# Patient Record
Sex: MALE | Race: WHITE | Employment: OTHER | URBAN - METROPOLITAN AREA
[De-identification: names, ages, dates, MRNs, and addresses within clinical notes are randomized per-mention and may not be internally consistent; named-entity substitution may affect disease eponyms.]

---

## 2017-03-21 ENCOUNTER — TRANSFERRED RECORDS (OUTPATIENT)
Dept: HEALTH INFORMATION MANAGEMENT | Facility: CLINIC | Age: 77
End: 2017-03-21

## 2017-04-11 ENCOUNTER — OFFICE VISIT (OUTPATIENT)
Dept: URGENT CARE | Facility: URGENT CARE | Age: 77
End: 2017-04-11
Payer: COMMERCIAL

## 2017-04-11 VITALS
DIASTOLIC BLOOD PRESSURE: 88 MMHG | BODY MASS INDEX: 29.83 KG/M2 | SYSTOLIC BLOOD PRESSURE: 130 MMHG | OXYGEN SATURATION: 97 % | HEART RATE: 74 BPM | TEMPERATURE: 98 F | WEIGHT: 202 LBS

## 2017-04-11 DIAGNOSIS — L03.115 CELLULITIS OF RIGHT LEG WITHOUT FOOT: Primary | ICD-10-CM

## 2017-04-11 PROCEDURE — 99213 OFFICE O/P EST LOW 20 MIN: CPT | Performed by: FAMILY MEDICINE

## 2017-04-11 RX ORDER — CEPHALEXIN 500 MG/1
500 CAPSULE ORAL 3 TIMES DAILY
Qty: 21 CAPSULE | Refills: 0 | Status: SHIPPED | OUTPATIENT
Start: 2017-04-11 | End: 2017-04-15

## 2017-04-11 NOTE — MR AVS SNAPSHOT
After Visit Summary   4/11/2017    Yogesh Abreu    MRN: [id number]           Patient Information     Date Of Birth          1940        Visit Information        Provider Department      4/11/2017 8:40 PM Shannan Hitchcock MD [facility]        Today's Diagnoses     Cellulitis of right leg without foot    -  1       Follow-ups after your visit        Who to contact     If you have questions or need follow up information about today's clinic visit or your schedule please contact [facility] directly at [phone].  Normal or non-critical lab and imaging results will be communicated to you by [employer] OnDemandhart, letter or phone within 4 business days after the clinic has received the results. If you do not hear from us within 7 days, please contact the clinic through City BeBet or phone. If you have a critical or abnormal lab result, we will notify you by phone as soon as possible.  Submit refill requests through GitCafe or call your pharmacy and they will forward the refill request to us. Please allow 3 business days for your refill to be completed.          Additional Information About Your Visit        MyChart Information     GitCafe gives you secure access to your electronic health record. If you see a primary care provider, you can also send messages to your care team and make appointments. If you have questions, please call your primary care clinic.  If you do not have a primary care provider, please call [phone] and they will assist you.        Care EveryWhere ID     This is your Care EveryWhere ID. This could be used by other organizations to access your [place] medical records  [id number]        Your Vitals Were     Pulse Temperature Pulse Oximetry BMI (Body Mass Index)          74 98  F (36.7  C) (Oral) 97% 29.83 kg/m2         Blood Pressure from Last 3 Encounters:   04/11/17 130/88   11/10/16 106/80   12/28/15 104/68    Weight from Last 3 Encounters:    04/11/17 202 lb (91.6 kg)   11/10/16 194 lb (88 kg)   12/30/15 190 lb 3.2 oz (86.3 kg)              Today, you had the following     No orders found for display         Today's Medication Changes          These changes are accurate as of: 4/11/17  9:14 PM.  If you have any questions, ask your nurse or doctor.               Start taking these medicines.        Dose/Directions    cephALEXin 500 MG capsule   Commonly known as:  KEFLEX   Used for:  Cellulitis of right leg without foot   Started by:  Shannan Hitchcock MD        Dose:  500 mg   Take 1 capsule (500 mg) by mouth 3 times daily for 7 days   Quantity:  21 capsule   Refills:  0            Where to get your medicines      These medications were sent to [facility] [address] [place] [address] [address] AT [address]  [address], [facility] [address]    Hours:  24-hours Phone:  [phone]     cephALEXin 500 MG capsule                Primary Care Provider Office Phone # Fax #    Brandan Clinton [phone]       [facility] [address] [address]        Thank you!     Thank you for choosing [facility]  for your care. Our goal is always to provide you with excellent care. Hearing back from our patients is one way we can continue to improve our services. Please take a few minutes to complete the written survey that you may receive in the mail after your visit with us. Thank you!             Your Updated Medication List - Protect others around you: Learn how to safely use, store and throw away your medicines at www.disposemymeds.org.          This list is accurate as of: 4/11/17  9:14 PM.  Always use your most recent med list.                   Brand Name Dispense Instructions for use    albuterol 108 (90 BASE) MCG/ACT Inhaler    PROAIR HFA/PROVENTIL HFA/VENTOLIN HFA    1 Inhaler    Inhale 2 puffs into the lungs every 6 hours as needed for shortness of breath /  dyspnea or wheezing       atorvastatin 40 MG tablet    LIPITOR    90 tablet    Take 1 tablet (40 mg) by mouth At Bedtime       CALCIUM CITRATE + D PO      Take 600 mg by mouth 2 times daily       cephALEXin 500 MG capsule    KEFLEX    21 capsule    Take 1 capsule (500 mg) by mouth 3 times daily for 7 days       coenzyme Q-10 capsule      Take 1 capsule by mouth daily       fluticasone-salmeterol 250-50 MCG/DOSE diskus inhaler    ADVAIR DISKUS    1 Inhaler    Inhale 1 puff into the lungs 2 times daily       LISINOPRIL PO      Take 5 mg by mouth       metoprolol 25 MG 24 hr tablet    TOPROL-XL    30 tablet    Take 0.5 tablets (12.5 mg) by mouth daily       nitroglycerin 0.4 MG sublingual tablet    NITROSTAT    25 tablet    Place 1 tablet (0.4 mg) under the tongue every 5 minutes as needed for chest pain       vardenafil 20 MG tablet    LEVITRA    12 tablet    Take 0.5-1 tablets (10-20 mg) by mouth daily as needed for erectile dysfunction Never use with nitroglycerin, terazosin or doxazosin.       VITAMIN D3 PO      Take 1,000 Units by mouth 2 times daily       warfarin 5 MG tablet    COUMADIN    150 tablet    10mg Tuesday and 7.5mg the rest of the week or as directed per INR clinic

## 2017-04-12 NOTE — NURSING NOTE
"Chief Complaint   Patient presents with     Urgent Care     pt c/o redness and swelling of R ankle x 4 days worse today pnful to walk --no injury recalled--hx DVT--taking Coumadin       Initial /88 (BP Location: Right arm, Cuff Size: Adult Regular)  Pulse 74  Temp 98  F (36.7  C) (Oral)  Wt 202 lb (91.6 kg)  SpO2 97%  BMI 29.83 kg/m2 Estimated body mass index is 29.83 kg/(m^2) as calculated from the following:    Height as of 11/10/16: 5' 9\" (1.753 m).    Weight as of this encounter: 202 lb (91.6 kg).  Medication Reconciliation: incomplete--it was a triage--just confrimed coumadin--  Jacklyn Mccauley CMA ([facility])      "

## 2017-04-12 NOTE — PROGRESS NOTES
SUBJECTIVE:  Yogesh Abreu is a 76 year old male who presents to the clinic today for a rash.  Onset of rash was 4 day(s) ago.   Rash is sudden onset and still present.  Location of the rash: R lower shin, near the ankle.  Quality/symptoms of rash: painful and redness   Symptoms are moderate and rash seems to be worsening.  Previous history of a similar rash? No  Recent exposure history: none known    Associated symptoms include: nothing.    History of DVT.  INR was therapeutic at last check (about 3 weeks ago).    Past Medical History:   Diagnosis Date     CAD (coronary artery disease), native coronary artery 10/2015    9/2015 Heart cath - 90% diagonal, 50-60% CFX, 100% prox RCA occlusion - MAL placed in RCA, 10/2015 EF 35-40% by Echo     DVT (deep venous thrombosis) (H) 9/2015 9/2015 Occlusive DVT extending from left common femoral to mid left popliteal vein     Hypercholesteraemia      Hypertension      PE (pulmonary embolism) 9/2015     PMR (polymyalgia rheumatica) (H)      Polymyalgia rheumatica (H)      STEMI (ST elevation myocardial infarction) (H) 10/2015    10/2015 Inferior STEMI - 100% RCA occlusion with MAL placed     Current Outpatient Prescriptions   Medication Sig Dispense Refill     cephALEXin (KEFLEX) 500 MG capsule Take 1 capsule (500 mg) by mouth 3 times daily for 7 days 21 capsule 0     LISINOPRIL PO Take 5 mg by mouth       vardenafil (LEVITRA) 20 MG tablet Take 0.5-1 tablets (10-20 mg) by mouth daily as needed for erectile dysfunction Never use with nitroglycerin, terazosin or doxazosin. 12 tablet 3     metoprolol (TOPROL-XL) 25 MG 24 hr tablet Take 0.5 tablets (12.5 mg) by mouth daily 30 tablet 6     atorvastatin (LIPITOR) 40 MG tablet Take 1 tablet (40 mg) by mouth At Bedtime 90 tablet 4     warfarin (COUMADIN) 5 MG tablet 10mg Tuesday and 7.5mg the rest of the week or as directed per INR clinic 150 tablet 1     fluticasone-salmeterol (ADVAIR DISKUS) 250-50 MCG/DOSE diskus inhaler Inhale  1 puff into the lungs 2 times daily 1 Inhaler 12     albuterol (PROAIR HFA, PROVENTIL HFA, VENTOLIN HFA) 108 (90 BASE) MCG/ACT inhaler Inhale 2 puffs into the lungs every 6 hours as needed for shortness of breath / dyspnea or wheezing 1 Inhaler 0     coenzyme Q-10 capsule Take 1 capsule by mouth daily       Cholecalciferol (VITAMIN D3 PO) Take 1,000 Units by mouth 2 times daily       nitroglycerin (NITROSTAT) 0.4 MG SL tablet Place 1 tablet (0.4 mg) under the tongue every 5 minutes as needed for chest pain 25 tablet 3     Calcium Citrate-Vitamin D (CALCIUM CITRATE + D PO) Take 600 mg by mouth 2 times daily       Social History   Substance Use Topics     Smoking status: Former Smoker     Smokeless tobacco: Not on file      Comment: quit 40 years ago     Alcohol use Yes      Comment: socially       ROS:  Review of systems negative except as stated above.    EXAM:   /88 (BP Location: Right arm, Cuff Size: Adult Regular)  Pulse 74  Temp 98  F (36.7  C) (Oral)  Wt 202 lb (91.6 kg)  SpO2 97%  BMI 29.83 kg/m2  GENERAL: alert, no acute distress.  SKIN: Rash description:    Distribution: localized  Location: lower leg, right    Color: red,  Lesion type: patch, isolated with warmth, tenderness and swelling  There are no red streaks extending proximally from the area.  There is no calf tenderness on either leg.    ASSESSMENT:  Cellulitis, R shin    PLAN:  1) See today's orders for cephalexin 500 mg TID x 7 days.  2) Follow-up with primary clinic if not improving within 2-3 days, sooner if worsening despite antibiotics.

## 2017-04-15 ENCOUNTER — OFFICE VISIT (OUTPATIENT)
Dept: URGENT CARE | Facility: URGENT CARE | Age: 77
End: 2017-04-15
Payer: COMMERCIAL

## 2017-04-15 VITALS
HEART RATE: 85 BPM | DIASTOLIC BLOOD PRESSURE: 85 MMHG | TEMPERATURE: 97.4 F | SYSTOLIC BLOOD PRESSURE: 130 MMHG | OXYGEN SATURATION: 96 %

## 2017-04-15 DIAGNOSIS — L03.115 CELLULITIS OF RIGHT LOWER LEG: ICD-10-CM

## 2017-04-15 DIAGNOSIS — I87.2 VENOUS (PERIPHERAL) INSUFFICIENCY: Primary | ICD-10-CM

## 2017-04-15 DIAGNOSIS — R60.9 EDEMA, UNSPECIFIED TYPE: ICD-10-CM

## 2017-04-15 PROCEDURE — 99213 OFFICE O/P EST LOW 20 MIN: CPT | Performed by: PHYSICIAN ASSISTANT

## 2017-04-15 RX ORDER — CLINDAMYCIN HCL 150 MG
150 CAPSULE ORAL 3 TIMES DAILY
Qty: 30 CAPSULE | Refills: 0 | Status: SHIPPED | OUTPATIENT
Start: 2017-04-15 | End: 2017-05-25

## 2017-04-15 RX ORDER — FUROSEMIDE 20 MG
20 TABLET ORAL 2 TIMES DAILY
Qty: 60 TABLET | Refills: 1 | Status: SHIPPED | OUTPATIENT
Start: 2017-04-15 | End: 2017-05-25

## 2017-04-15 NOTE — MR AVS SNAPSHOT
After Visit Summary   4/15/2017    Yogesh Abreu    MRN: [id number]           Patient Information     Date Of Birth          1940        Visit Information        Provider Department      4/15/2017 3:55 PM Xin Gilbert PA-C [facility]        Today's Diagnoses     Venous (peripheral) insufficiency    -  1    Cellulitis of right lower leg        Edema, unspecified type           Follow-ups after your visit        Who to contact     If you have questions or need follow up information about today's clinic visit or your schedule please contact [facility] directly at [phone].  Normal or non-critical lab and imaging results will be communicated to you by vBrandhart, letter or phone within 4 business days after the clinic has received the results. If you do not hear from us within 7 days, please contact the clinic through vBrandhart or phone. If you have a critical or abnormal lab result, we will notify you by phone as soon as possible.  Submit refill requests through Guardium or call your pharmacy and they will forward the refill request to us. Please allow 3 business days for your refill to be completed.          Additional Information About Your Visit        MyChart Information     Guardium gives you secure access to your electronic health record. If you see a primary care provider, you can also send messages to your care team and make appointments. If you have questions, please call your primary care clinic.  If you do not have a primary care provider, please call [phone] and they will assist you.        Care EveryWhere ID     This is your Care EveryWhere ID. This could be used by other organizations to access your [place] medical records  [id number]        Your Vitals Were     Pulse Temperature Pulse Oximetry             85 97.4  F (36.3  C) (Oral) 96%          Blood Pressure from Last 3 Encounters:   04/15/17 130/85   04/11/17 130/88    11/10/16 106/80    Weight from Last 3 Encounters:   04/11/17 202 lb (91.6 kg)   11/10/16 194 lb (88 kg)   12/30/15 190 lb 3.2 oz (86.3 kg)              Today, you had the following     No orders found for display         Today's Medication Changes          These changes are accurate as of: 4/15/17  5:30 PM.  If you have any questions, ask your nurse or doctor.               Start taking these medicines.        Dose/Directions    clindamycin 150 MG capsule   Commonly known as:  CLEOCIN   Used for:  Cellulitis of right lower leg   Started by:  Xin Gilbert PA-C        Dose:  150 mg   Take 1 capsule (150 mg) by mouth 3 times daily   Quantity:  30 capsule   Refills:  0       furosemide 20 MG tablet   Commonly known as:  LASIX   Used for:  Edema, unspecified type   Started by:  Xin Gilbert PA-C        Dose:  20 mg   Take 1 tablet (20 mg) by mouth 2 times daily   Quantity:  60 tablet   Refills:  1         Stop taking these medicines if you haven't already. Please contact your care team if you have questions.     cephALEXin 500 MG capsule   Commonly known as:  KEFLEX   Stopped by:  Xin Gilbert PA-C                Where to get your medicines      These medications were sent to [facility] [address] [place] [address] [address] AT [facility]  [address], [facility] [address]     Phone:  [phone]     clindamycin 150 MG capsule    furosemide 20 MG tablet                Primary Care Provider Office Phone # Fax #    Brandan Clinton [phone]       [facility] [address]  [facility] [address]        Thank you!     Thank you for choosing [facility]  for your care. Our goal is always to provide you with excellent care. Hearing back from our patients is one way we can continue to improve our services. Please take a few minutes to complete the written survey that you may receive in the  mail after your visit with us. Thank you!             Your Updated Medication List - Protect others around you: Learn how to safely use, store and throw away your medicines at www.disposemymeds.org.          This list is accurate as of: 4/15/17  5:30 PM.  Always use your most recent med list.                   Brand Name Dispense Instructions for use    albuterol 108 (90 BASE) MCG/ACT Inhaler    PROAIR HFA/PROVENTIL HFA/VENTOLIN HFA    1 Inhaler    Inhale 2 puffs into the lungs every 6 hours as needed for shortness of breath / dyspnea or wheezing       atorvastatin 40 MG tablet    LIPITOR    90 tablet    Take 1 tablet (40 mg) by mouth At Bedtime       CALCIUM CITRATE + D PO      Take 600 mg by mouth 2 times daily       clindamycin 150 MG capsule    CLEOCIN    30 capsule    Take 1 capsule (150 mg) by mouth 3 times daily       coenzyme Q-10 capsule      Take 1 capsule by mouth daily       fluticasone-salmeterol 250-50 MCG/DOSE diskus inhaler    ADVAIR DISKUS    1 Inhaler    Inhale 1 puff into the lungs 2 times daily       furosemide 20 MG tablet    LASIX    60 tablet    Take 1 tablet (20 mg) by mouth 2 times daily       LISINOPRIL PO      Take 5 mg by mouth       metoprolol 25 MG 24 hr tablet    TOPROL-XL    30 tablet    Take 0.5 tablets (12.5 mg) by mouth daily       nitroglycerin 0.4 MG sublingual tablet    NITROSTAT    25 tablet    Place 1 tablet (0.4 mg) under the tongue every 5 minutes as needed for chest pain       vardenafil 20 MG tablet    LEVITRA    12 tablet    Take 0.5-1 tablets (10-20 mg) by mouth daily as needed for erectile dysfunction Never use with nitroglycerin, terazosin or doxazosin.       VITAMIN D3 PO      Take 1,000 Units by mouth 2 times daily       warfarin 5 MG tablet    COUMADIN    150 tablet    10mg Tuesday and 7.5mg the rest of the week or as directed per INR clinic

## 2017-04-15 NOTE — PROGRESS NOTES
SUBJECTIVE:                                                    Yogesh Abreu is a 76 year old male who presents to clinic today for the following health issues:      Rash - states he is just finishing a 7 day course of Keflex due to cellulitis dx while in [place]. Just got back within past couple of days. Drove back x 3 days.   Hx of DVT L upper inner leg and subsequent PE in past 2 yrs. On coumadin.     Also has well managed HTN on Lisinopril low dose and Toprol. Hx of MI about 2 years ago.  No hx of CHF or significant peripheral edema.     Onset: about 2 weeks ago    Description:   Location: right anterior lower leg  Character: painful  Itching (Pruritis): no     Progression of Symptoms:  worsening    Accompanying Signs & Symptoms:  Fever: no   Body aches or joint pain: no   Sore throat symptoms: no   Recent cold symptoms: no    History:   Previous similar rash: no     Precipitating factors:   Exposure to similar rash: no   New exposures: None   Recent travel: no     Alleviating factors:  none     Therapies Tried and outcome: using Keflex for presumed cellulitis          Problem list and histories reviewed & adjusted, as indicated.  Additional history: as documented    Past Medical History:   Diagnosis Date     CAD (coronary artery disease), native coronary artery 10/2015    9/2015 Heart cath - 90% diagonal, 50-60% CFX, 100% prox RCA occlusion - MAL placed in RCA, 10/2015 EF 35-40% by Echo     DVT (deep venous thrombosis) (H) 9/2015 9/2015 Occlusive DVT extending from left common femoral to mid left popliteal vein     Hypercholesteraemia      Hypertension      PE (pulmonary embolism) 9/2015     PMR (polymyalgia rheumatica) (H)      Polymyalgia rheumatica (H)      STEMI (ST elevation myocardial infarction) (H) 10/2015    10/2015 Inferior STEMI - 100% RCA occlusion with MAL placed     Past Surgical History:   Procedure Laterality Date     HEART CATH STENT COR W/WO PTCA  10/2015    90% diagonal, 50-60% CFX, 100%  prox RCA occlusion - MAL placed in RCA     ORTHOPEDIC SURGERY  08/2015    right carpal tunnel     Social History   Substance Use Topics     Smoking status: Former Smoker     Smokeless tobacco: Not on file      Comment: quit 40 years ago     Alcohol use Yes      Comment: socially     Family History   Problem Relation Age of Onset     Hypertension Brother      Hypertension Brother       No Known Allergies  Current Outpatient Prescriptions   Medication Sig Dispense Refill     clindamycin (CLEOCIN) 150 MG capsule Take 1 capsule (150 mg) by mouth 3 times daily 30 capsule 0     furosemide (LASIX) 20 MG tablet Take 1 tablet (20 mg) by mouth 2 times daily 60 tablet 1     LISINOPRIL PO Take 5 mg by mouth       metoprolol (TOPROL-XL) 25 MG 24 hr tablet Take 0.5 tablets (12.5 mg) by mouth daily 30 tablet 6     atorvastatin (LIPITOR) 40 MG tablet Take 1 tablet (40 mg) by mouth At Bedtime 90 tablet 4     warfarin (COUMADIN) 5 MG tablet 10mg Tuesday and 7.5mg the rest of the week or as directed per INR clinic 150 tablet 1     coenzyme Q-10 capsule Take 1 capsule by mouth daily       Cholecalciferol (VITAMIN D3 PO) Take 1,000 Units by mouth 2 times daily       Calcium Citrate-Vitamin D (CALCIUM CITRATE + D PO) Take 600 mg by mouth 2 times daily       vardenafil (LEVITRA) 20 MG tablet Take 0.5-1 tablets (10-20 mg) by mouth daily as needed for erectile dysfunction Never use with nitroglycerin, terazosin or doxazosin. (Patient not taking: Reported on 4/15/2017) 12 tablet 3     fluticasone-salmeterol (ADVAIR DISKUS) 250-50 MCG/DOSE diskus inhaler Inhale 1 puff into the lungs 2 times daily (Patient not taking: Reported on 4/15/2017) 1 Inhaler 12     albuterol (PROAIR HFA, PROVENTIL HFA, VENTOLIN HFA) 108 (90 BASE) MCG/ACT inhaler Inhale 2 puffs into the lungs every 6 hours as needed for shortness of breath / dyspnea or wheezing (Patient not taking: Reported on 4/15/2017) 1 Inhaler 0     nitroglycerin (NITROSTAT) 0.4 MG SL tablet Place  1 tablet (0.4 mg) under the tongue every 5 minutes as needed for chest pain (Patient not taking: Reported on 4/15/2017) 25 tablet 3           Reviewed and updated as needed this visit by clinical staff  Tobacco  Allergies  Meds  Soc Hx      Reviewed and updated as needed this visit by Provider         ROS:  Constitutional, HEENT, cardiovascular, pulmonary, gi and gu systems are negative, except as otherwise noted.    OBJECTIVE:                                                    /85  Pulse 85  Temp 97.4  F (36.3  C) (Oral)  SpO2 96%  There is no height or weight on file to calculate BMI.   GENERAL: healthy, alert and no distress  RESP: lungs clear to auscultation - no rales, rhonchi or wheezes  CV: regular rate and rhythm, normal S1 S2, no S3 or S4, no murmur, click or rub, no peripheral edema and peripheral pulses strong  SKIN: R lower anterior extremity - area in question pink/red area oval about 10cm in diameter. Warm to the touch centrally. No oozing/weeping. 2+ pitting edema around this area to the ankle. Normal temp of rest of leg/foot. Normal sensation and pulses lower extremity.  No red streaking or signs of ascending cellulitis.     Diagnostic Test Results:  none   Recent BMP normal - done 2 weeks ago in [place].     ASSESSMENT:                                                       Cellulitis of right lower leg  Venous (peripheral) insufficiency  Edema, unspecified type      PLAN:                                                        ICD-10-CM    1. Venous (peripheral) insufficiency I87.2    2. Cellulitis of right lower leg L03.115 clindamycin (CLEOCIN) 150 MG capsule   3. Edema, unspecified type R60.9 furosemide (LASIX) 20 MG tablet           MEDICATIONS:        - Trial of Lasix for swelling - start with 20mg in the morning. Side effects discussed with him.        - Discontinue Keflex       - Start taking Clindamycin - reviewed with him that this looks more like a venous insufficiency than a true  cellulitis. Given holiday weekend, and concerns with increasing warmth suggested changing to med that covers different organisms. This is superficial - at this point not concerned of DVT given location. Compression stockings (moderate) suggested for now. May need Jobst stockings prescribed in future. Also discussed that further studies such as US and possibly CT angiogram of extremity may be necessary - again will discuss with PCP.       - Continue other medications without change  FUTURE APPOINTMENTS:       - Follow-up visit in next 1-2 weeks with IM - he hopes to establish care with [facility].     Xin Gilbert PA-C  [facility]    Orders Placed This Encounter     clindamycin (CLEOCIN) 150 MG capsule     furosemide (LASIX) 20 MG tablet       Chart documentation done in part with Dragon Voice recognition Software. Although reviewed after completion, some word and grammatical error may remain.  AVS given to patient upon discharge today.  Electronically signed by Xin Gilbert PA-C  April 15, 2017  5:21 PM

## 2017-04-15 NOTE — NURSING NOTE
"Chief Complaint   Patient presents with     Urgent Care     Derm Problem     red,swollen,hot rash on right leg,seen at urgent care on thursday started on  keflex        Initial /85  Pulse 85  Temp 97.4  F (36.3  C) (Oral)  SpO2 96% Estimated body mass index is 29.83 kg/(m^2) as calculated from the following:    Height as of 11/10/16: 5' 9\" (1.753 m).    Weight as of 4/11/17: 202 lb (91.6 kg).  Medication Reconciliation: complete   Oralia BRYANT MA       "

## 2017-05-03 ENCOUNTER — ANTICOAGULATION THERAPY VISIT (OUTPATIENT)
Dept: CARDIOLOGY | Facility: CLINIC | Age: 77
End: 2017-05-03
Payer: COMMERCIAL

## 2017-05-03 DIAGNOSIS — Z79.01 LONG-TERM (CURRENT) USE OF ANTICOAGULANTS: ICD-10-CM

## 2017-05-03 LAB — INR POINT OF CARE: 1.7 (ref 0.86–1.14)

## 2017-05-03 PROCEDURE — 36416 COLLJ CAPILLARY BLOOD SPEC: CPT | Performed by: INTERNAL MEDICINE

## 2017-05-03 PROCEDURE — 85610 PROTHROMBIN TIME: CPT | Mod: QW | Performed by: INTERNAL MEDICINE

## 2017-05-03 NOTE — MR AVS SNAPSHOT
Yogesh Abreu   5/3/2017 8:00 AM   Anticoagulation Therapy Visit    Description:  76 year old male   Provider:  PABLO ANTICOAGULATION   Department:  [facility]           INR as of 5/3/2017     Today's INR 1.7!      Anticoagulation Summary as of 5/3/2017     INR goal 2.0-3.0   Today's INR 1.7!   Full instructions 5/3: 10 mg; Otherwise 10 mg on Sun, Wed; 7.5 mg all other days   Next INR check 5/12/2017    Indications   Long-term (current) use of anticoagulants [Z79.01] [Z79.01]  PE (pulmonary embolism) [I26.99]         Your next Anticoagulation Clinic appointment(s)     May 12, 2017  8:00 AM CDT   Anticoagulation Visit with  ANTICOAGULATION   [facility] ([facility])    [address] [place] [id number]   [phone]              Contact Numbers     Anticoagulant (INR) Clinic Number: [phone]          May 2017 Details    Sun Mon Tue Wed Thu Fri Sat      1               2               3      10 mg   See details      4      7.5 mg         5      7.5 mg         6      7.5 mg           7      10 mg         8      7.5 mg         9      7.5 mg         10      10 mg         11      7.5 mg         12            13                 14               15               16               17               18               19               20                 21               22               23               24               25               26               27                 28               29               30               31                   Date Details   05/03 This INR check       Date of next INR:  5/12/2017         How to take your warfarin dose     To take:  7.5 mg Take 1.5 of the 5 mg tablets.    To take:  10 mg Take 2 of the 5 mg tablets.

## 2017-05-03 NOTE — PROGRESS NOTES
ANTICOAGULATION FOLLOW-UP CLINIC VISIT    Patient Name:  Yogesh Abreu  Date:  5/3/2017  Contact Type:  Face to Face    SUBJECTIVE:     Patient Findings     Positives Change in diet/appetite (eating more greens now -- 4x/wk)           OBJECTIVE    INR Protime   Date Value Ref Range Status   05/03/2017 1.7 (A) 0.86 - 1.14 Final     Chromogenic Factor 10   Date Value Ref Range Status   10/12/2015 28 (L) 70 - 130 % Final     Comment:     Therapeutic Range:  A Chromogenic Factor 10 level of approximately 20-40%   inversely correlates with an INR of 2-3 for patients receiving Warfarin.   Chromogenic Factor 10 levels below 20% indicate an INR greater than 3 and   levels above 40% indicate an INR less than 2.         ASSESSMENT / PLAN  INR assessment SUB    Recheck INR In: 10 DAYS    INR Location Clinic      Anticoagulation Summary as of 5/3/2017     INR goal 2.0-3.0   Today's INR 1.7!   Maintenance plan 10 mg (5 mg x 2) on Sun, Wed; 7.5 mg (5 mg x 1.5) all other days   Full instructions 5/3: 10 mg; Otherwise 10 mg on Sun, Wed; 7.5 mg all other days   Weekly total 57.5 mg   Plan last modified Tanja Wills RN (5/3/2017)   Next INR check 5/12/2017   Target end date Indefinite    Indications   Long-term (current) use of anticoagulants [Z79.01] [Z79.01]  PE (pulmonary embolism) [I26.99]         Anticoagulation Episode Summary     INR check location     Preferred lab     Send INR reminders to [facility] HEART INR NURSE    Comments       Anticoagulation Care Providers     Provider Role Specialty Phone number    Satya Rogers MD [facility] Cardiology [phone]            See the Encounter Report to view Anticoagulation Flowsheet and Dosing Calendar (Go to Encounters tab in chart review, and find the Anticoagulation Therapy Visit)    INR 1.7 He returned from [place] about 3 weeks ago. INR's were managed by MD in [place] over the winter. He states that INR was 2.2-2.7 over the winter months. Since returning to [place], he has  increased intake of greens (4x/wk). No change in meds. Will increase by 2.5 mg/wk - 10 mg SuW and 7.5 mg all other days with recheck in 10 days. Dosage adjustment made based on physician directed care plan.    Tanja Wills RN

## 2017-05-12 ENCOUNTER — ANTICOAGULATION THERAPY VISIT (OUTPATIENT)
Dept: CARDIOLOGY | Facility: CLINIC | Age: 77
End: 2017-05-12
Payer: COMMERCIAL

## 2017-05-12 DIAGNOSIS — Z79.01 LONG-TERM (CURRENT) USE OF ANTICOAGULANTS: ICD-10-CM

## 2017-05-12 LAB — INR POINT OF CARE: 2.4 (ref 0.86–1.14)

## 2017-05-12 PROCEDURE — 36416 COLLJ CAPILLARY BLOOD SPEC: CPT | Performed by: INTERNAL MEDICINE

## 2017-05-12 PROCEDURE — 85610 PROTHROMBIN TIME: CPT | Mod: QW | Performed by: INTERNAL MEDICINE

## 2017-05-12 NOTE — MR AVS SNAPSHOT
Yogesh Abreu   5/12/2017 8:00 AM   Anticoagulation Therapy Visit    Description:  76 year old male   Provider:  PABLO ANTICOAGULATION   Department:  [facility]           INR as of 5/12/2017     Today's INR 2.4      Anticoagulation Summary as of 5/12/2017     INR goal 2.0-3.0   Today's INR 2.4   Full instructions 10 mg on Sun, Wed; 7.5 mg all other days   Next INR check 6/1/2017    Indications   Long-term (current) use of anticoagulants [Z79.01] [Z79.01]  PE (pulmonary embolism) [I26.99]         Your next Anticoagulation Clinic appointment(s)     Jun 01, 2017  8:00 AM CDT   Anticoagulation Visit with  ANTICOAGULATION   [facility] ([facility])    [address] [place] [id number] [phone]              Contact Numbers     Anticoagulant (INR) Clinic Number: [id number]          May 2017 Details    Sun Mon Tue Wed Thu Fri Sat      1               2               3               4               5               6                 7               8               9               10               11               12      7.5 mg   See details      13      7.5 mg           14      10 mg         15      7.5 mg         16      7.5 mg         17      10 mg         18      7.5 mg         19      7.5 mg         20      7.5 mg           21      10 mg         22      7.5 mg         23      7.5 mg         24      10 mg         25      7.5 mg         26      7.5 mg         27      7.5 mg           28      10 mg         29      7.5 mg         30      7.5 mg         31      10 mg             Date Details   05/12 This INR check               How to take your warfarin dose     To take:  7.5 mg Take 1.5 of the 5 mg tablets.    To take:  10 mg Take 2 of the 5 mg tablets.           June 2017 Details    Sun Mon Tue Wed Thu Fri Sat         1            2               3                 4               5               6               7               8                9               10                 11               12               13               14               15               16               17                 18               19               20               21               22               23               24                 25               26               27               28               29               30                 Date Details   No additional details    Date of next INR:  6/1/2017         How to take your warfarin dose     To take:  7.5 mg Take 1.5 of the 5 mg tablets.

## 2017-05-12 NOTE — PROGRESS NOTES
ANTICOAGULATION FOLLOW-UP CLINIC VISIT    Patient Name:  Yogesh Abreu  Date:  5/12/2017  Contact Type:  Face to Face    SUBJECTIVE:     Patient Findings     Positives No Problem Findings           OBJECTIVE    INR Protime   Date Value Ref Range Status   05/12/2017 2.4 (A) 0.86 - 1.14 Final     Chromogenic Factor 10   Date Value Ref Range Status   10/12/2015 28 (L) 70 - 130 % Final     Comment:     Therapeutic Range:  A Chromogenic Factor 10 level of approximately 20-40%   inversely correlates with an INR of 2-3 for patients receiving Warfarin.   Chromogenic Factor 10 levels below 20% indicate an INR greater than 3 and   levels above 40% indicate an INR less than 2.         ASSESSMENT / PLAN  INR assessment THER    Recheck INR In: 3 WEEKS    INR Location Clinic      Anticoagulation Summary as of 5/12/2017     INR goal 2.0-3.0   Today's INR 2.4   Maintenance plan 10 mg (5 mg x 2) on Sun, Wed; 7.5 mg (5 mg x 1.5) all other days   Full instructions 10 mg on Sun, Wed; 7.5 mg all other days   Weekly total 57.5 mg   No change documented Tanja Wills, RN   Plan last modified Tanja Wills, RN (5/3/2017)   Next INR check 6/1/2017   Target end date Indefinite    Indications   Long-term (current) use of anticoagulants [Z79.01] [Z79.01]  PE (pulmonary embolism) [I26.99]         Anticoagulation Episode Summary     INR check location     Preferred lab     Send INR reminders to [facility] HEART INR NURSE    Comments       Anticoagulation Care Providers     Provider Role Specialty Phone number    Satya Rogers MD [facility] Cardiology [phone]            See the Encounter Report to view Anticoagulation Flowsheet and Dosing Calendar (Go to Encounters tab in chart review, and find the Anticoagulation Therapy Visit)    INR 2.4 INR back in range after increase in dosing. He has gained about 8# this winter. Started calcium supplement within the last couple of months. Decreased intake of greens to 4-5x/wk. Will continue  current dosing of 10 mg SuW and 7.5 mg all other days with recheck in 3 weeks to see if we need to keep the increase in dosing or if INR is rising then possibly resume previous dosing schedule. Dosage adjustment made based on physician directed care plan.    Tanja Wills RN

## 2017-05-25 ENCOUNTER — OFFICE VISIT (OUTPATIENT)
Dept: FAMILY MEDICINE | Facility: CLINIC | Age: 77
End: 2017-05-25
Payer: COMMERCIAL

## 2017-05-25 VITALS
BODY MASS INDEX: 29.1 KG/M2 | DIASTOLIC BLOOD PRESSURE: 82 MMHG | WEIGHT: 196.5 LBS | HEART RATE: 65 BPM | SYSTOLIC BLOOD PRESSURE: 111 MMHG | OXYGEN SATURATION: 96 % | TEMPERATURE: 95.8 F | HEIGHT: 69 IN

## 2017-05-25 DIAGNOSIS — M54.42 CHRONIC LEFT-SIDED LOW BACK PAIN WITH LEFT-SIDED SCIATICA: ICD-10-CM

## 2017-05-25 DIAGNOSIS — M35.3 POLYMYALGIA RHEUMATICA (H): ICD-10-CM

## 2017-05-25 DIAGNOSIS — N52.9 ERECTILE DYSFUNCTION, UNSPECIFIED ERECTILE DYSFUNCTION TYPE: ICD-10-CM

## 2017-05-25 DIAGNOSIS — G89.29 CHRONIC LEFT-SIDED LOW BACK PAIN WITH LEFT-SIDED SCIATICA: ICD-10-CM

## 2017-05-25 DIAGNOSIS — R05.9 COUGH: ICD-10-CM

## 2017-05-25 DIAGNOSIS — I25.110 CORONARY ARTERY DISEASE INVOLVING NATIVE CORONARY ARTERY OF NATIVE HEART WITH UNSTABLE ANGINA PECTORIS (H): ICD-10-CM

## 2017-05-25 DIAGNOSIS — Z23 NEED FOR PROPHYLACTIC VACCINATION WITH TETANUS-DIPHTHERIA (TD): ICD-10-CM

## 2017-05-25 DIAGNOSIS — Z12.11 SCREEN FOR COLON CANCER: ICD-10-CM

## 2017-05-25 DIAGNOSIS — Z79.01 LONG TERM CURRENT USE OF ANTICOAGULANT THERAPY: ICD-10-CM

## 2017-05-25 DIAGNOSIS — J45.40 MODERATE PERSISTENT ASTHMA WITHOUT COMPLICATION: ICD-10-CM

## 2017-05-25 DIAGNOSIS — D68.61 ANTIPHOSPHOLIPID ANTIBODY SYNDROME (H): Primary | ICD-10-CM

## 2017-05-25 PROBLEM — H91.93 HL (HEARING LOSS), BILATERAL: Status: ACTIVE | Noted: 2017-05-25

## 2017-05-25 PROCEDURE — 99215 OFFICE O/P EST HI 40 MIN: CPT | Performed by: INTERNAL MEDICINE

## 2017-05-25 RX ORDER — ATORVASTATIN CALCIUM 40 MG/1
40 TABLET, FILM COATED ORAL AT BEDTIME
Qty: 90 TABLET | Refills: 4 | Status: SHIPPED | OUTPATIENT
Start: 2017-05-25 | End: 2018-06-27

## 2017-05-25 RX ORDER — WARFARIN SODIUM 5 MG/1
TABLET ORAL
Qty: 150 TABLET | Refills: 1 | Status: CANCELLED | OUTPATIENT
Start: 2017-05-25

## 2017-05-25 RX ORDER — VARDENAFIL HYDROCHLORIDE 20 MG/1
10-20 TABLET ORAL DAILY PRN
Qty: 12 TABLET | Refills: 3 | Status: SHIPPED | OUTPATIENT
Start: 2017-05-25 | End: 2019-08-21

## 2017-05-25 RX ORDER — ALBUTEROL SULFATE 90 UG/1
2 AEROSOL, METERED RESPIRATORY (INHALATION) EVERY 6 HOURS PRN
Qty: 1 INHALER | Refills: 0 | Status: SHIPPED | OUTPATIENT
Start: 2017-05-25 | End: 2020-01-27

## 2017-05-25 RX ORDER — METOPROLOL SUCCINATE 25 MG/1
12.5 TABLET, EXTENDED RELEASE ORAL DAILY
Qty: 90 TABLET | Refills: 3 | Status: SHIPPED | OUTPATIENT
Start: 2017-05-25 | End: 2017-10-09

## 2017-05-25 RX ORDER — UBIDECARENONE 30 MG
1 CAPSULE ORAL DAILY
Qty: 90 CAPSULE | Refills: 3 | Status: SHIPPED | OUTPATIENT
Start: 2017-05-25 | End: 2019-07-25

## 2017-05-25 RX ORDER — NITROGLYCERIN 0.4 MG/1
0.4 TABLET SUBLINGUAL EVERY 5 MIN PRN
Qty: 25 TABLET | Refills: 3 | Status: SHIPPED | OUTPATIENT
Start: 2017-05-25 | End: 2020-04-15

## 2017-05-25 RX ORDER — LISINOPRIL 5 MG/1
5 TABLET ORAL DAILY
Qty: 90 TABLET | Refills: 3 | Status: SHIPPED | OUTPATIENT
Start: 2017-05-25 | End: 2018-06-21

## 2017-05-25 NOTE — MR AVS SNAPSHOT
After Visit Summary   5/25/2017    Yogesh Abreu    MRN: [id number]           Patient Information     Date Of Birth          1940        Visit Information        Provider Department      5/25/2017 3:30 PM Filipe Goldberg MD [facility]        Today's Diagnoses     Antiphospholipid antibody syndrome (H)    -  1    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)        Polymyalgia rheumatica (H)        Erectile dysfunction, unspecified erectile dysfunction type        Cough        Screen for colon cancer        Moderate persistent asthma without complication        Need for prophylactic vaccination with tetanus-diphtheria (TD)        Long term current use of anticoagulant therapy        Chronic left-sided low back pain with left-sided sciatica           Follow-ups after your visit        Additional Services     GASTROENTEROLOGY ADULT REF PROCEDURE ONLY       Last Lab Result: Creatinine (mg/dL)       Date                     Value                 08/01/2016               0.9              ----------  Body mass index is 29.02 kg/(m^2).     Needed:  No  Language:  English    Patient will be contacted to schedule procedure.     Please be aware that coverage of these services is subject to the terms and limitations of your health insurance plan.  Call member services at your health plan with any benefit or coverage questions.  Any procedures must be performed at a [facility] facility OR coordinated by your clinic's referral office.    Please bring the following with you to your appointment:    (1) Any X-Rays, CTs or MRIs which have been performed.  Contact the facility where they were done to arrange for  prior to your scheduled appointment.    (2) List of current medications   (3) This referral request   (4) Any documents/labs given to you for this referral            [facility] PT, HAND, AND CHIROPRACTIC REFERRAL        **This order will print in the [facility] Scheduling Office**    Physical Therapy, Hand Therapy and Chiropractic Care are available through:    *[facility]  *[facility]  *[facility]    Call one number to schedule at any of the above locations: [phone].    Your provider has referred you to: Physical Therapy at [facility] or [facility]    Indication/Reason for Referral: left sided sciatica  Onset of Illness: Months ago   Therapy Orders: Evaluate and Treat  Special Programs:   Special Request:     [place]      Additional Comments for the Therapist or Chiropractor:     Please be aware that coverage of these services is subject to the terms and limitations of your health insurance plan.  Call member services at your health plan with any benefit or coverage questions.      Please bring the following to your appointment:    *Your personal calendar for scheduling future appointments  *Comfortable clothing            ONC/HEME ADULT REFERRAL       Your provider has referred you to: N: [facility] [phone]   [email or web address]    Dr. KAUFMAN    Please be aware that coverage of these services is subject to the terms and limitations of your health insurance plan.  Call member services at your health plan with any benefit or coverage questions.      Please bring the following with you to your appointment:    (1) Any X-Rays, CTs or MRIs which have been performed.  Contact the facility where they were done to arrange for  prior to your scheduled appointment.   (2) List of current medications  (3) This referral request   (4) Any documents/labs given to you for this referral                  Follow-up notes from your care team     Return in about 3 months (around 8/25/2017) for Physical Exam.      Your next 10 appointments already scheduled     Jun 02, 2017  8:40 AM CDT   Anticoagulation Visit with SHAH  "ANTICOAGULATION   [facility] ([facility])    [address]  [place] [address] [phone]              Who to contact     If you have questions or need follow up information about today's clinic visit or your schedule please contact [facility] directly at [phone].  Normal or non-critical lab and imaging results will be communicated to you by MyChart, letter or phone within 4 business days after the clinic has received the results. If you do not hear from us within 7 days, please contact the clinic through [employer] or phone. If you have a critical or abnormal lab result, we will notify you by phone as soon as possible.  Submit refill requests through [employer] or call your pharmacy and they will forward the refill request to us. Please allow 3 business days for your refill to be completed.          Additional Information About Your Visit        [employer] Information     [employer] gives you secure access to your electronic health record. If you see a primary care provider, you can also send messages to your care team and make appointments. If you have questions, please call your primary care clinic.  If you do not have a primary care provider, please call [phone] and they will assist you.        Care EveryWhere ID     This is your Care EveryWhere ID. This could be used by other organizations to access your [facility] medical records  [id number]        Your Vitals Were     Pulse Temperature Height Pulse Oximetry BMI (Body Mass Index)       65 95.8  F (35.4  C) (Tympanic) 5' 9\" (1.753 m) 96% 29.02 kg/m2        Blood Pressure from Last 3 Encounters:   05/25/17 111/82   04/15/17 130/85   04/11/17 130/88    Weight from Last 3 Encounters:   05/25/17 196 lb 8 oz (89.1 kg)   04/11/17 202 lb (91.6 kg)   11/10/16 194 lb (88 kg)              We Performed the Following     GASTROENTEROLOGY ADULT REF PROCEDURE ONLY     [facility] PT, HAND, AND " CHIROPRACTIC REFERRAL     ONC/HEME ADULT REFERRAL          Today's Medication Changes          These changes are accurate as of: 5/25/17  4:24 PM.  If you have any questions, ask your nurse or doctor.               These medicines have changed or have updated prescriptions.        Dose/Directions    lisinopril 5 MG tablet   Commonly known as:  PRINIVIL/ZESTRIL   This may have changed:    - medication strength  - when to take this   Used for:  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)   Changed by:  Filipe Goldberg MD        Dose:  5 mg   Take 1 tablet (5 mg) by mouth daily   Quantity:  90 tablet   Refills:  3            Where to get your medicines      These medications were sent to [facility] [address] [place] [address] [address] AT [facility] [address]  [address], [facility] [address]     Phone:  [phone]     albuterol 108 (90 BASE) MCG/ACT Inhaler    atorvastatin 40 MG tablet    coenzyme Q-10 capsule    fluticasone-salmeterol 250-50 MCG/DOSE diskus inhaler    lisinopril 5 MG tablet    metoprolol 25 MG 24 hr tablet    nitroglycerin 0.4 MG sublingual tablet         Some of these will need a paper prescription and others can be bought over the counter.  Ask your nurse if you have questions.     Bring a paper prescription for each of these medications     vardenafil 20 MG tablet                Primary Care Provider Office Phone # Fax #    Filipe Goldberg [phone]       [place] [address]  [facility] [address]        Thank you!     Thank you for choosing [facility]  for your care. Our goal is always to provide you with excellent care. Hearing back from our patients is one way we can continue to improve our services. Please take a few minutes to complete the written survey that you may receive in the mail after your visit with us. Thank you!              Your Updated Medication List - Protect others around you: Learn how to safely use, store and throw away your medicines at www.disposemymeds.org.          This list is accurate as of: 5/25/17  4:24 PM.  Always use your most recent med list.                   Brand Name Dispense Instructions for use    albuterol 108 (90 BASE) MCG/ACT Inhaler    PROAIR HFA/PROVENTIL HFA/VENTOLIN HFA    1 Inhaler    Inhale 2 puffs into the lungs every 6 hours as needed for shortness of breath / dyspnea or wheezing       atorvastatin 40 MG tablet    LIPITOR    90 tablet    Take 1 tablet (40 mg) by mouth At Bedtime       CALCIUM CITRATE + D PO      Take 600 mg by mouth 2 times daily       coenzyme Q-10 capsule     90 capsule    Take 1 capsule (100 mg) by mouth daily       fluticasone-salmeterol 250-50 MCG/DOSE diskus inhaler    ADVAIR DISKUS    1 Inhaler    Inhale 1 puff into the lungs 2 times daily       lisinopril 5 MG tablet    PRINIVIL/ZESTRIL    90 tablet    Take 1 tablet (5 mg) by mouth daily       metoprolol 25 MG 24 hr tablet    TOPROL-XL    90 tablet    Take 0.5 tablets (12.5 mg) by mouth daily       nitroglycerin 0.4 MG sublingual tablet    NITROSTAT    25 tablet    Place 1 tablet (0.4 mg) under the tongue every 5 minutes as needed for chest pain       vardenafil 20 MG tablet    LEVITRA    12 tablet    Take 0.5-1 tablets (10-20 mg) by mouth daily as needed for erectile dysfunction Never use with nitroglycerin, terazosin or doxazosin.       VITAMIN D3 PO      Take 1,000 Units by mouth 2 times daily       warfarin 5 MG tablet    COUMADIN    150 tablet    10mg Tuesday and 7.5mg the rest of the week or as directed per INR clinic

## 2017-05-25 NOTE — NURSING NOTE
"Chief Complaint   Patient presents with     Establish Care       Initial /82 (BP Location: Left arm, Patient Position: Chair, Cuff Size: Adult Regular)  Pulse 65  Temp 95.8  F (35.4  C) (Tympanic)  Ht 5' 9\" (1.753 m)  Wt 196 lb 8 oz (89.1 kg)  SpO2 96%  BMI 29.02 kg/m2 Estimated body mass index is 29.02 kg/(m^2) as calculated from the following:    Height as of this encounter: 5' 9\" (1.753 m).    Weight as of this encounter: 196 lb 8 oz (89.1 kg).  Medication Reconciliation: complete   Pao Rasmussen      "

## 2017-05-25 NOTE — PROGRESS NOTES
SUBJECTIVE:                                                    Yogesh Abreu is a 76 year old male who presents to clinic today for the following health issues:      Chief Complaint   Patient presents with     [facility] physician in [facility] due to many specialists in the vicinity       Was seen in urgent care with swelling and redness in leg skin  He was prescribed antibiotics and then lasix  He is not taking either medication now and his symptoms have resolved completely  No current fevers or chills  No orthopnea, PND, GUTIERREZ    Hyperlipidemia Follow-Up      Rate your low fat/cholesterol diet?: good    Taking statin?  Yes, no muscle aches from statin    Other lipid medications/supplements?:  none     Hypertension Follow-up      Outpatient blood pressures are not being checked.    Low Salt Diet: no added salt     Vascular Disease Follow-up:  Coronary Artery Disease (CAD)      Chest pain or pressure, left side neck or arm pain: No    Shortness of breath/increased sweats/nausea with exertion: No    Pain in calves walking 1-2 blocks: No    Worsened or new symptoms since last visit: No    Nitroglycerin use: no    Daily aspirin use: No (ON COUMADIN)     Asthma Follow-Up    Was ACT completed today?    Yes    ACT Total Scores 5/25/2017   ACT TOTAL SCORE (Goal Greater than or Equal to 20) 25   In the past 12 months, how many times did you visit the emergency room for your asthma without being admitted to the hospital? 0   In the past 12 months, how many times were you hospitalized overnight because of your asthma? 0       Recent asthma triggers that patient is dealing with: None        Problem list and histories reviewed & adjusted, as indicated.  Additional history: as documented    Patient Active Problem List   Diagnosis     PE (pulmonary embolism)     CAD (coronary artery disease), IMI -  => rescue stent to  RCA     Hypertension     Mixed hyperlipidemia     Polymyalgia rheumatica (H)      ACS (acute coronary syndrome) (H)     Long-term (current) use of anticoagulants [Z79.01]     Antiphospholipid antibody syndrome (H)     Moderate persistent asthma without complication     HL (hearing loss), bilateral     Past Surgical History:   Procedure Laterality Date     HEART CATH STENT COR W/WO PTCA  10/2015    90% diagonal, 50-60% CFX, 100% prox RCA occlusion - MAL placed in RCA     ORTHOPEDIC SURGERY  08/2015    right carpal tunnel       Social History   Substance Use Topics     Smoking status: Former Smoker     Smokeless tobacco: Not on file      Comment: quit 40 years ago     Alcohol use Yes      Comment: socially     Family History   Problem Relation Age of Onset     Hypertension Brother      Hypertension Brother          Current Outpatient Prescriptions   Medication Sig Dispense Refill     lisinopril (PRINIVIL/ZESTRIL) 5 MG tablet Take 1 tablet (5 mg) by mouth daily 90 tablet 3     vardenafil (LEVITRA) 20 MG tablet Take 0.5-1 tablets (10-20 mg) by mouth daily as needed for erectile dysfunction Never use with nitroglycerin, terazosin or doxazosin. 12 tablet 3     metoprolol (TOPROL-XL) 25 MG 24 hr tablet Take 0.5 tablets (12.5 mg) by mouth daily 90 tablet 3     atorvastatin (LIPITOR) 40 MG tablet Take 1 tablet (40 mg) by mouth At Bedtime 90 tablet 4     fluticasone-salmeterol (ADVAIR DISKUS) 250-50 MCG/DOSE diskus inhaler Inhale 1 puff into the lungs 2 times daily 1 Inhaler 12     albuterol (PROAIR HFA/PROVENTIL HFA/VENTOLIN HFA) 108 (90 BASE) MCG/ACT Inhaler Inhale 2 puffs into the lungs every 6 hours as needed for shortness of breath / dyspnea or wheezing 1 Inhaler 0     coenzyme Q-10 capsule Take 1 capsule (100 mg) by mouth daily 90 capsule 3     nitroglycerin (NITROSTAT) 0.4 MG sublingual tablet Place 1 tablet (0.4 mg) under the tongue every 5 minutes as needed for chest pain 25 tablet 3     warfarin (COUMADIN) 5 MG tablet 10mg Tuesday and 7.5mg the rest of the week or as directed per INR clinic  "150 tablet 1     Cholecalciferol (VITAMIN D3 PO) Take 1,000 Units by mouth 2 times daily       Calcium Citrate-Vitamin D (CALCIUM CITRATE + D PO) Take 600 mg by mouth 2 times daily       [DISCONTINUED] LISINOPRIL PO Take 5 mg by mouth       [DISCONTINUED] vardenafil (LEVITRA) 20 MG tablet Take 0.5-1 tablets (10-20 mg) by mouth daily as needed for erectile dysfunction Never use with nitroglycerin, terazosin or doxazosin. 12 tablet 3     [DISCONTINUED] metoprolol (TOPROL-XL) 25 MG 24 hr tablet Take 0.5 tablets (12.5 mg) by mouth daily 30 tablet 6     [DISCONTINUED] atorvastatin (LIPITOR) 40 MG tablet Take 1 tablet (40 mg) by mouth At Bedtime 90 tablet 4     [DISCONTINUED] fluticasone-salmeterol (ADVAIR DISKUS) 250-50 MCG/DOSE diskus inhaler Inhale 1 puff into the lungs 2 times daily 1 Inhaler 12     [DISCONTINUED] albuterol (PROAIR HFA, PROVENTIL HFA, VENTOLIN HFA) 108 (90 BASE) MCG/ACT inhaler Inhale 2 puffs into the lungs every 6 hours as needed for shortness of breath / dyspnea or wheezing 1 Inhaler 0     [DISCONTINUED] nitroglycerin (NITROSTAT) 0.4 MG SL tablet Place 1 tablet (0.4 mg) under the tongue every 5 minutes as needed for chest pain 25 tablet 3     Allergies   Allergen Reactions     Simvastatin        Reviewed and updated as needed this visit by clinical staff       Reviewed and updated as needed this visit by Provider         ROS:  Decades of left low back pain radiating down left leg that improved with PT in [place] ad got bad again when he played golf several days ago.    Constitutional, HEENT, cardiovascular, pulmonary, gi and gu systems are negative, except as otherwise noted.    OBJECTIVE:                                                    /82 (BP Location: Left arm, Patient Position: Chair, Cuff Size: Adult Regular)  Pulse 65  Temp 95.8  F (35.4  C) (Tympanic)  Ht 5' 9\" (1.753 m)  Wt 196 lb 8 oz (89.1 kg)  SpO2 96%  BMI 29.02 kg/m2  Body mass index is 29.02 kg/(m^2).  GENERAL: healthy, " alert and no distress  EYES: Eyes grossly normal to inspection, PERRL and conjunctivae and sclerae normal  HENT: ear canals and TM's normal, nose and mouth without ulcers or lesions  NECK: no adenopathy, no asymmetry, masses, or scars and thyroid normal to palpation  RESP: lungs clear to auscultation - no rales, rhonchi or wheezes  CV: regular rate and rhythm, normal S1 S2, no S3 or S4, no murmur, click or rub, no peripheral edema and peripheral pulses strong  ABDOMEN: soft, nontender, no hepatosplenomegaly, no masses and bowel sounds normal  MS:  straight leg raise does not elicit radicular symptoms bilaterally  SKIN: no suspicious lesions or rashes; varicose veins venous stasis skin changes in both legs  NEURO: Normal strength and tone, mentation intact and speech normal  PSYCH: mentation appears normal, affect normal/bright    Diagnostic Test Results:  none      ASSESSMENT/PLAN:                                                      1. 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)    - lisinopril (PRINIVIL/ZESTRIL) 5 MG tablet; Take 1 tablet (5 mg) by mouth daily  Dispense: 90 tablet; Refill: 3  - metoprolol (TOPROL-XL) 25 MG 24 hr tablet; Take 0.5 tablets (12.5 mg) by mouth daily  Dispense: 90 tablet; Refill: 3  - atorvastatin (LIPITOR) 40 MG tablet; Take 1 tablet (40 mg) by mouth At Bedtime  Dispense: 90 tablet; Refill: 4  - fluticasone-salmeterol (ADVAIR DISKUS) 250-50 MCG/DOSE diskus inhaler; Inhale 1 puff into the lungs 2 times daily  Dispense: 1 Inhaler; Refill: 12  - coenzyme Q-10 capsule; Take 1 capsule (100 mg) by mouth daily  Dispense: 90 capsule; Refill: 3  - nitroglycerin (NITROSTAT) 0.4 MG sublingual tablet; Place 1 tablet (0.4 mg) under the tongue every 5 minutes as needed for chest pain  Dispense: 25 tablet; Refill: 3    2. Antiphospholipid antibody syndrome (H)  Since Dr. Flynn left, he can see Dr. Galindo  - ONC/HEME ADULT  REFERRAL    3. Polymyalgia rheumatica (H)  This has been controlled off prednisone for now    4. Erectile dysfunction, unspecified erectile dysfunction type    - vardenafil (LEVITRA) 20 MG tablet; Take 0.5-1 tablets (10-20 mg) by mouth daily as needed for erectile dysfunction Never use with nitroglycerin, terazosin or doxazosin.  Dispense: 12 tablet; Refill: 3      6. Screen for colon cancer    - GASTROENTEROLOGY ADULT REF PROCEDURE ONLY    7. Moderate persistent asthma without complication  - fluticasone-salmeterol (ADVAIR DISKUS) 250-50 MCG/DOSE diskus inhaler; Inhale 1 puff into the lungs 2 times daily  Dispense: 1 Inhaler; Refill: 12  - albuterol (PROAIR HFA/PROVENTIL HFA/VENTOLIN HFA) 108 (90 BASE) MCG/ACT Inhaler; Inhale 2 puffs into the lungs every 6 hours as needed for shortness of breath / dyspnea or wheezing  Dispense: 1 Inhaler; Refill: 0        9. Long term current use of anticoagulant therapy  Coumadin managed by [facility] heart    10. Chronic left-sided low back pain with left-sided sciatica  Trial of PT   - [facility] REFERRAL    His swelling has resolved, his skin changes in his leg for which he was seen in urgent care, were likely related to venous stasis dermatitis;  we discussed compression hosiery, leg elevation and diet salt considerations    FUTURE APPOINTMENTS:       - Follow-up visit in 8/2017 for physical or sooner as needed     Filipe Goldberg MD  [facility]

## 2017-05-26 ASSESSMENT — ASTHMA QUESTIONNAIRES: ACT_TOTALSCORE: 25

## 2017-06-02 ENCOUNTER — ANTICOAGULATION THERAPY VISIT (OUTPATIENT)
Dept: CARDIOLOGY | Facility: CLINIC | Age: 77
End: 2017-06-02
Payer: COMMERCIAL

## 2017-06-02 DIAGNOSIS — Z79.01 LONG-TERM (CURRENT) USE OF ANTICOAGULANTS: ICD-10-CM

## 2017-06-02 LAB — INR POINT OF CARE: 2.7 (ref 0.86–1.14)

## 2017-06-02 PROCEDURE — 85610 PROTHROMBIN TIME: CPT | Mod: QW | Performed by: INTERNAL MEDICINE

## 2017-06-02 PROCEDURE — 36416 COLLJ CAPILLARY BLOOD SPEC: CPT | Performed by: INTERNAL MEDICINE

## 2017-06-02 NOTE — PROGRESS NOTES
ANTICOAGULATION FOLLOW-UP CLINIC VISIT    Patient Name:  Yogesh Abreu  Date:  6/2/2017  Contact Type:  Face to Face    SUBJECTIVE:     Patient Findings     Positives No Problem Findings           OBJECTIVE    INR Protime   Date Value Ref Range Status   06/02/2017 2.7 (A) 0.86 - 1.14 Final     Chromogenic Factor 10   Date Value Ref Range Status   10/12/2015 28 (L) 70 - 130 % Final     Comment:     Therapeutic Range:  A Chromogenic Factor 10 level of approximately 20-40%   inversely correlates with an INR of 2-3 for patients receiving Warfarin.   Chromogenic Factor 10 levels below 20% indicate an INR greater than 3 and   levels above 40% indicate an INR less than 2.         ASSESSMENT / PLAN  INR assessment THER    Recheck INR In: 5 WEEKS    INR Location Clinic      Anticoagulation Summary as of 6/2/2017     INR goal 2.0-3.0   Today's INR 2.7   Maintenance plan 10 mg (5 mg x 2) on Sun, Wed; 7.5 mg (5 mg x 1.5) all other days   Full instructions 10 mg on Sun, Wed; 7.5 mg all other days   Weekly total 57.5 mg   Plan last modified Tanja Wills, RN (5/3/2017)   Next INR check 7/6/2017   Target end date Indefinite    Indications   Long-term (current) use of anticoagulants [Z79.01] [Z79.01]  PE (pulmonary embolism) [I26.99]         Anticoagulation Episode Summary     INR check location     Preferred lab     Send INR reminders to [facility] HEART INR NURSE    Comments       Anticoagulation Care Providers     Provider Role Specialty Phone number    Sue Satya HASTINGS MD Responsible Cardiology [phone]            See the Encounter Report to view Anticoagulation Flowsheet and Dosing Calendar (Go to Encounters tab in chart review, and find the Anticoagulation Therapy Visit)    INR 2.7 No changes. Will continue current dosing of 10 mg SuW and 7.5 mg all other days. Recheck in 5 weeks (he will be out of town at the 4 week timeframe). Dosage adjustment made based on physician directed care plan.    Tanja Wills, RN

## 2017-06-02 NOTE — MR AVS SNAPSHOT
Yogesh Abreu   6/2/2017 8:40 AM   Anticoagulation Therapy Visit    Description:  76 year old male   Provider:  [facility]   Department:  [facility]           INR as of 6/2/2017     Today's INR 2.7      Anticoagulation Summary as of 6/2/2017     INR goal 2.0-3.0   Today's INR 2.7   Full instructions 10 mg on Sun, Wed; 7.5 mg all other days   Next INR check 7/6/2017    Indications   Long-term (current) use of anticoagulants [Z79.01] [Z79.01]  PE (pulmonary embolism) [I26.99]         Your next Anticoagulation Clinic appointment(s)     Jul 06, 2017  8:20 AM CDT   Anticoagulation Visit with  ANTICOAGULATION   [facility])    [address] [place] [id number] [phone]              Contact Numbers     Anticoagulant (INR) Clinic Number: [phone]          June 2017 Details    Sun Mon Tue Wed Thu Fri Sat         1               2      7.5 mg   See details      3      7.5 mg           4      10 mg         5      7.5 mg         6      7.5 mg         7      10 mg         8      7.5 mg         9      7.5 mg         10      7.5 mg           11      10 mg         12      7.5 mg         13      7.5 mg         14      10 mg         15      7.5 mg         16      7.5 mg         17      7.5 mg           18      10 mg         19      7.5 mg         20      7.5 mg         21      10 mg         22      7.5 mg         23      7.5 mg         24      7.5 mg           25      10 mg         26      7.5 mg         27      7.5 mg         28      10 mg         29      7.5 mg         30      7.5 mg           Date Details   06/02 This INR check               How to take your warfarin dose     To take:  7.5 mg Take 1.5 of the 5 mg tablets.    To take:  10 mg Take 2 of the 5 mg tablets.           July 2017 Details    Sun Mon Tue Wed Thu Fri Sat           1      7.5 mg           2      10 mg         3      7.5 mg         4      7.5  mg         5      10 mg         6            7               8                 9               10               11               12               13               14               15                 16               17               18               19               20               21               22                 23               24               25               26               27               28               29                 30               31                     Date Details   No additional details    Date of next INR:  7/6/2017         How to take your warfarin dose     To take:  7.5 mg Take 1.5 of the 5 mg tablets.    To take:  10 mg Take 2 of the 5 mg tablets.

## 2017-06-08 ENCOUNTER — OFFICE VISIT (OUTPATIENT)
Dept: ORTHOPEDICS | Facility: CLINIC | Age: 77
End: 2017-06-08
Payer: COMMERCIAL

## 2017-06-08 VITALS
WEIGHT: 195 LBS | SYSTOLIC BLOOD PRESSURE: 126 MMHG | HEIGHT: 68 IN | BODY MASS INDEX: 29.55 KG/M2 | DIASTOLIC BLOOD PRESSURE: 76 MMHG

## 2017-06-08 DIAGNOSIS — R26.89 ANTALGIC GAIT: ICD-10-CM

## 2017-06-08 DIAGNOSIS — M51.26 LUMBAR HERNIATED DISC: ICD-10-CM

## 2017-06-08 DIAGNOSIS — M54.42 LEFT-SIDED LOW BACK PAIN WITH LEFT-SIDED SCIATICA, UNSPECIFIED CHRONICITY: Primary | ICD-10-CM

## 2017-06-08 DIAGNOSIS — M48.061 FORAMINAL STENOSIS OF LUMBAR REGION: ICD-10-CM

## 2017-06-08 DIAGNOSIS — M48.00 CENTRAL SPINAL STENOSIS: ICD-10-CM

## 2017-06-08 DIAGNOSIS — R25.1 TREMORS OF NERVOUS SYSTEM: ICD-10-CM

## 2017-06-08 PROCEDURE — 99213 OFFICE O/P EST LOW 20 MIN: CPT | Performed by: FAMILY MEDICINE

## 2017-06-08 NOTE — PATIENT INSTRUCTIONS
Thank you for allowing us to participate in your care today.  Please find below your visit diagnosis and the plan going forward.    1. Left-sided low back pain with left-sided sciatica, unspecified chronicity    2. Central spinal stenosis    3. Foraminal stenosis of lumbar region    4. Lumbar herniated disc      Discussed MRI - no focal disc that matches your left leg symptoms  MRI does show evidence / reasons for central back pain  Agree with PT and new order placed for spine based/MDT therapists  Would recommend Neurology follow-up for unsteady gait / walking    Follow up as needed. Call direct clinic number [[phone]] at any time with questions or concerns.    Sandro Mcleod DO CAQSM  [facility]  Website: [email or web address]  Twitter: [email or web address]

## 2017-06-08 NOTE — Clinical Note
Yogesh Vann Dr. saw me at [facility] on Jun 8, 2017 for questions regarding his lumbar spine. Please see my note and wanted to draw your attention to his gait and tremors. If you feel appropriate - may benefit from Neurology referral (he mentioned he had a referral many years ago). I can certainly place the referral but wanted to run this by you given you know him well and you may have other thoughts for follow-up.  Please feel free to contact me should you have any questions.  Thank you for the consult. Sincerely,  Sandro Mcleod DO, CAQSM [facility]

## 2017-06-08 NOTE — PROGRESS NOTES
"ASSESSMENT & PLAN    ICD-10-CM    1. Left-sided low back pain with left-sided sciatica, unspecified chronicity M54.42    2. Central spinal stenosis M48.00 [facility] REF[facility]   3. Foraminal stenosis of lumbar region M99.83 [facility] REFERR[facility]   4. Lumbar herniated disc M51.26 [facility] REFERR[facility]   5. Tremors of nervous system R25.1    6. Antalgic gait R26.89    Discussed MRI - no pathology that correlates with left sided radic (L5)  Central stenosis could account for axial back pain  Agree with PT and new order placed for spine based/MDT therapists  Would recommend Neurology follow-up for gait/tremors    Follow up as needed. Call direct clinic number [[phone]] at any time with questions or concerns. Instructed to call the office if the condition evolves or worsens.    -----    SUBJECTIVE  Yogesh Abreu is a/an 76 year old male who is seen in consultation at the request of Dr. Goldberg for evaluation of left low back pain with left leg radicular pain. The patient is seen by themselves. States his lower extremity weakness/altered gait and poor balance started ~ December 2016.  Had some intermittent back pain at that time.  Left sided radicular pain did not start until Jan 2017. Did some home exercises that were given to him by retired PT which helped the radicular pain. No change is gait/balance.  However left radic pain has returned.  Denies any upper extremity weakness or fine motor difficulty. Does have b/l hand tremors.  Was evaluated by Neurology \"many years ago\" and subsequent to that was diagnosed with PMR by Dr. Goldberg. Was managed with oral steroids and he discontinued on his own \"1.5 years ago\".    Onset: 1/2017. Reports insidious onset without acute precipitating event.   Worsened by: golfing, prolonged sitting, standing  Better with: laying down  Quality: dull, sharp, intermittent, with intermittent radiating pain  Pain Scale " "(maximum/current)/10: 7/10 / 3/10  Treatments tried: ice, heat, ibuprofen and previous imaging (MRI of the low back in [place] 3/21/17)  Red flags: Weakness: No, bowel/bladder loss: No, foot drop: Yes - reports that he seems to be shuffling  Orthopedic history: Lifted car in college, felt a pop, no medical treatment  Relevant surgical history: NO  Patient Social History: retired  Oswestry completed: Yes    Patient's past medical, surgical, social, and family histories were reviewed today and no changes are noted    REVIEW OF SYSTEMS:  10 point ROS is negative other than symptoms noted above in HPI, Past Medical History or as stated below  Constitutional: NEGATIVE for fever, chills, change in weight  Skin: NEGATIVE for worrisome rashes, moles or lesions  GI/: NEGATIVE for bowel or bladder changes  Neuro: NEGATIVE for weakness, dizziness or paresthesias    OBJECTIVE:  /76  Ht 5' 8\" (1.727 m)  Wt 195 lb (88.5 kg)  BMI 29.65 kg/m2   General: healthy, alert and in no distress  HEENT: no scleral icterus or conjunctival erythema  Skin: no suspicious lesions or rash. No jaundice.  CV: no pedal edema  Resp: normal respiratory effort without conversational dyspnea   Psych: normal mood and affect  Gait: antalgic gait with poor quad control/power during stance phase  Neuro: decreased sensation over left lateral thigh otherwise, normal light touch sensory exam of the bilateral lower extremities.  Motor strength as noted below. DTR's 2+ patella and 1+ achilles bilaterally. + upper and lower extremity tremors  MSK:  THORACIC/LUMBAR SPINE  Inspection:    No gross deformity/asymmetry  Palpation:    Mildly Tender about the lumbar facet joints. Otherwise remainder of landmarks are nontender.  Range of Motion:     Lumbar flexion full and has b/l lower extremity tremors when completing the action    Lumbar extension limited by pain/worsens his axial back pain  Strength:    Very poor coordination/balance with heel and toe " walk. + tremors when trying to execute the action.   quadriceps 5/5, hamstrings 5/5, gastrocsoleus 5/5, tibialis anterior 5/5, extensor hallicus longus 5/5  Special Tests:    Negative: slump test (bilateral)    Independent visualization of the below image:   MRI LOW BACK [facility] 3/20/17  Details by level:  L1-L2: mild-moderate central stenosis, right lateral recess stenosis and b/l foraminal stenosis  L2-L3: mild central stenosis. B/l foraminal stenosis abutting/iminging on b/l L2 nerve roots  L3-L4: mild central stenosis. B/l foraminal stenosis  L4-L5: mild-moderate central stenosis. B/l foraminal stenosis, impinging on b/l L4 nerve roots  L5-S1: mild broad-based disc bulge. No central stenosis. B/l foraminal stenosis.        Patient's conditions were thoroughly discussed during today's visit with greater than 50% of the visit spent counseling the patient with total time spent face-to-face with the patient being 20 minutes.    Sandro Mcleod DO [facility]

## 2017-06-08 NOTE — MR AVS SNAPSHOT
After Visit Summary   6/8/2017    Yogesh Abreu    MRN: [id number]           Patient Information     Date Of Birth          1940        Visit Information        Provider Department      6/8/2017 9:40 AM Sandro Mcleod DO [facility] SPORTS MEDICINE        Today's Diagnoses     Left-sided low back pain with left-sided sciatica, unspecified chronicity    -  1    Central spinal stenosis        Foraminal stenosis of lumbar region        Lumbar herniated disc          Care Instructions    Thank you for allowing us to participate in your care today.  Please find below your visit diagnosis and the plan going forward.    1. Left-sided low back pain with left-sided sciatica, unspecified chronicity    2. Central spinal stenosis    3. Foraminal stenosis of lumbar region    4. Lumbar herniated disc      Discussed MRI - no focal disc that matches your left leg symptoms  MRI does show evidence / reasons for central back pain  Agree with PT and new order placed for spine based/MDT therapists  Would recommend Neurology follow-up for unsteady gait / walking    Follow up as needed. Call direct clinic number [[phone]] at any time with questions or concerns.    Sandro Mcleod DO [facility]  Website: [email or web address]  Twitter: [email or web address]            Follow-ups after your visit        Additional Services     [facility] PT, HAND, AND CHIROPRACTIC REFERRAL       **This order will print in the [facility] Scheduling Office**    Physical Therapy, Hand Therapy and Chiropractic Care are available through:    *[facility]  *[facility]  *[facility]    Call one number to schedule at any of the above locations: [phone].    Your provider has referred you to: Physical Therapy at [facility] or [facility]    Indication/Reason for Referral: Back Pain with perceived left L5 radic, MRI non-focal. Has central and foraminal stenosis w/o any  nerve root impingement at that level. See my note for MRI results which were done outside the [place] system.  Onset of Illness: see chart  Therapy Orders: Evaluate and Treat  Special Programs: None  Special Request: MDT PT please - Caitlyn Carbajal, Gia Choi, Maxine Berg or Gia Nunes      Additional Comments for the Therapist or Chiropractor: Formal physical therapy - specific exercises to include centralization with flexion/extension activities with mobilization/manipulation and core stabilization with use of modalities (ie ice, ultrasound, electrical stimulation, etc.) as needed with home exercise prescription.    Please be aware that coverage of these services is subject to the terms and limitations of your health insurance plan.  Call member services at your health plan with any benefit or coverage questions.      Please bring the following to your appointment:    *Your personal calendar for scheduling future appointments  *Comfortable clothing                  Your next 10 appointments already scheduled     Jul 06, 2017  8:20 AM CDT   Anticoagulation Visit with SHAH ANTICOAGULATION   [facility] PHYSICIANS [facility] [place] ([facility])    [address] [place] [id number] [phone]              Who to contact     If you have questions or need follow up information about today's clinic visit or your schedule please contact [facility] SPORTS MEDICINE directly at [phone].  Normal or non-critical lab and imaging results will be communicated to you by MyChart, letter or phone within 4 business days after the clinic has received the results. If you do not hear from us within 7 days, please contact the clinic through MyChart or phone. If you have a critical or abnormal lab result, we will notify you by phone as soon as possible.  Submit refill requests through YellowBrck or call your pharmacy and they will forward the refill request to us. Please  "allow 3 business days for your refill to be completed.          Additional Information About Your Visit        MyChart Information     [employer] gives you secure access to your electronic health record. If you see a primary care provider, you can also send messages to your care team and make appointments. If you have questions, please call your primary care clinic.  If you do not have a primary care provider, please call [phone] and they will assist you.        Care EveryWhere ID     This is your Care EveryWhere ID. This could be used by other organizations to access your [facility] medical records  [id number]        Your Vitals Were     Height BMI (Body Mass Index)                5' 8\" (1.727 m) 29.65 kg/m2           Blood Pressure from Last 3 Encounters:   06/08/17 126/76   05/25/17 111/82   04/15/17 130/85    Weight from Last 3 Encounters:   06/08/17 195 lb (88.5 kg)   05/25/17 196 lb 8 oz (89.1 kg)   04/11/17 202 lb (91.6 kg)              We Performed the Following     [facility] PT, HAND, AND CHIROPRACTIC REFERRAL        Primary Care Provider Office Phone # Fax #    Filipe Goldberg [phone]       [facility] [address]  [facility] [address]        Thank you!     Thank you for choosing [facility]  for your care. Our goal is always to provide you with excellent care. Hearing back from our patients is one way we can continue to improve our services. Please take a few minutes to complete the written survey that you may receive in the mail after your visit with us. Thank you!             Your Updated Medication List - Protect others around you: Learn how to safely use, store and throw away your medicines at www.disposemymeds.org.          This list is accurate as of: 6/8/17 10:30 AM.  Always use your most recent med list.                   Brand Name Dispense Instructions for use    albuterol 108 (90 BASE) MCG/ACT Inhaler    PROAIR HFA/PROVENTIL HFA/VENTOLIN HFA    1 " Inhaler    Inhale 2 puffs into the lungs every 6 hours as needed for shortness of breath / dyspnea or wheezing       atorvastatin 40 MG tablet    LIPITOR    90 tablet    Take 1 tablet (40 mg) by mouth At Bedtime       CALCIUM CITRATE + D PO      Take 600 mg by mouth 2 times daily       coenzyme Q-10 capsule     90 capsule    Take 1 capsule (100 mg) by mouth daily       fluticasone-salmeterol 250-50 MCG/DOSE diskus inhaler    ADVAIR DISKUS    1 Inhaler    Inhale 1 puff into the lungs 2 times daily       lisinopril 5 MG tablet    PRINIVIL/ZESTRIL    90 tablet    Take 1 tablet (5 mg) by mouth daily       metoprolol 25 MG 24 hr tablet    TOPROL-XL    90 tablet    Take 0.5 tablets (12.5 mg) by mouth daily       nitroglycerin 0.4 MG sublingual tablet    NITROSTAT    25 tablet    Place 1 tablet (0.4 mg) under the tongue every 5 minutes as needed for chest pain       vardenafil 20 MG tablet    LEVITRA    12 tablet    Take 0.5-1 tablets (10-20 mg) by mouth daily as needed for erectile dysfunction Never use with nitroglycerin, terazosin or doxazosin.       VITAMIN D3 PO      Take 1,000 Units by mouth 2 times daily       warfarin 5 MG tablet    COUMADIN    150 tablet    10mg Tuesday and 7.5mg the rest of the week or as directed per INR clinic

## 2017-06-08 NOTE — NURSING NOTE
"Chief Complaint   Patient presents with     Musculoskeletal Problem       Initial /76  Ht 5' 8\" (1.727 m)  Wt 195 lb (88.5 kg)  BMI 29.65 kg/m2 Estimated body mass index is 29.65 kg/(m^2) as calculated from the following:    Height as of this encounter: 5' 8\" (1.727 m).    Weight as of this encounter: 195 lb (88.5 kg).  Medication Reconciliation: complete   Vikas Vigil ATC    "

## 2017-06-19 ENCOUNTER — THERAPY VISIT (OUTPATIENT)
Dept: PHYSICAL THERAPY | Facility: CLINIC | Age: 77
End: 2017-06-19
Payer: COMMERCIAL

## 2017-06-19 DIAGNOSIS — G89.29 CHRONIC LEFT-SIDED LOW BACK PAIN WITH LEFT-SIDED SCIATICA: Primary | ICD-10-CM

## 2017-06-19 DIAGNOSIS — M54.42 CHRONIC LEFT-SIDED LOW BACK PAIN WITH LEFT-SIDED SCIATICA: Primary | ICD-10-CM

## 2017-06-19 PROCEDURE — 97161 PT EVAL LOW COMPLEX 20 MIN: CPT | Mod: GP | Performed by: PHYSICAL THERAPIST

## 2017-06-19 PROCEDURE — 97112 NEUROMUSCULAR REEDUCATION: CPT | Mod: GP | Performed by: PHYSICAL THERAPIST

## 2017-06-19 PROCEDURE — 97110 THERAPEUTIC EXERCISES: CPT | Mod: GP | Performed by: PHYSICAL THERAPIST

## 2017-06-19 NOTE — PROGRESS NOTES
"[facility] Initial Evaluation -- Lumbar    Date: June 19, 2017  Yogesh Abreu is a 76 year old male with a lumbar and L LE condition.   Referral: FSOC  Work mechanical stresses:  Na--retired  Employment status:  retired  Leisure mechanical stresses: golfing, sitting  Functional disability score (NORBERTO/STarT Back):  22%/  5/9 MEDIUM  VAS score (0-10): 4/10  Patient goals/expectations:  \"Decrease pain so I can walk better and play golf.\"  Patient is a formal exerciser.  HISTORY:    Present symptoms: L LB, L hip, thigh, lower leg, and foot  Pain quality (sharp/shooting/stabbing/aching/burning/cramping):  Aching, sharp   Paresthesia (yes/no):  no    Present since (onset date): intermittent problems since 2012, worsening since 01/17/2017 for unknown reasons, exacerbated again 05/19/2017 after playing golf 3 days in a row.   Symptoms (improving/unchanging/worsening):  worsening.   Symptoms commenced as a result of: unknown, exacerbated 05/19/2017 after playing golf  Condition occurred in the following environment:   unknown     Symptoms at onset (back/thigh/leg): L LB, L LE to foot  Constant symptoms (back/thigh/leg): L LB, hip to lower leg  Intermittent symptoms (back/thigh/leg): foot    Symptoms are made worse with the following: Always Sitting, Always Rising, Always Standing, Always Walking, Time of day - No effect and Always When still   Symptoms are made better with the following: Always On the move    Disturbed sleep (yes/no):  no Sleeping postures (prone/sup/side R/L): sides    Previous episodes (0/1-5/6-10/11+): 5/6 Year of first episode: 2012    Previous history: intermittent problems since 2012  Previous treatments: tried lumbar extension exercises given to him by a PT friend--resolved the pain after a few weeks.      Specific Questions:  Cough/Sneeze/Strain (pos/neg): neg  Bowel/Bladder (normal/abnormal): normal  Gait (normal/abnormal): abnormal--feels like he alters his walking " pattern--feels like he shuffles  Medications (nil/NSAIDS/analg/steroids/anticoag/other):  Other - Cardiac, High blood pressure and heparin/coumadin  Medical allergies:  none  General health (excellent/good/fair/poor):  good  Pertinent medical history:  Heart problems and PE, MI, PMR  Imaging (NA/Xray/MRI):  MRI  Recent or major surgery (yes/no):  No  Night pain (yes/no): no  Accidents (yes/no): no  Unexplained weight loss (yes/no): no  Barriers at home: no  Other red flags: no    EXAMINATION    Posture:   Sitting (good/fair/poor): fair  Standing (good/fair/poor):poor  Lordosis (red/acc/normal): red  Correction of posture (better/worse/no effect): better    Lateral Shift (right/left/nil): Left  Relevant (yes/no):  ?  Other Observations: stands with flexed trunk posture    Neurological:    Motor deficit:  normal  Reflexes:  normal  Sensory deficit:  normal  Dural signs:  Normal      Movement Loss:   Kve Mod Min Nil Pain   Flexion   x  P. Mild L LBP on return   Extension x    NE   Side Gliding R x    P. L LBP   Side Gliding L x    NE     Test Movements:   During: produces, abolishes, increases, decreases, no effect, centralizing, peripheralizing   After: better, worse, no better, no worse, no effect, centralized, peripheralized    Pretest symptoms standin/10   Symptoms During Symptoms After ROM increased ROM decreased No Effect   FIS No Effect No Effect   x   Rep FIS No Effect No Effect   x   EIS No Effect No Effect   x   Rep EIS No Effect  2nd set of 10--NE No Effect  Worse-   x   Pretest symptoms lyin/10 L buttock, L calf      Symptoms During Symptoms After ROM increased ROM decreased No Effect   MIA No Effect No Effect   x   Rep MIA No Effect Worse   x   EIL No effect No Effect   x   Rep EIL Decreases Better x     If required, pretest symptoms:    Symptoms During Symptoms After ROM increased ROM decreased No Effect   SGIS - R        Rep SGIS - R        SGIS - L        Rep SGIS - L          Static  Tests:  Sitting slouched:    Sitting erect:    Standing slouched   Standing erect:    Lying prone in extension:  Decreased L calf, B after, NE ROM Long sitting:      Other Tests: none    Provisional Classification:  Derangement - Asymmetrical, unilateral, symptoms below knee    Principle of Management:  Education:  Posture--use of lumbar roll in sitting   Equipment provided:  none  Mechanical therapy (Y/N):  y   Extension principle:  REIL x10 reps, every 2 hours  Lateral Principle:    Flexion principle:    Other:      ASSESSMENT/PLAN:    Patient is a 76 year old male with lumbar complaints.  Provisional classification of derangement with directional preference for extension.  He had a good response to lumbar extension exercises in lying with decreased symptoms and improved extension ROM.  He should do well with a lumbar extension program in addition to posture and body mechanics training to decrease pain, improve mechanics, and improve overall function and mobility.      Patient has the following significant findings with corresponding treatment plan.                Diagnosis 1:  L lumbar radiculopathy  Pain -  self management, education, directional preference exercise and home program  Decreased ROM/flexibility - manual therapy, therapeutic exercise and home program  Decreased function - therapeutic activities and home program  Impaired posture - neuro re-education and home program    Therapy Evaluation Codes:   1) History comprised of:   Personal factors that impact the plan of care:      None.    Comorbidity factors that impact the plan of care are:      Heart problems, High blood pressure, Osteoarthritis and Overweight.     Medications impacting care: Cardiac, High blood pressure and Heparin/coumadin.  2) Examination of Body Systems comprised of:   Body structures and functions that impact the plan of care:      Lumbar spine.   Activity limitations that impact the plan of care are:      Sitting, Standing,  Walking and rising from sitting.  3) Clinical presentation characteristics are:   Stable/Uncomplicated.  4) Decision-Making    Low complexity using standardized patient assessment instrument and/or measureable assessment of functional outcome.  Cumulative Therapy Evaluation is: Low complexity.    Previous and current functional limitations:  (See Goal Flow Sheet for this information)    Short term and Long term goals: (See Goal Flow Sheet for this information)     Communication ability:  Patient appears to be able to clearly communicate and understand verbal and written communication and follow directions correctly.  Treatment Explanation - The following has been discussed with the patient:   RX ordered/plan of care  Anticipated outcomes  Possible risks and side effects  This patient would benefit from PT intervention to resume normal activities.   Rehab potential is good.    Frequency:  1 X week, once daily  Duration:  for 2 weeks tapering to 2 X a month over 2 months  Discharge Plan:  Achieve all LTG.  Independent in home treatment program.  Reach maximal therapeutic benefit.    Please refer to the daily flowsheet for treatment today, total treatment time and time spent performing 1:1 timed codes.

## 2017-06-19 NOTE — MR AVS SNAPSHOT
After Visit Summary   6/19/2017    Yogesh Abreu    MRN: [id number]           Patient Information     Date Of Birth          1940        Visit Information        Provider Department      6/19/2017 10:20 AM Gia Choi, PT [facility] Physical Therapy        Today's Diagnoses     Chronic left-sided low back pain with left-sided sciatica    -  1       Follow-ups after your visit        Your next 10 appointments already scheduled     Jun 26, 2017  9:30 AM CDT   [facility] Spine with Maxine Berg PT   [facility] ([facility]  )    [address] [facility] [address]  [facility] [id number] [phone]            Jul 06, 2017  8:20 AM CDT   Anticoagulation Visit with SHAH ANTICOAGULATION   [facility] PHYSICIANS [facility] ([facility])    [address] [place], [place] [address]  [facility] [id number] [phone]              Who to contact     If you have questions or need follow up information about today's clinic visit or your schedule please contact [facility] PHYSICAL THERAPY directly at [phone].  Normal or non-critical lab and imaging results will be communicated to you by MyChart, letter or phone within 4 business days after the clinic has received the results. If you do not hear from us within 7 days, please contact the clinic through Jasperhart or phone. If you have a critical or abnormal lab result, we will notify you by phone as soon as possible.  Submit refill requests through MadeClose or call your pharmacy and they will forward the refill request to us. Please allow 3 business days for your refill to be completed.          Additional Information About Your Visit        MyChart Information     [employer] gives you secure access to your electronic health record. If you see a primary care provider, you can also send messages to your care team and make  appointments. If you have questions, please call your primary care clinic.  If you do not have a primary care provider, please call [phone] and they will assist you.        Care EveryWhere ID     This is your Care EveryWhere ID. This could be used by other organizations to access your [facility] medical records  [id number]         Blood Pressure from Last 3 Encounters:   06/08/17 126/76   05/25/17 111/82   04/15/17 130/85    Weight from Last 3 Encounters:   06/08/17 88.5 kg (195 lb)   05/25/17 89.1 kg (196 lb 8 oz)   04/11/17 91.6 kg (202 lb)              We Performed the Following     MELISSA Inital Eval Report     Neuromuscular Re-Education     PT Sami, Low Complexity ([id number])     Therapeutic Exercises        Primary Care Provider Office Phone # Fax #    Filipe Goldberg [phone]       [facility] [address] [facility] [address]        Thank you!     Thank you for choosing [facility] PHYSICAL THERAP[facility]  for your care. Our goal is always to provide you with excellent care. Hearing back from our patients is one way we can continue to improve our services. Please take a few minutes to complete the written survey that you may receive in the mail after your visit with us. Thank you!             Your Updated Medication List - Protect others around you: Learn how to safely use, store and throw away your medicines at www.disposemymeds.org.          This list is accurate as of: 6/19/17  9:42 PM.  Always use your most recent med list.                   Brand Name Dispense Instructions for use    albuterol 108 (90 BASE) MCG/ACT Inhaler    PROAIR HFA/PROVENTIL HFA/VENTOLIN HFA    1 Inhaler    Inhale 2 puffs into the lungs every 6 hours as needed for shortness of breath / dyspnea or wheezing       atorvastatin 40 MG tablet    LIPITOR    90 tablet    Take 1 tablet (40 mg) by mouth At Bedtime       CALCIUM CITRATE + D PO      Take 600 mg by mouth 2 times daily        coenzyme Q-10 capsule     90 capsule    Take 1 capsule (100 mg) by mouth daily       fluticasone-salmeterol 250-50 MCG/DOSE diskus inhaler    ADVAIR DISKUS    1 Inhaler    Inhale 1 puff into the lungs 2 times daily       lisinopril 5 MG tablet    PRINIVIL/ZESTRIL    90 tablet    Take 1 tablet (5 mg) by mouth daily       metoprolol 25 MG 24 hr tablet    TOPROL-XL    90 tablet    Take 0.5 tablets (12.5 mg) by mouth daily       nitroglycerin 0.4 MG sublingual tablet    NITROSTAT    25 tablet    Place 1 tablet (0.4 mg) under the tongue every 5 minutes as needed for chest pain       vardenafil 20 MG tablet    LEVITRA    12 tablet    Take 0.5-1 tablets (10-20 mg) by mouth daily as needed for erectile dysfunction Never use with nitroglycerin, terazosin or doxazosin.       VITAMIN D3 PO      Take 1,000 Units by mouth 2 times daily       warfarin 5 MG tablet    COUMADIN    150 tablet    10mg Tuesday and 7.5mg the rest of the week or as directed per INR clinic

## 2017-06-26 ENCOUNTER — THERAPY VISIT (OUTPATIENT)
Dept: PHYSICAL THERAPY | Facility: CLINIC | Age: 77
End: 2017-06-26
Payer: COMMERCIAL

## 2017-06-26 DIAGNOSIS — M54.42 CHRONIC LEFT-SIDED LOW BACK PAIN WITH LEFT-SIDED SCIATICA: ICD-10-CM

## 2017-06-26 DIAGNOSIS — G89.29 CHRONIC LEFT-SIDED LOW BACK PAIN WITH LEFT-SIDED SCIATICA: ICD-10-CM

## 2017-06-26 PROCEDURE — 97530 THERAPEUTIC ACTIVITIES: CPT | Mod: GP | Performed by: PHYSICAL THERAPIST

## 2017-06-26 PROCEDURE — 97110 THERAPEUTIC EXERCISES: CPT | Mod: GP | Performed by: PHYSICAL THERAPIST

## 2017-06-27 ENCOUNTER — TRANSFERRED RECORDS (OUTPATIENT)
Dept: HEALTH INFORMATION MANAGEMENT | Facility: CLINIC | Age: 77
End: 2017-06-27

## 2017-06-27 LAB — INR PPP: 2.6

## 2017-06-29 ENCOUNTER — ANTICOAGULATION THERAPY VISIT (OUTPATIENT)
Dept: CARDIOLOGY | Facility: CLINIC | Age: 77
End: 2017-06-29
Payer: COMMERCIAL

## 2017-06-29 DIAGNOSIS — Z79.01 LONG-TERM (CURRENT) USE OF ANTICOAGULANTS: ICD-10-CM

## 2017-06-29 PROCEDURE — 99207 ZZC NO CHARGE LOS: CPT | Performed by: INTERNAL MEDICINE

## 2017-06-29 NOTE — MR AVS SNAPSHOT
Yogesh Abreu   6/29/2017   Anticoagulation Therapy Visit    Description:  76 year old male   Provider:  Tanja Wills, RN   Department:  [facility]           INR as of 6/29/2017     Today's INR 2.6 (6/26/2017)      Anticoagulation Summary as of 6/29/2017     INR goal 2.0-3.0   Today's INR 2.6 (6/26/2017)   Full instructions 10 mg on Sun, Wed; 7.5 mg all other days   Next INR check 7/24/2017    Indications   Long-term (current) use of anticoagulants [Z79.01] [Z79.01]  PE (pulmonary embolism) [I26.99]         Your next Anticoagulation Clinic appointment(s)     Jul 24, 2017  8:20 AM CDT   Anticoagulation Visit with SHAH ANTICOAGULATION   [facility]    [address] [place] [id number]   [phone]              Contact Numbers     Anticoagulant (INR) Clinic Number: [phone]          June 2017 Details    Sun Mon Tue Wed Thu Fri Sat         1               2               3                 4               5               6               7               8               9               10                 11               12               13               14               15               16               17                 18               19               20               21               22               23               24                 25               26               27               28               29      7.5 mg   See details      30      7.5 mg           Date Details   06/29 This INR check               How to take your warfarin dose     To take:  7.5 mg Take 1.5 of the 5 mg tablets.           July 2017 Details    Sun Mon Tue Wed Thu Fri Sat           1      7.5 mg           2      10 mg         3      7.5 mg         4      7.5 mg         5      10 mg         6      7.5 mg         7      7.5 mg         8      7.5 mg           9      10 mg         10      7.5 mg         11      7.5 mg         12      10 mg          13      7.5 mg         14      7.5 mg         15      7.5 mg           16      10 mg         17      7.5 mg         18      7.5 mg         19      10 mg         20      7.5 mg         21      7.5 mg         22      7.5 mg           23      10 mg         24            25               26               27               28               29                 30               31                     Date Details   No additional details    Date of next INR:  7/24/2017         How to take your warfarin dose     To take:  7.5 mg Take 1.5 of the 5 mg tablets.    To take:  10 mg Take 2 of the 5 mg tablets.

## 2017-06-29 NOTE — PROGRESS NOTES
ANTICOAGULATION FOLLOW-UP CLINIC VISIT    Patient Name:  Yogesh Abreu  Date:  6/29/2017  Contact Type:  Telephone/ patient    SUBJECTIVE:     Patient Findings     Positives No Problem Findings           OBJECTIVE    INR   Date Value Ref Range Status   06/26/2017 2.6  Final     Chromogenic Factor 10   Date Value Ref Range Status   10/12/2015 28 (L) 70 - 130 % Final     Comment:     Therapeutic Range:  A Chromogenic Factor 10 level of approximately 20-40%   inversely correlates with an INR of 2-3 for patients receiving Warfarin.   Chromogenic Factor 10 levels below 20% indicate an INR greater than 3 and   levels above 40% indicate an INR less than 2.         ASSESSMENT / PLAN  INR assessment THER    Recheck INR In: 4 WEEKS    INR Location Outside lab      Anticoagulation Summary as of 6/29/2017     INR goal 2.0-3.0   Today's INR 2.6 (6/26/2017)   Maintenance plan 10 mg (5 mg x 2) on Sun, Wed; 7.5 mg (5 mg x 1.5) all other days   Full instructions 10 mg on Sun, Wed; 7.5 mg all other days   Weekly total 57.5 mg   No change documented Tanja Wills, ENZO   Plan last modified Tanja Wills RN (5/3/2017)   Next INR check 7/24/2017   Target end date Indefinite    Indications   Long-term (current) use of anticoagulants [Z79.01] [Z79.01]  PE (pulmonary embolism) [I26.99]         Anticoagulation Episode Summary     INR check location     Preferred lab     Send INR reminders to [facility] INR NURSE    Comments       Anticoagulation Care Providers     Provider Role Specialty Phone number    Satya Rogers MD Responsible Cardiology [phone]            See the Encounter Report to view Anticoagulation Flowsheet and Dosing Calendar (Go to Encounters tab in chart review, and find the Anticoagulation Therapy Visit)    Pt calling to report that he had INR done at [facility] with Dr Keith Flynn (at [facility]) on 6/27 - INR 2.6. No changes. Will continue current dosing of 10 mg SuW and 7.5 mg all other days with recheck  in 4 weeks. Dosage adjustment made based on physician directed care plan.    Tanja Wills RN

## 2017-06-30 ENCOUNTER — TELEPHONE (OUTPATIENT)
Dept: FAMILY MEDICINE | Facility: CLINIC | Age: 77
End: 2017-06-30

## 2017-06-30 NOTE — TELEPHONE ENCOUNTER
Received a call from Mariajose, nurse, and [facility].    She would like Dr. Goldberg to take over Management of INR for patient.  From chart review, patient's INR is managed by Cardiology.  Spoke to patient who states Cardiology, Dr. Rogers, said he could stopped coumadin.  However, Oncology is recommended he continue but also follow up with PCP for this.  Patient will be making a follow up appt with Dr. Goldberg to discuss in near future.  Patient would prefer to continue to follow up in INR clinic with Cardiology until seen by Dr. Goldberg.  Patient's most recent INR drawn at oncology on 6/27 and dosed by INR clinic at [facility].  Maria Fernanda Epperson RN

## 2017-07-05 ENCOUNTER — MYC MEDICAL ADVICE (OUTPATIENT)
Dept: ORTHOPEDICS | Facility: CLINIC | Age: 77
End: 2017-07-05

## 2017-07-05 DIAGNOSIS — M25.519 ACUTE SHOULDER PAIN, UNSPECIFIED LATERALITY: Primary | ICD-10-CM

## 2017-07-06 ENCOUNTER — THERAPY VISIT (OUTPATIENT)
Dept: PHYSICAL THERAPY | Facility: CLINIC | Age: 77
End: 2017-07-06
Payer: COMMERCIAL

## 2017-07-06 DIAGNOSIS — G89.29 CHRONIC LEFT-SIDED LOW BACK PAIN WITH LEFT-SIDED SCIATICA: ICD-10-CM

## 2017-07-06 DIAGNOSIS — M54.42 CHRONIC LEFT-SIDED LOW BACK PAIN WITH LEFT-SIDED SCIATICA: ICD-10-CM

## 2017-07-06 PROCEDURE — 97110 THERAPEUTIC EXERCISES: CPT | Mod: GP | Performed by: PHYSICAL THERAPIST

## 2017-07-06 PROCEDURE — 97112 NEUROMUSCULAR REEDUCATION: CPT | Mod: GP | Performed by: PHYSICAL THERAPIST

## 2017-07-11 ENCOUNTER — THERAPY VISIT (OUTPATIENT)
Dept: PHYSICAL THERAPY | Facility: CLINIC | Age: 77
End: 2017-07-11
Payer: COMMERCIAL

## 2017-07-11 ENCOUNTER — MYC MEDICAL ADVICE (OUTPATIENT)
Dept: ORTHOPEDICS | Facility: CLINIC | Age: 77
End: 2017-07-11

## 2017-07-11 DIAGNOSIS — G89.29 CHRONIC LEFT-SIDED LOW BACK PAIN WITH LEFT-SIDED SCIATICA: ICD-10-CM

## 2017-07-11 DIAGNOSIS — M25.511 RIGHT SHOULDER PAIN: Primary | ICD-10-CM

## 2017-07-11 DIAGNOSIS — M54.42 CHRONIC LEFT-SIDED LOW BACK PAIN WITH LEFT-SIDED SCIATICA: ICD-10-CM

## 2017-07-11 PROCEDURE — 97110 THERAPEUTIC EXERCISES: CPT | Mod: GP | Performed by: PHYSICAL THERAPIST

## 2017-07-11 PROCEDURE — 97161 PT EVAL LOW COMPLEX 20 MIN: CPT | Mod: GP | Performed by: PHYSICAL THERAPIST

## 2017-07-11 NOTE — PROGRESS NOTES
"[facility] Initial Evaluation      Subjective:    Patient is a 76 year old male presenting with rehab right shoulder hpi.   Yogesh Abreu is a 76 year old male with a right shoulder condition.        Patient had an injury right shoulder about 8-10 years ago, was using a pectoral machine.  At this time pain is intermittent 0-10/10, \"sharp\".  Symptoms increase with reaching to the side, reaching/lifting out in front and with overhead dressing.  Symptoms decrease with stopping offending activity.  Medications: Cardiac, High blood pressure and heparin/coumadin  Medical allergies:  none  Pertinent medical history:  Heart problems and PE, MI, PMR    .                  Since onset symptoms are unchanged.        General health as reported by patient is good.                  Barriers include:  None as reported by the patient.    Red flags:  None as reported by the patient.      Oswestry Score: 15.56 %                 Objective:    Standing Alignment:      Shoulder/UE:  Rounded shoulders                                       Shoulder Evaluation:  ROM:  AROM:    Flexion:  Left:  WNL    Right:  WNL-painful arc  Extension: Left: WNLRight: minimally limited  Abduction:  Left: WNL   Right:  81 degrees, pain past 60 degrees    Internal Rotation:  Left:  WNL    Right:  WNL  External Rotation:  Left:  WNL    Right:  WNL            Extension/Internal Rotation:  Left:  T10    Right:  T9    PROM:      Extension:  Right:  Minimally limited  Abduction:  Right:  WNL                              Special Tests:        Right shoulder negative for the following special tests:Impingement and Bursal  Palpation:  not assessed                                         General     ROS  MMT left shoulder WNL.  MMT right shoulder abduction 3-/5 painful, flexion 4-/5 painful, IR and ER 4+/5, slight pain with resisted ER.     Repeated wand extension with overpressure:  Increase strength, decrease pain with shoulder flexion, no " change abduction.     Assessment/Plan:      Patient is a 76 year old male with right side shoulder complaints.    Patient has the following significant findings with corresponding treatment plan.                Diagnosis 1:  Right shoulder pain    Pain -  self management, education, directional preference exercise and home program  Decreased ROM/flexibility - therapeutic exercise and home program  Decreased strength - therapeutic exercise, therapeutic activities and home program  Decreased function - therapeutic activities and home program    Therapy Evaluation Codes:   1) History comprised of:   Personal factors that impact the plan of care:      Time since onset of symptoms.    Comorbidity factors that impact the plan of care are:      None.     Medications impacting care: None.  2) Examination of Body Systems comprised of:   Body structures and functions that impact the plan of care:      Shoulder.   Activity limitations that impact the plan of care are:      Dressing and Lifting.  3) Clinical presentation characteristics are:   Stable/Uncomplicated.  4) Decision-Making    Low complexity using standardized patient assessment instrument and/or measureable assessment of functional outcome.  Cumulative Therapy Evaluation is: Low complexity.    Previous and current functional limitations:  (See Goal Flow Sheet for this information)    Short term and Long term goals: (See Goal Flow Sheet for this information)     Communication ability:  Patient appears to be able to clearly communicate and understand verbal and written communication and follow directions correctly.  Treatment Explanation - The following has been discussed with the patient:   RX ordered/plan of care  Anticipated outcomes  Possible risks and side effects  This patient would benefit from PT intervention to resume normal activities.   Rehab potential is good.    Frequency:  1 X week, once daily  Duration:  for 6 weeks  Discharge Plan:  Achieve all  LTG.  Independent in home treatment program.  Reach maximal therapeutic benefit.    Please refer to the daily flowsheet for treatment today, total treatment time and time spent performing 1:1 timed codes.

## 2017-07-11 NOTE — MR AVS SNAPSHOT
After Visit Summary   7/11/2017    Yogesh Abreu    MRN: [id number]           Patient Information     Date Of Birth          1940        Visit Information        Provider Department      7/11/2017 10:20 AM Maxine Berg PT [facility] Physical Therapy        Today's Diagnoses     Right shoulder pain    -  1    Chronic left-sided low back pain with left-sided sciatica           Follow-ups after your visit        Your next 10 appointments already scheduled     Jul 18, 2017 11:40 AM CDT   MELISSA Extremity with Maxine Berg PT   [facility] Physical Therapy ([place] [place]  )    [address] [facility] [address]  [facility] [id number] [phone]            Jul 24, 2017  8:20 AM CDT   Anticoagulation Visit with SHAH ANTICOAGULATION   [facility] AT [place] ([facility])    [address] [place] [id number] [phone]            Aug 18, 2017  9:30 AM CDT   PHYSICAL with Filipe Goldberg MD   [facility] ([facility])    [address] [place] [id number] [phone]              Who to contact     If you have questions or need follow up information about today's clinic visit or your schedule please contact [facility] PHYSICAL THERAPY directly at [phone].  Normal or non-critical lab and imaging results will be communicated to you by MyChart, letter or phone within 4 business days after the clinic has received the results. If you do not hear from us within 7 days, please contact the clinic through MyChart or phone. If you have a critical or abnormal lab result, we will notify you by phone as soon as possible.  Submit refill requests through [employer] or call your pharmacy and they will forward the refill request to us. Please allow 3 business days for your refill to be completed.          Additional  Information About Your Visit        [employer] Information     [employer] gives you secure access to your electronic health record. If you see a primary care provider, you can also send messages to your care team and make appointments. If you have questions, please call your primary care clinic.  If you do not have a primary care provider, please call [phone] and they will assist you.        Care EveryWhere ID     This is your Care EveryWhere ID. This could be used by other organizations to access your [place] medical records  [id number]         Blood Pressure from Last 3 Encounters:   06/08/17 126/76   05/25/17 111/82   04/15/17 130/85    Weight from Last 3 Encounters:   06/08/17 88.5 kg (195 lb)   05/25/17 89.1 kg (196 lb 8 oz)   04/11/17 91.6 kg (202 lb)              We Performed the Following     HC PT EVAL, LOW COMPLEXITY     MELISSA INITIAL EVAL REPORT     THERAPEUTIC EXERCISES        Primary Care Provider Office Phone # Fax #    Filipe Goldberg [phone]       [facility] [address] [facility] [address]        Equal Access to Services     MIGUEL MOSCOSO : Hadii aad ku hadasho Soomaali, waaxda luqadaha, qaybta kaalmada adeegyada, kenan singh . So [facility] [phone].    ATENCIÓN: Si habla español, tiene a neumann disposición servicios gratInscription House Health Centeros de asistencia lingüística. [facility] [phone].    We comply with applicable federal civil rights laws and [place] laws. We do not discriminate on the basis of race, color, national origin, age, disability sex, sexual orientation or gender identity.            Thank you!     Thank you for choosing [facility] PHYSICAL THERAP[facility]  for your care. Our goal is always to provide you with excellent care. Hearing back from our patients is one way we can continue to improve our services. Please take a few minutes to complete the written survey that you may receive in the mail after your visit  with us. Thank you!             Your Updated Medication List - Protect others around you: Learn how to safely use, store and throw away your medicines at www.disposemymeds.org.          This list is accurate as of: 7/11/17  4:18 PM.  Always use your most recent med list.                   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)       CALCIUM CITRATE + D PO      Take 600 mg by mouth 2 times daily        coenzyme Q-10 capsule     90 capsule    Take 1 capsule (100 mg) by mouth daily    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)       fluticasone-salmeterol 250-50 MCG/DOSE diskus inhaler    ADVAIR DISKUS    1 Inhaler    Inhale 1 puff into the lungs 2 times daily    Cough, 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)       lisinopril 5 MG tablet    PRINIVIL/ZESTRIL    90 tablet    Take 1 tablet (5 mg) by mouth daily    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)       metoprolol 25 MG 24 hr tablet    TOPROL-XL    90 tablet    Take 0.5 tablets (12.5 mg) by mouth daily    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    Erectile dysfunction, unspecified erectile dysfunction type       VITAMIN D3 PO      Take 1,000 Units by mouth 2 times daily        warfarin 5 MG tablet    COUMADIN    150 tablet    10mg Tuesday and 7.5mg the rest of the week or as directed per INR clinic    Long term current use of anticoagulant therapy

## 2017-07-18 ENCOUNTER — THERAPY VISIT (OUTPATIENT)
Dept: PHYSICAL THERAPY | Facility: CLINIC | Age: 77
End: 2017-07-18
Payer: COMMERCIAL

## 2017-07-18 DIAGNOSIS — M54.42 CHRONIC LEFT-SIDED LOW BACK PAIN WITH LEFT-SIDED SCIATICA: ICD-10-CM

## 2017-07-18 DIAGNOSIS — M25.511 RIGHT SHOULDER PAIN: ICD-10-CM

## 2017-07-18 DIAGNOSIS — G89.29 CHRONIC LEFT-SIDED LOW BACK PAIN WITH LEFT-SIDED SCIATICA: ICD-10-CM

## 2017-07-18 PROCEDURE — 97112 NEUROMUSCULAR REEDUCATION: CPT | Mod: GP | Performed by: PHYSICAL THERAPIST

## 2017-07-18 PROCEDURE — 97110 THERAPEUTIC EXERCISES: CPT | Mod: GP | Performed by: PHYSICAL THERAPIST

## 2017-07-18 NOTE — MR AVS SNAPSHOT
After Visit Summary   7/18/2017    Yogesh Abreu    MRN: [id number]           Patient Information     Date Of Birth          1940        Visit Information        Provider Department      7/18/2017 11:40 AM Maxine Berg PT [facility] Physical Therapy        Today's Diagnoses     Right shoulder pain        Chronic left-sided low back pain with left-sided sciatica           Follow-ups after your visit        Your next 10 appointments already scheduled     Jul 24, 2017  8:20 AM CDT   Anticoagulation Visit with SHAH ANTICOAGULATION   [facility]ICIANS [facility] [place] ([facility])    [id number]  [facility] [id number] [phone]            Aug 18, 2017  9:30 AM CDT   PHYSICAL with Filipe Goldberg MD   [facility] ([facility])    [address] [facility] [phone]              Who to contact     If you have questions or need follow up information about today's clinic visit or your schedule please contact [facility] PHYSICAL THERAPY directly at [phone].  Normal or non-critical lab and imaging results will be communicated to you by [employer] Direct[employer], letter or phone within 4 business days after the clinic has received the results. If you do not hear from us within 7 days, please contact the clinic through [employer] or phone. If you have a critical or abnormal lab result, we will notify you by phone as soon as possible.  Submit refill requests through [employer] or call your pharmacy and they will forward the refill request to us. Please allow 3 business days for your refill to be completed.          Additional Information About Your Visit        MyChart Information     [employer] gives you secure access to your electronic health record. If you see a primary care provider, you can also send messages to your care team and make appointments. If you have  questions, please call your primary care clinic.  If you do not have a primary care provider, please call [phone] and they will assist you.        Care EveryWhere ID     This is your Care EveryWhere ID. This could be used by other organizations to access your [place] medical records  [id number]         Blood Pressure from Last 3 Encounters:   06/08/17 126/76   05/25/17 111/82   04/15/17 130/85    Weight from Last 3 Encounters:   06/08/17 88.5 kg (195 lb)   05/25/17 89.1 kg (196 lb 8 oz)   04/11/17 91.6 kg (202 lb)              We Performed the Following     NEUROMUSCULAR RE-EDUCATION     THERAPEUTIC EXERCISES        Primary Care Provider Office Phone # Fax #    Filipe Goldberg [phone]       [facility] [address]  [facility] [address]        Equal Access to Services     : Hadii aad ku hadasho Soomaali, waaxda luqadaha, qaybta kaalmada adeegyada, waxay roshanin hayaan maria guadalupe singh . So [facility] [phone].    ATENCIÓN: Si habla español, tiene a neumann disposición servicios gratuitos de asistencia lingüística. Coreyame al [phone].    We comply with applicable federal civil rights laws and [place] laws. We do not discriminate on the basis of race, color, national origin, age, disability sex, sexual orientation or gender identity.            Thank you!     Thank you for choosing [facility]ICAL THERAPY  for your care. Our goal is always to provide you with excellent care. Hearing back from our patients is one way we can continue to improve our services. Please take a few minutes to complete the written survey that you may receive in the mail after your visit with us. Thank you!             Your Updated Medication List - Protect others around you: Learn how to safely use, store and throw away your medicines at www.disposemymeds.org.          This list is accurate as of: 7/18/17  1:02 PM.  Always use your most recent med list.                    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)       CALCIUM CITRATE + D PO      Take 600 mg by mouth 2 times daily        coenzyme Q-10 capsule     90 capsule    Take 1 capsule (100 mg) by mouth daily    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)       fluticasone-salmeterol 250-50 MCG/DOSE diskus inhaler    ADVAIR DISKUS    1 Inhaler    Inhale 1 puff into the lungs 2 times daily    Cough, 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)       lisinopril 5 MG tablet    PRINIVIL/ZESTRIL    90 tablet    Take 1 tablet (5 mg) by mouth daily    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)       metoprolol 25 MG 24 hr tablet    TOPROL-XL    90 tablet    Take 0.5 tablets (12.5 mg) by mouth daily    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    Erectile dysfunction, unspecified erectile dysfunction  type       VITAMIN D3 PO      Take 1,000 Units by mouth 2 times daily        warfarin 5 MG tablet    COUMADIN    150 tablet    10mg Tuesday and 7.5mg the rest of the week or as directed per INR clinic    Long term current use of anticoagulant therapy

## 2017-07-24 ENCOUNTER — ANTICOAGULATION THERAPY VISIT (OUTPATIENT)
Dept: CARDIOLOGY | Facility: CLINIC | Age: 77
End: 2017-07-24
Payer: COMMERCIAL

## 2017-07-24 DIAGNOSIS — Z79.01 LONG-TERM (CURRENT) USE OF ANTICOAGULANTS: ICD-10-CM

## 2017-07-24 LAB — INR POINT OF CARE: 2.2 (ref 0.86–1.14)

## 2017-07-24 PROCEDURE — 85610 PROTHROMBIN TIME: CPT | Mod: QW | Performed by: INTERNAL MEDICINE

## 2017-07-24 PROCEDURE — 36416 COLLJ CAPILLARY BLOOD SPEC: CPT | Performed by: INTERNAL MEDICINE

## 2017-07-24 NOTE — MR AVS SNAPSHOT
Yogesh Abreu   7/24/2017 8:20 AM   Anticoagulation Therapy Visit    Description:  77 year old male   Provider:  [facility] ANTICOAGULATION   Department:  [facility]           INR as of 7/24/2017     Today's INR 2.2      Anticoagulation Summary as of 7/24/2017     INR goal 2.0-3.0   Today's INR 2.2   Full instructions 10 mg on Sun, Wed; 7.5 mg all other days   Next INR check 8/21/2017    Indications   Long-term (current) use of anticoagulants [Z79.01] [Z79.01]  PE (pulmonary embolism) [I26.99]         Your next Anticoagulation Clinic appointment(s)     Aug 21, 2017  8:20 AM CDT   Anticoagulation Visit with  ANTICOAGULATION   [facility] ([facility])    [address] [place] [id number]   [phone]              Contact Numbers     Anticoagulant (INR) Clinic Number: [phone]          July 2017 Details    Sun Mon Tue Wed Thu Fri Sat           1                 2               3               4               5               6               7               8                 9               10               11               12               13               14               15                 16               17               18               19               20               21               22                 23               24      7.5 mg   See details      25      7.5 mg         26      10 mg         27      7.5 mg         28      7.5 mg         29      7.5 mg           30      10 mg         31      7.5 mg               Date Details   07/24 This INR check               How to take your warfarin dose     To take:  7.5 mg Take 1.5 of the 5 mg tablets.    To take:  10 mg Take 2 of the 5 mg tablets.           August 2017 Details    Sun Mon Tue Wed Thu Fri Sat       1      7.5 mg         2      10 mg         3      7.5 mg         4      7.5 mg         5      7.5 mg           6      10 mg         7      7.5 mg         8      7.5 mg          9      10 mg         10      7.5 mg         11      7.5 mg         12      7.5 mg           13      10 mg         14      7.5 mg         15      7.5 mg         16      10 mg         17      7.5 mg         18      7.5 mg         19      7.5 mg           20      10 mg         21            22               23               24               25               26                 27               28               29               30               31                  Date Details   No additional details    Date of next INR:  8/21/2017         How to take your warfarin dose     To take:  7.5 mg Take 1.5 of the 5 mg tablets.    To take:  10 mg Take 2 of the 5 mg tablets.

## 2017-07-24 NOTE — PROGRESS NOTES
ANTICOAGULATION FOLLOW-UP CLINIC VISIT    Patient Name:  Yogesh Abreu  Date:  7/24/2017  Contact Type:  Face to Face    SUBJECTIVE:     Patient Findings     Positives No Problem Findings           OBJECTIVE    INR Protime   Date Value Ref Range Status   07/24/2017 2.2 (A) 0.86 - 1.14 Final     Chromogenic Factor 10   Date Value Ref Range Status   10/12/2015 28 (L) 70 - 130 % Final     Comment:     Therapeutic Range:  A Chromogenic Factor 10 level of approximately 20-40%   inversely correlates with an INR of 2-3 for patients receiving Warfarin.   Chromogenic Factor 10 levels below 20% indicate an INR greater than 3 and   levels above 40% indicate an INR less than 2.         ASSESSMENT / PLAN  INR assessment THER    Recheck INR In: 4 WEEKS    INR Location Clinic      Anticoagulation Summary as of 7/24/2017     INR goal 2.0-3.0   Today's INR 2.2   Maintenance plan 10 mg (5 mg x 2) on Sun, Wed; 7.5 mg (5 mg x 1.5) all other days   Full instructions 10 mg on Sun, Wed; 7.5 mg all other days   Weekly total 57.5 mg   No change documented Mandy Ferguson RN   Plan last modified Tanja Wills RN (5/3/2017)   Next INR check 8/21/2017   Target end date Indefinite    Indications   Long-term (current) use of anticoagulants [Z79.01] [Z79.01]  PE (pulmonary embolism) [I26.99]         Anticoagulation Episode Summary     INR check location     Preferred lab     Send INR reminders to [facility] HEART INR NURSE    Comments       Anticoagulation Care Providers     Provider Role Specialty Phone number    ChristianSatya galvan MD [facility] Cardiology [phone]            See the Encounter Report to view Anticoagulation Flowsheet and Dosing Calendar (Go to Encounters tab in chart review, and find the Anticoagulation Therapy Visit)    INR 2.2.  No major changes.  Continue same schedule and recheck in 4 weeks.  He is interested in home INR machine.  Canelo Ferguson, ENZO

## 2017-07-26 ENCOUNTER — THERAPY VISIT (OUTPATIENT)
Dept: PHYSICAL THERAPY | Facility: CLINIC | Age: 77
End: 2017-07-26
Payer: COMMERCIAL

## 2017-07-26 DIAGNOSIS — M25.511 RIGHT SHOULDER PAIN: ICD-10-CM

## 2017-07-26 PROCEDURE — 97110 THERAPEUTIC EXERCISES: CPT | Mod: GP | Performed by: PHYSICAL THERAPIST

## 2017-07-26 PROCEDURE — 97112 NEUROMUSCULAR REEDUCATION: CPT | Mod: GP | Performed by: PHYSICAL THERAPIST

## 2017-08-02 ENCOUNTER — THERAPY VISIT (OUTPATIENT)
Dept: PHYSICAL THERAPY | Facility: CLINIC | Age: 77
End: 2017-08-02
Payer: COMMERCIAL

## 2017-08-02 DIAGNOSIS — M25.511 RIGHT SHOULDER PAIN: ICD-10-CM

## 2017-08-02 PROCEDURE — 97110 THERAPEUTIC EXERCISES: CPT | Mod: GP | Performed by: PHYSICAL THERAPIST

## 2017-08-02 NOTE — MR AVS SNAPSHOT
After Visit Summary   8/2/2017    Yogesh Abreu    MRN: [id number]           Patient Information     Date Of Birth          1940        Visit Information        Provider Department      8/2/2017 9:40 AM Maxine Berg PT [facility] Physical Therapy        Today's Diagnoses     Right shoulder pain           Follow-ups after your visit        Your next 10 appointments already scheduled     Aug 18, 2017  9:30 AM CDT   [facility]YSICAL with Filipe Goldberg MD   [facility]    [address] [id number] [phone]            Aug 21, 2017  8:20 AM CDT   Anticoagulation Visit with SHAH ANTICOAGULATION   [facility] PHYSICIANS [facility] [place] ([facility])    [id number]  [facility] [address] [phone]              Who to contact     If you have questions or need follow up information about today's clinic visit or your schedule please contact [facility] PHYSICAL THERAPY directly at [phone].  Normal or non-critical lab and imaging results will be communicated to you by "[employer]hart, letter or phone within 4 business days after the clinic has received the results. If you do not hear from us within 7 days, please contact the clinic through [employer] or phone. If you have a critical or abnormal lab result, we will notify you by phone as soon as possible.  Submit refill requests through [employer] or call your pharmacy and they will forward the refill request to us. Please allow 3 business days for your refill to be completed.          Additional Information About Your Visit        "[employer]hart Information     [employer] gives you secure access to your electronic health record. If you see a primary care provider, you can also send messages to your care team and make appointments. If you have questions, please call your primary care clinic.  If you do not  have a primary care provider, please call [phone] and they will assist you.        Care EveryWhere ID     This is your Care EveryWhere ID. This could be used by other organizations to access your [place] medical records  [id number]         Blood Pressure from Last 3 Encounters:   06/08/17 126/76   05/25/17 111/82   04/15/17 130/85    Weight from Last 3 Encounters:   06/08/17 88.5 kg (195 lb)   05/25/17 89.1 kg (196 lb 8 oz)   04/11/17 91.6 kg (202 lb)              We Performed the Following     THERAPEUTIC EXERCISES        Primary Care Provider Office Phone # Fax #    Filipe Goldberg [phone]       [facility] [address] [place] [address]        Equal Access to Services     RYAN MOSCOSO : Hadii aad ku hadasho Soomaali, waaxda luqadaha, qaybta kaalmada adeegyada, kenan singh . So [facility] [phone].    ATENCIÓN: Si habla español, tiene a neumann disposición servicios gratuitos de asistencia lingüística. Govind al [phone].    We comply with applicable federal civil rights laws and [place] laws. We do not discriminate on the basis of race, color, national origin, age, disability sex, sexual orientation or gender identity.            Thank you!     Thank you for choosing [facility] PHYSICAL THERAP[facility]  for your care. Our goal is always to provide you with excellent care. Hearing back from our patients is one way we can continue to improve our services. Please take a few minutes to complete the written survey that you may receive in the mail after your visit with us. Thank you!             Your Updated Medication List - Protect others around you: Learn how to safely use, store and throw away your medicines at www.disposemymeds.org.          This list is accurate as of: 8/2/17 12:00 PM.  Always use your most recent med list.                   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)       CALCIUM CITRATE + D PO      Take 600 mg by mouth 2 times daily        coenzyme Q-10 capsule     90 capsule    Take 1 capsule (100 mg) by mouth daily    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)       fluticasone-salmeterol 250-50 MCG/DOSE diskus inhaler    ADVAIR DISKUS    1 Inhaler    Inhale 1 puff into the lungs 2 times daily    Cough, 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)       lisinopril 5 MG tablet    PRINIVIL/ZESTRIL    90 tablet    Take 1 tablet (5 mg) by mouth daily    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)       metoprolol 25 MG 24 hr tablet    TOPROL-XL    90 tablet    Take 0.5 tablets (12.5 mg) by mouth daily    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    Erectile dysfunction, unspecified erectile dysfunction type       VITAMIN D3 PO      Take 1,000 Units by mouth 2 times daily        warfarin 5 MG  tablet    COUMADIN    150 tablet    10mg Tuesday and 7.5mg the rest of the week or as directed per INR clinic    Long term current use of anticoagulant therapy

## 2017-08-18 ENCOUNTER — OFFICE VISIT (OUTPATIENT)
Dept: FAMILY MEDICINE | Facility: CLINIC | Age: 77
End: 2017-08-18
Payer: COMMERCIAL

## 2017-08-18 VITALS
HEART RATE: 74 BPM | TEMPERATURE: 99 F | WEIGHT: 196 LBS | DIASTOLIC BLOOD PRESSURE: 83 MMHG | SYSTOLIC BLOOD PRESSURE: 121 MMHG | HEIGHT: 68 IN | OXYGEN SATURATION: 95 % | BODY MASS INDEX: 29.7 KG/M2

## 2017-08-18 DIAGNOSIS — I25.110 CORONARY ARTERY DISEASE INVOLVING NATIVE CORONARY ARTERY OF NATIVE HEART WITH UNSTABLE ANGINA PECTORIS (H): ICD-10-CM

## 2017-08-18 DIAGNOSIS — E78.2 MIXED HYPERLIPIDEMIA: ICD-10-CM

## 2017-08-18 DIAGNOSIS — I10 ESSENTIAL HYPERTENSION: ICD-10-CM

## 2017-08-18 DIAGNOSIS — J45.40 MODERATE PERSISTENT ASTHMA WITHOUT COMPLICATION: ICD-10-CM

## 2017-08-18 DIAGNOSIS — M35.3 POLYMYALGIA RHEUMATICA (H): ICD-10-CM

## 2017-08-18 DIAGNOSIS — Z12.11 SCREEN FOR COLON CANCER: ICD-10-CM

## 2017-08-18 DIAGNOSIS — D68.61 ANTIPHOSPHOLIPID ANTIBODY SYNDROME (H): ICD-10-CM

## 2017-08-18 DIAGNOSIS — Z79.01 LONG TERM CURRENT USE OF ANTICOAGULANT THERAPY: Primary | ICD-10-CM

## 2017-08-18 DIAGNOSIS — R25.1 TREMOR: ICD-10-CM

## 2017-08-18 DIAGNOSIS — Z00.00 ROUTINE GENERAL MEDICAL EXAMINATION AT A HEALTH CARE FACILITY: ICD-10-CM

## 2017-08-18 LAB
ALBUMIN SERPL-MCNC: 4.2 G/DL (ref 3.4–5)
ALP SERPL-CCNC: 59 U/L (ref 40–150)
ALT SERPL W P-5'-P-CCNC: 33 U/L (ref 0–70)
ANION GAP SERPL CALCULATED.3IONS-SCNC: 6 MMOL/L (ref 3–14)
AST SERPL W P-5'-P-CCNC: 32 U/L (ref 0–45)
BILIRUB SERPL-MCNC: 0.8 MG/DL (ref 0.2–1.3)
BUN SERPL-MCNC: 15 MG/DL (ref 7–30)
CALCIUM SERPL-MCNC: 9.9 MG/DL (ref 8.5–10.1)
CHLORIDE SERPL-SCNC: 104 MMOL/L (ref 94–109)
CHOLEST SERPL-MCNC: 158 MG/DL
CO2 SERPL-SCNC: 28 MMOL/L (ref 20–32)
CREAT SERPL-MCNC: 0.8 MG/DL (ref 0.66–1.25)
ERYTHROCYTE [DISTWIDTH] IN BLOOD BY AUTOMATED COUNT: 13.5 % (ref 10–15)
GFR SERPL CREATININE-BSD FRML MDRD: >90 ML/MIN/1.7M2
GLUCOSE SERPL-MCNC: 97 MG/DL (ref 70–99)
HCT VFR BLD AUTO: 49.6 % (ref 40–53)
HDLC SERPL-MCNC: 44 MG/DL
HGB BLD-MCNC: 16.4 G/DL (ref 13.3–17.7)
LDLC SERPL CALC-MCNC: 97 MG/DL
MCH RBC QN AUTO: 30 PG (ref 26.5–33)
MCHC RBC AUTO-ENTMCNC: 33.1 G/DL (ref 31.5–36.5)
MCV RBC AUTO: 91 FL (ref 78–100)
NONHDLC SERPL-MCNC: 114 MG/DL
PLATELET # BLD AUTO: 178 10E9/L (ref 150–450)
POTASSIUM SERPL-SCNC: 4 MMOL/L (ref 3.4–5.3)
PROT SERPL-MCNC: 7.9 G/DL (ref 6.8–8.8)
RBC # BLD AUTO: 5.47 10E12/L (ref 4.4–5.9)
SODIUM SERPL-SCNC: 138 MMOL/L (ref 133–144)
TRIGL SERPL-MCNC: 85 MG/DL
WBC # BLD AUTO: 4.7 10E9/L (ref 4–11)

## 2017-08-18 PROCEDURE — 99212 OFFICE O/P EST SF 10 MIN: CPT | Performed by: INTERNAL MEDICINE

## 2017-08-18 PROCEDURE — 80061 LIPID PANEL: CPT | Performed by: INTERNAL MEDICINE

## 2017-08-18 PROCEDURE — 80053 COMPREHEN METABOLIC PANEL: CPT | Performed by: INTERNAL MEDICINE

## 2017-08-18 PROCEDURE — 36415 COLL VENOUS BLD VENIPUNCTURE: CPT | Performed by: INTERNAL MEDICINE

## 2017-08-18 PROCEDURE — G0439 PPPS, SUBSEQ VISIT: HCPCS | Performed by: INTERNAL MEDICINE

## 2017-08-18 PROCEDURE — 85027 COMPLETE CBC AUTOMATED: CPT | Performed by: INTERNAL MEDICINE

## 2017-08-18 RX ORDER — WARFARIN SODIUM 5 MG/1
TABLET ORAL
Qty: 150 TABLET | Refills: 1 | COMMUNITY
Start: 2017-08-18 | End: 2017-10-25

## 2017-08-18 ASSESSMENT — ANXIETY QUESTIONNAIRES
5. BEING SO RESTLESS THAT IT IS HARD TO SIT STILL: NOT AT ALL
6. BECOMING EASILY ANNOYED OR IRRITABLE: NOT AT ALL
2. NOT BEING ABLE TO STOP OR CONTROL WORRYING: NOT AT ALL
GAD7 TOTAL SCORE: 0
1. FEELING NERVOUS, ANXIOUS, OR ON EDGE: NOT AT ALL
IF YOU CHECKED OFF ANY PROBLEMS ON THIS QUESTIONNAIRE, HOW DIFFICULT HAVE THESE PROBLEMS MADE IT FOR YOU TO DO YOUR WORK, TAKE CARE OF THINGS AT HOME, OR GET ALONG WITH OTHER PEOPLE: NOT DIFFICULT AT ALL
7. FEELING AFRAID AS IF SOMETHING AWFUL MIGHT HAPPEN: NOT AT ALL
3. WORRYING TOO MUCH ABOUT DIFFERENT THINGS: NOT AT ALL

## 2017-08-18 ASSESSMENT — PATIENT HEALTH QUESTIONNAIRE - PHQ9
SUM OF ALL RESPONSES TO PHQ QUESTIONS 1-9: 0
5. POOR APPETITE OR OVEREATING: NOT AT ALL

## 2017-08-18 NOTE — LETTER
[facility]  [address] [facility]  [address]  [place]  [address]  Tel: [phone]    August 21, 2017    Yogesh TENA Brady  [address]   [facility] [address]        Dear Mr. Abreu,    The following letter pertains to your most recent diagnostic tests:     -Liver and gallbladder tests are normal for you. (ALT,AST, Alk phos, bilirubin), kidney function is normal for you (Creatinine, GFR), Sodium is normal, Potassium is normal for you, Calcium is normal for you, Glucose (blood sugar) is normal for you.       -Your cholesterol panel looks healthy.     -Your complete blood counts including your hemoglobin returned normal for you.         Bottom line:  Your lab results look healthy and at goal       Follow up:  Schedule an appointment for a physical examination with fasting blood tests in one year's time, or return sooner if new questions, symptoms or problems arise.       Sincerely,     Dr. Goldberg     Results for orders placed or performed in visit on 08/18/17   Comprehensive metabolic panel   Result Value Ref Range    Sodium 138 133 - 144 mmol/L    Potassium 4.0 3.4 - 5.3 mmol/L    Chloride 104 94 - 109 mmol/L    Carbon Dioxide 28 20 - 32 mmol/L    Anion Gap 6 3 - 14 mmol/L    Glucose 97 70 - 99 mg/dL    Urea Nitrogen 15 7 - 30 mg/dL    Creatinine 0.80 0.66 - 1.25 mg/dL    GFR Estimate >90 >60 mL/min/1.7m2    GFR Estimate If Black >90 >60 mL/min/1.7m2    Calcium 9.9 8.5 - 10.1 mg/dL    Bilirubin Total 0.8 0.2 - 1.3 mg/dL    Albumin 4.2 3.4 - 5.0 g/dL    Protein Total 7.9 6.8 - 8.8 g/dL    Alkaline Phosphatase 59 40 - 150 U/L    ALT 33 0 - 70 U/L    AST 32 0 - 45 U/L   CBC with platelets   Result Value Ref Range    WBC 4.7 4.0 - 11.0 10e9/L    RBC Count 5.47 4.4 - 5.9 10e12/L    Hemoglobin 16.4 13.3 - 17.7 g/dL    Hematocrit 49.6 40.0 - 53.0 %    MCV 91 78 - 100 fl    MCH 30.0 26.5 - 33.0 pg    MCHC 33.1 31.5 - 36.5 g/dL    RDW 13.5 10.0 - 15.0 %    Platelet Count 178 150 - 450 10e9/L    Lipid panel reflex to direct LDL   Result Value Ref Range    Cholesterol 158 <200 mg/dL    Triglycerides 85 <150 mg/dL    HDL Cholesterol 44 >39 mg/dL    LDL Cholesterol Calculated 97 <100 mg/dL    Non HDL Cholesterol 114 <130 mg/dL

## 2017-08-18 NOTE — NURSING NOTE
"Chief Complaint   Patient presents with     Wellness Visit       Initial /83 (BP Location: Left arm, Patient Position: Chair, Cuff Size: Adult Regular)  Pulse 74  Temp 99  F (37.2  C) (Oral)  Ht 5' 8\" (1.727 m)  Wt 196 lb (88.9 kg)  SpO2 95%  BMI 29.8 kg/m2 Estimated body mass index is 29.8 kg/(m^2) as calculated from the following:    Height as of this encounter: 5' 8\" (1.727 m).    Weight as of this encounter: 196 lb (88.9 kg).  Medication Reconciliation: complete   Alma Brannon MA  "

## 2017-08-18 NOTE — MR AVS SNAPSHOT
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   o   Discuss preventive care.    o   Review your medicines if your doctor has prescribed any.    Talk with your health care provider about whether you should have a test to screen for prostate cancer (PSA).    Every 3 years, have a diabetes test (fasting glucose). If you are at risk for diabetes, you should have this test more often.    Every 5 years, have a cholesterol test. Have this test more often if you are at risk for high cholesterol or heart disease.     Every 10 years, have a colonoscopy. Or, have a yearly FIT test (stool test). These exams will check for colon cancer.    Talk to with your health care provider about screening for Abdominal Aortic Aneurysm if you have a family history of AAA or have a history of smoking.  Shots:     Get a flu shot each year.     Get a tetanus shot every 10 years.     Talk to your doctor about your pneumonia vaccines. There are now two you should receive - Pneumovax (PPSV 23) and Prevnar (PCV 13).    Talk to your  doctor about a shingles vaccine.     Talk to your doctor about the hepatitis B vaccine.  Nutrition:     Eat at least 5 servings of fruits and vegetables each day.     Eat whole-grain bread, whole-wheat pasta and brown rice instead of white grains and rice.     Talk to your doctor about Calcium and Vitamin D.   Lifestyle    Exercise for at least 150 minutes a week (30 minutes a day, 5 days a week). This will help you control your weight and prevent disease.     Limit alcohol to one drink per day.     No smoking.     Wear sunscreen to prevent skin cancer.     See your dentist every six months for an exam and cleaning.     See your eye doctor every 1 to 2 years to screen for conditions such as glaucoma, macular degeneration and cataracts.          Follow-ups after your visit        Additional Services     NEUROLOGY ADULT REFERRAL       Your provider has referred you for the following:   Consult at [facility]: [facility] of Neurology Rupinder Casillas [phone]   [email or web address]    Dr. Yamilka Trivedi    Please be aware that coverage of these services is subject to the terms and limitations of your health insurance plan.  Call member services at your health plan with any benefit or coverage questions.      Please bring the following with you to your appointment:    (1) Any X-Rays, CTs or MRIs which have been performed.  Contact the facility where they were done to arrange for  prior to your scheduled appointment.    (2) List of current medications  (3) This referral request   (4) Any documents/labs given to you for this referral                  Follow-up notes from your care team     Return in about 1 year (around 8/18/2018) for Physical Exam.      Your next 10 appointments already scheduled     Aug 21, 2017  8:20 AM CDT   Anticoagulation Visit with SHAH ANTICOAGULATION   [facility] [place] ([facility])    [address] [place] [address]  [id number]  [place] [id number] [phone]              Who to contact     If you have questions or need follow up information about today's clinic visit or your schedule please contact [facility] directly at [phone].  Normal or non-critical lab and imaging results will be communicated to you by MyChart, letter or phone within 4 business days after the clinic has received the results. If you do not hear from us within 7 days, please contact the clinic through [employer] or phone. If you have a critical or abnormal lab result, we will notify you by phone as soon as possible.  Submit refill requests through [employer] or call your pharmacy and they will forward the refill request to us. Please allow 3 business days for your refill to be completed.          Additional Information About Your Visit        [employer] Information     [employer] gives you secure access to your electronic health record. If you see a primary care provider, you can also send messages to your care team and make appointments. If you have questions, please call your primary care clinic.  If you do not have a primary care provider, please call [phone] and they will assist you.        Care EveryWhere ID     This is your Care EveryWhere ID. This could be used by other organizations to access your [place] medical records  [id number]        Your Vitals Were     Pulse Temperature Height Pulse Oximetry BMI (Body Mass Index)       74 99  F (37.2  C) (Oral) 5' 8\" (1.727 m) 95% 29.8 kg/m2        Blood Pressure from Last 3 Encounters:   08/18/17 121/83   06/08/17 126/76   05/25/17 111/82    Weight from Last 3 Encounters:   08/18/17 196 lb (88.9 kg)   06/08/17 195 lb (88.5 kg)   05/25/17 196 lb 8 oz (89.1 kg)              We Performed the Following     CBC with platelets     Comprehensive metabolic panel     Lipid panel reflex to direct LDL     NEUROLOGY ADULT REFERRAL        Primary Care Provider Office Phone # Fax #    Filipe Goldberg MD " [phone]       [address] [place] [address]        Equal Access to Services     RYAN MOSCOSO : Hadii caleb fournier víctor Alonso, wajoeda lulesly, qaybta kajaspalda jesse, kenan bryantcarmela bib. So [facility] [phone].    ATENCIÓN: Si habla español, tiene a neumann disposición servicios gratuitos de asistencia lingüística. Llame al [phone].    We comply with applicable federal civil rights laws and [place] laws. We do not discriminate on the basis of race, color, national origin, age, disability sex, sexual orientation or gender identity.            Thank you!     Thank you for choosing [facility]  for your care. Our goal is always to provide you with excellent care. Hearing back from our patients is one way we can continue to improve our services. Please take a few minutes to complete the written survey that you may receive in the mail after your visit with us. Thank you!             Your Updated Medication List - Protect others around you: Learn how to safely use, store and throw away your medicines at www.disposemymeds.org.          This list is accurate as of: 8/18/17 10:10 AM.  Always use your most recent med list.                   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)       CALCIUM CITRATE + D PO      Take 600 mg by mouth 2 times daily        coenzyme Q-10 capsule     90 capsule    Take 1 capsule (100 mg) by mouth daily    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)        fluticasone-salmeterol 250-50 MCG/DOSE diskus inhaler    ADVAIR DISKUS    1 Inhaler    Inhale 1 puff into the lungs 2 times daily    Cough, 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)       lisinopril 5 MG tablet    PRINIVIL/ZESTRIL    90 tablet    Take 1 tablet (5 mg) by mouth daily    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)       metoprolol 25 MG 24 hr tablet    TOPROL-XL    90 tablet    Take 0.5 tablets (12.5 mg) by mouth daily    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    Erectile dysfunction, unspecified erectile dysfunction type       VITAMIN D3 PO      Take 1,000 Units by mouth 2 times daily        warfarin 5 MG tablet    COUMADIN    150 tablet    10mg Tuesday and 7.5mg the rest of the week or as directed per INR clinic    Long term current use of anticoagulant therapy

## 2017-08-18 NOTE — PROGRESS NOTES
SUBJECTIVE:   Yogesh Abreu is a 77 year old male who presents for Preventive Visit.    Are you in the first 12 months of your Medicare Part B coverage?  No    Healthy Habits:  Annual Exam:  Getting at least 3 servings of Calcium per day:: Yes  Bi-annual eye exam:: Yes  Dental care twice a year:: Yes  Sleep apnea or symptoms of sleep apnea:: None  Diet:: Low salt, Low fat/cholesterol  Frequency of exercise:: 4-5 days/week  Taking medications regularly:: Yes  Medication side effects:: Other  Additional concerns today:: YES  PHQ-2 Score: 0  Duration of exercise:: 15-30 minutes      COGNITIVE SCREEN  1) Repeat 3 items (Banana, Sunrise, Chair)    2) Clock draw: NORMAL  3) 3 item recall: Recalls 3 objects  Results: 3 items recalled: COGNITIVE IMPAIRMENT LESS LIKELY    Mini-CogTM Copyright S Florina. Licensed by the author for use in [email or web address]; reprinted with permission (halley@[facility]). All rights reserved.          Reviewed and updated as needed this visit by clinical staff  Tobacco  Allergies  Meds  Soc Hx        Reviewed and updated as needed this visit by Provider        Social History   Substance Use Topics     Smoking status: Former Smoker     Smokeless tobacco: Never Used      Comment: quit 40 years ago     Alcohol use Yes      Comment: 1 drink per day       The patient does not drink >3 drinks per day nor >7 drinks per week.    Today's PHQ-2 Score:   PHQ-2 ( 1999 [employer]) 8/18/2017 8/17/2017   Q1: Little interest or pleasure in doing things 0 0   Q2: Feeling down, depressed or hopeless 1 0   PHQ-2 Score 1 0   Q1: Little interest or pleasure in doing things - Not at all   Q2: Feeling down, depressed or hopeless - Not at all   PHQ-2 Score - 0         Do you feel safe in your environment - Yes    Do you have a Health Care Directive?: Yes: Advance Directive has been received and scanned.    Current providers sharing in care for this patient include: Patient Care Team:  Filipe Goldberg MD as PCP -  General (Internal Medicine)      Hearing impairment: Yes, wears hearing aids    Ability to successfully perform activities of daily living: Yes, no assistance needed     Fall risk:  Fallen 2 or more times in the past year?: No  Any fall with injury in the past year?: No      Home safety:  none identified      The following health maintenance items are reviewed in Epic and correct as of today:  Health Maintenance   Topic Date Due     ASTHMA ACTION PLAN Q1 YR  07/16/1945     MOIZ QUESTIONNAIRE 1 YEAR  07/16/1960     PHQ-9 Q1YR  07/16/1960     COLON CANCER SCREEN (SYSTEM ASSIGNED)  07/16/1992     ADVANCE DIRECTIVE PLANNING Q5 YRS  07/16/1997     OP ANNUAL INR REFERRAL  10/12/2016     INFLUENZA VACCINE (SYSTEM ASSIGNED)  09/01/2017     ASTHMA CONTROL TEST Q6 MOS  11/25/2017     FALL RISK ASSESSMENT  05/25/2018     LIPID SCREEN Q5 YR MALE (SYSTEM ASSIGNED)  08/01/2021     TETANUS IMMUNIZATION (SYSTEM ASSIGNED)  07/29/2023     PNEUMOCOCCAL  Completed     AORTIC ANEURYSM SCREENING (SYSTEM ASSIGNED)  Completed     Patient Active Problem List   Diagnosis     PE (pulmonary embolism)     CAD (coronary artery disease), IMI -  => rescue stent to  RCA     Hypertension     Mixed hyperlipidemia     Polymyalgia rheumatica (H)     ACS (acute coronary syndrome) (H)     Long-term (current) use of anticoagulants [Z79.01]     Antiphospholipid antibody syndrome (H)     Moderate persistent asthma without complication     HL (hearing loss), bilateral     Chronic left-sided low back pain with left-sided sciatica     Right shoulder pain     Past Surgical History:   Procedure Laterality Date     HEART CATH STENT COR W/WO PTCA  10/2015    90% diagonal, 50-60% CFX, 100% prox RCA occlusion - MAL placed in RCA     ORTHOPEDIC SURGERY  08/2015    right carpal tunnel       Social History   Substance Use Topics     Smoking status: Former Smoker     Smokeless tobacco: Never Used      Comment: quit 40 years ago     Alcohol use Yes      Comment: 1  drink per day     Family History   Problem Relation Age of Onset     Hypertension Brother      Hypertension Brother          Current Outpatient Prescriptions   Medication Sig Dispense Refill     warfarin (COUMADIN) 5 MG tablet 10mg Tuesday and 7.5mg the rest of the week or as directed per INR clinic 150 tablet 1     lisinopril (PRINIVIL/ZESTRIL) 5 MG tablet Take 1 tablet (5 mg) by mouth daily 90 tablet 3     vardenafil (LEVITRA) 20 MG tablet Take 0.5-1 tablets (10-20 mg) by mouth daily as needed for erectile dysfunction Never use with nitroglycerin, terazosin or doxazosin. 12 tablet 3     metoprolol (TOPROL-XL) 25 MG 24 hr tablet Take 0.5 tablets (12.5 mg) by mouth daily 90 tablet 3     atorvastatin (LIPITOR) 40 MG tablet Take 1 tablet (40 mg) by mouth At Bedtime 90 tablet 4     fluticasone-salmeterol (ADVAIR DISKUS) 250-50 MCG/DOSE diskus inhaler Inhale 1 puff into the lungs 2 times daily 1 Inhaler 12     albuterol (PROAIR HFA/PROVENTIL HFA/VENTOLIN HFA) 108 (90 BASE) MCG/ACT Inhaler Inhale 2 puffs into the lungs every 6 hours as needed for shortness of breath / dyspnea or wheezing 1 Inhaler 0     coenzyme Q-10 capsule Take 1 capsule (100 mg) by mouth daily 90 capsule 3     nitroglycerin (NITROSTAT) 0.4 MG sublingual tablet Place 1 tablet (0.4 mg) under the tongue every 5 minutes as needed for chest pain 25 tablet 3     Cholecalciferol (VITAMIN D3 PO) Take 1,000 Units by mouth 2 times daily       Calcium Citrate-Vitamin D (CALCIUM CITRATE + D PO) Take 600 mg by mouth 2 times daily       [DISCONTINUED] warfarin (COUMADIN) 5 MG tablet 10mg Tuesday and 7.5mg the rest of the week or as directed per INR clinic 150 tablet 1     Allergies   Allergen Reactions     Simvastatin              ROS:  Constitutional, HEENT, cardiovascular, pulmonary, GI, , musculoskeletal, neuro (concern regarding worsening tremor over several years), skin, endocrine and psych systems are negative, except as otherwise noted.      OBJECTIVE:  "  /83 (BP Location: Left arm, Patient Position: Chair, Cuff Size: Adult Regular)  Pulse 74  Temp 99  F (37.2  C) (Oral)  Ht 5' 8\" (1.727 m)  Wt 196 lb (88.9 kg)  SpO2 95%  BMI 29.8 kg/m2 Estimated body mass index is 29.8 kg/(m^2) as calculated from the following:    Height as of this encounter: 5' 8\" (1.727 m).    Weight as of this encounter: 196 lb (88.9 kg).  EXAM:   GENERAL: healthy, alert and no distress  EYES: Eyes grossly normal to inspection, PERRL and conjunctivae and sclerae normal  HENT: ear canals and TM's normal, nose and mouth without ulcers or lesions  NECK: no adenopathy, no asymmetry, masses, or scars and thyroid normal to palpation  RESP: lungs clear to auscultation - no rales, rhonchi or wheezes  CV: regular rate and rhythm, normal S1 S2, no S3 or S4, no murmur, click or rub, no peripheral edema and peripheral pulses strong  ABDOMEN: soft, nontender, no hepatosplenomegaly, no masses and bowel sounds normal  RECTAL: normal sphincter tone, no rectal masses, prostate normal size, smooth, nontender without nodules or masses  MS: no gross musculoskeletal defects noted, no edema  SKIN: no suspicious lesions or rashes  NEURO: Normal strength and tone, mentation intact and speech normal; moderate resting tremor  PSYCH: mentation appears normal, affect normal/bright    ASSESSMENT / PLAN:   1. Routine general medical examination at a health care facility      2. Polymyalgia rheumatica (H)  No current symptoms     3. Antiphospholipid antibody syndrome (H)  Continue anticoagulation ; he will decide if he wants INRs checked here or at [facility] heart; he is interested in their home monitoring  program     4. Mixed hyperlipidemia  On statin therapy   - Lipid panel reflex to direct LDL    5. Essential hypertension  Well controlled   - Comprehensive metabolic panel  - CBC with platelets    6. Coronary artery disease involving native coronary artery of native heart with unstable angina pectoris " "(H)  Stable symptoms     7. Screen for colon cancer  Reminded to schedule colonoscopy     8. Long term current use of anticoagulant therapy    - warfarin (COUMADIN) 5 MG tablet; 10mg Tuesday and 7.5mg the rest of the week or as directed per INR clinic  Dispense: 150 tablet; Refill: 1    9. Moderate persistent asthma without complication  Well controlled as of May 2017 ACT test     10. Tremor  Concern for early Parkinsonism vs. Essential tremor; neurology consult to investigate further and possible discuss treatment pending symptom severity   - NEUROLOGY ADULT REFERRAL    End of Life Planning:  Patient currently has an advanced directive: Yes.  Practitioner is supportive of decision.    COUNSELING:  Reviewed preventive health counseling, as reflected in patient instructions  Special attention given to:       Regular exercise       Healthy diet/nutrition       Colon cancer screening        Estimated body mass index is 29.8 kg/(m^2) as calculated from the following:    Height as of this encounter: 5' 8\" (1.727 m).    Weight as of this encounter: 196 lb (88.9 kg).  Weight management plan: Discussed healthy diet and exercise guidelines and patient will follow up in 12 months in clinic to re-evaluate.   reports that he has quit smoking. He has never used smokeless tobacco.        Appropriate preventive services were discussed with this patient, including applicable screening as appropriate for cardiovascular disease, diabetes, osteopenia/osteoporosis, and glaucoma.  As appropriate for age/gender, discussed screening for colorectal cancer, prostate cancer, breast cancer, and cervical cancer. Checklist reviewing preventive services available has been given to the patient.    Reviewed patients plan of care and provided an AVS. The Basic Care Plan (routine screening as documented in Health Maintenance) for Yogesh meets the Care Plan requirement. This Care Plan has been established and reviewed with the Patient.    Counseling " Resources:  ATP IV Guidelines  Pooled Cohorts Equation Calculator  Breast Cancer Risk Calculator  FRAX Risk Assessment  ICSI Preventive Guidelines  Dietary Guidelines for Americans, 2010  USDA's MyPlate  ASA Prophylaxis  Lung CA Screening    Filipe Goldberg MD  [facility] for HPI/ROS submitted by the patient on 8/17/2017

## 2017-08-19 ASSESSMENT — ANXIETY QUESTIONNAIRES: GAD7 TOTAL SCORE: 0

## 2017-08-21 ENCOUNTER — ANTICOAGULATION THERAPY VISIT (OUTPATIENT)
Dept: CARDIOLOGY | Facility: CLINIC | Age: 77
End: 2017-08-21
Payer: COMMERCIAL

## 2017-08-21 DIAGNOSIS — Z79.01 LONG-TERM (CURRENT) USE OF ANTICOAGULANTS: ICD-10-CM

## 2017-08-21 LAB — INR POINT OF CARE: 3 (ref 0.86–1.14)

## 2017-08-21 PROCEDURE — 85610 PROTHROMBIN TIME: CPT | Mod: QW | Performed by: INTERNAL MEDICINE

## 2017-08-21 PROCEDURE — 36416 COLLJ CAPILLARY BLOOD SPEC: CPT | Performed by: INTERNAL MEDICINE

## 2017-08-21 NOTE — PROGRESS NOTES
ANTICOAGULATION FOLLOW-UP CLINIC VISIT    Patient Name:  Yogesh Abreu  Date:  8/21/2017  Contact Type:  Face to Face    SUBJECTIVE:     Patient Findings     Positives No Problem Findings           OBJECTIVE    INR Protime   Date Value Ref Range Status   08/21/2017 3.0 (A) 0.86 - 1.14 Final     Chromogenic Factor 10   Date Value Ref Range Status   10/12/2015 28 (L) 70 - 130 % Final     Comment:     Therapeutic Range:  A Chromogenic Factor 10 level of approximately 20-40%   inversely correlates with an INR of 2-3 for patients receiving Warfarin.   Chromogenic Factor 10 levels below 20% indicate an INR greater than 3 and   levels above 40% indicate an INR less than 2.         ASSESSMENT / PLAN  INR assessment THER    Recheck INR In: 4 WEEKS    INR Location Clinic      Anticoagulation Summary as of 8/21/2017     INR goal 2.0-3.0   Today's INR 3.0   Maintenance plan 10 mg (5 mg x 2) on Sun, Wed; 7.5 mg (5 mg x 1.5) all other days   Full instructions 10 mg on Sun, Wed; 7.5 mg all other days   Weekly total 57.5 mg   No change documented Mandy Ferguson RN   Plan last modified Tanja Wills RN (5/3/2017)   Next INR check 9/18/2017   Target end date Indefinite    Indications   Long-term (current) use of anticoagulants [Z79.01] [Z79.01]  PE (pulmonary embolism) [I26.99]         Anticoagulation Episode Summary     INR check location     Preferred lab     Send INR reminders to [facility] HEART INR NURSE    Comments       Anticoagulation Care Providers     Provider Role Specialty Phone number    BryantSatya gordon MD [facility] Cardiology [phone]            See the Encounter Report to view Anticoagulation Flowsheet and Dosing Calendar (Go to Encounters tab in chart review, and find the Anticoagulation Therapy Visit)    INR 3.0.  He took ibuprofen 1 day.  No other changes.  Continue same schedule and recheck in 4 weeks.  He is very interested in home INR management.  Canelo Ferguson, ENZO

## 2017-08-21 NOTE — PROGRESS NOTES
The following letter pertains to your most recent diagnostic tests:    -Liver and gallbladder tests are normal for you. (ALT,AST, Alk phos, bilirubin), kidney function is normal for you (Creatinine, GFR), Sodium is normal, Potassium is normal for you, Calcium is normal for you, Glucose (blood sugar) is normal for you.      -Your cholesterol panel looks healthy.     -Your complete blood counts including your hemoglobin returned normal for you.         Bottom line:  Your lab results look healthy and at goal      Follow up:  Schedule an appointment for a physical examination with fasting blood tests in one year's time, or return sooner if new questions, symptoms or problems arise.       Sincerely,    Dr. Goldberg

## 2017-08-21 NOTE — MR AVS SNAPSHOT
Yogesh Abreu   8/21/2017 8:20 AM   Anticoagulation Therapy Visit    Description:  77 year old male   Provider:  [facility]   Department:  [facility]           INR as of 8/21/2017     Today's INR 3.0      Anticoagulation Summary as of 8/21/2017     INR goal 2.0-3.0   Today's INR 3.0   Full instructions 10 mg on Sun, Wed; 7.5 mg all other days   Next INR check 9/18/2017    Indications   Long-term (current) use of anticoagulants [Z79.01] [Z79.01]  PE (pulmonary embolism) [I26.99]         Your next Anticoagulation Clinic appointment(s)     Sep 18, 2017  8:20 AM CDT   Anticoagulation Visit with  ANTICOAGULATION   [facility] ([facility])    [address] [place] [id number]   [phone]              Contact Numbers     Anticoagulant (INR) Clinic Number: [phone]          August 2017 Details    Sun Mon Tue Wed Thu Fri Sat       1               2               3               4               5                 6               7               8               9               10               11               12                 13               14               15               16               17               18               19                 20               21      7.5 mg   See details      22      7.5 mg         23      10 mg         24      7.5 mg         25      7.5 mg         26      7.5 mg           27      10 mg         28      7.5 mg         29      7.5 mg         30      10 mg         31      7.5 mg            Date Details   08/21 This INR check               How to take your warfarin dose     To take:  7.5 mg Take 1.5 of the 5 mg tablets.    To take:  10 mg Take 2 of the 5 mg tablets.           September 2017 Details    Sun Mon Tue Wed Thu Fri Sat          1      7.5 mg         2      7.5 mg           3      10 mg         4      7.5 mg         5      7.5 mg         6      10 mg         7      7.5 mg         8       7.5 mg         9      7.5 mg           10      10 mg         11      7.5 mg         12      7.5 mg         13      10 mg         14      7.5 mg         15      7.5 mg         16      7.5 mg           17      10 mg         18            19               20               21               22               23                 24               25               26               27               28               29               30                Date Details   No additional details    Date of next INR:  9/18/2017         How to take your warfarin dose     To take:  7.5 mg Take 1.5 of the 5 mg tablets.    To take:  10 mg Take 2 of the 5 mg tablets.

## 2017-08-24 ENCOUNTER — MEDICAL CORRESPONDENCE (OUTPATIENT)
Dept: HEALTH INFORMATION MANAGEMENT | Facility: CLINIC | Age: 77
End: 2017-08-24

## 2017-08-24 ENCOUNTER — DOCUMENTATION ONLY (OUTPATIENT)
Dept: CARDIOLOGY | Facility: CLINIC | Age: 77
End: 2017-08-24

## 2017-08-24 NOTE — PROGRESS NOTES
Signed [facility] home INR monitoring orders faxed to Andra [phone]. Andra will determine insurance coverage for pt then notify pt of his expected cost per month. Andra will then notify the INR clinic if pt is still wanting to start home INR monitoring. Rickey

## 2017-09-07 ENCOUNTER — MYC MEDICAL ADVICE (OUTPATIENT)
Dept: FAMILY MEDICINE | Facility: CLINIC | Age: 77
End: 2017-09-07

## 2017-09-08 NOTE — TELEPHONE ENCOUNTER
Patient notified and referral faxed to clinic of neurology.    Giovanna Gutierrez  Referral Coordinator

## 2017-09-19 ENCOUNTER — ANTICOAGULATION THERAPY VISIT (OUTPATIENT)
Dept: CARDIOLOGY | Facility: CLINIC | Age: 77
End: 2017-09-19
Payer: COMMERCIAL

## 2017-09-19 DIAGNOSIS — I26.99 PULMONARY EMBOLISM (H): Primary | ICD-10-CM

## 2017-09-19 DIAGNOSIS — Z79.01 LONG-TERM (CURRENT) USE OF ANTICOAGULANTS: ICD-10-CM

## 2017-09-19 LAB — INR POINT OF CARE: 3.1 (ref 0.86–1.14)

## 2017-09-19 PROCEDURE — 36416 COLLJ CAPILLARY BLOOD SPEC: CPT | Performed by: INTERNAL MEDICINE

## 2017-09-19 PROCEDURE — 85610 PROTHROMBIN TIME: CPT | Mod: QW | Performed by: INTERNAL MEDICINE

## 2017-09-19 NOTE — PROGRESS NOTES
ANTICOAGULATION FOLLOW-UP CLINIC VISIT    Patient Name:  Yogesh Abreu  Date:  9/19/2017  Contact Type:  Face to Face    SUBJECTIVE:     Patient Findings     Positives Change in diet/appetite (ate more greens and drank more ETOH while on vacation)           OBJECTIVE    INR Protime   Date Value Ref Range Status   09/19/2017 3.1 (A) 0.86 - 1.14 Final     Chromogenic Factor 10   Date Value Ref Range Status   10/12/2015 28 (L) 70 - 130 % Final     Comment:     Therapeutic Range:  A Chromogenic Factor 10 level of approximately 20-40%   inversely correlates with an INR of 2-3 for patients receiving Warfarin.   Chromogenic Factor 10 levels below 20% indicate an INR greater than 3 and   levels above 40% indicate an INR less than 2.         ASSESSMENT / PLAN  INR assessment THER    Recheck INR In: 4 WEEKS    INR Location Clinic      Anticoagulation Summary as of 9/19/2017     INR goal 2.0-3.0   Today's INR 3.1!   Maintenance plan 10 mg (5 mg x 2) on Sun, Wed; 7.5 mg (5 mg x 1.5) all other days   Full instructions 10 mg on Sun, Wed; 7.5 mg all other days   Weekly total 57.5 mg   No change documented Tanja Wills, RN   Plan last modified Tanja Wills, RN (5/3/2017)   Next INR check 10/17/2017   Target end date Indefinite    Indications   Long-term (current) use of anticoagulants [Z79.01] [Z79.01]  PE (pulmonary embolism) [I26.99]         Anticoagulation Episode Summary     INR check location     Preferred lab     Send INR reminders to [facility] HEART INR NURSE    Comments       Anticoagulation Care Providers     Provider Role Specialty Phone number    Satya Rogers MD [facility] Cardiology [phone]            See the Encounter Report to view Anticoagulation Flowsheet and Dosing Calendar (Go to Encounters tab in chart review, and find the Anticoagulation Therapy Visit)    INR 3.1 He ate more greens and drank more ETOH while on vacation last week but has now resumed his usual diet and ETOH. No change in meds. No  abnormal bleeding or bruising. Will continue current dosing of 10 mg SuW and 7.5 mg all other days with recheck in 4 weeks. Dosage adjustment made based on physician directed care plan.    Tanja Wills RN

## 2017-09-19 NOTE — MR AVS SNAPSHOT
Yogesh Abreu   9/19/2017 8:20 AM   Anticoagulation Therapy Visit    Description:  77 year old male   Provider:  [facility]   Department:  [facility]           INR as of 9/19/2017     Today's INR 3.1!      Anticoagulation Summary as of 9/19/2017     INR goal 2.0-3.0   Today's INR 3.1!   Full instructions 10 mg on Sun, Wed; 7.5 mg all other days   Next INR check 10/17/2017    Indications   Long-term (current) use of anticoagulants [Z79.01] [Z79.01]  PE (pulmonary embolism) [I26.99]         Your next Anticoagulation Clinic appointment(s)     Oct 17, 2017  8:20 AM CDT   Anticoagulation Visit with  ANTICOAGULATION   [facility] ([facility] PSA Clin[facility])    [address] [place] [id number]   [phone]              Contact Numbers     Anticoagulant (INR) Clinic Number: [phone]          September 2017 Details    Sun Mon Tue Wed Thu Fri Sat          1               2                 3               4               5               6               7               8               9                 10               11               12               13               14               15               16                 17               18               19      7.5 mg   See details      20      10 mg         21      7.5 mg         22      7.5 mg         23      7.5 mg           24      10 mg         25      7.5 mg         26      7.5 mg         27      10 mg         28      7.5 mg         29      7.5 mg         30      7.5 mg          Date Details   09/19 This INR check               How to take your warfarin dose     To take:  7.5 mg Take 1.5 of the 5 mg tablets.    To take:  10 mg Take 2 of the 5 mg tablets.           October 2017 Details    Sun Mon Tue Wed Thu Fri Sat     1      10 mg         2      7.5 mg         3      7.5 mg         4      10 mg         5      7.5 mg         6      7.5 mg         7      7.5 mg           8      10 mg          9      7.5 mg         10      7.5 mg         11      10 mg         12      7.5 mg         13      7.5 mg         14      7.5 mg           15      10 mg         16      7.5 mg         17            18               19               20               21                 22               23               24               25               26               27               28                 29               30               31                    Date Details   No additional details    Date of next INR:  10/17/2017         How to take your warfarin dose     To take:  7.5 mg Take 1.5 of the 5 mg tablets.    To take:  10 mg Take 2 of the 5 mg tablets.

## 2017-10-09 ENCOUNTER — TELEPHONE (OUTPATIENT)
Dept: CARDIOLOGY | Facility: CLINIC | Age: 77
End: 2017-10-09

## 2017-10-09 DIAGNOSIS — I25.110 CORONARY ARTERY DISEASE INVOLVING NATIVE CORONARY ARTERY OF NATIVE HEART WITH UNSTABLE ANGINA PECTORIS (H): ICD-10-CM

## 2017-10-09 RX ORDER — METOPROLOL SUCCINATE 25 MG/1
12.5 TABLET, EXTENDED RELEASE ORAL DAILY
Qty: 45 TABLET | Refills: 0 | Status: SHIPPED | OUTPATIENT
Start: 2017-10-09 | End: 2018-08-08

## 2017-10-09 NOTE — TELEPHONE ENCOUNTER
Pt calling to report that he received a call from [facility] INR to schedule his training session on 10/12/17. Will wait for the INR result then contact pt to discuss dosing. He will need to schedule a 30 day INR appt in clinic(bring his INR machine from home) to review and sign patient contract and guidelines and to review the history of results in his INR machine. Rickey

## 2017-10-11 ENCOUNTER — ANTICOAGULATION THERAPY VISIT (OUTPATIENT)
Dept: CARDIOLOGY | Facility: CLINIC | Age: 77
End: 2017-10-11
Payer: COMMERCIAL

## 2017-10-11 ENCOUNTER — TRANSFERRED RECORDS (OUTPATIENT)
Dept: HEALTH INFORMATION MANAGEMENT | Facility: CLINIC | Age: 77
End: 2017-10-11

## 2017-10-11 DIAGNOSIS — Z79.01 LONG-TERM (CURRENT) USE OF ANTICOAGULANTS: ICD-10-CM

## 2017-10-11 LAB — INR PPP: 2.5

## 2017-10-11 PROCEDURE — 99207 ZZC NO CHARGE NURSE ONLY: CPT | Performed by: INTERNAL MEDICINE

## 2017-10-11 NOTE — PROGRESS NOTES
ANTICOAGULATION FOLLOW-UP CLINIC VISIT    Patient Name:  Yogesh Abreu  Date:  10/11/2017  Contact Type:  Telephone/ patient    SUBJECTIVE:     Patient Findings     Positives No Problem Findings           OBJECTIVE    INR   Date Value Ref Range Status   10/11/2017 2.5  Final     Chromogenic Factor 10   Date Value Ref Range Status   10/12/2015 28 (L) 70 - 130 % Final     Comment:     Therapeutic Range:  A Chromogenic Factor 10 level of approximately 20-40%   inversely correlates with an INR of 2-3 for patients receiving Warfarin.   Chromogenic Factor 10 levels below 20% indicate an INR greater than 3 and   levels above 40% indicate an INR less than 2.         ASSESSMENT / PLAN  INR assessment THER    Recheck INR In: 2 WEEKS    INR Location Home INR    Billed home INR No      Anticoagulation Summary as of 10/11/2017     INR goal 2.0-3.0   Today's INR 2.5   Maintenance plan 10 mg (5 mg x 2) on Sun, Wed; 7.5 mg (5 mg x 1.5) all other days   Full instructions 10 mg on Sun, Wed; 7.5 mg all other days   Weekly total 57.5 mg   No change documented Tanja Wills, ENZO   Plan last modified Tanja Wills, RN (5/3/2017)   Next INR check 10/25/2017   Target end date Indefinite    Indications   Long-term (current) use of anticoagulants [Z79.01] [Z79.01]  PE (pulmonary embolism) [I26.99]         Anticoagulation Episode Summary     INR check location     Preferred lab     Send INR reminders to [facility] HEART INR NURSE    Comments       Anticoagulation Care Providers     Provider Role Specialty Phone number    Satya Rogers MD Responsible Cardiology [phone]            See the Encounter Report to view Anticoagulation Flowsheet and Dosing Calendar (Go to Encounters tab in chart review, and find the Anticoagulation Therapy Visit)    Home INR result 2.5. Spoke with pt via phone. He received training on home INR machine today. No questions regarding home INRs at this time. No change in meds or diet. No abnormal bleeding or  bruising. Will continue current dosing of 10 mg SuW and 7.5 mg all other days with recheck in 2 weeks using hoome INR machine. He will come into the clinic tomorrow to review and sign the pt guidelines and home INR contract. He will be leaving for [place] soon so will not be able to schedule an in clinic INR appt (with his coaguchek machine) until February. Dosage adjustment made based on physician directed care plan.  (#1 of 4 - not billed)  Tanja Wills RN

## 2017-10-11 NOTE — MR AVS SNAPSHOT
Yogesh Abreu   10/11/2017   Anticoagulation Therapy Visit    Description:  77 year old male   Provider:  Tanja Wills, RN   Department:  [facility]           INR as of 10/11/2017     Today's INR 2.5      Anticoagulation Summary as of 10/11/2017     INR goal 2.0-3.0   Today's INR 2.5   Full instructions 10 mg on Sun, Wed; 7.5 mg all other days   Next INR check 10/25/2017    Indications   Long-term (current) use of anticoagulants [Z79.01] [Z79.01]  PE (pulmonary embolism) [I26.99]         Your next Anticoagulation Clinic appointment(s)     Oct 12, 2017  9:20 AM CDT   Anticoagulation Visit with [facility]AGUL[facility]   [facility] ([facility])    [address] [place] [id number]   [phone]              Contact Numbers     Anticoagulant (INR) Clinic Number: [phone]          October 2017 Details    Sun Mon Tue Wed Thu Fri Sat     1               2               3               4               5               6               7                 8               9               10               11      10 mg   See details      12      7.5 mg         13      7.5 mg         14      7.5 mg           15      10 mg         16      7.5 mg         17      7.5 mg         18      10 mg         19      7.5 mg         20      7.5 mg         21      7.5 mg           22      10 mg         23      7.5 mg         24      7.5 mg         25            26               27               28                 29               30               31                    Date Details   10/11 This INR check       Date of next INR:  10/25/2017         How to take your warfarin dose     To take:  7.5 mg Take 1.5 of the 5 mg tablets.    To take:  10 mg Take 2 of the 5 mg tablets.

## 2017-10-12 ENCOUNTER — ANTICOAGULATION THERAPY VISIT (OUTPATIENT)
Dept: CARDIOLOGY | Facility: CLINIC | Age: 77
End: 2017-10-12

## 2017-10-12 DIAGNOSIS — Z79.01 LONG-TERM (CURRENT) USE OF ANTICOAGULANTS: ICD-10-CM

## 2017-10-24 ENCOUNTER — ANTICOAGULATION THERAPY VISIT (OUTPATIENT)
Dept: CARDIOLOGY | Facility: CLINIC | Age: 77
End: 2017-10-24
Payer: COMMERCIAL

## 2017-10-24 ENCOUNTER — TRANSFERRED RECORDS (OUTPATIENT)
Dept: HEALTH INFORMATION MANAGEMENT | Facility: CLINIC | Age: 77
End: 2017-10-24

## 2017-10-24 DIAGNOSIS — Z79.01 LONG-TERM (CURRENT) USE OF ANTICOAGULANTS: ICD-10-CM

## 2017-10-24 LAB — INR PPP: 2.5

## 2017-10-24 PROCEDURE — 99207 ZZC NO CHARGE NURSE ONLY: CPT

## 2017-10-24 NOTE — PROGRESS NOTES
ANTICOAGULATION FOLLOW-UP CLINIC VISIT    Patient Name:  Yogesh Abreu  Date:  10/24/2017  Contact Type:  Telephone/ patient    SUBJECTIVE:     Patient Findings     Positives No Problem Findings           OBJECTIVE    INR   Date Value Ref Range Status   10/24/2017 2.5  Final     Chromogenic Factor 10   Date Value Ref Range Status   10/12/2015 28 (L) 70 - 130 % Final     Comment:     Therapeutic Range:  A Chromogenic Factor 10 level of approximately 20-40%   inversely correlates with an INR of 2-3 for patients receiving Warfarin.   Chromogenic Factor 10 levels below 20% indicate an INR greater than 3 and   levels above 40% indicate an INR less than 2.         ASSESSMENT / PLAN  INR assessment THER    Recheck INR In: 2 WEEKS    INR Location Home INR    Billed home INR No      Anticoagulation Summary as of 10/24/2017     INR goal 2.0-3.0   Today's INR 2.5   Maintenance plan 10 mg (5 mg x 2) on Sun, Wed; 7.5 mg (5 mg x 1.5) all other days   Full instructions 10 mg on Sun, Wed; 7.5 mg all other days   Weekly total 57.5 mg   No change documented Tanja Wills, ENZO   Plan last modified Tanja Wills, RN (5/3/2017)   Next INR check 11/7/2017   Target end date Indefinite    Indications   Long-term (current) use of anticoagulants [Z79.01] [Z79.01]  PE (pulmonary embolism) [I26.99]         Anticoagulation Episode Summary     INR check location     Preferred lab     Send INR reminders to [facility] HEART INR NURSE    Comments       Anticoagulation Care Providers     Provider Role Specialty Phone number    Satya Rogers MD Responsible Cardiology [phone]            See the Encounter Report to view Anticoagulation Flowsheet and Dosing Calendar (Go to Encounters tab in chart review, and find the Anticoagulation Therapy Visit)    Home INR 2.5. No change in meds or diet. No abnormal bleeding or bruising. Pt is in [place] until February so will not be able to schedule an appt in our clinic for review of the memory in the  [place]ag[place] until then. Will continue current dosing of 10 mg SuW and 7.5 mg all other days with recheck in 2 weeks. Dosage adjustment made based on physician directed care plan.  (# 2 of 4 - not billed)  Tanja Wills RN

## 2017-10-24 NOTE — MR AVS SNAPSHOT
Yogesh Abreu   10/24/2017   Anticoagulation Therapy Visit    Description:  77 year old male   Provider:  Tanja Wills, RN   Department:  [facility]           INR as of 10/24/2017     Today's INR 2.5      Anticoagulation Summary as of 10/24/2017     INR goal 2.0-3.0   Today's INR 2.5   Full instructions 10 mg on Sun, Wed; 7.5 mg all other days   Next INR check 11/7/2017    Indications   Long-term (current) use of anticoagulants [Z79.01] [Z79.01]  PE (pulmonary embolism) [I26.99]         Contact Numbers     Anticoagulant (INR) Clinic Number: [phone]          October 2017 Details    Sun Mon Tue Wed Thu Fri Sat     1               2               3               4               5               6               7                 8               9               10               11               12               13               14                 15               16               17               18               19               20               21                 22               23               24      7.5 mg   See details      25      10 mg         26      7.5 mg         27      7.5 mg         28      7.5 mg           29      10 mg         30      7.5 mg         31      7.5 mg              Date Details   10/24 This INR check               How to take your warfarin dose     To take:  7.5 mg Take 1.5 of the 5 mg tablets.    To take:  10 mg Take 2 of the 5 mg tablets.           November 2017 Details    Sun Mon Tue Wed Thu Fri Sat        1      10 mg         2      7.5 mg         3      7.5 mg         4      7.5 mg           5      10 mg         6      7.5 mg         7            8               9               10               11                 12               13               14               15               16               17               18                 19               20               21               22               23               24               25                 26                27               28               29               30                  Date Details   No additional details    Date of next INR:  11/7/2017         How to take your warfarin dose     To take:  7.5 mg Take 1.5 of the 5 mg tablets.    To take:  10 mg Take 2 of the 5 mg tablets.

## 2017-10-25 DIAGNOSIS — Z79.01 LONG TERM CURRENT USE OF ANTICOAGULANT THERAPY: ICD-10-CM

## 2017-10-25 RX ORDER — WARFARIN SODIUM 5 MG/1
TABLET ORAL
Qty: 150 TABLET | Refills: 1 | Status: SHIPPED | OUTPATIENT
Start: 2017-10-25 | End: 2018-05-01

## 2017-10-26 ENCOUNTER — MYC REFILL (OUTPATIENT)
Dept: CARDIOLOGY | Facility: CLINIC | Age: 77
End: 2017-10-26

## 2017-10-26 DIAGNOSIS — Z79.01 LONG TERM CURRENT USE OF ANTICOAGULANT THERAPY: ICD-10-CM

## 2017-10-26 RX ORDER — WARFARIN SODIUM 5 MG/1
TABLET ORAL
Qty: 150 TABLET | Refills: 1 | Status: CANCELLED | OUTPATIENT
Start: 2017-10-26

## 2017-10-27 NOTE — TELEPHONE ENCOUNTER
Warfarin Rx was already refilled on 10/25/17. Spoke with pharmacist to verify that they received the Rx refill - they did receive the Rx refill. Rickey

## 2017-10-27 NOTE — TELEPHONE ENCOUNTER
Message from Riky:  Felix Valle RN Fri Oct 27, 2017 8:33 AM        ----- Message -----   From: Yogesh Abreu   Sent: 10/26/2017 5:13 PM   To: [facility] 3  Subject: Medication Renewal Request     Original authorizing provider: PABLITO WALKER MD [facility] would like a refill of the following medications:  warfarin (COUMADIN) 5 MG tablet [PABLITO WALKER MD]    Preferred pharmacy: [facility] [address] AT [facility]  [address]    Comment:

## 2017-11-07 ENCOUNTER — TRANSFERRED RECORDS (OUTPATIENT)
Dept: HEALTH INFORMATION MANAGEMENT | Facility: CLINIC | Age: 77
End: 2017-11-07

## 2017-11-07 ENCOUNTER — ANTICOAGULATION THERAPY VISIT (OUTPATIENT)
Dept: CARDIOLOGY | Facility: CLINIC | Age: 77
End: 2017-11-07

## 2017-11-07 DIAGNOSIS — Z79.01 LONG-TERM (CURRENT) USE OF ANTICOAGULANTS: ICD-10-CM

## 2017-11-07 LAB — INR PPP: 2.4

## 2017-11-07 NOTE — PROGRESS NOTES
ANTICOAGULATION FOLLOW-UP CLINIC VISIT    Patient Name:  Yogesh Abreu  Date:  11/7/2017  Contact Type:  Telephone/ patient    SUBJECTIVE:     Patient Findings     Positives No Problem Findings           OBJECTIVE    INR   Date Value Ref Range Status   11/07/2017 2.4  Final     Chromogenic Factor 10   Date Value Ref Range Status   10/12/2015 28 (L) 70 - 130 % Final     Comment:     Therapeutic Range:  A Chromogenic Factor 10 level of approximately 20-40%   inversely correlates with an INR of 2-3 for patients receiving Warfarin.   Chromogenic Factor 10 levels below 20% indicate an INR greater than 3 and   levels above 40% indicate an INR less than 2.         ASSESSMENT / PLAN  INR assessment THER    Recheck INR In: 2 WEEKS    INR Location Home INR    Billed home INR No      Anticoagulation Summary as of 11/7/2017     INR goal 2.0-3.0   Today's INR 2.4   Maintenance plan 10 mg (5 mg x 2) on Sun, Wed; 7.5 mg (5 mg x 1.5) all other days   Full instructions 10 mg on Sun, Wed; 7.5 mg all other days   Weekly total 57.5 mg   No change documented Tanja Wills, ENZO   Plan last modified Tanja Wills, RN (5/3/2017)   Next INR check 11/21/2017   Target end date Indefinite    Indications   Long-term (current) use of anticoagulants [Z79.01] [Z79.01]  PE (pulmonary embolism) [I26.99]         Anticoagulation Episode Summary     INR check location     Preferred lab     Send INR reminders to [facility] HEART INR NURSE    Comments       Anticoagulation Care Providers     Provider Role Specialty Phone number    Satya Rogers MD Responsible Cardiology [phone]            See the Encounter Report to view Anticoagulation Flowsheet and Dosing Calendar (Go to Encounters tab in chart review, and find the Anticoagulation Therapy Visit)    Home INR 2.2  Spoke with pt via phone. No change in meds or diet. No abnormal bleeding or bruising. Will continue current dosing of 10 mg SuW and 7.5 mg all other days with recheck in 2 weeks.  Dosage adjustment made based on physician directed care plan.  (#3 of 4 - not billed)  Tanja Wills RN

## 2017-11-07 NOTE — MR AVS SNAPSHOT
Yogesh Abreu   11/7/2017   Anticoagulation Therapy Visit    Description:  77 year old male   Provider:  Tanja Wills, RN   Department:  [facility]           INR as of 11/7/2017     Today's INR 2.4      Anticoagulation Summary as of 11/7/2017     INR goal 2.0-3.0   Today's INR 2.4   Full instructions 10 mg on Sun, Wed; 7.5 mg all other days   Next INR check 11/21/2017    Indications   Long-term (current) use of anticoagulants [Z79.01] [Z79.01]  PE (pulmonary embolism) [I26.99]         Contact Numbers     Anticoagulant (INR) Clinic Number: [phone]          November 2017 Details    Sun Mon Tue Wed Thu Fri Sat        1               2               3               4                 5               6               7      7.5 mg   See details      8      10 mg         9      7.5 mg         10      7.5 mg         11      7.5 mg           12      10 mg         13      7.5 mg         14      7.5 mg         15      10 mg         16      7.5 mg         17      7.5 mg         18      7.5 mg           19      10 mg         20      7.5 mg         21            22               23               24               25                 26               27               28               29               30                  Date Details   11/07 This INR check       Date of next INR:  11/21/2017         How to take your warfarin dose     To take:  7.5 mg Take 1.5 of the 5 mg tablets.    To take:  10 mg Take 2 of the 5 mg tablets.

## 2017-11-22 ENCOUNTER — TRANSFERRED RECORDS (OUTPATIENT)
Dept: HEALTH INFORMATION MANAGEMENT | Facility: CLINIC | Age: 77
End: 2017-11-22

## 2017-11-22 ENCOUNTER — ANTICOAGULATION THERAPY VISIT (OUTPATIENT)
Dept: CARDIOLOGY | Facility: CLINIC | Age: 77
End: 2017-11-22

## 2017-11-22 DIAGNOSIS — Z79.01 LONG-TERM (CURRENT) USE OF ANTICOAGULANTS: ICD-10-CM

## 2017-11-22 LAB — INR PPP: 2.3

## 2017-11-22 NOTE — MR AVS SNAPSHOT
Yogesh Abreu   11/22/2017   Anticoagulation Therapy Visit    Description:  77 year old male   Provider:  Tanja Wills, RN   Department:  [facility]           INR as of 11/22/2017     Today's INR 2.3      Anticoagulation Summary as of 11/22/2017     INR goal 2.0-3.0   Today's INR 2.3   Full instructions 10 mg on Sun, Wed; 7.5 mg all other days   Next INR check 12/6/2017    Indications   Long-term (current) use of anticoagulants [Z79.01] [Z79.01]  PE (pulmonary embolism) [I26.99]         Contact Numbers     Anticoagulant (INR) Clinic Number: [phone]          November 2017 Details    Sun Mon Tue Wed Thu Fri Sat        1               2               3               4                 5               6               7               8               9               10               11                 12               13               14               15               16               17               18                 19               20               21               22      10 mg   See details      23      7.5 mg         24      7.5 mg         25      7.5 mg           26      10 mg         27      7.5 mg         28      7.5 mg         29      10 mg         30      7.5 mg            Date Details   11/22 This INR check               How to take your warfarin dose     To take:  7.5 mg Take 1.5 of the 5 mg tablets.    To take:  10 mg Take 2 of the 5 mg tablets.           December 2017 Details    Sun Mon Tue Wed Thu Fri Sat          1      7.5 mg         2      7.5 mg           3      10 mg         4      7.5 mg         5      7.5 mg         6            7               8               9                 10               11               12               13               14               15               16                 17               18               19               20               21               22               23                 24               25               26               27                28               29               30                 31                      Date Details   No additional details    Date of next INR:  12/6/2017         How to take your warfarin dose     To take:  7.5 mg Take 1.5 of the 5 mg tablets.    To take:  10 mg Take 2 of the 5 mg tablets.

## 2017-11-22 NOTE — PROGRESS NOTES
ANTICOAGULATION FOLLOW-UP CLINIC VISIT    Patient Name:  Yogesh Abreu  Date:  11/22/2017  Contact Type:  Telephone/ patient    SUBJECTIVE:     Patient Findings     Positives No Problem Findings           OBJECTIVE    INR   Date Value Ref Range Status   11/22/2017 2.3  Final     Chromogenic Factor 10   Date Value Ref Range Status   10/12/2015 28 (L) 70 - 130 % Final     Comment:     Therapeutic Range:  A Chromogenic Factor 10 level of approximately 20-40%   inversely correlates with an INR of 2-3 for patients receiving Warfarin.   Chromogenic Factor 10 levels below 20% indicate an INR greater than 3 and   levels above 40% indicate an INR less than 2.         ASSESSMENT / PLAN  INR assessment THER    Recheck INR In: 2 WEEKS    INR Location Home INR    Billed home INR Yes      Anticoagulation Summary as of 11/22/2017     INR goal 2.0-3.0   Today's INR 2.3   Maintenance plan 10 mg (5 mg x 2) on Sun, Wed; 7.5 mg (5 mg x 1.5) all other days   Full instructions 10 mg on Sun, Wed; 7.5 mg all other days   Weekly total 57.5 mg   No change documented Tanja Wills, ENZO   Plan last modified Tanja Wills, RN (5/3/2017)   Next INR check 12/6/2017   Target end date Indefinite    Indications   Long-term (current) use of anticoagulants [Z79.01] [Z79.01]  PE (pulmonary embolism) [I26.99]         Anticoagulation Episode Summary     INR check location     Preferred lab     Send INR reminders to [facility] HEART INR NURSE    Comments       Anticoagulation Care Providers     Provider Role Specialty Phone number    Satya Rogers MD Responsible Cardiology [phone]            See the Encounter Report to view Anticoagulation Flowsheet and Dosing Calendar (Go to Encounters tab in chart review, and find the Anticoagulation Therapy Visit)    Home INR 2.3 Spoke with pt via phone. No change in meds or diet. No abnormal bleeding or bruising. Will continue current dosing of 10 mg SuW and 7.5 mg all other days with recheck in 2 weeks  using home INR machine. Dosage adjustment made based on physician directed care plan.  (#4 of 4 - billed)  Tanja Wills RN

## 2017-12-05 ENCOUNTER — TRANSFERRED RECORDS (OUTPATIENT)
Dept: HEALTH INFORMATION MANAGEMENT | Facility: CLINIC | Age: 77
End: 2017-12-05

## 2017-12-05 ENCOUNTER — ANTICOAGULATION THERAPY VISIT (OUTPATIENT)
Dept: CARDIOLOGY | Facility: CLINIC | Age: 77
End: 2017-12-05
Payer: COMMERCIAL

## 2017-12-05 DIAGNOSIS — Z79.01 LONG-TERM (CURRENT) USE OF ANTICOAGULANTS: ICD-10-CM

## 2017-12-05 LAB — INR PPP: 3.2

## 2017-12-05 PROCEDURE — 99207 ZZC NO CHARGE NURSE ONLY: CPT

## 2017-12-05 NOTE — MR AVS SNAPSHOT
Yogesh Abreu   12/5/2017   Anticoagulation Therapy Visit    Description:  77 year old male   Provider:  Tanja Wills, RN   Department:  [facility]           INR as of 12/5/2017     Today's INR 3.2!      Anticoagulation Summary as of 12/5/2017     INR goal 2.0-3.0   Today's INR 3.2!   Full instructions 12/5: 5 mg; Otherwise 10 mg on Sun, Wed; 7.5 mg all other days   Next INR check 12/19/2017    Indications   Long-term (current) use of anticoagulants [Z79.01] [Z79.01]  PE (pulmonary embolism) [I26.99]         Contact Numbers     Anticoagulant (INR) Clinic Number: [phone]          December 2017 Details    Sun Mon Tue Wed Thu Fri Sat          1               2                 3               4               5      5 mg   See details      6      10 mg         7      7.5 mg         8      7.5 mg         9      7.5 mg           10      10 mg         11      7.5 mg         12      7.5 mg         13      10 mg         14      7.5 mg         15      7.5 mg         16      7.5 mg           17      10 mg         18      7.5 mg         19            20               21               22               23                 24               25               26               27               28               29               30                 31                      Date Details   12/05 This INR check       Date of next INR:  12/19/2017         How to take your warfarin dose     To take:  5 mg Take 1 of the 5 mg tablets.    To take:  7.5 mg Take 1.5 of the 5 mg tablets.    To take:  10 mg Take 2 of the 5 mg tablets.

## 2017-12-05 NOTE — PROGRESS NOTES
ANTICOAGULATION FOLLOW-UP CLINIC VISIT    Patient Name:  Yogesh Abreu  Date:  12/5/2017  Contact Type:  Telephone/ patient    SUBJECTIVE:     Patient Findings     Positives Change in diet/appetite (less greens, more ETOH 2 ays ago), OTC meds (ibuprofen 400 mg after golfing (takes once a week))           OBJECTIVE    INR   Date Value Ref Range Status   12/05/2017 3.2  Final     Chromogenic Factor 10   Date Value Ref Range Status   10/12/2015 28 (L) 70 - 130 % Final     Comment:     Therapeutic Range:  A Chromogenic Factor 10 level of approximately 20-40%   inversely correlates with an INR of 2-3 for patients receiving Warfarin.   Chromogenic Factor 10 levels below 20% indicate an INR greater than 3 and   levels above 40% indicate an INR less than 2.         ASSESSMENT / PLAN  INR assessment SUPRA    Recheck INR In: 2 WEEKS    INR Location Home INR    Billed home INR No      Anticoagulation Summary as of 12/5/2017     INR goal 2.0-3.0   Today's INR 3.2!   Maintenance plan 10 mg (5 mg x 2) on Sun, Wed; 7.5 mg (5 mg x 1.5) all other days   Full instructions 12/5: 5 mg; Otherwise 10 mg on Sun, Wed; 7.5 mg all other days   Weekly total 57.5 mg   Plan last modified Tanja Wills, RN (5/3/2017)   Next INR check 12/19/2017   Target end date Indefinite    Indications   Long-term (current) use of anticoagulants [Z79.01] [Z79.01]  PE (pulmonary embolism) [I26.99]         Anticoagulation Episode Summary     INR check location     Preferred lab     Send INR reminders to [facility] HEART INR NURSE    Comments       Anticoagulation Care Providers     Provider Role Specialty Phone number    Satya Rogers MD [facility] Cardiology [phone]            See the Encounter Report to view Anticoagulation Flowsheet and Dosing Calendar (Go to Encounters tab in chart review, and find the Anticoagulation Therapy Visit)    Home INR 3.2 Spoke with pt via phone. He has eaten fewer greens and drank more ETOH 2 days ago. Took Ibuprofen  400 mg after golfing yesterday. No abnormal bleeding or bruising. Will take 5 mg today then resume usual dosing of 10 mg SuW and 7.5 mg all other days. He will resume his usual diet and ETOH. Recheck in 2 weeks. Dosage adjustment made based on physician directed care plan.  (#1 of 4 - not billed)  Tanja Wills RN

## 2017-12-19 ENCOUNTER — TRANSFERRED RECORDS (OUTPATIENT)
Dept: HEALTH INFORMATION MANAGEMENT | Facility: CLINIC | Age: 77
End: 2017-12-19

## 2017-12-19 ENCOUNTER — ANTICOAGULATION THERAPY VISIT (OUTPATIENT)
Dept: CARDIOLOGY | Facility: CLINIC | Age: 77
End: 2017-12-19
Payer: COMMERCIAL

## 2017-12-19 DIAGNOSIS — Z79.01 LONG-TERM (CURRENT) USE OF ANTICOAGULANTS: ICD-10-CM

## 2017-12-19 LAB — INR PPP: 3.3

## 2017-12-19 PROCEDURE — 99207 ZZC NO CHARGE NURSE ONLY: CPT

## 2017-12-19 NOTE — MR AVS SNAPSHOT
Yogesh Abreu   12/19/2017   Anticoagulation Therapy Visit    Description:  77 year old male   Provider:  Tanja Wills, RN   Department:  [facility]           INR as of 12/19/2017     Today's INR 3.3!      Anticoagulation Summary as of 12/19/2017     INR goal 2.0-3.0   Today's INR 3.3!   Full instructions 12/19: 2.5 mg; Otherwise 10 mg on Sun, Wed; 7.5 mg all other days   Next INR check 1/2/2018    Indications   Long-term (current) use of anticoagulants [Z79.01] [Z79.01]  PE (pulmonary embolism) [I26.99]         Contact Numbers     Anticoagulant (INR) Clinic Number: [phone]          December 2017 Details    Sun Mon Tue Wed Thu Fri Sat          1               2                 3               4               5               6               7               8               9                 10               11               12               13               14               15               16                 17               18               19      2.5 mg   See details      20      10 mg         21      7.5 mg         22      7.5 mg         23      7.5 mg           24      10 mg         25      7.5 mg         26      7.5 mg         27      10 mg         28      7.5 mg         29      7.5 mg         30      7.5 mg           31      10 mg                Date Details   12/19 This INR check               How to take your warfarin dose     To take:  2.5 mg Take 0.5 of a 5 mg tablet.    To take:  7.5 mg Take 1.5 of the 5 mg tablets.    To take:  10 mg Take 2 of the 5 mg tablets.           January 2018 Details    Sun Mon Tue Wed Thu Fri Sat      1      7.5 mg         2            3               4               5               6                 7               8               9               10               11               12               13                 14               15               16               17               18               19               20                 21                22               23               24               25               26               27                 28               29               30               31                   Date Details   No additional details    Date of next INR:  1/2/2018         How to take your warfarin dose     To take:  7.5 mg Take 1.5 of the 5 mg tablets.

## 2017-12-19 NOTE — PROGRESS NOTES
ANTICOAGULATION FOLLOW-UP CLINIC VISIT    Patient Name:  Yogesh Abreu  Date:  12/19/2017  Contact Type:  Telephone/ patient    SUBJECTIVE:     Patient Findings     Positives Change in diet/appetite    Comments Herbal/Supplements - eating less greens             OBJECTIVE    INR   Date Value Ref Range Status   12/19/2017 3.3  Final     Chromogenic Factor 10   Date Value Ref Range Status   10/12/2015 28 (L) 70 - 130 % Final     Comment:     Therapeutic Range:  A Chromogenic Factor 10 level of approximately 20-40%   inversely correlates with an INR of 2-3 for patients receiving Warfarin.   Chromogenic Factor 10 levels below 20% indicate an INR greater than 3 and   levels above 40% indicate an INR less than 2.         ASSESSMENT / PLAN  INR assessment SUPRA    Recheck INR In: 2 WEEKS    INR Location Home INR    Billed home INR No      Anticoagulation Summary as of 12/19/2017     INR goal 2.0-3.0   Today's INR 3.3!   Maintenance plan 10 mg (5 mg x 2) on Sun, Wed; 7.5 mg (5 mg x 1.5) all other days   Full instructions 12/19: 2.5 mg; Otherwise 10 mg on Sun, Wed; 7.5 mg all other days   Weekly total 57.5 mg   Plan last modified Tanja Wills, RN (5/3/2017)   Next INR check 1/2/2018   Target end date Indefinite    Indications   Long-term (current) use of anticoagulants [Z79.01] [Z79.01]  PE (pulmonary embolism) [I26.99]         Anticoagulation Episode Summary     INR check location     Preferred lab     Send INR reminders to [facility] HEART INR NURSE    Comments       Anticoagulation Care Providers     Provider Role Specialty Phone number    Satya Rogers MD Responsible Cardiology [phone]            See the Encounter Report to view Anticoagulation Flowsheet and Dosing Calendar (Go to Encounters tab in chart review, and find the Anticoagulation Therapy Visit)    Home INR 3.3 He still has not resumed eating his almost daily salads but will resume them. No extra ETOH. No Ibuprofen this week.No change in other meds.  No abnormal bleeding or bruising. 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 Will take only 2.5 mg today, resume eating salads then resume usual dosing of 10 mg SuW and 7.5 mg all other days with recheck in 2 weeks. Dosage adjustment made based on physician directed care plan.  (#2 of 4 - not billed)    Tanja Wills RN

## 2018-01-02 ENCOUNTER — TRANSFERRED RECORDS (OUTPATIENT)
Dept: HEALTH INFORMATION MANAGEMENT | Facility: CLINIC | Age: 78
End: 2018-01-02

## 2018-01-02 ENCOUNTER — ANTICOAGULATION THERAPY VISIT (OUTPATIENT)
Dept: CARDIOLOGY | Facility: CLINIC | Age: 78
End: 2018-01-02
Payer: COMMERCIAL

## 2018-01-02 DIAGNOSIS — Z79.01 LONG-TERM (CURRENT) USE OF ANTICOAGULANTS: ICD-10-CM

## 2018-01-02 LAB — INR POINT OF CARE: 2.9 (ref 0.86–1.14)

## 2018-01-02 PROCEDURE — 85610 PROTHROMBIN TIME: CPT | Mod: QW | Performed by: INTERNAL MEDICINE

## 2018-01-02 PROCEDURE — 36416 COLLJ CAPILLARY BLOOD SPEC: CPT | Performed by: INTERNAL MEDICINE

## 2018-01-02 NOTE — MR AVS SNAPSHOT
Yogesh Abreu   1/2/2018   Anticoagulation Therapy Visit    Description:  77 year old male   Provider:  Tanja Wills, RN   Department:  [facility]           INR as of 1/2/2018     Today's INR 2.9      Anticoagulation Summary as of 1/2/2018     INR goal 2.0-3.0   Today's INR 2.9   Full instructions 10 mg on Sun, Wed; 7.5 mg all other days   Next INR check 1/16/2018    Indications   Long-term (current) use of anticoagulants [Z79.01] [Z79.01]  PE (pulmonary embolism) [I26.99]         Contact Numbers     Anticoagulant (INR) Clinic Number: [phone]          January 2018 Details    Sun Mon Tue Wed Thu Fri Sat      1               2      7.5 mg   See details      3      10 mg         4      7.5 mg         5      7.5 mg         6      7.5 mg           7      10 mg         8      7.5 mg         9      7.5 mg         10      10 mg         11      7.5 mg         12      7.5 mg         13      7.5 mg           14      10 mg         15      7.5 mg         16            17               18               19               20                 21               22               23               24               25               26               27                 28               29               30               31                   Date Details   01/02 This INR check       Date of next INR:  1/16/2018         How to take your warfarin dose     To take:  7.5 mg Take 1.5 of the 5 mg tablets.    To take:  10 mg Take 2 of the 5 mg tablets.

## 2018-01-02 NOTE — PROGRESS NOTES
ANTICOAGULATION FOLLOW-UP CLINIC VISIT    Patient Name:  Yogesh Abreu  Date:  1/2/2018  Contact Type:  Telephone/patient    SUBJECTIVE:        OBJECTIVE    INR Protime   Date Value Ref Range Status   01/02/2018 2.9 (A) 0.86 - 1.14 Final     Chromogenic Factor 10   Date Value Ref Range Status   10/12/2015 28 (L) 70 - 130 % Final     Comment:     Therapeutic Range:  A Chromogenic Factor 10 level of approximately 20-40%   inversely correlates with an INR of 2-3 for patients receiving Warfarin.   Chromogenic Factor 10 levels below 20% indicate an INR greater than 3 and   levels above 40% indicate an INR less than 2.         ASSESSMENT / PLAN  INR assessment THER    Recheck INR In: 2 WEEKS    INR Location Clinic      Anticoagulation Summary as of 1/2/2018     INR goal 2.0-3.0   Today's INR 2.9   Maintenance plan 10 mg (5 mg x 2) on Sun, Wed; 7.5 mg (5 mg x 1.5) all other days   Full instructions 10 mg on Sun, Wed; 7.5 mg all other days   Weekly total 57.5 mg   No change documented Tanja Wills, ENZO   Plan last modified Tanja Wills, RN (5/3/2017)   Next INR check 1/16/2018   Target end date Indefinite    Indications   Long-term (current) use of anticoagulants [Z79.01] [Z79.01]  PE (pulmonary embolism) [I26.99]         Anticoagulation Episode Summary     INR check location     Preferred lab     Send INR reminders to [facility] HEART INR NURSE    Comments       Anticoagulation Care Providers     Provider Role Specialty Phone number    ChristianSatya galvan MD Responsible Cardiology [phone]            See the Encounter Report to view Anticoagulation Flowsheet and Dosing Calendar (Go to Encounters tab in chart review, and find the Anticoagulation Therapy Visit)    Home INR 2.9 No change in meds. He has resumed his almost daily salads. No abnormal bleeding or bruising. Will continue current dosing of 10 mg SuW and 7.5 mg all other days with recheck in 2 weeks. Dosage adjustment made based on physician directed care  plan.  (#3 of 4 - not billed)  Tanja Wills RN

## 2018-01-16 ENCOUNTER — ANTICOAGULATION THERAPY VISIT (OUTPATIENT)
Dept: CARDIOLOGY | Facility: CLINIC | Age: 78
End: 2018-01-16
Payer: COMMERCIAL

## 2018-01-16 ENCOUNTER — TRANSFERRED RECORDS (OUTPATIENT)
Dept: HEALTH INFORMATION MANAGEMENT | Facility: CLINIC | Age: 78
End: 2018-01-16

## 2018-01-16 DIAGNOSIS — Z79.01 LONG-TERM (CURRENT) USE OF ANTICOAGULANTS: ICD-10-CM

## 2018-01-16 LAB — INR PPP: 2.4

## 2018-01-16 PROCEDURE — G0250 MD INR TEST REVIE INTER MGMT: HCPCS

## 2018-01-16 NOTE — MR AVS SNAPSHOT
Yogesh Abreu   1/16/2018   Anticoagulation Therapy Visit    Description:  77 year old male   Provider:  Tanja Wills, RN   Department:  [facility]           INR as of 1/16/2018     Today's INR 2.4      Anticoagulation Summary as of 1/16/2018     INR goal 2.0-3.0   Today's INR 2.4   Full instructions 10 mg on Sun, Wed; 7.5 mg all other days   Next INR check 1/30/2018    Indications   Long-term (current) use of anticoagulants [Z79.01] [Z79.01]  PE (pulmonary embolism) [I26.99]         Contact Numbers     Anticoagulant (INR) Clinic Number: [phone]          January 2018 Details    Sun Mon Tue Wed Thu Fri Sat      1               2               3               4               5               6                 7               8               9               10               11               12               13                 14               15               16      7.5 mg   See details      17      10 mg         18      7.5 mg         19      7.5 mg         20      7.5 mg           21      10 mg         22      7.5 mg         23      7.5 mg         24      10 mg         25      7.5 mg         26      7.5 mg         27      7.5 mg           28      10 mg         29      7.5 mg         30            31                   Date Details   01/16 This INR check       Date of next INR:  1/30/2018         How to take your warfarin dose     To take:  7.5 mg Take 1.5 of the 5 mg tablets.    To take:  10 mg Take 2 of the 5 mg tablets.

## 2018-01-16 NOTE — PROGRESS NOTES
ANTICOAGULATION FOLLOW-UP CLINIC VISIT    Patient Name:  Yogesh Abreu  Date:  1/16/2018  Contact Type:  Telephone/ left message for patient    SUBJECTIVE:     Patient Findings     Positives No Problem Findings           OBJECTIVE    INR   Date Value Ref Range Status   01/16/2018 2.4  Final     Chromogenic Factor 10   Date Value Ref Range Status   10/12/2015 28 (L) 70 - 130 % Final     Comment:     Therapeutic Range:  A Chromogenic Factor 10 level of approximately 20-40%   inversely correlates with an INR of 2-3 for patients receiving Warfarin.   Chromogenic Factor 10 levels below 20% indicate an INR greater than 3 and   levels above 40% indicate an INR less than 2.         ASSESSMENT / PLAN  INR assessment THER    Recheck INR In: 2 WEEKS    INR Location Home INR    Billed home INR Yes      Anticoagulation Summary as of 1/16/2018     INR goal 2.0-3.0   Today's INR 2.4   Maintenance plan 10 mg (5 mg x 2) on Sun, Wed; 7.5 mg (5 mg x 1.5) all other days   Full instructions 10 mg on Sun, Wed; 7.5 mg all other days   Weekly total 57.5 mg   No change documented Tanja Wills, ENZO   Plan last modified Tanja Wills, RN (5/3/2017)   Next INR check 1/30/2018   Target end date Indefinite    Indications   Long-term (current) use of anticoagulants [Z79.01] [Z79.01]  PE (pulmonary embolism) [I26.99]         Anticoagulation Episode Summary     INR check location     Preferred lab     Send INR reminders to [facility] HEART INR NURSE    Comments       Anticoagulation Care Providers     Provider Role Specialty Phone number    Satya Rogers MD Responsible Cardiology [phone]            See the Encounter Report to view Anticoagulation Flowsheet and Dosing Calendar (Go to Encounters tab in chart review, and find the Anticoagulation Therapy Visit)    Home INR 2.4 Left a voicemail message asking pt to call back if he has had any change in health status, meds or diet. If no change, then will continue current dosing of 10 mg SuW  and 7.5 mg all other days with recheck in 2 weeks with home INR machine. Dosage adjustment made based on physician directed care plan.  (#4 of 4 - billed)  Tanja Wills RN

## 2018-01-30 ENCOUNTER — ANTICOAGULATION THERAPY VISIT (OUTPATIENT)
Dept: CARDIOLOGY | Facility: CLINIC | Age: 78
End: 2018-01-30
Payer: COMMERCIAL

## 2018-01-30 ENCOUNTER — TRANSFERRED RECORDS (OUTPATIENT)
Dept: HEALTH INFORMATION MANAGEMENT | Facility: CLINIC | Age: 78
End: 2018-01-30

## 2018-01-30 DIAGNOSIS — Z79.01 LONG-TERM (CURRENT) USE OF ANTICOAGULANTS: ICD-10-CM

## 2018-01-30 LAB — INR PPP: 1.9

## 2018-01-30 PROCEDURE — 99207 ZZC NO CHARGE NURSE ONLY: CPT | Performed by: INTERNAL MEDICINE

## 2018-01-30 NOTE — PROGRESS NOTES
ANTICOAGULATION FOLLOW-UP CLINIC VISIT    Patient Name:  Yogesh Abreu  Date:  1/30/2018  Contact Type:  Telephone/ patient    SUBJECTIVE:     Patient Findings     Positives Change in diet/appetite (eating more greens recently), OTC meds (took ibuprofen 600 mg daily for a few days for a back ache)           OBJECTIVE    INR   Date Value Ref Range Status   01/30/2018 1.9  Final     Chromogenic Factor 10   Date Value Ref Range Status   10/12/2015 28 (L) 70 - 130 % Final     Comment:     Therapeutic Range:  A Chromogenic Factor 10 level of approximately 20-40%   inversely correlates with an INR of 2-3 for patients receiving Warfarin.   Chromogenic Factor 10 levels below 20% indicate an INR greater than 3 and   levels above 40% indicate an INR less than 2.         ASSESSMENT / PLAN  INR assessment THER    Recheck INR In: 2 WEEKS    INR Location Home INR    Billed home INR No      Anticoagulation Summary as of 1/30/2018     INR goal 2.0-3.0   Today's INR 1.9!   Maintenance plan 10 mg (5 mg x 2) on Sun, Wed; 7.5 mg (5 mg x 1.5) all other days   Full instructions 10 mg on Sun, Wed; 7.5 mg all other days   Weekly total 57.5 mg   No change documented Tanja Wills, RN   Plan last modified Tanja Wills, RN (5/3/2017)   Next INR check 2/13/2018   Target end date Indefinite    Indications   Long-term (current) use of anticoagulants [Z79.01] [Z79.01]  PE (pulmonary embolism) [I26.99]         Anticoagulation Episode Summary     INR check location     Preferred lab     Send INR reminders to [facility] HEART INR NURSE    Comments       Anticoagulation Care Providers     Provider Role Specialty Phone number    Satya Rogers MD [facility] Cardiology [phone]            See the Encounter Report to view Anticoagulation Flowsheet and Dosing Calendar (Go to Encounters tab in chart review, and find the Anticoagulation Therapy Visit)    Home INR 1.9 Spoke with pt via phone. He has been eating more greens recently. Has had a  recent back ache so was taking Ibuprofen 600 mg daily for a few days (none today - symptom improving). Advised that he should take Tylenol instead of Ibuprofen because it will increase bleeding risk when taken with warfarin. No change in other meds. No abnormal bleeding or bruising. He prefers to resume previous diet and keep dosing the same so will continue 10 mg SuW and 7.5 mg all other days with recheck in 2 weeks. Reviewed with him that there is a new protocol for home INR calls -- if INR is in range, pt will receive a call from anticoagulation nurse every 4 weeks instead of every 2 weeks (pt would continue his current dosing and recheck INR at a 2 week interval). Pt would be responsible to call us if there was a change in health status or meds. Pt verbalized understanding. Dosage adjustment made based on physician directed care plan.    Tanja Wills RN

## 2018-01-30 NOTE — MR AVS SNAPSHOT
Yogesh Abreu   1/30/2018   Anticoagulation Therapy Visit    Description:  77 year old male   Provider:  Tanja Wills, RN   Department:  [facility]           INR as of 1/30/2018     Today's INR 1.9!      Anticoagulation Summary as of 1/30/2018     INR goal 2.0-3.0   Today's INR 1.9!   Full instructions 10 mg on Sun, Wed; 7.5 mg all other days   Next INR check 2/13/2018    Indications   Long-term (current) use of anticoagulants [Z79.01] [Z79.01]  PE (pulmonary embolism) [I26.99]         Contact Numbers     Anticoagulant (INR) Clinic Number: [phone]          January 2018 Details    Sun Mon Tue Wed Thu Fri Sat      1               2               3               4               5               6                 7               8               9               10               11               12               13                 14               15               16               17               18               19               20                 21               22               23               24               25               26               27                 28               29               30      7.5 mg   See details      31      10 mg             Date Details   01/30 This INR check               How to take your warfarin dose     To take:  7.5 mg Take 1.5 of the 5 mg tablets.    To take:  10 mg Take 2 of the 5 mg tablets.           February 2018 Details    Sun Mon Tue Wed Thu Fri Sat         1      7.5 mg         2      7.5 mg         3      7.5 mg           4      10 mg         5      7.5 mg         6      7.5 mg         7      10 mg         8      7.5 mg         9      7.5 mg         10      7.5 mg           11      10 mg         12      7.5 mg         13            14               15               16               17                 18               19               20               21               22               23               24                 25               26                27               28                   Date Details   No additional details    Date of next INR:  2/13/2018         How to take your warfarin dose     To take:  7.5 mg Take 1.5 of the 5 mg tablets.    To take:  10 mg Take 2 of the 5 mg tablets.

## 2018-02-13 ENCOUNTER — TRANSFERRED RECORDS (OUTPATIENT)
Dept: HEALTH INFORMATION MANAGEMENT | Facility: CLINIC | Age: 78
End: 2018-02-13

## 2018-02-13 ENCOUNTER — ANTICOAGULATION THERAPY VISIT (OUTPATIENT)
Dept: CARDIOLOGY | Facility: CLINIC | Age: 78
End: 2018-02-13
Payer: COMMERCIAL

## 2018-02-13 DIAGNOSIS — Z79.01 LONG-TERM (CURRENT) USE OF ANTICOAGULANTS: ICD-10-CM

## 2018-02-13 LAB — INR PPP: 2.5

## 2018-02-13 PROCEDURE — 99207 ZZC NO CHARGE NURSE ONLY: CPT | Performed by: INTERNAL MEDICINE

## 2018-02-13 NOTE — PROGRESS NOTES
ANTICOAGULATION FOLLOW-UP CLINIC VISIT    Patient Name:  Yogesh Abreu  Date:  2/13/2018  Contact Type:  Instilling Values    SUBJECTIVE:        OBJECTIVE    INR   Date Value Ref Range Status   02/13/2018 2.5  Final     Chromogenic Factor 10   Date Value Ref Range Status   10/12/2015 28 (L) 70 - 130 % Final     Comment:     Therapeutic Range:  A Chromogenic Factor 10 level of approximately 20-40%   inversely correlates with an INR of 2-3 for patients receiving Warfarin.   Chromogenic Factor 10 levels below 20% indicate an INR greater than 3 and   levels above 40% indicate an INR less than 2.         ASSESSMENT / PLAN  INR assessment THER    Recheck INR In: 2 WEEKS    INR Location Home INR    Billed home INR No      Anticoagulation Summary as of 2/13/2018     INR goal 2.0-3.0   Today's INR 2.5   Maintenance plan 10 mg (5 mg x 2) on Sun, Wed; 7.5 mg (5 mg x 1.5) all other days   Full instructions 10 mg on Sun, Wed; 7.5 mg all other days   Weekly total 57.5 mg   No change documented Tanja Wills, ENZO   Plan last modified Tanja Wills, RN (5/3/2017)   Next INR check 2/27/2018   Target end date Indefinite    Indications   Long-term (current) use of anticoagulants [Z79.01] [Z79.01]  PE (pulmonary embolism) [I26.99]         Anticoagulation Episode Summary     INR check location     Preferred lab     Send INR reminders to [facility] HEART INR NURSE    Comments       Anticoagulation Care Providers     Provider Role Specialty Phone number    Satya Rogers MD Responsible Cardiology [phone]            See the Encounter Report to view Anticoagulation Flowsheet and Dosing Calendar (Go to Encounters tab in chart review, and find the Anticoagulation Therapy Visit)    Home INR 2.5 Per new home INR protocol, pt is aware that if INR is in range then pt to continue current dosing and recheck INR in 2 weeks so he will continue to take 10 mg SuW and 7.5 mg all other days. Recheck in 2 weeks. Dosage adjustment made based on  physician directed care plan.  (#2 of 4 - not billed)  Tanja Wills RN

## 2018-02-13 NOTE — MR AVS SNAPSHOT
Yogesh Abreu   2/13/2018   Anticoagulation Therapy Visit    Description:  77 year old male   Provider:  Tanja Wills, RN   Department:  [facility]           INR as of 2/13/2018     Today's INR 2.5      Anticoagulation Summary as of 2/13/2018     INR goal 2.0-3.0   Today's INR 2.5   Full instructions 10 mg on Sun, Wed; 7.5 mg all other days   Next INR check 2/27/2018    Indications   Long-term (current) use of anticoagulants [Z79.01] [Z79.01]  PE (pulmonary embolism) [I26.99]         Contact Numbers     Anticoagulant (INR) Clinic Number: [phone]          February 2018 Details    Sun Mon Tue Wed Thu Fri Sat         1               2               3                 4               5               6               7               8               9               10                 11               12               13      7.5 mg   See details      14      10 mg         15      7.5 mg         16      7.5 mg         17      7.5 mg           18      10 mg         19      7.5 mg         20      7.5 mg         21      10 mg         22      7.5 mg         23      7.5 mg         24      7.5 mg           25      10 mg         26      7.5 mg         27            28                   Date Details   02/13 This INR check       Date of next INR:  2/27/2018         How to take your warfarin dose     To take:  7.5 mg Take 1.5 of the 5 mg tablets.    To take:  10 mg Take 2 of the 5 mg tablets.

## 2018-02-27 ENCOUNTER — TRANSFERRED RECORDS (OUTPATIENT)
Dept: HEALTH INFORMATION MANAGEMENT | Facility: CLINIC | Age: 78
End: 2018-02-27

## 2018-02-27 ENCOUNTER — ANTICOAGULATION THERAPY VISIT (OUTPATIENT)
Dept: CARDIOLOGY | Facility: CLINIC | Age: 78
End: 2018-02-27
Payer: COMMERCIAL

## 2018-02-27 DIAGNOSIS — Z79.01 LONG-TERM (CURRENT) USE OF ANTICOAGULANTS: ICD-10-CM

## 2018-02-27 LAB — INR PPP: 2.9

## 2018-02-27 PROCEDURE — 99207 ZZC NO CHARGE NURSE ONLY: CPT

## 2018-02-27 NOTE — PROGRESS NOTES
ANTICOAGULATION FOLLOW-UP CLINIC VISIT    Patient Name:  Yogesh Abreu  Date:  2/27/2018  Contact Type:  Telephone/ patient    SUBJECTIVE:     Patient Findings     Positives No Problem Findings           OBJECTIVE    INR   Date Value Ref Range Status   02/27/2018 2.9  Final     Chromogenic Factor 10   Date Value Ref Range Status   10/12/2015 28 (L) 70 - 130 % Final     Comment:     Therapeutic Range:  A Chromogenic Factor 10 level of approximately 20-40%   inversely correlates with an INR of 2-3 for patients receiving Warfarin.   Chromogenic Factor 10 levels below 20% indicate an INR greater than 3 and   levels above 40% indicate an INR less than 2.         ASSESSMENT / PLAN  INR assessment THER    Recheck INR In: 2 WEEKS    INR Location Home INR    Billed home INR No      Anticoagulation Summary as of 2/27/2018     INR goal 2.0-3.0   Today's INR 2.9   Maintenance plan 10 mg (5 mg x 2) on Sun, Wed; 7.5 mg (5 mg x 1.5) all other days   Full instructions 10 mg on Sun, Wed; 7.5 mg all other days   Weekly total 57.5 mg   No change documented Tanja Wills, ENZO   Plan last modified Tanja Wills, RN (5/3/2017)   Next INR check 3/13/2018   Target end date Indefinite    Indications   Long-term (current) use of anticoagulants [Z79.01] [Z79.01]  PE (pulmonary embolism) [I26.99]         Anticoagulation Episode Summary     INR check location     Preferred lab     Send INR reminders to [facility] HEART INR NURSE    Comments       Anticoagulation Care Providers     Provider Role Specialty Phone number    Satya Rogers MD Responsible Cardiology [phone]            See the Encounter Report to view Anticoagulation Flowsheet and Dosing Calendar (Go to Encounters tab in chart review, and find the Anticoagulation Therapy Visit)    Home INR 2.9 Spoke with pt via phone. No change in meds or diet. No abnormal bleeding or bruising. Will continue current dosing of 10 mg SuW and 7.5 mg all other days with recheck in 2 weeks.Dosage  adjustment made based on physician directed care plan.  (#3 of 4 - not billed)  Tanja Wills RN

## 2018-02-27 NOTE — MR AVS SNAPSHOT
Yogesh Abreu   2/27/2018   Anticoagulation Therapy Visit    Description:  77 year old male   Provider:  Tanja Wills, RN   Department:  [facility]           INR as of 2/27/2018     Today's INR 2.9      Anticoagulation Summary as of 2/27/2018     INR goal 2.0-3.0   Today's INR 2.9   Full instructions 10 mg on Sun, Wed; 7.5 mg all other days   Next INR check 3/13/2018    Indications   Long-term (current) use of anticoagulants [Z79.01] [Z79.01]  PE (pulmonary embolism) [I26.99]         Contact Numbers     Anticoagulant (INR) Clinic Number: [phone]          February 2018 Details    Sun Mon Tue Wed Thu Fri Sat         1               2               3                 4               5               6               7               8               9               10                 11               12               13               14               15               16               17                 18               19               20               21               22               23               24                 25               26               27      7.5 mg   See details      28      10 mg             Date Details   02/27 This INR check               How to take your warfarin dose     To take:  7.5 mg Take 1.5 of the 5 mg tablets.    To take:  10 mg Take 2 of the 5 mg tablets.           March 2018 Details    Sun Mon Tue Wed Thu Fri Sat         1      7.5 mg         2      7.5 mg         3      7.5 mg           4      10 mg         5      7.5 mg         6      7.5 mg         7      10 mg         8      7.5 mg         9      7.5 mg         10      7.5 mg           11      10 mg         12      7.5 mg         13            14               15               16               17                 18               19               20               21               22               23               24                 25               26               27               28               29                30               31                Date Details   No additional details    Date of next INR:  3/13/2018         How to take your warfarin dose     To take:  7.5 mg Take 1.5 of the 5 mg tablets.    To take:  10 mg Take 2 of the 5 mg tablets.

## 2018-03-16 ENCOUNTER — ANTICOAGULATION THERAPY VISIT (OUTPATIENT)
Dept: CARDIOLOGY | Facility: CLINIC | Age: 78
End: 2018-03-16
Payer: COMMERCIAL

## 2018-03-16 ENCOUNTER — TRANSFERRED RECORDS (OUTPATIENT)
Dept: HEALTH INFORMATION MANAGEMENT | Facility: CLINIC | Age: 78
End: 2018-03-16

## 2018-03-16 DIAGNOSIS — Z79.01 LONG-TERM (CURRENT) USE OF ANTICOAGULANTS: ICD-10-CM

## 2018-03-16 LAB — INR PPP: 2.5

## 2018-03-16 PROCEDURE — 99207 ZZC NO CHARGE NURSE ONLY: CPT

## 2018-03-16 NOTE — PROGRESS NOTES
ANTICOAGULATION FOLLOW-UP CLINIC VISIT    Patient Name:  Yogesh Abreu  Date:  3/16/2018  Contact Type:  timeplazza    SUBJECTIVE:        OBJECTIVE    INR   Date Value Ref Range Status   03/16/2018 2.5  Final     Chromogenic Factor 10   Date Value Ref Range Status   10/12/2015 28 (L) 70 - 130 % Final     Comment:     Therapeutic Range:  A Chromogenic Factor 10 level of approximately 20-40%   inversely correlates with an INR of 2-3 for patients receiving Warfarin.   Chromogenic Factor 10 levels below 20% indicate an INR greater than 3 and   levels above 40% indicate an INR less than 2.         ASSESSMENT / PLAN  INR assessment THER    Recheck INR In: 2 WEEKS    INR Location Home INR    Billed home INR No      Anticoagulation Summary as of 3/16/2018     INR goal 2.0-3.0   Today's INR 2.5   Maintenance plan 10 mg (5 mg x 2) on Sun, Wed; 7.5 mg (5 mg x 1.5) all other days   Full instructions 10 mg on Sun, Wed; 7.5 mg all other days   Weekly total 57.5 mg   No change documented Tanja Wills RN   Plan last modified Tanja Wills RN (5/3/2017)   Next INR check 3/29/2018   Target end date Indefinite    Indications   Long-term (current) use of anticoagulants [Z79.01] [Z79.01]  PE (pulmonary embolism) [I26.99]         Anticoagulation Episode Summary     INR check location     Preferred lab     Send INR reminders to [facility] HEART INR NURSE    Comments       Anticoagulation Care Providers     Provider Role Specialty Phone number    BryantSatya gordon MD Responsible Cardiology [phone]            See the Encounter Report to view Anticoagulation Flowsheet and Dosing Calendar (Go to Encounters tab in chart review, and find the Anticoagulation Therapy Visit)    INR 2.5 per [facility]. INR staying in range so will continue current dosing of 10 mg SuW and 7.5 mg all other days with recheck in 2 weeks.Dosage adjustment made based on physician directed care plan.    Tanja Wills RN

## 2018-03-16 NOTE — MR AVS SNAPSHOT
Yogesh Abreu   3/16/2018   Anticoagulation Therapy Visit    Description:  77 year old male   Provider:  Tanja Wills, RN   Department:  [facility]           INR as of 3/16/2018     Today's INR 2.5      Anticoagulation Summary as of 3/16/2018     INR goal 2.0-3.0   Today's INR 2.5   Full instructions 10 mg on Sun, Wed; 7.5 mg all other days   Next INR check 3/29/2018    Indications   Long-term (current) use of anticoagulants [Z79.01] [Z79.01]  PE (pulmonary embolism) [I26.99]         Contact Numbers     Anticoagulant (INR) Clinic Number: [phone]          March 2018 Details    Sun Mon Tue Wed Thu Fri Sat         1               2               3                 4               5               6               7               8               9               10                 11               12               13               14               15               16      7.5 mg   See details      17      7.5 mg           18      10 mg         19      7.5 mg         20      7.5 mg         21      10 mg         22      7.5 mg         23      7.5 mg         24      7.5 mg           25      10 mg         26      7.5 mg         27      7.5 mg         28      10 mg         29            30               31                Date Details   03/16 This INR check       Date of next INR:  3/29/2018         How to take your warfarin dose     To take:  7.5 mg Take 1.5 of the 5 mg tablets.    To take:  10 mg Take 2 of the 5 mg tablets.

## 2018-03-26 ENCOUNTER — MYC MEDICAL ADVICE (OUTPATIENT)
Dept: CARDIOLOGY | Facility: CLINIC | Age: 78
End: 2018-03-26

## 2018-03-27 ENCOUNTER — ANTICOAGULATION THERAPY VISIT (OUTPATIENT)
Dept: CARDIOLOGY | Facility: CLINIC | Age: 78
End: 2018-03-27
Payer: COMMERCIAL

## 2018-03-27 ENCOUNTER — TRANSFERRED RECORDS (OUTPATIENT)
Dept: HEALTH INFORMATION MANAGEMENT | Facility: CLINIC | Age: 78
End: 2018-03-27

## 2018-03-27 DIAGNOSIS — Z79.01 LONG-TERM (CURRENT) USE OF ANTICOAGULANTS: ICD-10-CM

## 2018-03-27 LAB — INR PPP: 3.2

## 2018-03-27 PROCEDURE — 99207 ZZC NO CHARGE LOS: CPT | Performed by: INTERNAL MEDICINE

## 2018-03-27 NOTE — MR AVS SNAPSHOT
Yogesh Abreu   3/27/2018   Anticoagulation Therapy Visit    Description:  77 year old male   Provider:  Tanja Wills, RN   Department:  [facility]           INR as of 3/27/2018     Today's INR 3.2!      Anticoagulation Summary as of 3/27/2018     INR goal 2.0-3.0   Today's INR 3.2!   Full instructions 3/27: 5 mg; Otherwise 10 mg on Sun, Wed; 7.5 mg all other days   Next INR check 4/10/2018    Indications   Long-term (current) use of anticoagulants [Z79.01] [Z79.01]  PE (pulmonary embolism) [I26.99]         Contact Numbers     Anticoagulant (INR) Clinic Number: [phone]          March 2018 Details    Sun Mon Tue Wed Thu Fri Sat         1               2               3                 4               5               6               7               8               9               10                 11               12               13               14               15               16               17                 18               19               20               21               22               23               24                 25               26               27      5 mg   See details      28      10 mg         29      7.5 mg         30      7.5 mg         31      7.5 mg          Date Details   03/27 This INR check               How to take your warfarin dose     To take:  5 mg Take 1 of the 5 mg tablets.    To take:  7.5 mg Take 1.5 of the 5 mg tablets.    To take:  10 mg Take 2 of the 5 mg tablets.           April 2018 Details    Sun Mon Tue Wed Thu Fri Sat     1      10 mg         2      7.5 mg         3      7.5 mg         4      10 mg         5      7.5 mg         6      7.5 mg         7      7.5 mg           8      10 mg         9      7.5 mg         10            11               12               13               14                 15               16               17               18               19               20               21                 22               23                24               25               26               27               28                 29               30                     Date Details   No additional details    Date of next INR:  4/10/2018         How to take your warfarin dose     To take:  7.5 mg Take 1.5 of the 5 mg tablets.    To take:  10 mg Take 2 of the 5 mg tablets.

## 2018-03-27 NOTE — PROGRESS NOTES
ANTICOAGULATION FOLLOW-UP CLINIC VISIT    Patient Name:  Yogesh Abreu  Date:  3/27/2018  Contact Type:  Telephone/ patient    SUBJECTIVE:     Patient Findings     Positives Change in diet/appetite (less greens recently), OTC meds (taking ibuprofen 200 mg 2 tabs on Sunday and 1 tab on Monday after golfing)           OBJECTIVE    INR   Date Value Ref Range Status   03/27/2018 3.2  Final     Chromogenic Factor 10   Date Value Ref Range Status   10/12/2015 28 (L) 70 - 130 % Final     Comment:     Therapeutic Range:  A Chromogenic Factor 10 level of approximately 20-40%   inversely correlates with an INR of 2-3 for patients receiving Warfarin.   Chromogenic Factor 10 levels below 20% indicate an INR greater than 3 and   levels above 40% indicate an INR less than 2.         ASSESSMENT / PLAN  INR assessment SUPRA    Recheck INR In: 2 WEEKS    INR Location Home INR    Billed home INR No      Anticoagulation Summary as of 3/27/2018     INR goal 2.0-3.0   Today's INR 3.2!   Maintenance plan 10 mg (5 mg x 2) on Sun, Wed; 7.5 mg (5 mg x 1.5) all other days   Full instructions 3/27: 5 mg; Otherwise 10 mg on Sun, Wed; 7.5 mg all other days   Weekly total 57.5 mg   Plan last modified Tanja Wills, RN (5/3/2017)   Next INR check 4/10/2018   Target end date Indefinite    Indications   Long-term (current) use of anticoagulants [Z79.01] [Z79.01]  PE (pulmonary embolism) [I26.99]         Anticoagulation Episode Summary     INR check location     Preferred lab     Send INR reminders to [facility] HEART INR NURSE    Comments       Anticoagulation Care Providers     Provider Role Specialty Phone number    Satya Rogers MD Responsible Cardiology [phone]            See the Encounter Report to view Anticoagulation Flowsheet and Dosing Calendar (Go to Encounters tab in chart review, and find the Anticoagulation Therapy Visit)    Home INR 3.2 Spoke with pt who states that he has not been eating his usual greens. Taking Ibuprofen 2  tabs on Sunday and 1 tab on Monday for discomfort after golfing. Ibuprofen can increase bleeding risk when taken with Warfarin. Advised that he should take Tylenol instead of Ibuprofen and if Tylenol is not effective enough then he should discuss alternative meds with PMD. No abnormal bleeding or bruising. Will resume eating his usual diet. Take 5 mg today then resume 10 mg SuW and 7.5 mg all other days with recheck in 2 weeks. He will need to schedule an INR appt in clinic after he returns to [place] (mid to end of April)to verify his readings on his home INR machine. Dosage adjustment made based on physician directed care plan.    Tanja Wills RN

## 2018-04-16 ENCOUNTER — ANTICOAGULATION THERAPY VISIT (OUTPATIENT)
Dept: CARDIOLOGY | Facility: CLINIC | Age: 78
End: 2018-04-16
Payer: COMMERCIAL

## 2018-04-16 DIAGNOSIS — Z79.01 LONG-TERM (CURRENT) USE OF ANTICOAGULANTS: ICD-10-CM

## 2018-04-16 LAB — INR PPP: 3

## 2018-04-16 PROCEDURE — 99207 ZZC NO CHARGE NURSE ONLY: CPT | Performed by: INTERNAL MEDICINE

## 2018-04-16 NOTE — PROGRESS NOTES
ANTICOAGULATION FOLLOW-UP CLINIC VISIT    Patient Name:  Yogesh Abreu  Date:  4/16/2018  Contact Type:  Face to Face    SUBJECTIVE:     Patient Findings     Positives No Problem Findings           OBJECTIVE    INR   Date Value Ref Range Status   04/13/2018 3.0  Final     Chromogenic Factor 10   Date Value Ref Range Status   10/12/2015 28 (L) 70 - 130 % Final     Comment:     Therapeutic Range:  A Chromogenic Factor 10 level of approximately 20-40%   inversely correlates with an INR of 2-3 for patients receiving Warfarin.   Chromogenic Factor 10 levels below 20% indicate an INR greater than 3 and   levels above 40% indicate an INR less than 2.         ASSESSMENT / PLAN  INR assessment THER    Recheck INR In: 2 WEEKS    INR Location Home INR      Anticoagulation Summary as of 4/16/2018     INR goal 2.0-3.0   Today's INR 3.0 (4/13/2018)   Maintenance plan 10 mg (5 mg x 2) on Sun, Wed; 7.5 mg (5 mg x 1.5) all other days   Full instructions 10 mg on Sun, Wed; 7.5 mg all other days   Weekly total 57.5 mg   No change documented Mandy Ferguson RN   Plan last modified Tanja Wills, RN (5/3/2017)   Next INR check 4/30/2018   Target end date Indefinite    Indications   Long-term (current) use of anticoagulants [Z79.01] [Z79.01]  PE (pulmonary embolism) [I26.99]         Anticoagulation Episode Summary     INR check location     Preferred lab     Send INR reminders to [facility] HEART INR NURSE    Comments       Anticoagulation Care Providers     Provider Role Specialty Phone number    Satya Rogers MD Responsible Cardiology [phone]            See the Encounter Report to view Anticoagulation Flowsheet and Dosing Calendar (Go to Encounters tab in chart review, and find the Anticoagulation Therapy Visit)    INR 3.0.  INR was checked at home on Friday 4/13.  Called patient and he just returned back to [place] a few days ago.  He said possibly extra ETOH and little bit of ibuprofen recently.  NO bleeding.  No other  changes.  Continue same schedule and recheck in the clinic in 2 weeks.  Last note said he was due in mid April to end of April for in clinic check to review his alere home INR machine.  I did remind him to only check INR between Monday and Thursday.  Unsure which number INR this is in regards to billing.  If INR goes above 3 next time then consider decreasing dosing since his INR has been running high.  Canelo Ferguson RN

## 2018-04-16 NOTE — MR AVS SNAPSHOT
Yogesh Abreu   4/16/2018   Anticoagulation Therapy Visit    Description:  77 year old male   Provider:  Mandy Ferguson, RN   Department:  [facility]           INR as of 4/16/2018     Today's INR 3.0 (4/13/2018)      Anticoagulation Summary as of 4/16/2018     INR goal 2.0-3.0   Today's INR 3.0 (4/13/2018)   Full instructions 10 mg on Sun, Wed; 7.5 mg all other days   Next INR check 4/30/2018    Indications   Long-term (current) use of anticoagulants [Z79.01] [Z79.01]  PE (pulmonary embolism) [I26.99]         Your next Anticoagulation Clinic appointment(s)     Apr 30, 2018  8:40 AM CDT   Anticoagulation Visit with [facility]   [facility] ([facility])    [address] [place] [id number]   [phone] OPT 2              Contact Numbers     Anticoagulant (INR) Clinic Number: [id number]          April 2018 Details    Sun Mon Tue Wed Thu Fri Sat     1               2               3               4               5               6               7                 8               9               10               11               12               13               14                 15               16      7.5 mg   See details      17      7.5 mg         18      10 mg         19      7.5 mg         20      7.5 mg         21      7.5 mg           22      10 mg         23      7.5 mg         24      7.5 mg         25      10 mg         26      7.5 mg         27      7.5 mg         28      7.5 mg           29      10 mg         30                  Date Details   04/16 This INR check       Date of next INR:  4/30/2018         How to take your warfarin dose     To take:  7.5 mg Take 1.5 of the 5 mg tablets.    To take:  10 mg Take 2 of the 5 mg tablets.

## 2018-04-30 ENCOUNTER — ANTICOAGULATION THERAPY VISIT (OUTPATIENT)
Dept: CARDIOLOGY | Facility: CLINIC | Age: 78
End: 2018-04-30
Payer: COMMERCIAL

## 2018-04-30 DIAGNOSIS — Z79.01 LONG-TERM (CURRENT) USE OF ANTICOAGULANTS: ICD-10-CM

## 2018-04-30 LAB — INR POINT OF CARE: 3.2 (ref 0.86–1.14)

## 2018-04-30 PROCEDURE — 85610 PROTHROMBIN TIME: CPT | Mod: QW | Performed by: INTERNAL MEDICINE

## 2018-04-30 PROCEDURE — 36416 COLLJ CAPILLARY BLOOD SPEC: CPT | Performed by: INTERNAL MEDICINE

## 2018-04-30 NOTE — MR AVS SNAPSHOT
Yogesh bAreu   4/30/2018 8:40 AM   Anticoagulation Therapy Visit    Description:  77 year old male   Provider:  [facility]   Department:  [facility]           INR as of 4/30/2018     Today's INR 3.2!      Anticoagulation Summary as of 4/30/2018     INR goal 2.0-3.0   Today's INR 3.2!   Full instructions 4/30: 5 mg; Otherwise 10 mg on Wed; 7.5 mg all other days   Next INR check 5/14/2018    Indications   Long-term (current) use of anticoagulants [Z79.01] [Z79.01]  PE (pulmonary embolism) [I26.99]         Contact Numbers     Anticoagulant (INR) Clinic Number: [phone]          April 2018 Details    Sun Mon Tue Wed Thu Fri Sat     1               2               3               4               5               6               7                 8               9               10               11               12               13               14                 15               16               17               18               19               20               21                 22               23               24               25               26               27               28                 29               30      5 mg   See details            Date Details   04/30 This INR check               How to take your warfarin dose     To take:  5 mg Take 1 of the 5 mg tablets.           May 2018 Details    Sun Mon Tue Wed Thu Fri Sat       1      7.5 mg         2      10 mg         3      7.5 mg         4      7.5 mg         5      7.5 mg           6      7.5 mg         7      7.5 mg         8      7.5 mg         9      10 mg         10      7.5 mg         11      7.5 mg         12      7.5 mg           13      7.5 mg         14            15               16               17               18               19                 20               21               22               23               24               25               26                 27               28               29                30               31                  Date Details   No additional details    Date of next INR:  5/14/2018         How to take your warfarin dose     To take:  7.5 mg Take 1.5 of the 5 mg tablets.    To take:  10 mg Take 2 of the 5 mg tablets.

## 2018-04-30 NOTE — PROGRESS NOTES
ANTICOAGULATION FOLLOW-UP CLINIC VISIT    Patient Name:  Yogesh Abreu  Date:  4/30/2018  Contact Type:  Face to Face    SUBJECTIVE:     Patient Findings     Positives Inflammation (knee pain and lower back pain)           OBJECTIVE    INR Protime   Date Value Ref Range Status   04/30/2018 3.2 (A) 0.86 - 1.14 Final     Chromogenic Factor 10   Date Value Ref Range Status   10/12/2015 28 (L) 70 - 130 % Final     Comment:     Therapeutic Range:  A Chromogenic Factor 10 level of approximately 20-40%   inversely correlates with an INR of 2-3 for patients receiving Warfarin.   Chromogenic Factor 10 levels below 20% indicate an INR greater than 3 and   levels above 40% indicate an INR less than 2.         ASSESSMENT / PLAN  INR assessment SUPRA    Recheck INR In: 2 WEEKS    INR Location Clinic      Anticoagulation Summary as of 4/30/2018     INR goal 2.0-3.0   Today's INR 3.2!   Maintenance plan 10 mg (5 mg x 2) on Wed; 7.5 mg (5 mg x 1.5) all other days   Full instructions 4/30: 5 mg; Otherwise 10 mg on Wed; 7.5 mg all other days   Weekly total 55 mg   Plan last modified Tanja Wills, RN (4/30/2018)   Next INR check 5/14/2018   Target end date Indefinite    Indications   Long-term (current) use of anticoagulants [Z79.01] [Z79.01]  PE (pulmonary embolism) [I26.99]         Anticoagulation Episode Summary     INR check location     Preferred lab     Send INR reminders to [facility] HEART INR NURSE    Comments       Anticoagulation Care Providers     Provider Role Specialty Phone number    Satya Rogers MD Responsible Cardiology [phone]            See the Encounter Report to view Anticoagulation Flowsheet and Dosing Calendar (Go to Encounters tab in chart review, and find the Anticoagulation Therapy Visit)    INR 3.2 INR remains elevated. He thinks that his diet is unchanged. Has continued to have knee and back pain. Not currently taking pain meds. Has decreased his activity due to pain and has gained a few pounds.  Advised that he could try Tylenol for pain relief but if that is not effective then he should ask PMD for alternative meds. Denies infection. No ETOH. Will decrease dosing by 2.5 mg/wk - take 5 mg today then dose with 10 mg Wed and 7.5 mg all other days with recheck in 2 weeks using his home INR meter. Reviewed home INR readings on his coaguchek meter from this winter -- readings were correct. Left message for Andra that INR was checked on our meter today and that next recheck date is 5/14/18. Dosage adjustment made based on physician directed care plan.    Tanja Wills RN

## 2018-05-01 DIAGNOSIS — Z79.01 LONG TERM CURRENT USE OF ANTICOAGULANT THERAPY: ICD-10-CM

## 2018-05-01 RX ORDER — WARFARIN SODIUM 5 MG/1
TABLET ORAL
Qty: 150 TABLET | Refills: 1 | Status: SHIPPED | OUTPATIENT
Start: 2018-05-01 | End: 2018-10-31

## 2018-05-14 ENCOUNTER — ANTICOAGULATION THERAPY VISIT (OUTPATIENT)
Dept: CARDIOLOGY | Facility: CLINIC | Age: 78
End: 2018-05-14
Payer: COMMERCIAL

## 2018-05-14 ENCOUNTER — TRANSFERRED RECORDS (OUTPATIENT)
Dept: HEALTH INFORMATION MANAGEMENT | Facility: CLINIC | Age: 78
End: 2018-05-14

## 2018-05-14 DIAGNOSIS — Z79.01 LONG-TERM (CURRENT) USE OF ANTICOAGULANTS: ICD-10-CM

## 2018-05-14 LAB — INR PPP: 3.1

## 2018-05-14 PROCEDURE — 36416 COLLJ CAPILLARY BLOOD SPEC: CPT | Performed by: INTERNAL MEDICINE

## 2018-05-14 PROCEDURE — 85610 PROTHROMBIN TIME: CPT | Mod: QW | Performed by: INTERNAL MEDICINE

## 2018-05-14 NOTE — PROGRESS NOTES
ANTICOAGULATION FOLLOW-UP CLINIC VISIT    Patient Name:  Yogesh Abreu  Date:  5/14/2018  Contact Type:  Face to Face    SUBJECTIVE:     Patient Findings     Positives No Problem Findings           OBJECTIVE    INR   Date Value Ref Range Status   05/14/2018 3.1  Final     Chromogenic Factor 10   Date Value Ref Range Status   10/12/2015 28 (L) 70 - 130 % Final     Comment:     Therapeutic Range:  A Chromogenic Factor 10 level of approximately 20-40%   inversely correlates with an INR of 2-3 for patients receiving Warfarin.   Chromogenic Factor 10 levels below 20% indicate an INR greater than 3 and   levels above 40% indicate an INR less than 2.         ASSESSMENT / PLAN  INR assessment THER    Recheck INR In: 2 WEEKS    INR Location Home INR      Anticoagulation Summary as of 5/14/2018     INR goal 2.0-3.0   Today's INR 3.1!   Maintenance plan 7.5 mg (5 mg x 1.5) every day   Full instructions 5/16: 7.5 mg; 5/23: 7.5 mg; Otherwise 7.5 mg every day   Weekly total 52.5 mg   Plan last modified Mandy Ferguson RN (5/14/2018)   Next INR check 5/29/2018   Target end date Indefinite    Indications   Long-term (current) use of anticoagulants [Z79.01] [Z79.01]  PE (pulmonary embolism) [I26.99]         Anticoagulation Episode Summary     INR check location     Preferred lab     Send INR reminders to [facility] HEART INR NURSE    Comments       Anticoagulation Care Providers     Provider Role Specialty Phone number    Satya Rogers MD Responsible Cardiology [phone]            See the Encounter Report to view Anticoagulation Flowsheet and Dosing Calendar (Go to Encounters tab in chart review, and find the Anticoagulation Therapy Visit)    INR 3.1 via [employer] home INR.  Called patient.  Dosing was decreased last check and INR yajaira down from 3.2 to 3.2.  No signs of bleeding.  He thinks his polymyalgia is maybe flaring up starting a couple months ago and that is when INR started to run 3.0 or above.  No tylenol  recently.  He has been eating greens.  Decrease to 7.5mg/day and recheck in 2 weeks.  He will contact his PMD to discuss further.  Recheck INR at home on 5/29.  Canelo Ferguson RN

## 2018-05-14 NOTE — MR AVS SNAPSHOT
Yogesh Abreu   5/14/2018   Anticoagulation Therapy Visit    Description:  77 year old male   Provider:  Mandy Ferguson, RN   Department:  [facility]           INR as of 5/14/2018     Today's INR 3.1!      Anticoagulation Summary as of 5/14/2018     INR goal 2.0-3.0   Today's INR 3.1!   Full instructions 5/16: 7.5 mg; 5/23: 7.5 mg; Otherwise 7.5 mg every day   Next INR check 5/29/2018    Indications   Long-term (current) use of anticoagulants [Z79.01] [Z79.01]  PE (pulmonary embolism) [I26.99]         Contact Numbers     Anticoagulant (INR) Clinic Number: [phone]          May 2018 Details    Sun Mon Tue Wed Thu Fri Sat       1               2               3               4               5                 6               7               8               9               10               11               12                 13               14      7.5 mg   See details      15      7.5 mg         16      7.5 mg         17      7.5 mg         18      7.5 mg         19      7.5 mg           20      7.5 mg         21      7.5 mg         22      7.5 mg         23      7.5 mg         24      7.5 mg         25      7.5 mg         26      7.5 mg           27      7.5 mg         28      7.5 mg         29            30               31                  Date Details   05/14 This INR check       Date of next INR:  5/29/2018         How to take your warfarin dose     To take:  7.5 mg Take 1.5 of the 5 mg tablets.

## 2018-05-29 ENCOUNTER — TRANSFERRED RECORDS (OUTPATIENT)
Dept: HEALTH INFORMATION MANAGEMENT | Facility: CLINIC | Age: 78
End: 2018-05-29

## 2018-05-29 ENCOUNTER — ANTICOAGULATION THERAPY VISIT (OUTPATIENT)
Dept: CARDIOLOGY | Facility: CLINIC | Age: 78
End: 2018-05-29
Payer: COMMERCIAL

## 2018-05-29 DIAGNOSIS — Z79.01 LONG-TERM (CURRENT) USE OF ANTICOAGULANTS: ICD-10-CM

## 2018-05-29 LAB — INR PPP: 2

## 2018-05-29 PROCEDURE — 99207 ZZC NO CHARGE LOS: CPT | Performed by: INTERNAL MEDICINE

## 2018-05-29 NOTE — MR AVS SNAPSHOT
Yogesh Abreu   5/29/2018   Anticoagulation Therapy Visit    Description:  77 year old male   Provider:  Tanja Wills, RN   Department:  [facility]           INR as of 5/29/2018     Today's INR 2.0      Anticoagulation Summary as of 5/29/2018     INR goal 2.0-3.0   Today's INR 2.0   Full warfarin instructions 7.5 mg every day   Next INR check 6/12/2018    Indications   Long-term (current) use of anticoagulants [Z79.01] [Z79.01]  PE (pulmonary embolism) [I26.99]         Contact Numbers     Anticoagulant (INR) Clinic Number: [phone]          May 2018 Details    Sun Mon Tue Wed Thu Fri Sat       1               2               3               4               5                 6               7               8               9               10               11               12                 13               14               15               16               17               18               19                 20               21               22               23               24               25               26                 27               28               29      7.5 mg   See details      30      7.5 mg         31      7.5 mg            Date Details   05/29 This INR check               How to take your warfarin dose     To take:  7.5 mg Take 1.5 of the 5 mg tablets.           June 2018 Details    Sun Mon Tue Wed Thu Fri Sat          1      7.5 mg         2      7.5 mg           3      7.5 mg         4      7.5 mg         5      7.5 mg         6      7.5 mg         7      7.5 mg         8      7.5 mg         9      7.5 mg           10      7.5 mg         11      7.5 mg         12            13               14               15               16                 17               18               19               20               21               22               23                 24               25               26               27               28               29               30                 Date Details   No additional details    Date of next INR:  6/12/2018         How to take your warfarin dose     To take:  7.5 mg Take 1.5 of the 5 mg tablets.

## 2018-05-29 NOTE — PROGRESS NOTES
ANTICOAGULATION FOLLOW-UP CLINIC VISIT    Patient Name:  Yogesh Abreu  Date:  5/29/2018  Contact Type:  [employer]    SUBJECTIVE:        OBJECTIVE    INR   Date Value Ref Range Status   05/29/2018 2.0  Final     Chromogenic Factor 10   Date Value Ref Range Status   10/12/2015 28 (L) 70 - 130 % Final     Comment:     Therapeutic Range:  A Chromogenic Factor 10 level of approximately 20-40%   inversely correlates with an INR of 2-3 for patients receiving Warfarin.   Chromogenic Factor 10 levels below 20% indicate an INR greater than 3 and   levels above 40% indicate an INR less than 2.         ASSESSMENT / PLAN  INR assessment THER    Recheck INR In: 2 WEEKS    INR Location Home INR    Billed home INR No      Anticoagulation Summary as of 5/29/2018     INR goal 2.0-3.0   Today's INR 2.0   Warfarin maintenance plan 7.5 mg (5 mg x 1.5) every day   Full warfarin instructions 7.5 mg every day   Weekly warfarin total 52.5 mg   No change documented Tanja Wills, ENZO   Plan last modified Mandy Ferguson RN (5/14/2018)   Next INR check 6/12/2018   Target end date Indefinite    Indications   Long-term (current) use of anticoagulants [Z79.01] [Z79.01]  PE (pulmonary embolism) [I26.99]         Anticoagulation Episode Summary     INR check location     Preferred lab     Send INR reminders to [facility] HEART INR NURSE    Comments       Anticoagulation Care Providers     Provider Role Specialty Phone number    ChristianmandySatya MD Responsible Cardiology [phone]            See the Encounter Report to view Anticoagulation Flowsheet and Dosing Calendar (Go to Encounters tab in chart review, and find the Anticoagulation Therapy Visit)    Home INR 2.0 per [place]. Will continue current dosing of 7.5 mg daily and recheck in 2 weeks. Will call pt after next INR.     Tanja Wills RN    5/30/18 08:50 am Pt left voicemail message that he might have missed a dose last Friday and did not make it up. No other changes.  He will continue current dosing of 7.5 mg daily and recheck on 6/12/18. Rickey

## 2018-06-12 ENCOUNTER — ANTICOAGULATION THERAPY VISIT (OUTPATIENT)
Dept: CARDIOLOGY | Facility: CLINIC | Age: 78
End: 2018-06-12
Payer: COMMERCIAL

## 2018-06-12 ENCOUNTER — TRANSFERRED RECORDS (OUTPATIENT)
Dept: HEALTH INFORMATION MANAGEMENT | Facility: CLINIC | Age: 78
End: 2018-06-12

## 2018-06-12 DIAGNOSIS — Z79.01 LONG-TERM (CURRENT) USE OF ANTICOAGULANTS: ICD-10-CM

## 2018-06-12 LAB — INR PPP: 1.9

## 2018-06-12 PROCEDURE — 99207 ZZC NO CHARGE NURSE ONLY: CPT

## 2018-06-12 NOTE — PROGRESS NOTES
ANTICOAGULATION FOLLOW-UP CLINIC VISIT    Patient Name:  Yogesh Abreu  Date:  6/12/2018  Contact Type:  Telephone/ patient    SUBJECTIVE:     Patient Findings     Positives Change in diet/appetite (eats more greens in the summer)           OBJECTIVE    INR   Date Value Ref Range Status   06/12/2018 1.9  Final     Chromogenic Factor 10   Date Value Ref Range Status   10/12/2015 28 (L) 70 - 130 % Final     Comment:     Therapeutic Range:  A Chromogenic Factor 10 level of approximately 20-40%   inversely correlates with an INR of 2-3 for patients receiving Warfarin.   Chromogenic Factor 10 levels below 20% indicate an INR greater than 3 and   levels above 40% indicate an INR less than 2.         ASSESSMENT / PLAN  INR assessment THER    Recheck INR In: 2 WEEKS    INR Location Home INR    Billed home INR No      Anticoagulation Summary as of 6/12/2018     INR goal 2.0-3.0   Today's INR 1.9!   Warfarin maintenance plan 10 mg (5 mg x 2) on Tue; 7.5 mg (5 mg x 1.5) all other days   Full warfarin instructions 10 mg on Tue; 7.5 mg all other days   Weekly warfarin total 55 mg   Plan last modified Tanja Wills, RN (6/12/2018)   Next INR check 6/26/2018   Target end date Indefinite    Indications   Long-term (current) use of anticoagulants [Z79.01] [Z79.01]  PE (pulmonary embolism) [I26.99]         Anticoagulation Episode Summary     INR check location     Preferred lab     Send INR reminders to [facility] HEART INR NURSE    Comments       Anticoagulation Care Providers     Provider Role Specialty Phone number    Satya Rogers MD Responsible Cardiology [phone]            See the Encounter Report to view Anticoagulation Flowsheet and Dosing Calendar (Go to Encounters tab in chart review, and find the Anticoagulation Therapy Visit)    Home INR 1.9 Pt states he eats more greens/veggies in the summer. No change in meds. No abnormal bleeding or bruising. Will increase dosing to 10 mg Tu and 7.5 mg all other days with  recheck in 2 weeks. Dosage adjustment made based on physician directed care plan.    Tanja Wills RN

## 2018-06-12 NOTE — MR AVS SNAPSHOT
Yogesh Abreu   6/12/2018   Anticoagulation Therapy Visit    Description:  77 year old male   Provider:  Tanja Wills, RN   Department:  [facility]           INR as of 6/12/2018     Today's INR 1.9!      Anticoagulation Summary as of 6/12/2018     INR goal 2.0-3.0   Today's INR 1.9!   Full warfarin instructions 10 mg on Tue; 7.5 mg all other days   Next INR check 6/26/2018    Indications   Long-term (current) use of anticoagulants [Z79.01] [Z79.01]  PE (pulmonary embolism) [I26.99]         Contact Numbers     Anticoagulant (INR) Clinic Number: [phone]          June 2018 Details    Sun Mon Tue Wed Thu Fri Sat          1               2                 3               4               5               6               7               8               9                 10               11               12      10 mg   See details      13      7.5 mg         14      7.5 mg         15      7.5 mg         16      7.5 mg           17      7.5 mg         18      7.5 mg         19      10 mg         20      7.5 mg         21      7.5 mg         22      7.5 mg         23      7.5 mg           24      7.5 mg         25      7.5 mg         26            27               28               29               30                Date Details   06/12 This INR check       Date of next INR:  6/26/2018         How to take your warfarin dose     To take:  7.5 mg Take 1.5 of the 5 mg tablets.    To take:  10 mg Take 2 of the 5 mg tablets.

## 2018-06-21 DIAGNOSIS — I25.110 CORONARY ARTERY DISEASE INVOLVING NATIVE CORONARY ARTERY OF NATIVE HEART WITH UNSTABLE ANGINA PECTORIS (H): ICD-10-CM

## 2018-06-21 NOTE — TELEPHONE ENCOUNTER
"lisinopril (PRINIVIL/ZESTRIL) 5 MG tablet 90 tablet 3 5/25/2017           Last Written Prescription Date:  05/25/2017  Last Fill Quantity: 90,  # refills: 3   Last office visit: 8/18/2017 with prescribing provider:  Yusuf   Future Office Visit:  unknown  Requested Prescriptions   Pending Prescriptions Disp Refills     lisinopril (PRINIVIL/ZESTRIL) 5 MG tablet [Pharmacy Med Name: LISINOPRIL 5MG TABLETS] 90 tablet 0     Sig: TAKE 1 TABLET(5 MG) BY MOUTH DAILY    ACE Inhibitors (Including Combos) Protocol Passed    6/21/2018  1:48 PM       Passed - Blood pressure under 140/90 in past 12 months    BP Readings from Last 3 Encounters:   08/18/17 121/83   06/08/17 126/76   05/25/17 111/82                Passed - Recent (12 mo) or future (30 days) visit within the authorizing provider's specialty    Patient had office visit in the last 12 months or has a visit in the next 30 days with authorizing provider or within the authorizing provider's specialty.  See \"Patient Info\" tab in inbasket, or \"Choose Columns\" in Meds & Orders section of the refill encounter.           Passed - Patient is age 18 or older       Passed - Normal serum creatinine on file in past 12 months    Recent Labs   Lab Test  08/18/17   1030   CR  0.80            Passed - Normal serum potassium on file in past 12 months    Recent Labs   Lab Test  08/18/17   1030   POTASSIUM  4.0               "

## 2018-06-22 RX ORDER — LISINOPRIL 5 MG/1
TABLET ORAL
Qty: 90 TABLET | Refills: 0 | Status: SHIPPED | OUTPATIENT
Start: 2018-06-22 | End: 2018-08-08

## 2018-06-26 ENCOUNTER — ANTICOAGULATION THERAPY VISIT (OUTPATIENT)
Dept: CARDIOLOGY | Facility: CLINIC | Age: 78
End: 2018-06-26
Payer: COMMERCIAL

## 2018-06-26 ENCOUNTER — TRANSFERRED RECORDS (OUTPATIENT)
Dept: HEALTH INFORMATION MANAGEMENT | Facility: CLINIC | Age: 78
End: 2018-06-26

## 2018-06-26 DIAGNOSIS — Z79.01 LONG-TERM (CURRENT) USE OF ANTICOAGULANTS: ICD-10-CM

## 2018-06-26 LAB — INR PPP: 2.2

## 2018-06-26 PROCEDURE — 99207 ZZC NO CHARGE NURSE ONLY: CPT | Performed by: INTERNAL MEDICINE

## 2018-06-26 NOTE — PROGRESS NOTES
ANTICOAGULATION FOLLOW-UP CLINIC VISIT    Patient Name:  Yogesh Abreu  Date:  6/26/2018  Contact Type:  Telephone/ patient    SUBJECTIVE:     Patient Findings     Positives No Problem Findings           OBJECTIVE    INR   Date Value Ref Range Status   06/26/2018 2.2  Final     Chromogenic Factor 10   Date Value Ref Range Status   10/12/2015 28 (L) 70 - 130 % Final     Comment:     Therapeutic Range:  A Chromogenic Factor 10 level of approximately 20-40%   inversely correlates with an INR of 2-3 for patients receiving Warfarin.   Chromogenic Factor 10 levels below 20% indicate an INR greater than 3 and   levels above 40% indicate an INR less than 2.         ASSESSMENT / PLAN  INR assessment THER    Recheck INR In: 2 WEEKS    INR Location Home INR    Billed home INR No      Anticoagulation Summary as of 6/26/2018     INR goal 2.0-3.0   Today's INR 2.2   Warfarin maintenance plan 10 mg (5 mg x 2) on Tue; 7.5 mg (5 mg x 1.5) all other days   Full warfarin instructions 10 mg on Tue; 7.5 mg all other days   Weekly warfarin total 55 mg   No change documented Tanja Wills, ENZO   Plan last modified Tanja Wills, RN (6/12/2018)   Next INR check 7/10/2018   Target end date Indefinite    Indications   Long-term (current) use of anticoagulants [Z79.01] [Z79.01]  PE (pulmonary embolism) [I26.99]         Anticoagulation Episode Summary     INR check location     Preferred lab     Send INR reminders to [facility] HEART INR NURSE    Comments       Anticoagulation Care Providers     Provider Role Specialty Phone number    Satya Rogers MD Responsible Cardiology [phone]            See the Encounter Report to view Anticoagulation Flowsheet and Dosing Calendar (Go to Encounters tab in chart review, and find the Anticoagulation Therapy Visit)    Home INR 2.2 Spoke with pt via phone. No change in meds or diet. No abnormal bleeding or bruising. Will continue current dosing of 10 mg Tu and 7.5 mg all other days with recheck in  2 weeks.    Tanja Wills RN

## 2018-06-26 NOTE — MR AVS SNAPSHOT
Yogesh Abreu   6/26/2018   Anticoagulation Therapy Visit    Description:  77 year old male   Provider:  Tanja Wills, RN   Department:  [facility]           INR as of 6/26/2018     Today's INR 2.2      Anticoagulation Summary as of 6/26/2018     INR goal 2.0-3.0   Today's INR 2.2   Full warfarin instructions 10 mg on Tue; 7.5 mg all other days   Next INR check 7/10/2018    Indications   Long-term (current) use of anticoagulants [Z79.01] [Z79.01]  PE (pulmonary embolism) [I26.99]         Contact Numbers     Anticoagulant (INR) Clinic Number: [phone]          June 2018 Details    Sun Mon Tue Wed Thu Fri Sat          1               2                 3               4               5               6               7               8               9                 10               11               12               13               14               15               16                 17               18               19               20               21               22               23                 24               25               26      10 mg   See details      27      7.5 mg         28      7.5 mg         29      7.5 mg         30      7.5 mg          Date Details   06/26 This INR check               How to take your warfarin dose     To take:  7.5 mg Take 1.5 of the 5 mg tablets.    To take:  10 mg Take 2 of the 5 mg tablets.           July 2018 Details    Sun Mon Tue Wed Thu Fri Sat     1      7.5 mg         2      7.5 mg         3      10 mg         4      7.5 mg         5      7.5 mg         6      7.5 mg         7      7.5 mg           8      7.5 mg         9      7.5 mg         10            11               12               13               14                 15               16               17               18               19               20               21                 22               23               24               25               26               27               28                  29               30               31                    Date Details   No additional details    Date of next INR:  7/10/2018         How to take your warfarin dose     To take:  7.5 mg Take 1.5 of the 5 mg tablets.    To take:  10 mg Take 2 of the 5 mg tablets.

## 2018-06-27 DIAGNOSIS — I25.110 CORONARY ARTERY DISEASE INVOLVING NATIVE CORONARY ARTERY OF NATIVE HEART WITH UNSTABLE ANGINA PECTORIS (H): ICD-10-CM

## 2018-06-29 RX ORDER — ATORVASTATIN CALCIUM 40 MG/1
40 TABLET, FILM COATED ORAL AT BEDTIME
Qty: 90 TABLET | Refills: 3 | Status: SHIPPED | OUTPATIENT
Start: 2018-06-29 | End: 2018-09-26

## 2018-06-29 NOTE — TELEPHONE ENCOUNTER
Prescription approved per [facility] Refill Protocol.    Jarrett ALEX RN

## 2018-07-10 ENCOUNTER — ANTICOAGULATION THERAPY VISIT (OUTPATIENT)
Dept: CARDIOLOGY | Facility: CLINIC | Age: 78
End: 2018-07-10
Payer: COMMERCIAL

## 2018-07-10 ENCOUNTER — TRANSFERRED RECORDS (OUTPATIENT)
Dept: HEALTH INFORMATION MANAGEMENT | Facility: CLINIC | Age: 78
End: 2018-07-10

## 2018-07-10 DIAGNOSIS — Z79.01 LONG-TERM (CURRENT) USE OF ANTICOAGULANTS: ICD-10-CM

## 2018-07-10 LAB — INR PPP: 2.4

## 2018-07-10 PROCEDURE — 99207 ZZC NO CHARGE NURSE ONLY: CPT

## 2018-07-10 NOTE — PROGRESS NOTES
ANTICOAGULATION FOLLOW-UP CLINIC VISIT    Patient Name:  Yogesh Abreu  Date:  7/10/2018  Contact Type:  [email or web address]    SUBJECTIVE:        OBJECTIVE    INR   Date Value Ref Range Status   07/10/2018 2.4  Final     Chromogenic Factor 10   Date Value Ref Range Status   10/12/2015 28 (L) 70 - 130 % Final     Comment:     Therapeutic Range:  A Chromogenic Factor 10 level of approximately 20-40%   inversely correlates with an INR of 2-3 for patients receiving Warfarin.   Chromogenic Factor 10 levels below 20% indicate an INR greater than 3 and   levels above 40% indicate an INR less than 2.         ASSESSMENT / PLAN  INR assessment THER    Recheck INR In: 2 WEEKS    INR Location Home INR    Billed home INR No      Anticoagulation Summary as of 7/10/2018     INR goal 2.0-3.0   Today's INR 2.4   Warfarin maintenance plan 10 mg (5 mg x 2) on Tue; 7.5 mg (5 mg x 1.5) all other days   Full warfarin instructions 10 mg on Tue; 7.5 mg all other days   Weekly warfarin total 55 mg   No change documented Tanja Wills RN   Plan last modified Tanja Wills RN (6/12/2018)   Next INR check 7/24/2018   Target end date Indefinite    Indications   Long-term (current) use of anticoagulants [Z79.01] [Z79.01]  PE (pulmonary embolism) [I26.99]         Anticoagulation Episode Summary     INR check location     Preferred lab     Send INR reminders to [facility] HEART INR NURSE    Comments       Anticoagulation Care Providers     Provider Role Specialty Phone number    Satya Rogers MD Responsible Cardiology [phone]            See the Encounter Report to view Anticoagulation Flowsheet and Dosing Calendar (Go to Encounters tab in chart review, and find the Anticoagulation Therapy Visit)    Home INR 2.4 Will continue current dosing of 10 mg Tuesdays and 7.5 mg all other days with recheck in 2 weeks. Will call pt after next INR check.    Tanja Wills RN

## 2018-07-10 NOTE — MR AVS SNAPSHOT
Yogesh Abreu   7/10/2018   Anticoagulation Therapy Visit    Description:  77 year old male   Provider:  Tanja Wills, RN   Department:  [facility]           INR as of 7/10/2018     Today's INR 2.4      Anticoagulation Summary as of 7/10/2018     INR goal 2.0-3.0   Today's INR 2.4   Full warfarin instructions 10 mg on Tue; 7.5 mg all other days   Next INR check 7/24/2018    Indications   Long-term (current) use of anticoagulants [Z79.01] [Z79.01]  PE (pulmonary embolism) [I26.99]         Contact Numbers     Anticoagulant (INR) Clinic Number: [phone]          July 2018 Details    Sun Mon Tue Wed Thu Fri Sat     1               2               3               4               5               6               7                 8               9               10      10 mg   See details      11      7.5 mg         12      7.5 mg         13      7.5 mg         14      7.5 mg           15      7.5 mg         16      7.5 mg         17      10 mg         18      7.5 mg         19      7.5 mg         20      7.5 mg         21      7.5 mg           22      7.5 mg         23      7.5 mg         24            25               26               27               28                 29               30               31                    Date Details   07/10 This INR check       Date of next INR:  7/24/2018         How to take your warfarin dose     To take:  7.5 mg Take 1.5 of the 5 mg tablets.    To take:  10 mg Take 2 of the 5 mg tablets.

## 2018-07-19 ENCOUNTER — MYC MEDICAL ADVICE (OUTPATIENT)
Dept: CARDIOLOGY | Facility: CLINIC | Age: 78
End: 2018-07-19

## 2018-07-20 NOTE — TELEPHONE ENCOUNTER
Called patient to follow up on [employer] message requesting to schedule his 2 year follow up visit with Dr. Rogers prior to leaving [place] for 5 months in October. Spoke with patient and transferred call to the scheduling department to arrange this. TAWANDA Valle RN - 07/20/18, 9:16 AM

## 2018-07-24 ENCOUNTER — TRANSFERRED RECORDS (OUTPATIENT)
Dept: HEALTH INFORMATION MANAGEMENT | Facility: CLINIC | Age: 78
End: 2018-07-24

## 2018-07-24 LAB — INR PPP: 2.9

## 2018-07-25 ENCOUNTER — TRANSFERRED RECORDS (OUTPATIENT)
Dept: HEALTH INFORMATION MANAGEMENT | Facility: CLINIC | Age: 78
End: 2018-07-25

## 2018-07-25 ENCOUNTER — ANTICOAGULATION THERAPY VISIT (OUTPATIENT)
Dept: CARDIOLOGY | Facility: CLINIC | Age: 78
End: 2018-07-25
Payer: COMMERCIAL

## 2018-07-25 DIAGNOSIS — Z79.01 LONG-TERM (CURRENT) USE OF ANTICOAGULANTS: ICD-10-CM

## 2018-07-25 PROCEDURE — 99207 ZZC NO CHARGE NURSE ONLY: CPT | Performed by: INTERNAL MEDICINE

## 2018-07-25 NOTE — MR AVS SNAPSHOT
Yogesh Abreu   7/25/2018   Anticoagulation Therapy Visit    Description:  78 year old male   Provider:  Tanja Wills, RN   Department:  [facility]           INR as of 7/25/2018     Today's INR 2.9 (7/24/2018)      Anticoagulation Summary as of 7/25/2018     INR goal 2.0-3.0   Today's INR 2.9 (7/24/2018)   Full warfarin instructions 10 mg on Tue; 7.5 mg all other days   Next INR check 8/7/2018    Indications   Long-term (current) use of anticoagulants [Z79.01] [Z79.01]  PE (pulmonary embolism) [I26.99]         Your next Anticoagulation Clinic appointment(s)     Oct 02, 2018 10:00 AM CDT   Anticoagulation Visit with SHAH ANTICOAGULATION   [facility] ([facility])    [address] [place] [id number]   [phone] OPT 2              Contact Numbers     Anticoagulant (INR) Clinic Number: [phone]          July 2018 Details    Sun Mon Tue Wed Thu Fri Sat     1               2               3               4               5               6               7                 8               9               10               11               12               13               14                 15               16               17               18               19               20               21                 22               23               24               25      7.5 mg   See details      26      7.5 mg         27      7.5 mg         28      7.5 mg           29      7.5 mg         30      7.5 mg         31      10 mg              Date Details   07/25 This INR check               How to take your warfarin dose     To take:  7.5 mg Take 1.5 of the 5 mg tablets.    To take:  10 mg Take 2 of the 5 mg tablets.           August 2018 Details    Sun Mon Tue Wed Thu Fri Sat        1      7.5 mg         2      7.5 mg         3      7.5 mg         4      7.5 mg           5      7.5 mg         6      7.5 mg         7            8                9               10               11                 12               13               14               15               16               17               18                 19               20               21               22               23               24               25                 26               27               28               29               30               31                 Date Details   No additional details    Date of next INR:  8/7/2018         How to take your warfarin dose     To take:  7.5 mg Take 1.5 of the 5 mg tablets.    To take:  10 mg Take 2 of the 5 mg tablets.

## 2018-07-25 NOTE — PROGRESS NOTES
ANTICOAGULATION FOLLOW-UP CLINIC VISIT    Patient Name:  Yogesh Abreu  Date:  7/25/2018  Contact Type:  Telephone/ patient    SUBJECTIVE:        OBJECTIVE    INR   Date Value Ref Range Status   07/24/2018 2.9  Final     Chromogenic Factor 10   Date Value Ref Range Status   10/12/2015 28 (L) 70 - 130 % Final     Comment:     Therapeutic Range:  A Chromogenic Factor 10 level of approximately 20-40%   inversely correlates with an INR of 2-3 for patients receiving Warfarin.   Chromogenic Factor 10 levels below 20% indicate an INR greater than 3 and   levels above 40% indicate an INR less than 2.         ASSESSMENT / PLAN  INR assessment THER    Recheck INR In: 2 WEEKS    INR Location Home INR    Billed home INR No      Anticoagulation Summary as of 7/25/2018     INR goal 2.0-3.0   Today's INR 2.9 (7/24/2018)   Warfarin maintenance plan 10 mg (5 mg x 2) on Tue; 7.5 mg (5 mg x 1.5) all other days   Full warfarin instructions 10 mg on Tue; 7.5 mg all other days   Weekly warfarin total 55 mg   No change documented Tanja Wills, ENZO   Plan last modified Tanja Wills, RN (6/12/2018)   Next INR check 8/7/2018   Target end date Indefinite    Indications   Long-term (current) use of anticoagulants [Z79.01] [Z79.01]  PE (pulmonary embolism) [I26.99]         Anticoagulation Episode Summary     INR check location     Preferred lab     Send INR reminders to [facility] HEART INR NURSE    Comments       Anticoagulation Care Providers     Provider Role Specialty Phone number    Satya Rogers MD Responsible Cardiology [phone]            See the Encounter Report to view Anticoagulation Flowsheet and Dosing Calendar (Go to Encounters tab in chart review, and find the Anticoagulation Therapy Visit)    7/24/18 Home INR 2.9 Left voicemail message for pt asking that he call back if there has been any change in meds/diet or bleeding issues. Will continue current dosing of 10 mg Tuesdays and 7.5 mg all other days with recheck in 2  weeks. Also scheduled pt for the 6 month in clinic INR appt on 10/2 at 10 am (before his 10:15 am OV with Dr Rogers) - pt to bring his coagucheck meter to verify home INR readings. Left this information on the voicemail message as well.    Tanja Wills RN       9:20 am Pt left voicemail message confirming no changes and that he wants the in clinic INR appt on 10/2 at 10 am as scheduled. His next home INR check will be on 8/7. Rickey

## 2018-07-27 ENCOUNTER — TRANSFERRED RECORDS (OUTPATIENT)
Dept: HEALTH INFORMATION MANAGEMENT | Facility: CLINIC | Age: 78
End: 2018-07-27

## 2018-08-07 ENCOUNTER — TRANSFERRED RECORDS (OUTPATIENT)
Dept: HEALTH INFORMATION MANAGEMENT | Facility: CLINIC | Age: 78
End: 2018-08-07

## 2018-08-07 LAB — INR PPP: 2.8

## 2018-08-08 ENCOUNTER — ANTICOAGULATION THERAPY VISIT (OUTPATIENT)
Dept: CARDIOLOGY | Facility: CLINIC | Age: 78
End: 2018-08-08
Payer: COMMERCIAL

## 2018-08-08 DIAGNOSIS — I25.110 CORONARY ARTERY DISEASE INVOLVING NATIVE CORONARY ARTERY OF NATIVE HEART WITH UNSTABLE ANGINA PECTORIS (H): ICD-10-CM

## 2018-08-08 DIAGNOSIS — Z79.01 LONG-TERM (CURRENT) USE OF ANTICOAGULANTS: ICD-10-CM

## 2018-08-08 PROCEDURE — 99207 ZZC NO CHARGE NURSE ONLY: CPT

## 2018-08-08 RX ORDER — METOPROLOL SUCCINATE 25 MG/1
12.5 TABLET, EXTENDED RELEASE ORAL DAILY
Qty: 45 TABLET | Refills: 0 | Status: SHIPPED | OUTPATIENT
Start: 2018-08-08 | End: 2018-09-26

## 2018-08-08 NOTE — PROGRESS NOTES
ANTICOAGULATION FOLLOW-UP CLINIC VISIT    Patient Name:  Yogesh Abreu  Date:  8/8/2018  Contact Type:  [employer]    SUBJECTIVE:        OBJECTIVE    INR   Date Value Ref Range Status   08/07/2018 2.8  Final     Chromogenic Factor 10   Date Value Ref Range Status   10/12/2015 28 (L) 70 - 130 % Final     Comment:     Therapeutic Range:  A Chromogenic Factor 10 level of approximately 20-40%   inversely correlates with an INR of 2-3 for patients receiving Warfarin.   Chromogenic Factor 10 levels below 20% indicate an INR greater than 3 and   levels above 40% indicate an INR less than 2.         ASSESSMENT / PLAN  INR assessment THER    Recheck INR In: 2 WEEKS    INR Location Home INR    Billed home INR No      Anticoagulation Summary as of 8/8/2018     INR goal 2.0-3.0   Today's INR 2.8 (8/7/2018)   Warfarin maintenance plan 10 mg (5 mg x 2) on Tue; 7.5 mg (5 mg x 1.5) all other days   Full warfarin instructions 10 mg on Tue; 7.5 mg all other days   Weekly warfarin total 55 mg   No change documented Tanja Wills, ENZO   Plan last modified Tanja Wills, RN (6/12/2018)   Next INR check 8/21/2018   Target end date Indefinite    Indications   Long-term (current) use of anticoagulants [Z79.01] [Z79.01]  PE (pulmonary embolism) [I26.99]         Anticoagulation Episode Summary     INR check location     Preferred lab     Send INR reminders to [facility] HEART INR NURSE    Comments       Anticoagulation Care Providers     Provider Role Specialty Phone number    Satya Rogers MD Responsible Cardiology [phone]            See the Encounter Report to view Anticoagulation Flowsheet and Dosing Calendar (Go to Encounters tab in chart review, and find the Anticoagulation Therapy Visit)    8/7/18 Home INR 2.8 Will continue current dosing of 10 mg Tu and 7.5 mg all other days with recheck in 2 weeks. Will call pt after next INR obtained (pt to recheck INR every 2 weeks and we call pt every 4 weeks if his INR is staying in  range).    Tanja Wills RN

## 2018-08-08 NOTE — MR AVS SNAPSHOT
Yogesh Abreu   8/8/2018   Anticoagulation Therapy Visit    Description:  78 year old male   Provider:  Tanja Wills, RN   Department:  [facility]           INR as of 8/8/2018     Today's INR 2.8 (8/7/2018)      Anticoagulation Summary as of 8/8/2018     INR goal 2.0-3.0   Today's INR 2.8 (8/7/2018)   Full warfarin instructions 10 mg on Tue; 7.5 mg all other days   Next INR check 8/21/2018    Indications   Long-term (current) use of anticoagulants [Z79.01] [Z79.01]  PE (pulmonary embolism) [I26.99]         Your next Anticoagulation Clinic appointment(s)     Oct 02, 2018 10:00 AM CDT   Anticoagulation Visit with SHAH ANTICOAGULATION   [facility] ([facility])    [address] [place] [id number]   [phone] OPT 2              Contact Numbers     Anticoagulant (INR) Clinic Number: [phone]          August 2018 Details    Sun Mon Tue Wed Thu Fri Sat        1               2               3               4                 5               6               7               8      7.5 mg   See details      9      7.5 mg         10      7.5 mg         11      7.5 mg           12      7.5 mg         13      7.5 mg         14      10 mg         15      7.5 mg         16      7.5 mg         17      7.5 mg         18      7.5 mg           19      7.5 mg         20      7.5 mg         21            22               23               24               25                 26               27               28               29               30               31                 Date Details   08/08 This INR check       Date of next INR:  8/21/2018         How to take your warfarin dose     To take:  7.5 mg Take 1.5 of the 5 mg tablets.    To take:  10 mg Take 2 of the 5 mg tablets.

## 2018-08-09 RX ORDER — LISINOPRIL 5 MG/1
TABLET ORAL
Qty: 30 TABLET | Refills: 0 | Status: SHIPPED | OUTPATIENT
Start: 2018-08-09 | End: 2018-09-26

## 2018-08-09 NOTE — TELEPHONE ENCOUNTER
Medication is being filled for 1 time refill only due to:  Patient needs to be seen because due for physical.

## 2018-08-11 DIAGNOSIS — I25.110 CORONARY ARTERY DISEASE INVOLVING NATIVE CORONARY ARTERY OF NATIVE HEART WITH UNSTABLE ANGINA PECTORIS (H): ICD-10-CM

## 2018-08-13 RX ORDER — LISINOPRIL 5 MG/1
TABLET ORAL
Refills: 0
Start: 2018-08-13

## 2018-08-13 NOTE — TELEPHONE ENCOUNTER
"Lisinopril 5 mg    Last Written Prescription Date:  08/09/18  Last Fill Quantity: 30 tablets,  # refills: 0   Last office visit: 8/18/2017 with prescribing provider:  Yusuf   Future Office Visit:   Next 5 appointments (look out 90 days)     Oct 02, 2018 10:15 AM CDT   Return Visit with Satya Rogers MD   [facility] ([facility])    [address] [place] [id number] [phone] OPT 2                 Requested Prescriptions   Pending Prescriptions Disp Refills     lisinopril (PRINIVIL/ZESTRIL) 5 MG tablet [Pharmacy Med Name: LISINOPRIL 5MG TABLETS] 90 tablet 0     Sig: TAKE 1 TABLET BY MOUTH EVERY DAY    ACE Inhibitors (Including Combos) Protocol Passed    8/11/2018  8:47 AM       Passed - Blood pressure under 140/90 in past 12 months    BP Readings from Last 3 Encounters:   08/18/17 121/83   06/08/17 126/76   05/25/17 111/82                Passed - Recent (12 mo) or future (30 days) visit within the authorizing provider's specialty    Patient had office visit in the last 12 months or has a visit in the next 30 days with authorizing provider or within the authorizing provider's specialty.  See \"Patient Info\" tab in inbasket, or \"Choose Columns\" in Meds & Orders section of the refill encounter.           Passed - Patient is age 18 or older       Passed - Normal serum creatinine on file in past 12 months    Recent Labs   Lab Test  08/18/17   1030   CR  0.80            Passed - Normal serum potassium on file in past 12 months    Recent Labs   Lab Test  08/18/17   1030   POTASSIUM  4.0               "

## 2018-08-20 ENCOUNTER — OFFICE VISIT (OUTPATIENT)
Dept: FAMILY MEDICINE | Facility: CLINIC | Age: 78
End: 2018-08-20
Payer: COMMERCIAL

## 2018-08-20 VITALS
OXYGEN SATURATION: 97 % | SYSTOLIC BLOOD PRESSURE: 115 MMHG | HEART RATE: 63 BPM | WEIGHT: 196.2 LBS | DIASTOLIC BLOOD PRESSURE: 74 MMHG | HEIGHT: 68 IN | BODY MASS INDEX: 29.73 KG/M2 | TEMPERATURE: 97.4 F

## 2018-08-20 DIAGNOSIS — I50.1 LEFT VENTRICULAR FAILURE (H): ICD-10-CM

## 2018-08-20 DIAGNOSIS — M54.50 CHRONIC LOW BACK PAIN WITHOUT SCIATICA, UNSPECIFIED BACK PAIN LATERALITY: ICD-10-CM

## 2018-08-20 DIAGNOSIS — I25.10 CORONARY ARTERY DISEASE INVOLVING NATIVE CORONARY ARTERY OF NATIVE HEART WITHOUT ANGINA PECTORIS: ICD-10-CM

## 2018-08-20 DIAGNOSIS — Z00.00 ROUTINE GENERAL MEDICAL EXAMINATION AT A HEALTH CARE FACILITY: Primary | ICD-10-CM

## 2018-08-20 DIAGNOSIS — M35.3 POLYMYALGIA RHEUMATICA (H): ICD-10-CM

## 2018-08-20 DIAGNOSIS — Z12.11 SCREEN FOR COLON CANCER: ICD-10-CM

## 2018-08-20 DIAGNOSIS — D68.61 ANTIPHOSPHOLIPID ANTIBODY SYNDROME (H): ICD-10-CM

## 2018-08-20 DIAGNOSIS — G89.29 CHRONIC LOW BACK PAIN WITHOUT SCIATICA, UNSPECIFIED BACK PAIN LATERALITY: ICD-10-CM

## 2018-08-20 DIAGNOSIS — I10 ESSENTIAL HYPERTENSION: ICD-10-CM

## 2018-08-20 DIAGNOSIS — E78.2 MIXED HYPERLIPIDEMIA: ICD-10-CM

## 2018-08-20 DIAGNOSIS — Z12.5 PROSTATE CANCER SCREENING: ICD-10-CM

## 2018-08-20 DIAGNOSIS — I26.99 OTHER PULMONARY EMBOLISM WITHOUT ACUTE COR PULMONALE, UNSPECIFIED CHRONICITY (H): ICD-10-CM

## 2018-08-20 DIAGNOSIS — J45.40 MODERATE PERSISTENT ASTHMA WITHOUT COMPLICATION: ICD-10-CM

## 2018-08-20 LAB
ALBUMIN SERPL-MCNC: 4.3 G/DL (ref 3.4–5)
ALP SERPL-CCNC: 60 U/L (ref 40–150)
ALT SERPL W P-5'-P-CCNC: 31 U/L (ref 0–70)
ANION GAP SERPL CALCULATED.3IONS-SCNC: 6 MMOL/L (ref 3–14)
AST SERPL W P-5'-P-CCNC: 34 U/L (ref 0–45)
BILIRUB SERPL-MCNC: 1 MG/DL (ref 0.2–1.3)
BUN SERPL-MCNC: 18 MG/DL (ref 7–30)
CALCIUM SERPL-MCNC: 9.4 MG/DL (ref 8.5–10.1)
CHLORIDE SERPL-SCNC: 104 MMOL/L (ref 94–109)
CHOLEST SERPL-MCNC: 140 MG/DL
CO2 SERPL-SCNC: 27 MMOL/L (ref 20–32)
CREAT SERPL-MCNC: 0.82 MG/DL (ref 0.66–1.25)
ERYTHROCYTE [DISTWIDTH] IN BLOOD BY AUTOMATED COUNT: 13.9 % (ref 10–15)
GFR SERPL CREATININE-BSD FRML MDRD: >90 ML/MIN/1.7M2
GLUCOSE SERPL-MCNC: 93 MG/DL (ref 70–99)
HCT VFR BLD AUTO: 50.2 % (ref 40–53)
HDLC SERPL-MCNC: 37 MG/DL
HGB BLD-MCNC: 16.6 G/DL (ref 13.3–17.7)
LDLC SERPL CALC-MCNC: 86 MG/DL
MCH RBC QN AUTO: 30.1 PG (ref 26.5–33)
MCHC RBC AUTO-ENTMCNC: 33.1 G/DL (ref 31.5–36.5)
MCV RBC AUTO: 91 FL (ref 78–100)
NONHDLC SERPL-MCNC: 103 MG/DL
PLATELET # BLD AUTO: 186 10E9/L (ref 150–450)
POTASSIUM SERPL-SCNC: 3.7 MMOL/L (ref 3.4–5.3)
PROT SERPL-MCNC: 7.8 G/DL (ref 6.8–8.8)
RBC # BLD AUTO: 5.51 10E12/L (ref 4.4–5.9)
SODIUM SERPL-SCNC: 137 MMOL/L (ref 133–144)
TRIGL SERPL-MCNC: 85 MG/DL
WBC # BLD AUTO: 5 10E9/L (ref 4–11)

## 2018-08-20 PROCEDURE — 85027 COMPLETE CBC AUTOMATED: CPT | Performed by: INTERNAL MEDICINE

## 2018-08-20 PROCEDURE — 80061 LIPID PANEL: CPT | Performed by: INTERNAL MEDICINE

## 2018-08-20 PROCEDURE — 80053 COMPREHEN METABOLIC PANEL: CPT | Performed by: INTERNAL MEDICINE

## 2018-08-20 PROCEDURE — 36415 COLL VENOUS BLD VENIPUNCTURE: CPT | Performed by: INTERNAL MEDICINE

## 2018-08-20 PROCEDURE — 99397 PER PM REEVAL EST PAT 65+ YR: CPT | Performed by: INTERNAL MEDICINE

## 2018-08-20 ASSESSMENT — PATIENT HEALTH QUESTIONNAIRE - PHQ9: 5. POOR APPETITE OR OVEREATING: NOT AT ALL

## 2018-08-20 ASSESSMENT — ANXIETY QUESTIONNAIRES
7. FEELING AFRAID AS IF SOMETHING AWFUL MIGHT HAPPEN: NOT AT ALL
6. BECOMING EASILY ANNOYED OR IRRITABLE: NOT AT ALL
GAD7 TOTAL SCORE: 1
2. NOT BEING ABLE TO STOP OR CONTROL WORRYING: NOT AT ALL
3. WORRYING TOO MUCH ABOUT DIFFERENT THINGS: NOT AT ALL
5. BEING SO RESTLESS THAT IT IS HARD TO SIT STILL: NOT AT ALL
1. FEELING NERVOUS, ANXIOUS, OR ON EDGE: SEVERAL DAYS
IF YOU CHECKED OFF ANY PROBLEMS ON THIS QUESTIONNAIRE, HOW DIFFICULT HAVE THESE PROBLEMS MADE IT FOR YOU TO DO YOUR WORK, TAKE CARE OF THINGS AT HOME, OR GET ALONG WITH OTHER PEOPLE: NOT DIFFICULT AT ALL

## 2018-08-20 NOTE — PROGRESS NOTES
SUBJECTIVE:   Yogesh Abreu is a 78 year old male who presents for Preventive Visit.      Are you in the first 12 months of your Medicare Part B coverage?  No    Healthy Habits:    Do you get at least three servings of calcium containing foods daily (dairy, green leafy vegetables, etc.)? yes    Amount of exercise or daily activities, outside of work: 3 day(s) per week    Problems taking medications regularly No    Medication side effects: No    Have you had an eye exam in the past two years? no    Do you see a dentist twice per year? omce    Do you have sleep apnea, excessive snoring or daytime drowsiness?no      Ability to successfully perform activities of daily living: Yes, no assistance needed    Home safety:  none identified     Hearing impairment: Yes, wears hearing aids    Fall risk:  Fallen 2 or more times in the past year?: No  Any fall with injury in the past year?: Yes  Timed Up and Go Test (>13.5 is fall risk; contact physician) : 8        COGNITIVE SCREEN  1) Repeat 3 items (Leader, Season, Table)    2) Clock draw: NORMAL  3) 3 item recall: Recalls 3 objects  Results: 3 items recalled: COGNITIVE IMPAIRMENT LESS LIKELY    Mini-CogTM Copyright S Florina. Licensed by the author for use in [facility]; reprinted with permission (soob@.[email or web address]). All rights reserved.                Reviewed and updated as needed this visit by clinical staff  Tobacco  Allergies  Meds  Soc Hx        Reviewed and updated as needed this visit by Provider        Social History   Substance Use Topics     Smoking status: Former Smoker     Smokeless tobacco: Never Used      Comment: quit 40 years ago     Alcohol use Yes      Comment: 1 drink per day       If you drink alcohol do you typically have >3 drinks per day or >7 drinks per week? No                        Today's PHQ-2 Score:   PHQ-2 ( 1999 [employer]) 8/20/2018 8/18/2017   Q1: Little interest or pleasure in doing things 0 0   Q2: Feeling down, depressed or  hopeless 0 1   PHQ-2 Score 0 1   Q1: Little interest or pleasure in doing things - -   Q2: Feeling down, depressed or hopeless - -   PHQ-2 Score - -       Do you feel safe in your environment - Yes    Do you have a Health Care Directive?: Yes: Advance Directive has been received and scanned.    Current providers sharing in care for this patient include:   Patient Care Team:  Filipe Goldberg MD as PCP - General (Internal Medicine)    The following health maintenance items are reviewed in Epic and correct as of today:  Health Maintenance   Topic Date Due     HF ACTION PLAN Q3 YR  1940     ASTHMA ACTION PLAN Q1 YR  07/16/1947     ADVANCE DIRECTIVE PLANNING Q5 YRS  07/16/1997     OP ANNUAL INR REFERRAL  10/12/2016     ASTHMA CONTROL TEST Q6 MOS  11/25/2017     BMP Q6 MOS  02/18/2018     ALT Q1 YR  08/18/2018     LIPID MONITORING Q1 YEAR  08/18/2018     CBC Q1 YR  08/18/2018     FALL RISK ASSESSMENT  08/18/2018     MOIZ QUESTIONNAIRE 1 YEAR  08/18/2018     PHQ-9 Q1YR  08/18/2018     INFLUENZA VACCINE (1) 09/01/2018     TETANUS IMMUNIZATION (SYSTEM ASSIGNED)  07/29/2023     PNEUMOCOCCAL  Completed     Patient Active Problem List   Diagnosis     PE (pulmonary embolism)     CAD (coronary artery disease), IMI -  => rescue stent to  RCA     Hypertension     Mixed hyperlipidemia     Polymyalgia rheumatica (H)     ACS (acute coronary syndrome) (H)     Long-term (current) use of anticoagulants [Z79.01]     Antiphospholipid antibody syndrome (H)     Moderate persistent asthma without complication     HL (hearing loss), bilateral     Chronic left-sided low back pain with left-sided sciatica     Right shoulder pain     Left ventricular failure (H)     Past Surgical History:   Procedure Laterality Date     HEART CATH STENT COR W/WO PTCA  10/2015    90% diagonal, 50-60% CFX, 100% prox RCA occlusion - MAL placed in RCA     ORTHOPEDIC SURGERY  08/2015    right carpal tunnel       Social History   Substance Use Topics      Smoking status: Former Smoker     Smokeless tobacco: Never Used      Comment: quit 40 years ago     Alcohol use Yes      Comment: 1 drink per day     Family History   Problem Relation Age of Onset     Hypertension Brother      Hypertension Brother          Current Outpatient Prescriptions   Medication Sig Dispense Refill     albuterol (PROAIR HFA/PROVENTIL HFA/VENTOLIN HFA) 108 (90 BASE) MCG/ACT Inhaler Inhale 2 puffs into the lungs every 6 hours as needed for shortness of breath / dyspnea or wheezing 1 Inhaler 0     ASPIRIN NOT PRESCRIBED (INTENTIONAL) Please choose reason not prescribed, below 0 each 0     atorvastatin (LIPITOR) 40 MG tablet Take 1 tablet (40 mg) by mouth At Bedtime 90 tablet 3     Calcium Citrate-Vitamin D (CALCIUM CITRATE + D PO) Take 600 mg by mouth 2 times daily       Cholecalciferol (VITAMIN D3 PO) Take 1,000 Units by mouth 2 times daily       coenzyme Q-10 capsule Take 1 capsule (100 mg) by mouth daily 90 capsule 3     fluticasone-salmeterol (ADVAIR DISKUS) 250-50 MCG/DOSE diskus inhaler Inhale 1 puff into the lungs 2 times daily 1 Inhaler 12     lisinopril (PRINIVIL/ZESTRIL) 5 MG tablet TAKE 1 TABLET BY MOUTH EVERY DAY 30 tablet 0     metoprolol succinate (TOPROL-XL) 25 MG 24 hr tablet Take 0.5 tablets (12.5 mg) by mouth daily (Patient taking differently: Take 25 mg by mouth daily ) 45 tablet 0     nitroglycerin (NITROSTAT) 0.4 MG sublingual tablet Place 1 tablet (0.4 mg) under the tongue every 5 minutes as needed for chest pain 25 tablet 3     vardenafil (LEVITRA) 20 MG tablet Take 0.5-1 tablets (10-20 mg) by mouth daily as needed for erectile dysfunction Never use with nitroglycerin, terazosin or doxazosin. 12 tablet 3     warfarin (COUMADIN) 5 MG tablet Take 2 tabs on Wednesdays and take 1 1/2 tabs on all other days or as directed per INR clinic 150 tablet 1     Allergies   Allergen Reactions     Simvastatin            ROS:    Low back pain from weight lifting in college, no radiation to  "legs or leg numbness or weakness want to go back to PT for this     Constitutional, HEENT, cardiovascular, pulmonary, gi and gu systems are negative, except as otherwise noted.    OBJECTIVE:   /74 (BP Location: Right arm, Cuff Size: Adult Regular)  Pulse 63  Temp 97.4  F (36.3  C) (Oral)  Ht 5' 8\" (1.727 m)  Wt 196 lb 3.2 oz (89 kg)  SpO2 97%  BMI 29.83 kg/m2 Estimated body mass index is 29.83 kg/(m^2) as calculated from the following:    Height as of this encounter: 5' 8\" (1.727 m).    Weight as of this encounter: 196 lb 3.2 oz (89 kg).  EXAM:   GENERAL: healthy, alert and no distress  EYES: Eyes grossly normal to inspection, PERRL and conjunctivae and sclerae normal  HENT: ear canals and TM's normal, nose and mouth without ulcers or lesions  NECK: no adenopathy, no asymmetry, masses, or scars and thyroid normal to palpation  RESP: lungs clear to auscultation - no rales, rhonchi or wheezes  CV: regular rate and rhythm, normal S1 S2, no S3 or S4, no murmur, click or rub, no peripheral edema and peripheral pulses strong  ABDOMEN: soft, nontender, no hepatosplenomegaly, no masses and bowel sounds normal  RECTAL: normal sphincter tone, no rectal masses, prostate normal size, smooth, nontender without nodules or masses  MS: no gross musculoskeletal defects noted, no edema  SKIN: no suspicious lesions or rashes  NEURO: Normal strength and tone, mentation intact and speech normal  PSYCH: mentation appears normal, affect normal/bright    Diagnostic Test Results:  Labs pending     ASSESSMENT / PLAN:   1. Routine general medical examination at a health care facility      2. Polymyalgia rheumatica (H)  He is not on prednisone for this and did does not seem to be a currently active issue     3. Antiphospholipid antibody syndrome (H)  On warfarin   - ASPIRIN NOT PRESCRIBED (INTENTIONAL); Please choose reason not prescribed, below  Dispense: 0 each; Refill: 0    4. Left ventricular failure (H)  Volume status appears " "stable     5. Other pulmonary embolism without acute cor pulmonale, unspecified chronicity (H)  On warfarin     6. Mixed hyperlipidemia  Goal LDL < 70; on statin therapy   - Lipid panel reflex to direct LDL Fasting  - Comprehensive metabolic panel  - CBC with platelets    7. Moderate persistent asthma without complication      8. Essential hypertension  Under good control     9. Coronary artery disease involving native coronary artery of native heart without angina pectoris  Stable; continue follow up with Dr. Rogers    10. Chronic low back pain without sciatica, unspecified back pain laterality  He wants to return to PT for this     11. Prostate cancer screening      12. Screen for colon cancer    - Fecal colorectal cancer screen (FIT); Future    End of Life Planning:  Patient currently has an advanced directive: Yes.  Practitioner is supportive of decision.    COUNSELING:  Reviewed preventive health counseling, as reflected in patient instructions  Special attention given to:       Regular exercise       Healthy diet/nutrition       Immunizations    Shingrix            Colon cancer screening (it has been over 10 years since last colonoscopy which was normal, he is a little reluctant to stop coumadin for colonoscopy which is understandable, he would like to do a FIT test this year)        Prostate cancer screening, elected to do AMINATA without PSA for screening this year     BP Readings from Last 1 Encounters:   08/20/18 115/74     Estimated body mass index is 29.83 kg/(m^2) as calculated from the following:    Height as of this encounter: 5' 8\" (1.727 m).    Weight as of this encounter: 196 lb 3.2 oz (89 kg).      Weight management plan: Discussed healthy diet and exercise guidelines and patient will follow up in 12 months in clinic to re-evaluate.     reports that he has quit smoking. He has never used smokeless tobacco.      Appropriate preventive services were discussed with this patient, including applicable " screening as appropriate for cardiovascular disease, diabetes, osteopenia/osteoporosis, and glaucoma.  As appropriate for age/gender, discussed screening for colorectal cancer, prostate cancer, breast cancer, and cervical cancer. Checklist reviewing preventive services available has been given to the patient.    Reviewed patients plan of care and provided an AVS. The Basic Care Plan (routine screening as documented in Health Maintenance) for Yogesh meets the Care Plan requirement. This Care Plan has been established and reviewed with the Patient.    Counseling Resources:  ATP IV Guidelines  Pooled Cohorts Equation Calculator  Breast Cancer Risk Calculator  FRAX Risk Assessment  ICSI Preventive Guidelines  Dietary Guidelines for Americans, 2010  USDA's MyPlate  ASA Prophylaxis  Lung CA Screening    Filipe Goldberg MD  [facility]

## 2018-08-20 NOTE — MR AVS SNAPSHOT
"              After Visit Summary   8/20/2018    Yogesh Abreu    MRN: [id number]           Patient Information     Date Of Birth          1940        Visit Information        Provider Department      8/20/2018 8:30 AM Filipe Goldberg MD [facility]        Today's Diagnoses     Routine general medical examination at a health care facility    -  1    Polymyalgia rheumatica (H)        Antiphospholipid antibody syndrome (H)        Left ventricular failure (H)        Other pulmonary embolism without acute cor pulmonale, unspecified chronicity (H)        Mixed hyperlipidemia        Moderate persistent asthma without complication        Essential hypertension        Coronary artery disease involving native coronary artery of native heart without angina pectoris        Chronic low back pain without sciatica, unspecified back pain laterality        Prostate cancer screening        Screen for colon cancer          Care Instructions    You should get the new shingles vaccine \"SHINGRIX\" (not Zostavax) at your pharmacy.           Preventive Health Recommendations:       Male Ages 65 and over    Yearly exam:             See your health care provider every year in order to  o   Review health changes.   o   Discuss preventive care.    o   Review your medicines if your doctor has prescribed any.    Talk with your health care provider about whether you should have a test to screen for prostate cancer (PSA).    Every 3 years, have a diabetes test (fasting glucose). If you are at risk for diabetes, you should have this test more often.    Every 5 years, have a cholesterol test. Have this test more often if you are at risk for high cholesterol or heart disease.     Every 10 years, have a colonoscopy. Or, have a yearly FIT test (stool test). These exams will check for colon cancer.    Talk to with your health care provider about screening for Abdominal Aortic Aneurysm if you have a family history of AAA or have a " history of smoking.  Shots:     Get a flu shot each year.     Get a tetanus shot every 10 years.     Talk to your doctor about your pneumonia vaccines. There are now two you should receive - Pneumovax (PPSV 23) and Prevnar (PCV 13).    Talk to your pharmacist about a shingles vaccine.     Talk to your doctor about the hepatitis B vaccine.  Nutrition:     Eat at least 5 servings of fruits and vegetables each day.     Eat whole-grain bread, whole-wheat pasta and brown rice instead of white grains and rice.     Get adequate Calcium and Vitamin D.   Lifestyle    Exercise for at least 150 minutes a week (30 minutes a day, 5 days a week). This will help you control your weight and prevent disease.     Limit alcohol to one drink per day.     No smoking.     Wear sunscreen to prevent skin cancer.     See your dentist every six months for an exam and cleaning.     See your eye doctor every 1 to 2 years to screen for conditions such as glaucoma, macular degeneration and cataracts.          Follow-ups after your visit        Follow-up notes from your care team     Return in about 1 year (around 8/20/2019) for Physical Exam.      Your next 10 appointments already scheduled     Oct 02, 2018 10:00 AM CDT   Anticoagulation Visit with [facility]   [facility])    [address] [place], [place] [address]  [facility] [address] [phone] OPT 2            Oct 02, 2018 10:15 AM CDT   Return Visit with Satya Rogers MD   [facility] ([facility])    [address] [place], [place] [address]  [facility] [address] [phone] OPT 2              Future tests that were ordered for you today     Open Future Orders        Priority Expected Expires Ordered    Fecal colorectal cancer screen (FIT) Routine 9/10/2018 11/12/2018 8/20/2018            Who to contact     If you have questions or need follow up information about today's clinic visit or  "your schedule please contact [facility] directly at [phone].  Normal or non-critical lab and imaging results will be communicated to you by MyChart, letter or phone within 4 business days after the clinic has received the results. If you do not hear from us within 7 days, please contact the clinic through [employer] or phone. If you have a critical or abnormal lab result, we will notify you by phone as soon as possible.  Submit refill requests through [employer] or call your pharmacy and they will forward the refill request to us. Please allow 3 business days for your refill to be completed.          Additional Information About Your Visit        [employer] Information     [employer] gives you secure access to your electronic health record. If you see a primary care provider, you can also send messages to your care team and make appointments. If you have questions, please call your primary care clinic.  If you do not have a primary care provider, please call [phone] and they will assist you.        Care EveryWhere ID     This is your Care EveryWhere ID. This could be used by other organizations to access your [facility] medical records  [id number]        Your Vitals Were     Pulse Temperature Height Pulse Oximetry BMI (Body Mass Index)       63 97.4  F (36.3  C) (Oral) 5' 8\" (1.727 m) 97% 29.83 kg/m2        Blood Pressure from Last 3 Encounters:   08/20/18 115/74   08/18/17 121/83   06/08/17 126/76    Weight from Last 3 Encounters:   08/20/18 196 lb 3.2 oz (89 kg)   08/18/17 196 lb (88.9 kg)   06/08/17 195 lb (88.5 kg)              We Performed the Following     CBC with platelets     Comprehensive metabolic panel     Lipid panel reflex to direct LDL Fasting          Today's Medication Changes          These changes are accurate as of 8/20/18  9:16 AM.  If you have any questions, ask your nurse or doctor.               These medicines have changed or have updated prescriptions.        Dose/Directions    " metoprolol succinate 25 MG 24 hr tablet   Commonly known as:  TOPROL-XL   This may have changed:  how much to take   Used for:  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)        Dose:  12.5 mg   Take 0.5 tablets (12.5 mg) by mouth daily   Quantity:  45 tablet   Refills:  0                Primary Care Provider Office Phone # Fax #    Filipe KERVIN Goldberg [phone] [address]  [facility] [address]        Equal Access to Services     [facility]: Hadii aad ku hadasho Soomaali, waaxda luqadaha, qaybta kaalmada adeegyada, waxay roshanin haydevorahn maria guadalupe singh . So [facility] [phone].    ATENCIÓN: Si habla español, tiene a neumann disposición servicios gratuitos de asistencia lingüística. [facility] [phone].    We comply with applicable federal civil rights laws and [place] laws. We do not discriminate on the basis of race, color, national origin, age, disability, sex, sexual orientation, or gender identity.            Thank you!     Thank you for choosing [facility]  for your care. Our goal is always to provide you with excellent care. Hearing back from our patients is one way we can continue to improve our services. Please take a few minutes to complete the written survey that you may receive in the mail after your visit with us. Thank you!             Your Updated Medication List - Protect others around you: Learn how to safely use, store and throw away your medicines at www.disposemymeds.org.          This list is accurate as of 8/20/18  9:16 AM.  Always use your most recent med list.                   Brand Name Dispense Instructions for use Diagnosis    albuterol 108 (90 Base) MCG/ACT inhaler    PROAIR HFA/PROVENTIL HFA/VENTOLIN HFA    1 Inhaler    Inhale 2 puffs into the lungs every 6 hours as needed for shortness of breath / dyspnea or wheezing    Cough       ASPIRIN NOT PRESCRIBED    INTENTIONAL    0 each     Please choose reason not prescribed, below    Antiphospholipid antibody syndrome (H)       atorvastatin 40 MG tablet    LIPITOR    90 tablet    Take 1 tablet (40 mg) by mouth At Bedtime    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)       CALCIUM CITRATE + D PO      Take 600 mg by mouth 2 times daily        coenzyme Q-10 capsule     90 capsule    Take 1 capsule (100 mg) by mouth daily    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)       fluticasone-salmeterol 250-50 MCG/DOSE diskus inhaler    ADVAIR DISKUS    1 Inhaler    Inhale 1 puff into the lungs 2 times daily    Cough, 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)       lisinopril 5 MG tablet    PRINIVIL/ZESTRIL    30 tablet    TAKE 1 TABLET BY MOUTH EVERY DAY    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)       metoprolol succinate 25 MG 24 hr tablet    TOPROL-XL    45 tablet    Take 0.5 tablets (12.5 mg) by mouth daily    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    Erectile dysfunction, unspecified erectile dysfunction type       VITAMIN D3 PO      Take 1,000 Units by mouth 2 times daily        warfarin 5 MG tablet    COUMADIN    150 tablet    Take 2 tabs on Wednesdays and take 1 1/2 tabs on all  other days or as directed per INR clinic    Long term current use of anticoagulant therapy

## 2018-08-20 NOTE — PROGRESS NOTES
"The following letter pertains to your most recent diagnostic tests:    -Liver and gallbladder tests are normal for you. (ALT,AST, Alk phos, bilirubin), kidney function is normal for you (Creatinine, GFR), Sodium is normal, Potassium is normal for you, Calcium is normal for you, Glucose (blood sugar) is normal for you.      -Your cholesterol panel looks healthy with the exception of mildly low \"good cholesterol\" (HDL).  Increasing exercise can improve HDL (\"good cholesterol\") levels.  A good target to shoot for is 30 minutes of aerobic activity at least 4 days per week.     -Your complete blood counts including your hemoglobin returned normal for you.       Bottom line:  Lab results look stable.        Follow up:  Schedule an appointment for a physical examination with fasting blood tests in one year's time, or return sooner if new questions, symptoms or problems arise.       Sincerely,    Dr. Goldberg"

## 2018-08-21 ENCOUNTER — ANTICOAGULATION THERAPY VISIT (OUTPATIENT)
Dept: CARDIOLOGY | Facility: CLINIC | Age: 78
End: 2018-08-21
Payer: COMMERCIAL

## 2018-08-21 ENCOUNTER — TRANSFERRED RECORDS (OUTPATIENT)
Dept: HEALTH INFORMATION MANAGEMENT | Facility: CLINIC | Age: 78
End: 2018-08-21

## 2018-08-21 DIAGNOSIS — Z79.01 LONG-TERM (CURRENT) USE OF ANTICOAGULANTS: ICD-10-CM

## 2018-08-21 LAB — INR POINT OF CARE: 2.9 (ref 0.86–1.14)

## 2018-08-21 PROCEDURE — 99207 ZZC NO CHARGE NURSE ONLY: CPT | Performed by: INTERNAL MEDICINE

## 2018-08-21 PROCEDURE — 85610 PROTHROMBIN TIME: CPT | Mod: QW | Performed by: INTERNAL MEDICINE

## 2018-08-21 PROCEDURE — 36416 COLLJ CAPILLARY BLOOD SPEC: CPT | Performed by: INTERNAL MEDICINE

## 2018-08-21 ASSESSMENT — ASTHMA QUESTIONNAIRES: ACT_TOTALSCORE: 25

## 2018-08-21 ASSESSMENT — ANXIETY QUESTIONNAIRES: GAD7 TOTAL SCORE: 1

## 2018-08-21 ASSESSMENT — PATIENT HEALTH QUESTIONNAIRE - PHQ9: SUM OF ALL RESPONSES TO PHQ QUESTIONS 1-9: 1

## 2018-08-21 NOTE — PROGRESS NOTES
ANTICOAGULATION FOLLOW-UP CLINIC VISIT    Patient Name:  Yogesh Abreu  Date:  8/21/2018  Contact Type:  Face to Face    SUBJECTIVE:     Patient Findings     Positives No Problem Findings           OBJECTIVE    INR Protime   Date Value Ref Range Status   08/21/2018 2.9 (A) 0.86 - 1.14 Final     Chromogenic Factor 10   Date Value Ref Range Status   10/12/2015 28 (L) 70 - 130 % Final     Comment:     Therapeutic Range:  A Chromogenic Factor 10 level of approximately 20-40%   inversely correlates with an INR of 2-3 for patients receiving Warfarin.   Chromogenic Factor 10 levels below 20% indicate an INR greater than 3 and   levels above 40% indicate an INR less than 2.         ASSESSMENT / PLAN  INR assessment THER    Recheck INR In: 2 WEEKS    INR Location Clinic      Anticoagulation Summary as of 8/21/2018     INR goal 2.0-3.0   Today's INR 2.9   Warfarin maintenance plan 10 mg (5 mg x 2) on Tue; 7.5 mg (5 mg x 1.5) all other days   Full warfarin instructions 10 mg on Tue; 7.5 mg all other days   Weekly warfarin total 55 mg   No change documented Lisa Pedraza, RN   Plan last modified Tanja Wills, RN (6/12/2018)   Next INR check 9/4/2018   Target end date Indefinite    Indications   Long-term (current) use of anticoagulants [Z79.01] [Z79.01]  PE (pulmonary embolism) [I26.99]         Anticoagulation Episode Summary     INR check location     Preferred lab     Send INR reminders to [facility] HEART INR NURSE    Comments       Anticoagulation Care Providers     Provider Role Specialty Phone number    Satya Rogers MD Responsible Cardiology [phone]            See the Encounter Report to view Anticoagulation Flowsheet and Dosing Calendar (Go to Encounters tab in chart review, and find the Anticoagulation Therapy Visit)    INR 2.9 per home care INR check through [facility] home monitoring. Patient will continue on same dosing and recheck in 2 weeks per protocol. Next check on 9/4/18 pt will be due for monthly  phone call.    Lisa Pedraza RN

## 2018-08-21 NOTE — MR AVS SNAPSHOT
Yogesh Abreu   8/21/2018   Anticoagulation Therapy Visit    Description:  78 year old male   Provider:  Lisa Pedraza, RN   Department:  [facility]           INR as of 8/21/2018     Today's INR 2.9      Anticoagulation Summary as of 8/21/2018     INR goal 2.0-3.0   Today's INR 2.9   Full warfarin instructions 10 mg on Tue; 7.5 mg all other days   Next INR check 9/4/2018    Indications   Long-term (current) use of anticoagulants [Z79.01] [Z79.01]  PE (pulmonary embolism) [I26.99]         Your next Anticoagulation Clinic appointment(s)     Oct 02, 2018 10:00 AM CDT   Anticoagulation Visit with SHAH ANTICOAGULATION   [facility] ([facility])    [address] [place] [id number] [phone] OPT 2              Contact Numbers     Anticoagulant (INR) Clinic Number: [phone]          August 2018 Details    Sun Mon Tue Wed Thu Fri Sat        1               2               3               4                 5               6               7               8               9               10               11                 12               13               14               15               16               17               18                 19               20               21      10 mg   See details      22      7.5 mg         23      7.5 mg         24      7.5 mg         25      7.5 mg           26      7.5 mg         27      7.5 mg         28      10 mg         29      7.5 mg         30      7.5 mg         31      7.5 mg           Date Details   08/21 This INR check               How to take your warfarin dose     To take:  7.5 mg Take 1.5 of the 5 mg tablets.    To take:  10 mg Take 2 of the 5 mg tablets.           September 2018 Details    Sun Mon Tue Wed Thu Fri Sat           1      7.5 mg           2      7.5 mg         3      7.5 mg         4            5               6               7               8                 9                10               11               12               13               14               15                 16               17               18               19               20               21               22                 23               24               25               26               27               28               29                 30                      Date Details   No additional details    Date of next INR:  9/4/2018         How to take your warfarin dose     To take:  7.5 mg Take 1.5 of the 5 mg tablets.    To take:  10 mg Take 2 of the 5 mg tablets.

## 2018-08-28 ENCOUNTER — THERAPY VISIT (OUTPATIENT)
Dept: PHYSICAL THERAPY | Facility: CLINIC | Age: 78
End: 2018-08-28
Payer: COMMERCIAL

## 2018-08-28 DIAGNOSIS — G89.29 CHRONIC BILATERAL LOW BACK PAIN WITHOUT SCIATICA: ICD-10-CM

## 2018-08-28 DIAGNOSIS — M54.50 CHRONIC BILATERAL LOW BACK PAIN WITHOUT SCIATICA: ICD-10-CM

## 2018-08-28 PROCEDURE — G8978 MOBILITY CURRENT STATUS: HCPCS | Mod: GP

## 2018-08-28 PROCEDURE — 97110 THERAPEUTIC EXERCISES: CPT | Mod: GP | Performed by: PHYSICAL THERAPIST

## 2018-08-28 PROCEDURE — G8979 MOBILITY GOAL STATUS: HCPCS | Mod: GP

## 2018-08-28 PROCEDURE — 97161 PT EVAL LOW COMPLEX 20 MIN: CPT | Mod: GP | Performed by: PHYSICAL THERAPIST

## 2018-08-28 NOTE — PROGRESS NOTES
[facility] Initial Evaluation  Subjective:  Yogesh Abreu is a 78 year old male.    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 Difficulty sitting in car for longer periods of time.  Difficulty standing up from chair.  Rarely affects sleep.  He reports it is also affecting his golfing.  Condition occurred due to heavy lifting while in college.  Date of Onset: 4 years ago, patient referred to PT from physician on 8/20/2018  Location of symptoms: Lower back on both sides, reports it no longer radiates down to his ankles.  Occasionally radiates down into his buttock and thighs.  He reports no numbness or tingling.  Weakness R leg, patient states this causes his difficulty in walking.  Symptoms other than pain include: Instability in walking and descending stairs   Quality of pain is dull and frequency is intermittent; present when upright, sitting down relieves pain.    Pain dependence on time of day is: Worse in mornings, prone press ups relieve pain.  Pain rating is: 7/10.    Symptoms are exacerbated by: Playing golf, walking, standing for longer periods of time.    Symptoms are relieved by:  Sitting down and resting, prone press ups.    Progression of symptoms is that symptoms are:  He reports it is gradually getting worse since his back began hurting again.  Previous treatments include: Physical therapy.   Patient reports that general health is: Good.   Pertinent medical history includes:  History of wrist fracture, stent placement in heart in 2015.    Current occupation is: retired, enjoys golfing and going out for coffee  Red flags include: No bowel/bladder changes.    Patient's expectations for therapy include: Would like to walk longer distances and stand for longer periods of time.    HPI                    Objective:  LUMBAR:    Posture: Slight lumbar flexion in standing      Neurological:    Motor  Deficit:  Myotomes L R   L1-2 (hip flexion) 5 5   L3 (knee extension) 5 5   L4 (ankle DF) 5 5   L5 (g. toe ext) 5 5   S1 (ankle PF or knee flex) 5 5     Sensory Deficit: No sensory deficits      AROM: (Major, Moderate, Minimal or Nil loss)  Movement Loss Kev Mod Min Nil Pain   Flexion    x  none   Extension    x  none   Side Gliding L    x  none   Side Gliding R    x  4/10 pain with R side glide     Repeated movement testing:   (During: produces, abolishes, increases, decreases, no effect, centralizing, peripheralizing; After: better, worse, no better, no worse, no effect, centralized, peripheralized)    Pre-test Symptoms Standing: Low back discomfort in standing   Symptoms During Symptoms After ROM increased ROM decreased No Effect   FIS      x   Rep FIS     x   EIS     x   Rep EIS     x   Pre-test Symptoms Lying: Low back discomfort in standing   Symptoms During Symptoms After ROM increased ROM decreased No Effect   MIA     x   Rep MIA     x   EIL   Decreased      Rep EIL   Decreased        HIP ROM  Passive: WNL bilaterally in all directions    Other Tests:   LUCERO Test: Negative bilaterally, patient reported some low back discomfort on right  FADIR Test: Negative bilaterally  Hip Scour: Negative Bilaterally        System    Physical Exam    General     ROS    Assessment/Plan:    Patient is a 78 year old male with lumbar complaints.    Patient has the following significant findings with corresponding treatment plan.                Diagnosis 1:  Low Back Pain    Pain -  directional preference exercise  Impaired balance - neuro re-education and therapeutic activities  Decreased proprioception - neuro re-education and therapeutic activities  Impaired gait - gait training  Decreased function - therapeutic activities  Impaired posture - neuro re-education  Instability -  Therapeutic Activity  Therapeutic Exercise    Therapy Evaluation Codes:   1) History comprised of:   Personal factors that impact the plan of care:       None.    Comorbidity factors that impact the plan of care are:      Tremor in right leg with ambulation.     Medications impacting care: None.  2) Examination of Body Systems comprised of:   Body structures and functions that impact the plan of care:      Lumbar spine.   Activity limitations that impact the plan of care are:      Standing and Walking.  3) Clinical presentation characteristics are:   Stable/Uncomplicated.  4) Decision-Making    Low complexity using standardized patient assessment instrument and/or measureable assessment of functional outcome.  Cumulative Therapy Evaluation is: Low complexity.    Previous and current functional limitations:  (See Goal Flow Sheet for this information)    Short term and Long term goals: (See Goal Flow Sheet for this information)     Communication ability:  Patient appears to be able to clearly communicate and understand verbal and written communication and follow directions correctly.  Treatment Explanation - The following has been discussed with the patient:   RX ordered/plan of care  Anticipated outcomes  Possible risks and side effects  This patient would benefit from PT intervention to resume normal activities.   Rehab potential is good.    Frequency:  1 X week, once daily  Duration:  for 8 weeks  Discharge Plan:  Achieve all LTG.  Independent in home treatment program.  Reach maximal therapeutic benefit.    Please refer to the daily flowsheet for treatment today, total treatment time and time spent performing 1:1 timed codes.

## 2018-09-04 ENCOUNTER — ANTICOAGULATION THERAPY VISIT (OUTPATIENT)
Dept: CARDIOLOGY | Facility: CLINIC | Age: 78
End: 2018-09-04
Payer: COMMERCIAL

## 2018-09-04 ENCOUNTER — TRANSFERRED RECORDS (OUTPATIENT)
Dept: HEALTH INFORMATION MANAGEMENT | Facility: CLINIC | Age: 78
End: 2018-09-04

## 2018-09-04 DIAGNOSIS — Z79.01 LONG-TERM (CURRENT) USE OF ANTICOAGULANTS: ICD-10-CM

## 2018-09-04 LAB — INR PPP: 2.7

## 2018-09-04 PROCEDURE — 99207 ZZC NO CHARGE NURSE ONLY: CPT

## 2018-09-04 NOTE — MR AVS SNAPSHOT
Yogesh Abreu   9/4/2018   Anticoagulation Therapy Visit    Description:  78 year old male   Provider:  Tanja Wills, RN   Department:  [facility]           INR as of 9/4/2018     Today's INR 2.7      Anticoagulation Summary as of 9/4/2018     INR goal 2.0-3.0   Today's INR 2.7   Full warfarin instructions 10 mg on Tue; 7.5 mg all other days   Next INR check 9/18/2018    Indications   Long-term (current) use of anticoagulants [Z79.01] [Z79.01]  PE (pulmonary embolism) [I26.99]         Your next Anticoagulation Clinic appointment(s)     Oct 02, 2018 10:00 AM CDT   Anticoagulation Visit with SHAH ANTICOAGULATION   [facility] ([facility])    [address] [place] [id number]   [phone] OPT 2              Contact Numbers     Anticoagulant (INR) Clinic Number: [phone]          September 2018 Details    Sun Mon Tue Wed Thu Fri Sat           1                 2               3               4      10 mg   See details      5      7.5 mg         6      7.5 mg         7      7.5 mg         8      7.5 mg           9      7.5 mg         10      7.5 mg         11      10 mg         12      7.5 mg         13      7.5 mg         14      7.5 mg         15      7.5 mg           16      7.5 mg         17      7.5 mg         18            19               20               21               22                 23               24               25               26               27               28               29                 30                      Date Details   09/04 This INR check       Date of next INR:  9/18/2018         How to take your warfarin dose     To take:  7.5 mg Take 1.5 of the 5 mg tablets.    To take:  10 mg Take 2 of the 5 mg tablets.

## 2018-09-04 NOTE — PROGRESS NOTES
ANTICOAGULATION FOLLOW-UP CLINIC VISIT    Patient Name:  Yogesh Abreu  Date:  9/4/2018  Contact Type:  Telephone/patient    SUBJECTIVE:     Patient Findings     Positives No Problem Findings           OBJECTIVE    INR   Date Value Ref Range Status   09/04/2018 2.7  Final     Chromogenic Factor 10   Date Value Ref Range Status   10/12/2015 28 (L) 70 - 130 % Final     Comment:     Therapeutic Range:  A Chromogenic Factor 10 level of approximately 20-40%   inversely correlates with an INR of 2-3 for patients receiving Warfarin.   Chromogenic Factor 10 levels below 20% indicate an INR greater than 3 and   levels above 40% indicate an INR less than 2.         ASSESSMENT / PLAN  INR assessment THER    Recheck INR In: 2 WEEKS    INR Location Home INR    Billed home INR No      Anticoagulation Summary as of 9/4/2018     INR goal 2.0-3.0   Today's INR 2.7   Warfarin maintenance plan 10 mg (5 mg x 2) on Tue; 7.5 mg (5 mg x 1.5) all other days   Full warfarin instructions 10 mg on Tue; 7.5 mg all other days   Weekly warfarin total 55 mg   No change documented Tanja Wills, ENZO   Plan last modified Tanja Wills, RN (6/12/2018)   Next INR check 9/18/2018   Target end date Indefinite    Indications   Long-term (current) use of anticoagulants [Z79.01] [Z79.01]  PE (pulmonary embolism) [I26.99]         Anticoagulation Episode Summary     INR check location     Preferred lab     Send INR reminders to [facility] HEART INR NURSE    Comments       Anticoagulation Care Providers     Provider Role Specialty Phone number    Satya Rogers MD Responsible Cardiology [phone]            See the Encounter Report to view Anticoagulation Flowsheet and Dosing Calendar (Go to Encounters tab in chart review, and find the Anticoagulation Therapy Visit)    Home INR 2.7 No change in meds or diet. No abnormal bleeding or bruising. Will continue current dosing of 10 mg Tuesdays and 7.5 mg all other days with recheck in 2 weeks. Reminded him  that he also has an INR appt on 10/2 in clinic (pt to bring his coaguchek meter along for verification of previous readings.) Pt states that he will be travelling in [place] 10/10-11/1. He isn't sure if he will have cell phone access but thinks he might have email available so he would be able to send in his INR result and if we can't reach him by phone then we could send him a In[employer] message. He should know communication details by the time he comes into the clinic appt on 10/2 (might have him check INR 10/9 so he would only have to check INR once while in [place]).    Tanja Wills RN

## 2018-09-12 ENCOUNTER — THERAPY VISIT (OUTPATIENT)
Dept: PHYSICAL THERAPY | Facility: CLINIC | Age: 78
End: 2018-09-12
Payer: COMMERCIAL

## 2018-09-12 DIAGNOSIS — M54.50 CHRONIC BILATERAL LOW BACK PAIN WITHOUT SCIATICA: ICD-10-CM

## 2018-09-12 DIAGNOSIS — G89.29 CHRONIC BILATERAL LOW BACK PAIN WITHOUT SCIATICA: ICD-10-CM

## 2018-09-12 PROCEDURE — 97110 THERAPEUTIC EXERCISES: CPT | Mod: GP | Performed by: PHYSICAL THERAPIST

## 2018-09-12 PROCEDURE — 97112 NEUROMUSCULAR REEDUCATION: CPT | Mod: GP | Performed by: PHYSICAL THERAPIST

## 2018-09-18 ENCOUNTER — TRANSFERRED RECORDS (OUTPATIENT)
Dept: HEALTH INFORMATION MANAGEMENT | Facility: CLINIC | Age: 78
End: 2018-09-18

## 2018-09-18 LAB — INR PPP: 2.6

## 2018-09-19 ENCOUNTER — ANTICOAGULATION THERAPY VISIT (OUTPATIENT)
Dept: CARDIOLOGY | Facility: CLINIC | Age: 78
End: 2018-09-19
Payer: COMMERCIAL

## 2018-09-19 DIAGNOSIS — Z79.01 LONG-TERM (CURRENT) USE OF ANTICOAGULANTS: ICD-10-CM

## 2018-09-19 PROCEDURE — 99207 ZZC NO CHARGE NURSE ONLY: CPT | Performed by: INTERNAL MEDICINE

## 2018-09-19 NOTE — PROGRESS NOTES
ANTICOAGULATION FOLLOW-UP CLINIC VISIT    Patient Name:  Yogesh Abreu  Date:  9/19/2018  Contact Type:  [employer]    SUBJECTIVE:        OBJECTIVE    INR   Date Value Ref Range Status   09/18/2018 2.6  Corrected     Chromogenic Factor 10   Date Value Ref Range Status   10/12/2015 28 (L) 70 - 130 % Final     Comment:     Therapeutic Range:  A Chromogenic Factor 10 level of approximately 20-40%   inversely correlates with an INR of 2-3 for patients receiving Warfarin.   Chromogenic Factor 10 levels below 20% indicate an INR greater than 3 and   levels above 40% indicate an INR less than 2.         ASSESSMENT / PLAN  INR assessment THER    Recheck INR In: 2 WEEKS    INR Location Home INR    Billed home INR No      Anticoagulation Summary as of 9/19/2018     INR goal 2.0-3.0   Today's INR 2.6 (9/18/2018)   Warfarin maintenance plan 10 mg (5 mg x 2) on Tue; 7.5 mg (5 mg x 1.5) all other days   Full warfarin instructions 10 mg on Tue; 7.5 mg all other days   Weekly warfarin total 55 mg   No change documented Tanja Wills, ENZO   Plan last modified Tanja Wills, RN (6/12/2018)   Next INR check 10/2/2018   Target end date Indefinite    Indications   Long-term (current) use of anticoagulants [Z79.01] [Z79.01]  PE (pulmonary embolism) [I26.99]         Anticoagulation Episode Summary     INR check location     Preferred lab     Send INR reminders to [facility] HEART INR NURSE    Comments       Anticoagulation Care Providers     Provider Role Specialty Phone number    Satya Rogers MD Responsible Cardiology [phone]            See the Encounter Report to view Anticoagulation Flowsheet and Dosing Calendar (Go to Encounters tab in chart review, and find the Anticoagulation Therapy Visit)    9/18/18 Home INR 2.6 Will continue current dosing of 10 mg Tu and 7.5 mg all other days with recheck in 2 weeks at clinic appt for INR (pt already aware that he needs to bring his coaguchek meter to verify readings). Per previous  INR note, pt will be travelling in [place] 10/10-11/1. He isn't sure if he will have cell phone access but thinks he might have email available so he would be able to send in his INR result and if we can't reach him by phone then we could send him a [employer] message. He should know communication details by the time he comes into the clinic appt on 10/2 (might have him check INR 10/9 so he would only have to check INR once while in [place]).    Tanja Wills RN

## 2018-09-19 NOTE — MR AVS SNAPSHOT
Yogesh Abreu   9/19/2018   Anticoagulation Therapy Visit    Description:  78 year old male   Provider:  Tanja Wills, RN   Department:  [facility]           INR as of 9/19/2018     Today's INR 2.6 (9/18/2018)      Anticoagulation Summary as of 9/19/2018     INR goal 2.0-3.0   Today's INR 2.6 (9/18/2018)   Full warfarin instructions 10 mg on Tue; 7.5 mg all other days   Next INR check 10/2/2018    Indications   Long-term (current) use of anticoagulants [Z79.01] [Z79.01]  PE (pulmonary embolism) [I26.99]         Your next Anticoagulation Clinic appointment(s)     Oct 02, 2018 10:00 AM CDT   Anticoagulation Visit with SHAH ANTICOAGULATION   [facility] ([facility])    [address] [place] [id number] [phone] OPT 2              Contact Numbers     Anticoagulant (INR) Clinic Number: [phone]          September 2018 Details    Sun Mon Tue Wed Thu Fri Sat           1                 2               3               4               5               6               7               8                 9               10               11               12               13               14               15                 16               17               18               19      7.5 mg   See details      20      7.5 mg         21      7.5 mg         22      7.5 mg           23      7.5 mg         24      7.5 mg         25      10 mg         26      7.5 mg         27      7.5 mg         28      7.5 mg         29      7.5 mg           30      7.5 mg                Date Details   09/19 This INR check               How to take your warfarin dose     To take:  7.5 mg Take 1.5 of the 5 mg tablets.    To take:  10 mg Take 2 of the 5 mg tablets.           October 2018 Details    Sun Mon Tue Wed Thu Fri Sat      1      7.5 mg         2            3               4               5               6                 7               8               9                10               11               12               13                 14               15               16               17               18               19               20                 21               22               23               24               25               26               27                 28               29               30               31                   Date Details   No additional details    Date of next INR:  10/2/2018         How to take your warfarin dose     To take:  7.5 mg Take 1.5 of the 5 mg tablets.    To take:  10 mg Take 2 of the 5 mg tablets.

## 2018-09-26 ENCOUNTER — OFFICE VISIT (OUTPATIENT)
Dept: CARDIOLOGY | Facility: CLINIC | Age: 78
End: 2018-09-26
Payer: COMMERCIAL

## 2018-09-26 ENCOUNTER — THERAPY VISIT (OUTPATIENT)
Dept: PHYSICAL THERAPY | Facility: CLINIC | Age: 78
End: 2018-09-26
Payer: COMMERCIAL

## 2018-09-26 VITALS
SYSTOLIC BLOOD PRESSURE: 125 MMHG | HEIGHT: 68 IN | DIASTOLIC BLOOD PRESSURE: 84 MMHG | BODY MASS INDEX: 29.89 KG/M2 | WEIGHT: 197.2 LBS | HEART RATE: 73 BPM

## 2018-09-26 DIAGNOSIS — E78.2 MIXED HYPERLIPIDEMIA: ICD-10-CM

## 2018-09-26 DIAGNOSIS — G89.29 CHRONIC BILATERAL LOW BACK PAIN WITHOUT SCIATICA: ICD-10-CM

## 2018-09-26 DIAGNOSIS — M54.50 CHRONIC BILATERAL LOW BACK PAIN WITHOUT SCIATICA: ICD-10-CM

## 2018-09-26 DIAGNOSIS — N52.9 ERECTILE DYSFUNCTION, UNSPECIFIED ERECTILE DYSFUNCTION TYPE: ICD-10-CM

## 2018-09-26 DIAGNOSIS — I10 ESSENTIAL HYPERTENSION, BENIGN: ICD-10-CM

## 2018-09-26 DIAGNOSIS — I25.110 CORONARY ARTERY DISEASE INVOLVING NATIVE CORONARY ARTERY OF NATIVE HEART WITH UNSTABLE ANGINA PECTORIS (H): ICD-10-CM

## 2018-09-26 PROCEDURE — 97110 THERAPEUTIC EXERCISES: CPT | Mod: GP | Performed by: PHYSICAL THERAPIST

## 2018-09-26 PROCEDURE — 99214 OFFICE O/P EST MOD 30 MIN: CPT | Performed by: INTERNAL MEDICINE

## 2018-09-26 PROCEDURE — 97112 NEUROMUSCULAR REEDUCATION: CPT | Mod: GP | Performed by: PHYSICAL THERAPIST

## 2018-09-26 RX ORDER — METOPROLOL SUCCINATE 25 MG/1
25 TABLET, EXTENDED RELEASE ORAL DAILY
COMMUNITY
End: 2018-09-26

## 2018-09-26 RX ORDER — METOPROLOL SUCCINATE 25 MG/1
25 TABLET, EXTENDED RELEASE ORAL DAILY
Qty: 90 TABLET | Refills: 3 | Status: SHIPPED | OUTPATIENT
Start: 2018-09-26 | End: 2019-04-30

## 2018-09-26 RX ORDER — METHYLPREDNISOLONE 4 MG/1
TABLET ORAL
COMMUNITY
End: 2019-04-18

## 2018-09-26 RX ORDER — ATORVASTATIN CALCIUM 40 MG/1
40 TABLET, FILM COATED ORAL AT BEDTIME
Qty: 90 TABLET | Refills: 3 | Status: SHIPPED | OUTPATIENT
Start: 2018-09-26 | End: 2019-04-30

## 2018-09-26 RX ORDER — LISINOPRIL 5 MG/1
5 TABLET ORAL DAILY
Qty: 90 TABLET | Refills: 3 | Status: SHIPPED | OUTPATIENT
Start: 2018-09-26 | End: 2019-10-09

## 2018-09-26 NOTE — LETTER
9/26/2018    Filipe Goldberg MD  [address]  [facility] [address]    RE: Yogesh Abreu       Dear Colleague,    I had the pleasure of seeing Yogesh TENA Brady in the [facility].    I got together with Kelsea Abreu today.  He typically ulloa in [place].  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  He had multiple vessel coronary disease but had rescue angioplasty stenting of his RCA.  His LV function initially was diminished at around 40%-45%, but his ejection fraction has recovered to be normal subsequently.  In the past year, he has not had anything to suggest angina.  He has not had chest pain, dizziness or syncope.       Since his stent placed in 10/2015, he has been free of angina.  I told him that he could stop his Brilinta, which he has taken post-stenting, but now successfully for a year.       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  Dr. Flynn of Hematology has had him on warfarin.  I encouraged him to follow up with Dr. Flynn with regards to how long and how chronically he should stay on warfarin anticoagulation.  If it is indefinite, then that would be understandable, but it needs to be defined.       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    HPI and Plan:   See dictation            No orders of the defined types were placed in this encounter.    Orders Placed This Encounter   Medications     DISCONTD: metoprolol succinate (TOPROL-XL) 25 MG 24 hr tablet     Sig: Take 25 mg by mouth daily     methylPREDNISolone (MEDROL) 4 MG tablet     Sig: Take as directed.     atorvastatin (LIPITOR) 40 MG tablet     Sig: Take 1 tablet (40 mg) by mouth At Bedtime     Dispense:  90 tablet     Refill:  3     lisinopril (PRINIVIL/ZESTRIL) 5 MG tablet     Sig: Take 1 tablet (5 mg) by mouth daily     Dispense:  90 tablet     Refill:  3     metoprolol succinate (TOPROL-XL) 25 MG 24  hr tablet     Sig: Take 1 tablet (25 mg) by mouth daily     Dispense:  90 tablet     Refill:  3     Medications Discontinued During This Encounter   Medication Reason     metoprolol succinate (TOPROL-XL) 25 MG 24 hr tablet Medication Reconciliation Clean Up     methylPREDNISolone (MEDROL DOSEPAK) 4 MG tablet Therapy completed     atorvastatin (LIPITOR) 40 MG tablet Reorder     lisinopril (PRINIVIL/ZESTRIL) 5 MG tablet Reorder     metoprolol succinate (TOPROL-XL) 25 MG 24 hr tablet Reorder         Encounter Diagnoses   Name Primary?     Erectile dysfunction, unspecified erectile dysfunction type      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)      Mixed hyperlipidemia      Essential hypertension, benign      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)        CURRENT MEDICATIONS:  Current Outpatient Prescriptions   Medication Sig Dispense Refill     albuterol (PROAIR HFA/PROVENTIL HFA/VENTOLIN HFA) 108 (90 BASE) MCG/ACT Inhaler Inhale 2 puffs into the lungs every 6 hours as needed for shortness of breath / dyspnea or wheezing 1 Inhaler 0     atorvastatin (LIPITOR) 40 MG tablet Take 1 tablet (40 mg) by mouth At Bedtime 90 tablet 3     Calcium Citrate-Vitamin D (CALCIUM CITRATE + D PO) Take 600 mg by mouth 2 times daily       Cholecalciferol (VITAMIN D3 PO) Take 1,000 Units by mouth 2 times daily       coenzyme Q-10 capsule Take 1 capsule (100 mg) by mouth daily 90 capsule 3     fluticasone-salmeterol (ADVAIR DISKUS) 250-50 MCG/DOSE diskus inhaler Inhale 1 puff into the lungs 2 times daily 1 Inhaler 12     lisinopril (PRINIVIL/ZESTRIL) 5 MG tablet Take 1 tablet (5 mg) by mouth daily 90 tablet 3     methylPREDNISolone (MEDROL) 4 MG tablet Take as directed.       metoprolol succinate (TOPROL-XL) 25 MG 24 hr tablet Take 1 tablet (25 mg) by mouth daily 90 tablet 3     nitroglycerin (NITROSTAT) 0.4  MG sublingual tablet Place 1 tablet (0.4 mg) under the tongue every 5 minutes as needed for chest pain 25 tablet 3     vardenafil (LEVITRA) 20 MG tablet Take 0.5-1 tablets (10-20 mg) by mouth daily as needed for erectile dysfunction Never use with nitroglycerin, terazosin or doxazosin. 12 tablet 3     warfarin (COUMADIN) 5 MG tablet Take 2 tabs on Wednesdays and take 1 1/2 tabs on all other days or as directed per INR clinic 150 tablet 1     ASPIRIN NOT PRESCRIBED (INTENTIONAL) Please choose reason not prescribed, below 0 each 0     [DISCONTINUED] atorvastatin (LIPITOR) 40 MG tablet Take 1 tablet (40 mg) by mouth At Bedtime 90 tablet 3     [DISCONTINUED] lisinopril (PRINIVIL/ZESTRIL) 5 MG tablet TAKE 1 TABLET BY MOUTH EVERY DAY 30 tablet 0     [DISCONTINUED] metoprolol succinate (TOPROL-XL) 25 MG 24 hr tablet Take 25 mg by mouth daily       [DISCONTINUED] metoprolol succinate (TOPROL-XL) 25 MG 24 hr tablet Take 0.5 tablets (12.5 mg) by mouth daily (Patient taking differently: Take 25 mg by mouth daily ) 45 tablet 0       ALLERGIES     Allergies   Allergen Reactions     Simvastatin        PAST MEDICAL HISTORY:  Past Medical History:   Diagnosis Date     Antiphospholipid antibody syndrome (H) 5/25/2017     CAD (coronary artery disease), native coronary artery 10/2015    9/2015 Heart cath - 90% diagonal, 50-60% CFX, 100% prox RCA occlusion - MAL placed in RCA, 10/2015 EF 35-40% by Echo     DVT (deep venous thrombosis) (H) 9/2015 9/2015 Occlusive DVT extending from left common femoral to mid left popliteal vein     Hypercholesteraemia      Hypertension      PE (pulmonary embolism) 9/2015     PMR (polymyalgia rheumatica) (H)      Polymyalgia rheumatica (H)      STEMI (ST elevation myocardial infarction) (H) 10/2015    10/2015 Inferior STEMI - 100% RCA occlusion with MAL placed       PAST SURGICAL HISTORY:  Past Surgical History:   Procedure Laterality Date     HEART CATH STENT COR W/WO PTCA  10/2015    90% diagonal,  "50-60% CFX, 100% prox RCA occlusion - MAL placed in RCA     ORTHOPEDIC SURGERY  08/2015    right carpal tunnel       FAMILY HISTORY:  Family History   Problem Relation Age of Onset     Hypertension Brother      Hypertension Brother        SOCIAL HISTORY:  Social History     Social History     Marital status:      Spouse name: N/A     Number of children: N/A     Years of education: N/A     Social History Main Topics     Smoking status: Former Smoker     Packs/day: 0.50     Years: 5.00     Types: Cigarettes     Start date: 1965     Quit date: 1975     Smokeless tobacco: Never Used     Alcohol use Yes      Comment: 1 drink per day     Drug use: No     Sexual activity: Yes     Partners: Female     Other Topics Concern     Caffeine Concern Yes     1 cup coffee daily     Sleep Concern No     Stress Concern Yes     related to relationship     Weight Concern No     Special Diet Yes     mediterranian diet     Exercise Yes     walking, cardiac rehab starting     Social History Narrative         Review of Systems:  Skin:  Negative       Eyes:  Positive for glasses    ENT:  Negative      Respiratory:  Positive for cough congestion   Cardiovascular:  Negative      Gastroenterology: Positive for heartburn occ.  Genitourinary:  Negative      Musculoskeletal:  Positive for joint pain    Neurologic:  Negative      Psychiatric:  Positive for excessive stress    Heme/Lymph/Imm:  Positive for allergies    Endocrine:  Negative        Physical Exam:  Vitals: /84  Pulse 73  Ht 1.727 m (5' 8\")  Wt 89.4 kg (197 lb 3.2 oz)  BMI 29.98 kg/m2    Constitutional:  cooperative, alert and oriented, well developed, well nourished, in no acute distress;in no acute distress        Skin:  no apparent skin lesions or masses noted          Head:  normocephalic, no masses or lesions        Eyes:  EOMS intact        Lymph:      ENT:  not assessed this visit        Neck:  JVP normal;no carotid bruit;no stiffness        Respiratory:  clear " to auscultation;normal symmetry         Cardiac: regular rhythm;normal S1 and S2;no murmurs, gallops or rubs detected                      1+             1+                    GI:  abdomen soft;no masses;non-tender        Extremities and Muscular Skeletal:  no edema;no deformities, clubbing, cyanosis, erythema observed              Neurological:  affect appropriate        Psych:  oriented to time, person and place        Recent Lab Results:  LIPID RESULTS:  Lab Results   Component Value Date    CHOL 140 08/20/2018    HDL 37 (L) 08/20/2018    LDL 86 08/20/2018    TRIG 85 08/20/2018    CHOLHDLRATIO 4.0 10/10/2015       LIVER ENZYME RESULTS:  Lab Results   Component Value Date    AST 34 08/20/2018    ALT 31 08/20/2018       CBC RESULTS:  Lab Results   Component Value Date    WBC 5.0 08/20/2018    RBC 5.51 08/20/2018    HGB 16.6 08/20/2018    HCT 50.2 08/20/2018    MCV 91 08/20/2018    MCH 30.1 08/20/2018    MCHC 33.1 08/20/2018    RDW 13.9 08/20/2018     08/20/2018       BMP RESULTS:  Lab Results   Component Value Date     08/20/2018    POTASSIUM 3.7 08/20/2018    CHLORIDE 104 08/20/2018    CO2 27 08/20/2018    ANIONGAP 6 08/20/2018    GLC 93 08/20/2018    BUN 18 08/20/2018    CR 0.82 08/20/2018    GFRESTIMATED >90 08/20/2018    GFRESTBLACK >90 08/20/2018    RONNY 9.4 08/20/2018        A1C RESULTS:  No results found for: A1C    INR RESULTS:  Lab Results   Component Value Date    INR 2.6 09/18/2018    INR 2.7 09/04/2018           CC  Filipe Goldberg MD  [address]  [place] [address]                          Thank you for allowing me to participate in the care of your patient.      Sincerely,     PABLITO WALKER MD     [facility] [place]    cc:   Filipe Goldberg MD  [address]  [place] [address]

## 2018-09-26 NOTE — LETTER
9/26/2018      Filipe Goldberg MD  [address]  [facility] [address]      RE: Yogesh TENA Brady       Dear Colleague,    I had the pleasure of seeing Yogesh Abreu in the [facility].    Service Date: 09/26/2018      HISTORY OF PRESENT ILLNESS:  I got together with Yogesh Brady today.  He is 78.  He has a history of documented coronary artery disease, having presented with an inferior ST elevation infarct in 10/2015.  Coronary angiography revealed an LAD with a 90% lesion, a 50% diagonal stenosis, left circumflex had a 50% lesion.  The RCA was totally occluded.  The right coronary artery was dilated and stented at that time.  Since that time, he has not had evidence of recurrent angina and certainly no evidence of heart failure.  His ejection fraction has rebounded to 55%-60%, but he has had some residual inferior wall hypokinesia post-MI.  He has had nothing to suggest left heart failure or right heart failure.      He states, however, in the past year that he has had a sensation of upper sternal and throat tightness and burning which comes and goes a bit unpredictably.  It does not radiate.  It is not associated with other symptoms, but it has a bit of a flavor of angina.  It could be GI and peptic esophagitis, but I doubt it.  He does not have a history of acid reflux up into the throat or neck.      I set him up for a stress echo study looking for evidence of significant inducible ischemia in the inferior wall, or for that matter any other wall.      PHYSICAL EXAMINATION:   VITAL SIGNS:  At home his blood pressure are 120/80, today 125/84, heart rate 70 beats a minute.  He weighs 197 pounds.   HEAD AND NECK:  Normal.   HEART:  Regular without gallop or murmur.   LUNGS:  Clear.   ABDOMEN:  Soft.   EXTREMITIES:  Free of edema.      He is on warfarin because of a history of DVT and pulmonary emboli.  He is wishing to go to [place] for 3 weeks.  I offered him the idea of  taking Eliquis during that travel and then returning and either continuing Eliquis or going back onto warfarin.  He will speak with Dr. Flynn -- hematologist to get his recommendations.  I gave him enough Eliquis for a month, which would cover his trip, and he and Dr. Flynn can negotiate it subsequently.      He is on atorvastatin 40 mg at bedtime.  His LDLs have been 80-90.  Triglycerides as well have been normal.  Renal function normal, creatinine 0.8, BUN 18.      His current medicines are:   1.  Atorvastatin 40 mg at bedtime.   2.  Lisinopril 5 mg a day.   3.  Metoprolol succinate 25 mg a day.   4.  Warfarin to an INR of 2.0 to 3.0.   5.  Advair Diskus inhaler b.i.d.   6.  Coenzyme Q daily.   7.  Calcium and vitamin D daily.   8.  Albuterol inhaler p.r.n.      Mr. Abreu is relatively controlled in terms of his breathing, he has not deteriorated.  This upper sternal tightness and burning is disturbing.  I ordered a stress echo.  He had residual disease back when he had his stent placed in the LAD.  His subsequent stress tests have been negative for ischemia, and he has not had more classic angina.  This burning in the upper sternum and throat has an anginal quality to it.  We will see what the stress echo shows.      IMPRESSION:   1.  I gave him a month's worth of Eliquis 5 mg b.i.d. to cover his history of DVT and pulmonary embolism while he is in [place].  This can be negotiated with Dr. Flynn.   2.  Treated hyperlipidemia.   3.  Upper sternal tightness suggestive but not diagnostic of angina.   4.  Treated hypertension.   5.  History of DVT and pulmonary embolism.   6.  He carries a diagnosis of polymyalgia rheumatica.      PLAN:  We will see him in a year, sooner if his stress echo supports a new onset or newly appreciated myocardial ischemia.      I renewed his cardiac medicines and made no other changes.  I did order the stress echo.      Over 35 minutes was spent doing his consult, over half the  time was face-to-face with counseling.        cc:   Filipe Goldberg MD    [facility]    [address]    [place]  [address]           Keith Flynn MD    [facility]   [address] [place] [address], [address]    [place]  [address]         PABLITO WALKER MD, [facility]             D: 2018   T: 2018   MT: TORSTEN      Name:     RUIZ LAIRD   MRN:      [id number]        Account:      [id number]   :      1940           Service Date: 2018      Document: [id number]         Outpatient Encounter Prescriptions as of 2018   Medication Sig Dispense Refill     albuterol (PROAIR HFA/PROVENTIL HFA/VENTOLIN HFA) 108 (90 BASE) MCG/ACT Inhaler Inhale 2 puffs into the lungs every 6 hours as needed for shortness of breath / dyspnea or wheezing 1 Inhaler 0     atorvastatin (LIPITOR) 40 MG tablet Take 1 tablet (40 mg) by mouth At Bedtime 90 tablet 3     Calcium Citrate-Vitamin D (CALCIUM CITRATE + D PO) Take 600 mg by mouth 2 times daily       Cholecalciferol (VITAMIN D3 PO) Take 1,000 Units by mouth 2 times daily       coenzyme Q-10 capsule Take 1 capsule (100 mg) by mouth daily 90 capsule 3     fluticasone-salmeterol (ADVAIR DISKUS) 250-50 MCG/DOSE diskus inhaler Inhale 1 puff into the lungs 2 times daily 1 Inhaler 12     lisinopril (PRINIVIL/ZESTRIL) 5 MG tablet Take 1 tablet (5 mg) by mouth daily 90 tablet 3     methylPREDNISolone (MEDROL) 4 MG tablet Take as directed.       metoprolol succinate (TOPROL-XL) 25 MG 24 hr tablet Take 1 tablet (25 mg) by mouth daily 90 tablet 3     nitroglycerin (NITROSTAT) 0.4 MG sublingual tablet Place 1 tablet (0.4 mg) under the tongue every 5 minutes as needed for chest pain 25 tablet 3     vardenafil (LEVITRA) 20 MG tablet Take 0.5-1 tablets (10-20 mg) by mouth daily as needed for erectile dysfunction Never use with nitroglycerin, terazosin or doxazosin. 12 tablet 3     warfarin (COUMADIN) 5 MG tablet Take 2 tabs on  Wednesdays and take 1 1/2 tabs on all other days or as directed per INR clinic 150 tablet 1     ASPIRIN NOT PRESCRIBED (INTENTIONAL) Please choose reason not prescribed, below 0 each 0     [DISCONTINUED] atorvastatin (LIPITOR) 40 MG tablet Take 1 tablet (40 mg) by mouth At Bedtime 90 tablet 3     [DISCONTINUED] lisinopril (PRINIVIL/ZESTRIL) 5 MG tablet TAKE 1 TABLET BY MOUTH EVERY DAY 30 tablet 0     [DISCONTINUED] methylPREDNISolone (MEDROL DOSEPAK) 4 MG tablet Follow package instructions 21 tablet 2     [DISCONTINUED] metoprolol succinate (TOPROL-XL) 25 MG 24 hr tablet Take 25 mg by mouth daily       [DISCONTINUED] metoprolol succinate (TOPROL-XL) 25 MG 24 hr tablet Take 0.5 tablets (12.5 mg) by mouth daily (Patient taking differently: Take 25 mg by mouth daily ) 45 tablet 0     No facility-administered encounter medications on file as of 9/26/2018.        Again, thank you for allowing me to participate in the care of your patient.      Sincerely,    PABLITO WALKER MD     [facility]

## 2018-09-26 NOTE — MR AVS SNAPSHOT
After Visit Summary   9/26/2018    Yogesh Abreu    MRN: [id number]           Patient Information     Date Of Birth          1940        Visit Information        Provider Department      9/26/2018 4:15 PM Satya Rogers MD 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 ([facility])    [address]  [facility] [address]   [phone] OPT 2            Oct 03, 2018 11:00 AM CDT   Ech Stress Test with [id number]   [facility]chocardiography ([facility] at [facility] [place])    [address]  [address]  [facility] [id number] [phone]           1. Please bring or wear a comfortable two-piece outfit and walking shoes. 2. Stop eating 3 hours before the test. You may drink water or juice. 3. Stop all caffeine 12 hours before the test. This includes coffee, tea, soda pop, chocolate and certain medicines (such as Anacin and Excederin). Also avoid decaf coffee and tea, as these contain small amounts of caffeine. 4. No alcohol, smoking or use of other tobacco products for 12 hours before the test. 5. Refer to your provider instructions to see if you need to stop any medications (such as beta-blockers or nitrates) for this test. 6. For patients with diabetes: - If you take insulin, call your diabetes care team. Ask if you should take a   dose  the morning of your test. - If you take diabetes medicine by mouth, dont take it on the morning of your test. Bring it with you to take after the test. (If you have questions, call your diabetes care team) 7. When you arrive, please tell us if: - You have diabetes. - You have taken Viagra, Cialis or Levitra in the past 48 hours. 8. For any questions that cannot be answered, please contact the ordering physician 9. Please do not wear perfumes or scented lotions on the day of your exam.              Future tests that were ordered for you today     Open Future Orders        Priority Expected Expires Ordered    Exercise Stress Echocardiogram Routine 10/3/2018 9/26/2019 9/26/2018            Who to contact     If you have questions or need follow up information about today's clinic visit or your schedule please contact [facility] directly at [phone].  Normal or non-critical lab and imaging results will be communicated to you by MyChart, letter or phone within 4 business days after the clinic has received the results. If you do not hear from us within 7 days, please contact the clinic through [employer]hart or phone. If you have a critical or abnormal lab result, we will notify you by phone as soon as possible.  Submit refill requests through [employer] Store or call your pharmacy and they will forward the refill request to us. Please allow 3 business days for your refill to be completed.          Additional Information About Your Visit        [employer]My-Apps Information     [employer] gives you secure access to your electronic health record. If you see a primary care provider, you can also send messages to your care team and make appointments. If you have questions, please call your primary care clinic.  If you do not have a primary care provider, please call [phone] and they will assist you.        Care EveryWhere ID     This is your Care EveryWhere ID. This could be used by other organizations to  "access your [facility] medical records  [id number]        Your Vitals Were     Pulse Height BMI (Body Mass Index)             73 1.727 m (5' 8\") 29.98 kg/m2          Blood Pressure from Last 3 Encounters:   09/26/18 125/84   08/20/18 115/74   08/18/17 121/83    Weight from Last 3 Encounters:   09/26/18 89.4 kg (197 lb 3.2 oz)   08/20/18 89 kg (196 lb 3.2 oz)   08/18/17 88.9 kg (196 lb)              We Performed the Following     Follow-Up with Cardiologist          Today's Medication Changes          These changes are accurate as of 9/26/18  4:47 PM.  If you have any questions, ask your nurse or doctor.               These medicines have changed or have updated prescriptions.        Dose/Directions    lisinopril 5 MG tablet   Commonly known as:  PRINIVIL/ZESTRIL   This may have changed:  See the new instructions.   Used for:  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)   Changed by:  Satya Rogers MD        Dose:  5 mg   Take 1 tablet (5 mg) by mouth daily   Quantity:  90 tablet   Refills:  3            Where to get your medicines      These medications were sent to [facility] [address] [place] [address] [address] [place] [id number] [place] [address]     Phone:  [phone]     atorvastatin 40 MG tablet    lisinopril 5 MG tablet    metoprolol succinate 25 MG 24 hr tablet                Primary Care Provider Office Phone # Fax #    Filipe Goldberg [phone] [address]  [facility] [address]        Equal Access to Services     [facility]: Hadii caleb renee Soyuly, waaxda luqadaha, qaybta kaalmada adeteri, kenan mccartney. So [facility] [phone].    ATENCIÓN: Si habla español, tiene a neumnan disposición servicios gratuitos de asistencia lingüística. Llame al [phone].    We comply with applicable federal civil rights laws and [place] laws. " We do not discriminate on the basis of race, color, national origin, age, disability, sex, sexual orientation, or gender identity.            Thank you!     Thank you for choosing [facility]  for your care. Our goal is always to provide you with excellent care. Hearing back from our patients is one way we can continue to improve our services. Please take a few minutes to complete the written survey that you may receive in the mail after your visit with us. Thank you!             Your Updated Medication List - Protect others around you: Learn how to safely use, store and throw away your medicines at www.disposemymeds.org.          This list is accurate as of 9/26/18  4:47 PM.  Always use your most recent med list.                   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)       CALCIUM CITRATE + D PO      Take 600 mg by mouth 2 times daily        coenzyme Q-10 capsule     90 capsule    Take 1 capsule (100 mg) by mouth daily    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)       fluticasone-salmeterol 250-50 MCG/DOSE diskus inhaler    ADVAIR DISKUS    1 Inhaler    Inhale 1 puff into the lungs 2 times daily    Cough, 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)       lisinopril 5 MG tablet     PRINIVIL/ZESTRIL    90 tablet    Take 1 tablet (5 mg) by mouth daily    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    Erectile dysfunction, unspecified erectile dysfunction type       VITAMIN D3 PO      Take 1,000 Units by mouth 2 times daily        warfarin 5 MG tablet    COUMADIN    150 tablet    Take 2 tabs on Wednesdays and take 1 1/2 tabs on all other days or as directed per INR clinic    Long term current use of anticoagulant therapy

## 2018-09-26 NOTE — PROGRESS NOTES
I got together with Kelsea Rasheedsruthi today.  He typically ulloa in [place].  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  He had multiple vessel coronary disease but had rescue angioplasty stenting of his RCA.  His LV function initially was diminished at around 40%-45%, but his ejection fraction has recovered to be normal subsequently.  In the past year, he has not had anything to suggest angina.  He has not had chest pain, dizziness or syncope.       Since his stent placed in 10/2015, he has been free of angina.  I told him that he could stop his Brilinta, which he has taken post-stenting, but now successfully for a year.       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  Dr. Flynn of Hematology has had him on warfarin.  I encouraged him to follow up with Dr. Flynn with regards to how long and how chronically he should stay on warfarin anticoagulation.  If it is indefinite, then that would be understandable, but it needs to be defined.       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    HPI and Plan:   See dictation            No orders of the defined types were placed in this encounter.    Orders Placed This Encounter   Medications     DISCONTD: metoprolol succinate (TOPROL-XL) 25 MG 24 hr tablet     Sig: Take 25 mg by mouth daily     methylPREDNISolone (MEDROL) 4 MG tablet     Sig: Take as directed.     atorvastatin (LIPITOR) 40 MG tablet     Sig: Take 1 tablet (40 mg) by mouth At Bedtime     Dispense:  90 tablet     Refill:  3     lisinopril (PRINIVIL/ZESTRIL) 5 MG tablet     Sig: Take 1 tablet (5 mg) by mouth daily     Dispense:  90 tablet     Refill:  3     metoprolol succinate (TOPROL-XL) 25 MG 24 hr tablet     Sig: Take 1 tablet (25 mg) by mouth daily     Dispense:  90 tablet     Refill:  3     Medications Discontinued During This Encounter   Medication Reason     metoprolol succinate (TOPROL-XL) 25 MG 24 hr tablet  Medication Reconciliation Clean Up     methylPREDNISolone (MEDROL DOSEPAK) 4 MG tablet Therapy completed     atorvastatin (LIPITOR) 40 MG tablet Reorder     lisinopril (PRINIVIL/ZESTRIL) 5 MG tablet Reorder     metoprolol succinate (TOPROL-XL) 25 MG 24 hr tablet Reorder         Encounter Diagnoses   Name Primary?     Erectile dysfunction, unspecified erectile dysfunction type      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)      Mixed hyperlipidemia      Essential hypertension, benign      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)        CURRENT MEDICATIONS:  Current Outpatient Prescriptions   Medication Sig Dispense Refill     albuterol (PROAIR HFA/PROVENTIL HFA/VENTOLIN HFA) 108 (90 BASE) MCG/ACT Inhaler Inhale 2 puffs into the lungs every 6 hours as needed for shortness of breath / dyspnea or wheezing 1 Inhaler 0     atorvastatin (LIPITOR) 40 MG tablet Take 1 tablet (40 mg) by mouth At Bedtime 90 tablet 3     Calcium Citrate-Vitamin D (CALCIUM CITRATE + D PO) Take 600 mg by mouth 2 times daily       Cholecalciferol (VITAMIN D3 PO) Take 1,000 Units by mouth 2 times daily       coenzyme Q-10 capsule Take 1 capsule (100 mg) by mouth daily 90 capsule 3     fluticasone-salmeterol (ADVAIR DISKUS) 250-50 MCG/DOSE diskus inhaler Inhale 1 puff into the lungs 2 times daily 1 Inhaler 12     lisinopril (PRINIVIL/ZESTRIL) 5 MG tablet Take 1 tablet (5 mg) by mouth daily 90 tablet 3     methylPREDNISolone (MEDROL) 4 MG tablet Take as directed.       metoprolol succinate (TOPROL-XL) 25 MG 24 hr tablet Take 1 tablet (25 mg) by mouth daily 90 tablet 3     nitroglycerin (NITROSTAT) 0.4 MG sublingual tablet Place 1 tablet (0.4 mg) under the tongue every 5 minutes as needed for chest pain 25 tablet 3     vardenafil (LEVITRA) 20 MG tablet Take 0.5-1 tablets (10-20 mg) by mouth daily as needed for erectile  dysfunction Never use with nitroglycerin, terazosin or doxazosin. 12 tablet 3     warfarin (COUMADIN) 5 MG tablet Take 2 tabs on Wednesdays and take 1 1/2 tabs on all other days or as directed per INR clinic 150 tablet 1     ASPIRIN NOT PRESCRIBED (INTENTIONAL) Please choose reason not prescribed, below 0 each 0     [DISCONTINUED] atorvastatin (LIPITOR) 40 MG tablet Take 1 tablet (40 mg) by mouth At Bedtime 90 tablet 3     [DISCONTINUED] lisinopril (PRINIVIL/ZESTRIL) 5 MG tablet TAKE 1 TABLET BY MOUTH EVERY DAY 30 tablet 0     [DISCONTINUED] metoprolol succinate (TOPROL-XL) 25 MG 24 hr tablet Take 25 mg by mouth daily       [DISCONTINUED] metoprolol succinate (TOPROL-XL) 25 MG 24 hr tablet Take 0.5 tablets (12.5 mg) by mouth daily (Patient taking differently: Take 25 mg by mouth daily ) 45 tablet 0       ALLERGIES     Allergies   Allergen Reactions     Simvastatin        PAST MEDICAL HISTORY:  Past Medical History:   Diagnosis Date     Antiphospholipid antibody syndrome (H) 5/25/2017     CAD (coronary artery disease), native coronary artery 10/2015    9/2015 Heart cath - 90% diagonal, 50-60% CFX, 100% prox RCA occlusion - MAL placed in RCA, 10/2015 EF 35-40% by Echo     DVT (deep venous thrombosis) (H) 9/2015 9/2015 Occlusive DVT extending from left common femoral to mid left popliteal vein     Hypercholesteraemia      Hypertension      PE (pulmonary embolism) 9/2015     PMR (polymyalgia rheumatica) (H)      Polymyalgia rheumatica (H)      STEMI (ST elevation myocardial infarction) (H) 10/2015    10/2015 Inferior STEMI - 100% RCA occlusion with MAL placed       PAST SURGICAL HISTORY:  Past Surgical History:   Procedure Laterality Date     HEART CATH STENT COR W/WO PTCA  10/2015    90% diagonal, 50-60% CFX, 100% prox RCA occlusion - MAL placed in RCA     ORTHOPEDIC SURGERY  08/2015    right carpal tunnel       FAMILY HISTORY:  Family History   Problem Relation Age of Onset     Hypertension Brother       "Hypertension Brother        SOCIAL HISTORY:  Social History     Social History     Marital status:      Spouse name: N/A     Number of children: N/A     Years of education: N/A     Social History Main Topics     Smoking status: Former Smoker     Packs/day: 0.50     Years: 5.00     Types: Cigarettes     Start date: 1965     Quit date: 1975     Smokeless tobacco: Never Used     Alcohol use Yes      Comment: 1 drink per day     Drug use: No     Sexual activity: Yes     Partners: Female     Other Topics Concern     Caffeine Concern Yes     1 cup coffee daily     Sleep Concern No     Stress Concern Yes     related to relationship     Weight Concern No     Special Diet Yes     mediterranian diet     Exercise Yes     walking, cardiac rehab starting     Social History Narrative         Review of Systems:  Skin:  Negative       Eyes:  Positive for glasses    ENT:  Negative      Respiratory:  Positive for cough congestion   Cardiovascular:  Negative      Gastroenterology: Positive for heartburn occ.  Genitourinary:  Negative      Musculoskeletal:  Positive for joint pain    Neurologic:  Negative      Psychiatric:  Positive for excessive stress    Heme/Lymph/Imm:  Positive for allergies    Endocrine:  Negative        Physical Exam:  Vitals: /84  Pulse 73  Ht 1.727 m (5' 8\")  Wt 89.4 kg (197 lb 3.2 oz)  BMI 29.98 kg/m2    Constitutional:  cooperative, alert and oriented, well developed, well nourished, in no acute distress;in no acute distress        Skin:  no apparent skin lesions or masses noted          Head:  normocephalic, no masses or lesions        Eyes:  EOMS intact        Lymph:      ENT:  not assessed this visit        Neck:  JVP normal;no carotid bruit;no stiffness        Respiratory:  clear to auscultation;normal symmetry         Cardiac: regular rhythm;normal S1 and S2;no murmurs, gallops or rubs detected                      1+             1+                    GI:  abdomen soft;no " masses;non-tender        Extremities and Muscular Skeletal:  no edema;no deformities, clubbing, cyanosis, erythema observed              Neurological:  affect appropriate        Psych:  oriented to time, person and place        Recent Lab Results:  LIPID RESULTS:  Lab Results   Component Value Date    CHOL 140 08/20/2018    HDL 37 (L) 08/20/2018    LDL 86 08/20/2018    TRIG 85 08/20/2018    CHOLHDLRATIO 4.0 10/10/2015       LIVER ENZYME RESULTS:  Lab Results   Component Value Date    AST 34 08/20/2018    ALT 31 08/20/2018       CBC RESULTS:  Lab Results   Component Value Date    WBC 5.0 08/20/2018    RBC 5.51 08/20/2018    HGB 16.6 08/20/2018    HCT 50.2 08/20/2018    MCV 91 08/20/2018    MCH 30.1 08/20/2018    MCHC 33.1 08/20/2018    RDW 13.9 08/20/2018     08/20/2018       BMP RESULTS:  Lab Results   Component Value Date     08/20/2018    POTASSIUM 3.7 08/20/2018    CHLORIDE 104 08/20/2018    CO2 27 08/20/2018    ANIONGAP 6 08/20/2018    GLC 93 08/20/2018    BUN 18 08/20/2018    CR 0.82 08/20/2018    GFRESTIMATED >90 08/20/2018    GFRESTBLACK >90 08/20/2018    RONNY 9.4 08/20/2018        A1C RESULTS:  No results found for: A1C    INR RESULTS:  Lab Results   Component Value Date    INR 2.6 09/18/2018    INR 2.7 09/04/2018           CC  Filipe Goldberg MD  [address]  [place] [address]

## 2018-09-27 NOTE — PROGRESS NOTES
Service Date: 09/26/2018      HISTORY OF PRESENT ILLNESS:  I got together with Yogesh Abreu today.  He is 78.  He has a history of documented coronary artery disease, having presented with an inferior ST elevation infarct in 10/2015.  Coronary angiography revealed an LAD with a 90% lesion, a 50% diagonal stenosis, left circumflex had a 50% lesion.  The RCA was totally occluded.  The right coronary artery was dilated and stented at that time.  Since that time, he has not had evidence of recurrent angina and certainly no evidence of heart failure.  His ejection fraction has rebounded to 55%-60%, but he has had some residual inferior wall hypokinesia post-MI.  He has had nothing to suggest left heart failure or right heart failure.      He states, however, in the past year that he has had a sensation of upper sternal and throat tightness and burning which comes and goes a bit unpredictably.  It does not radiate.  It is not associated with other symptoms, but it has a bit of a flavor of angina.  It could be GI and peptic esophagitis, but I doubt it.  He does not have a history of acid reflux up into the throat or neck.      I set him up for a stress echo study looking for evidence of significant inducible ischemia in the inferior wall, or for that matter any other wall.      PHYSICAL EXAMINATION:   VITAL SIGNS:  At home his blood pressure are 120/80, today 125/84, heart rate 70 beats a minute.  He weighs 197 pounds.   HEAD AND NECK:  Normal.   HEART:  Regular without gallop or murmur.   LUNGS:  Clear.   ABDOMEN:  Soft.   EXTREMITIES:  Free of edema.      He is on warfarin because of a history of DVT and pulmonary emboli.  He is wishing to go to [place] for 3 weeks.  I offered him the idea of taking Eliquis during that travel and then returning and either continuing Eliquis or going back onto warfarin.  He will speak with Dr. Flynn -- hematologist to get his recommendations.  I gave him enough Eliquis for a month,  which would cover his trip, and he and Dr. Flynn can negotiate it subsequently.      He is on atorvastatin 40 mg at bedtime.  His LDLs have been 80-90.  Triglycerides as well have been normal.  Renal function normal, creatinine 0.8, BUN 18.      His current medicines are:   1.  Atorvastatin 40 mg at bedtime.   2.  Lisinopril 5 mg a day.   3.  Metoprolol succinate 25 mg a day.   4.  Warfarin to an INR of 2.0 to 3.0.   5.  Advair Diskus inhaler b.i.d.   6.  Coenzyme Q daily.   7.  Calcium and vitamin D daily.   8.  Albuterol inhaler p.r.n.      Mr. Abreu is relatively controlled in terms of his breathing, he has not deteriorated.  This upper sternal tightness and burning is disturbing.  I ordered a stress echo.  He had residual disease back when he had his stent placed in the LAD.  His subsequent stress tests have been negative for ischemia, and he has not had more classic angina.  This burning in the upper sternum and throat has an anginal quality to it.  We will see what the stress echo shows.      IMPRESSION:   1.  I gave him a month's worth of Eliquis 5 mg b.i.d. to cover his history of DVT and pulmonary embolism while he is in [place].  This can be negotiated with Dr. Flynn.   2.  Treated hyperlipidemia.   3.  Upper sternal tightness suggestive but not diagnostic of angina.   4.  Treated hypertension.   5.  History of DVT and pulmonary embolism.   6.  He carries a diagnosis of polymyalgia rheumatica.      PLAN:  We will see him in a year, sooner if his stress echo supports a new onset or newly appreciated myocardial ischemia.      I renewed his cardiac medicines and made no other changes.  I did order the stress echo.      Over 35 minutes was spent doing his consult, over half the time was face-to-face with counseling.        cc:   Filipe Goldberg MD    [address], [address]    [place]  [address]       Keith Flynn MD    [facility]   [address] [place] [address]  [facility], [address]    [place]  [address]         PABLITO WALKER MD, [facility]             D: 2018   T: 2018   MT: TORSTEN      Name:     RUIZ LAIRD   MRN:      [id number]        Account:      [id number]   :      1940           Service Date: 2018      Document: [id number]

## 2018-09-28 PROCEDURE — 82274 ASSAY TEST FOR BLOOD FECAL: CPT | Performed by: INTERNAL MEDICINE

## 2018-09-30 LAB — HEMOCCULT STL QL IA: NEGATIVE

## 2018-10-01 DIAGNOSIS — Z12.11 SCREEN FOR COLON CANCER: ICD-10-CM

## 2018-10-01 NOTE — LETTER
[facility]  [address] [facility]  [address]  [place]  [address]  Tel: [phone]    October 2, 2018    Yogesh Abreu  [address]   [facility] [address]        Dear Mr. Abreu,    The following letter pertains to your most recent diagnostic tests:    Good news! Your stool test for colon cancer screening is negative.  This should be repeated in one year.       Sincerely,    Dr. Goldberg /debo  Resulted Orders   Fecal colorectal cancer screen (FIT)   Result Value Ref Range    Occult Blood Scn FIT Negative NEG^Negative

## 2018-10-02 ENCOUNTER — ANTICOAGULATION THERAPY VISIT (OUTPATIENT)
Dept: CARDIOLOGY | Facility: CLINIC | Age: 78
End: 2018-10-02
Payer: COMMERCIAL

## 2018-10-02 DIAGNOSIS — Z79.01 LONG TERM CURRENT USE OF ANTICOAGULANT THERAPY: ICD-10-CM

## 2018-10-02 DIAGNOSIS — I26.99 PULMONARY EMBOLUS (H): Primary | ICD-10-CM

## 2018-10-02 LAB — INR POINT OF CARE: 2.3 (ref 0.86–1.14)

## 2018-10-02 PROCEDURE — 36416 COLLJ CAPILLARY BLOOD SPEC: CPT | Performed by: INTERNAL MEDICINE

## 2018-10-02 PROCEDURE — 85610 PROTHROMBIN TIME: CPT | Mod: QW | Performed by: INTERNAL MEDICINE

## 2018-10-02 NOTE — MR AVS SNAPSHOT
Yogesh Abreu   10/2/2018 10:00 AM   Anticoagulation Therapy Visit    Description:  78 year old male   Provider:  [facility]   Department:   [facility]           INR as of 10/2/2018     Today's INR 2.3      Anticoagulation Summary as of 10/2/2018     INR goal 2.0-3.0   Today's INR 2.3   Full warfarin instructions 10 mg on Tue; 7.5 mg all other days   Next INR check 10/16/2018    Indications   Long-term (current) use of anticoagulants [Z79.01] [Z79.01]  PE (pulmonary embolism) [I26.99]         Contact Numbers     Anticoagulant (INR) Clinic Number: [phone]          October 2018 Details    Sun Mon Tue Wed Thu Fri Sat      1               2      10 mg   See details      3      7.5 mg         4      7.5 mg         5      7.5 mg         6      7.5 mg           7      7.5 mg         8      7.5 mg         9      10 mg         10      7.5 mg         11      7.5 mg         12      7.5 mg         13      7.5 mg           14      7.5 mg         15      7.5 mg         16            17               18               19               20                 21               22               23               24               25               26               27                 28               29               30               31                   Date Details   10/02 This INR check       Date of next INR:  10/16/2018         How to take your warfarin dose     To take:  7.5 mg Take 1.5 of the 5 mg tablets.    To take:  10 mg Take 2 of the 5 mg tablets.

## 2018-10-02 NOTE — PROGRESS NOTES
ANTICOAGULATION FOLLOW-UP CLINIC VISIT    Patient Name:  Yogesh Abreu  Date:  10/2/2018  Contact Type:  Face to Face    SUBJECTIVE:     Patient Findings     Positives Change in diet/appetite (ate extra salads over the last week)           OBJECTIVE    INR Protime   Date Value Ref Range Status   10/02/2018 2.3 (A) 0.86 - 1.14 Final     Chromogenic Factor 10   Date Value Ref Range Status   10/12/2015 28 (L) 70 - 130 % Final     Comment:     Therapeutic Range:  A Chromogenic Factor 10 level of approximately 20-40%   inversely correlates with an INR of 2-3 for patients receiving Warfarin.   Chromogenic Factor 10 levels below 20% indicate an INR greater than 3 and   levels above 40% indicate an INR less than 2.         ASSESSMENT / PLAN  INR assessment THER    Recheck INR In: 2 WEEKS    INR Location Clinic      Anticoagulation Summary as of 10/2/2018     INR goal 2.0-3.0   Today's INR 2.3   Warfarin maintenance plan 10 mg (5 mg x 2) on Tue; 7.5 mg (5 mg x 1.5) all other days   Full warfarin instructions 10 mg on Tue; 7.5 mg all other days   Weekly warfarin total 55 mg   No change documented Tanja Wills, RN   Plan last modified Tanja Wills, RN (6/12/2018)   Next INR check 10/16/2018   Target end date Indefinite    Indications   Long-term (current) use of anticoagulants [Z79.01] [Z79.01]  PE (pulmonary embolism) [I26.99]         Anticoagulation Episode Summary     INR check location     Preferred lab     Send INR reminders to [facility] HEART INR NURSE    Comments       Anticoagulation Care Providers     Provider Role Specialty Phone number    BryantSatya gordon MD Responsible Cardiology [phone]            See the Encounter Report to view Anticoagulation Flowsheet and Dosing Calendar (Go to Encounters tab in chart review, and find the Anticoagulation Therapy Visit)    INR 2.3 Pt had an in clinic INR appt today. Verified previous INR readings on his home INR meter. He ate extra greens this week but will resume  his usual diet. He will be travelling in [place] from 10/9 - 11/1 (river boat cruise). He will bring his INR meter and we can reach him via My Chart messaging. Dr Rogers had offered him samples of Eliquis to take while in [place] but pt wanted to check with Dr Flynn (hematologist) before making a change and he has not been able to get an appt with Dr Flynn so he plans to continue to take Warfarin while in [place]. He has a PRN Rx for Medrol dosepak in case he needs it for back pain. Advised that if he has to take Medrol, then he would need to check INR 2 days after starting the new med as it can raise the INR. Will continue current dosing of 10 mg Tu and 7.5 mg all there days with recheck in 2 weeks. Called today's INR result and next INR due date to [facility] so they could update the website.    Tanja Wills RN

## 2018-10-02 NOTE — PROGRESS NOTES
The following letter pertains to your most recent diagnostic tests:    Good news! Your stool test for colon cancer screening is negative.  This should be repeated in one year.       Sincerely,    Dr. Goldberg

## 2018-10-03 ENCOUNTER — CARE COORDINATION (OUTPATIENT)
Dept: CARDIOLOGY | Facility: CLINIC | Age: 78
End: 2018-10-03

## 2018-10-03 ENCOUNTER — HOSPITAL ENCOUNTER (OUTPATIENT)
Dept: CARDIOLOGY | Facility: CLINIC | Age: 78
Discharge: HOME | End: 2018-10-03
Attending: INTERNAL MEDICINE | Admitting: INTERNAL MEDICINE
Payer: MEDICARE

## 2018-10-03 DIAGNOSIS — I10 ESSENTIAL HYPERTENSION, BENIGN: ICD-10-CM

## 2018-10-03 DIAGNOSIS — I25.110 CORONARY ARTERY DISEASE INVOLVING NATIVE CORONARY ARTERY OF NATIVE HEART WITH UNSTABLE ANGINA PECTORIS (H): ICD-10-CM

## 2018-10-03 DIAGNOSIS — N52.9 ERECTILE DYSFUNCTION, UNSPECIFIED ERECTILE DYSFUNCTION TYPE: ICD-10-CM

## 2018-10-03 DIAGNOSIS — E78.2 MIXED HYPERLIPIDEMIA: ICD-10-CM

## 2018-10-03 PROCEDURE — 93350 STRESS TTE ONLY: CPT | Mod: 26 | Performed by: INTERNAL MEDICINE

## 2018-10-03 PROCEDURE — 93321 DOPPLER ECHO F-UP/LMTD STD: CPT | Mod: 26 | Performed by: INTERNAL MEDICINE

## 2018-10-03 PROCEDURE — 93016 CV STRESS TEST SUPVJ ONLY: CPT | Performed by: INTERNAL MEDICINE

## 2018-10-03 PROCEDURE — 40000264 ECHO STRESS WITH OPTISON

## 2018-10-03 PROCEDURE — 93018 CV STRESS TEST I&R ONLY: CPT | Performed by: INTERNAL MEDICINE

## 2018-10-03 PROCEDURE — 93325 DOPPLER ECHO COLOR FLOW MAPG: CPT | Mod: 26 | Performed by: INTERNAL MEDICINE

## 2018-10-03 PROCEDURE — 25500064 ZZH RX 255 OP 636: Performed by: INTERNAL MEDICINE

## 2018-10-03 RX ADMIN — HUMAN ALBUMIN MICROSPHERES AND PERFLUTREN 9 ML: 10; .22 INJECTION, SOLUTION INTRAVENOUS at 11:50

## 2018-10-03 NOTE — PROGRESS NOTES
Received call from reading physician stating that pt's stress echo was abnormal & w/ NSVT & c/o chest discomfort, he would recommends that pt needs a cath. Called pt, denies chest pain or SOB at present. Pt advised to avoid exertion & if he does get chest pain or SOB he should go to the ED. Pt scheduled to see Dr. Rogers tomorrow. Pt aware of apt date, time & location. [place] RN

## 2018-10-04 ENCOUNTER — OFFICE VISIT (OUTPATIENT)
Dept: CARDIOLOGY | Facility: CLINIC | Age: 78
End: 2018-10-04
Payer: COMMERCIAL

## 2018-10-04 VITALS
DIASTOLIC BLOOD PRESSURE: 72 MMHG | HEIGHT: 68 IN | BODY MASS INDEX: 29.84 KG/M2 | WEIGHT: 196.9 LBS | SYSTOLIC BLOOD PRESSURE: 124 MMHG | HEART RATE: 64 BPM

## 2018-10-04 DIAGNOSIS — I25.110 CORONARY ARTERY DISEASE INVOLVING NATIVE CORONARY ARTERY OF NATIVE HEART WITH UNSTABLE ANGINA PECTORIS (H): Primary | ICD-10-CM

## 2018-10-04 DIAGNOSIS — E78.2 MIXED HYPERLIPIDEMIA: ICD-10-CM

## 2018-10-04 DIAGNOSIS — I10 ESSENTIAL HYPERTENSION: ICD-10-CM

## 2018-10-04 DIAGNOSIS — D68.61 ANTIPHOSPHOLIPID ANTIBODY SYNDROME (H): ICD-10-CM

## 2018-10-04 PROCEDURE — 99213 OFFICE O/P EST LOW 20 MIN: CPT | Performed by: INTERNAL MEDICINE

## 2018-10-04 NOTE — LETTER
10/4/2018    Filipe Goldberg MD  [address]  [facility] [place] [address]    RE: Yogesh Abreu       Dear Colleague,    I had the pleasure of seeing Yogesh Abreu in the [facility].    HPI and Plan:   See dictation            No orders of the defined types were placed in this encounter.    No orders of the defined types were placed in this encounter.    There are no discontinued medications.      Encounter Diagnoses   Name Primary?     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) Yes     Essential hypertension      Mixed hyperlipidemia      Antiphospholipid antibody syndrome (H)        CURRENT MEDICATIONS:  Current Outpatient Prescriptions   Medication Sig Dispense Refill     albuterol (PROAIR HFA/PROVENTIL HFA/VENTOLIN HFA) 108 (90 BASE) MCG/ACT Inhaler Inhale 2 puffs into the lungs every 6 hours as needed for shortness of breath / dyspnea or wheezing 1 Inhaler 0     atorvastatin (LIPITOR) 40 MG tablet Take 1 tablet (40 mg) by mouth At Bedtime 90 tablet 3     Calcium Citrate-Vitamin D (CALCIUM CITRATE + D PO) Take 600 mg by mouth 2 times daily       Cholecalciferol (VITAMIN D3 PO) Take 1,000 Units by mouth 2 times daily       coenzyme Q-10 capsule Take 1 capsule (100 mg) by mouth daily 90 capsule 3     fluticasone-salmeterol (ADVAIR DISKUS) 250-50 MCG/DOSE diskus inhaler Inhale 1 puff into the lungs 2 times daily 1 Inhaler 12     lisinopril (PRINIVIL/ZESTRIL) 5 MG tablet Take 1 tablet (5 mg) by mouth daily 90 tablet 3     metoprolol succinate (TOPROL-XL) 25 MG 24 hr tablet Take 1 tablet (25 mg) by mouth daily (Patient taking differently: Take 12.5 mg by mouth daily ) 90 tablet 3     nitroglycerin (NITROSTAT) 0.4 MG sublingual tablet Place 1 tablet (0.4 mg) under the tongue every 5 minutes as needed for chest pain 25 tablet 3     vardenafil (LEVITRA) 20 MG tablet Take 0.5-1 tablets (10-20 mg) by mouth daily as needed  for erectile dysfunction Never use with nitroglycerin, terazosin or doxazosin. 12 tablet 3     warfarin (COUMADIN) 5 MG tablet Take 2 tabs on Wednesdays and take 1 1/2 tabs on all other days or as directed per INR clinic (Patient taking differently: Take 2 tabs on Tuesdays and take 1 1/2 tabs on all other days or as directed per INR clinic) 150 tablet 1     ASPIRIN NOT PRESCRIBED (INTENTIONAL) Please choose reason not prescribed, below 0 each 0     methylPREDNISolone (MEDROL) 4 MG tablet Take as directed.         ALLERGIES     Allergies   Allergen Reactions     Simvastatin        PAST MEDICAL HISTORY:  Past Medical History:   Diagnosis Date     Antiphospholipid antibody syndrome (H) 5/25/2017     CAD (coronary artery disease), native coronary artery 10/2015    9/2015 Heart cath - 90% diagonal, 50-60% CFX, 100% prox RCA occlusion - MAL placed in RCA, 10/2015 EF 35-40% by Echo     DVT (deep venous thrombosis) (H) 9/2015 9/2015 Occlusive DVT extending from left common femoral to mid left popliteal vein     Hypercholesteraemia      Hypertension      PE (pulmonary embolism) 9/2015     PMR (polymyalgia rheumatica) (H)      Polymyalgia rheumatica (H)      STEMI (ST elevation myocardial infarction) (H) 10/2015    10/2015 Inferior STEMI - 100% RCA occlusion with MAL placed       PAST SURGICAL HISTORY:  Past Surgical History:   Procedure Laterality Date     HEART CATH STENT COR W/WO PTCA  10/2015    90% diagonal, 50-60% CFX, 100% prox RCA occlusion - MAL placed in RCA     ORTHOPEDIC SURGERY  08/2015    right carpal tunnel       FAMILY HISTORY:  Family History   Problem Relation Age of Onset     Hypertension Brother      Hypertension Brother        SOCIAL HISTORY:  Social History     Social History     Marital status:      Spouse name: N/A     Number of children: N/A     Years of education: N/A     Social History Main Topics     Smoking status: Former Smoker     Packs/day: 0.50     Years: 5.00     Types: Cigarettes  "    Start date: 1965     Quit date: 1975     Smokeless tobacco: Never Used     Alcohol use Yes      Comment: 1 drink per day     Drug use: No     Sexual activity: Yes     Partners: Female     Other Topics Concern     Caffeine Concern Yes     1 cup coffee daily     Sleep Concern No     Stress Concern Yes     related to relationship     Weight Concern No     Special Diet Yes     mediterranian diet     Exercise Yes     walking, cardiac rehab starting     Social History Narrative         Review of Systems:  Skin:  Negative       Eyes:  Positive for glasses    ENT:  Negative      Respiratory:  Negative       Cardiovascular:    chest pain;Positive for felt like heartburn  Gastroenterology: Positive for heartburn    Genitourinary:  Positive for urinary frequency    Musculoskeletal:  Positive for arthritis;joint pain    Neurologic:  Negative      Psychiatric:  Positive for excessive stress    Heme/Lymph/Imm:  Positive for allergies seasonal  Endocrine:  Negative        Physical Exam:  Vitals: /72  Pulse 64  Ht 1.727 m (5' 8\")  Wt 89.3 kg (196 lb 14.4 oz)  BMI 29.94 kg/m2    Constitutional:  in no acute distress;cooperative;well developed;well nourished        Skin:  no apparent skin lesions or masses noted          Head:  normocephalic, no masses or lesions        Eyes:  EOMS intact        Lymph:      ENT:  not assessed this visit        Neck:  JVP normal;no carotid bruit;no stiffness        Respiratory:  clear to auscultation;normal symmetry         Cardiac: regular rhythm;normal S1 and S2;no murmurs, gallops or rubs detected                      1+             1+                    GI:  abdomen soft;no masses;non-tender        Extremities and Muscular Skeletal:  no edema;no deformities, clubbing, cyanosis, erythema observed              Neurological:  affect appropriate        Psych:  oriented to time, person and place        Recent Lab Results:  LIPID RESULTS:  Lab Results   Component Value Date    CHOL 140 " 08/20/2018    HDL 37 (L) 08/20/2018    LDL 86 08/20/2018    TRIG 85 08/20/2018    CHOLHDLRATIO 4.0 10/10/2015       LIVER ENZYME RESULTS:  Lab Results   Component Value Date    AST 34 08/20/2018    ALT 31 08/20/2018       CBC RESULTS:  Lab Results   Component Value Date    WBC 5.0 08/20/2018    RBC 5.51 08/20/2018    HGB 16.6 08/20/2018    HCT 50.2 08/20/2018    MCV 91 08/20/2018    MCH 30.1 08/20/2018    MCHC 33.1 08/20/2018    RDW 13.9 08/20/2018     08/20/2018       BMP RESULTS:  Lab Results   Component Value Date     08/20/2018    POTASSIUM 3.7 08/20/2018    CHLORIDE 104 08/20/2018    CO2 27 08/20/2018    ANIONGAP 6 08/20/2018    GLC 93 08/20/2018    BUN 18 08/20/2018    CR 0.82 08/20/2018    GFRESTIMATED >90 08/20/2018    GFRESTBLACK >90 08/20/2018    RONNY 9.4 08/20/2018        A1C RESULTS:  No results found for: A1C    INR RESULTS:  Lab Results   Component Value Date    INR 2.3 (A) 10/02/2018    INR 2.6 09/18/2018    INR 2.7 09/04/2018           CC  No referring provider defined for this encounter.                      Thank you for allowing me to participate in the care of your patient.      Sincerely,     PABLITO WALKER MD     [facility]    cc:   No referring provider defined for this encounter.

## 2018-10-04 NOTE — MR AVS SNAPSHOT
After Visit Summary   10/4/2018    Yogesh Abreu    MRN: [id number]           Patient Information     Date Of Birth          1940        Visit Information        Provider Department      10/4/2018 9:45 AM Satya Rogers MD [facility]        Today's Diagnoses     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)    -  1    Essential hypertension        Mixed hyperlipidemia        Antiphospholipid antibody syndrome (H)           Follow-ups after your visit        Future tests that were ordered for you today     Open Future Orders        Priority Expected Expires Ordered    ECHO STRESS WITH OPTISON Routine 10/3/2018 9/26/2019 9/26/2018            Who to contact     If you have questions or need follow up information about today's clinic visit or your schedule please contact [facility] directly at [phone].  Normal or non-critical lab and imaging results will be communicated to you by Hand Talkhart, letter or phone within 4 business days after the clinic has received the results. If you do not hear from us within 7 days, please contact the clinic through Hand Talkhart or phone. If you have a critical or abnormal lab result, we will notify you by phone as soon as possible.  Submit refill requests through [employer] InSite or call your pharmacy and they will forward the refill request to us. Please allow 3 business days for your refill to be completed.          Additional Information About Your Visit        MyChart Information     [employer] gives you secure access to your electronic health record. If you see a primary care provider, you can also send messages to your care team and make appointments. If you have questions, please call your primary care clinic.  If you do not have a primary care provider, please call [phone] and they will assist you.        Care EveryWhere ID      "This is your Care EveryWhere ID. This could be used by other organizations to access your [facility] medical records  [id number]        Your Vitals Were     Pulse Height BMI (Body Mass Index)             64 1.727 m (5' 8\") 29.94 kg/m2          Blood Pressure from Last 3 Encounters:   10/04/18 124/72   09/26/18 125/84   08/20/18 115/74    Weight from Last 3 Encounters:   10/04/18 89.3 kg (196 lb 14.4 oz)   09/26/18 89.4 kg (197 lb 3.2 oz)   08/20/18 89 kg (196 lb 3.2 oz)              Today, you had the following     No orders found for display         Today's Medication Changes          These changes are accurate as of 10/4/18 10:21 AM.  If you have any questions, ask your nurse or doctor.               These medicines have changed or have updated prescriptions.        Dose/Directions    metoprolol succinate 25 MG 24 hr tablet   Commonly known as:  TOPROL-XL   This may have changed:  how much to take   Used for:  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), Essential hypertension, benign        Dose:  25 mg   Take 1 tablet (25 mg) by mouth daily   Quantity:  90 tablet   Refills:  3       warfarin 5 MG tablet   Commonly known as:  COUMADIN   This may have changed:  additional instructions   Used for:  Long term current use of anticoagulant therapy        Take 2 tabs on Wednesdays and take 1 1/2 tabs on all other days or as directed per INR clinic   Quantity:  150 tablet   Refills:  1                Primary Care Provider Office Phone # Fax #    Filipe Goldberg [phone] [address]  [facility] [place] [address]        Equal Access to Services     MIGUEL MOSCOSO AH: Hadii caleb renee Soyuly, waaxda luqadaha, qaybta kaalmada jesse, kenan mccartney. So [facility] [phone].    ATENCIÓN: Si habla español, tiene a neumann disposición servicios gratuitos de asistencia lingüística. Llame al [phone].    We comply " with applicable federal civil rights laws and [place] laws. We do not discriminate on the basis of race, color, national origin, age, disability, sex, sexual orientation, or gender identity.            Thank you!     Thank you for choosing [facility]  for your care. Our goal is always to provide you with excellent care. Hearing back from our patients is one way we can continue to improve our services. Please take a few minutes to complete the written survey that you may receive in the mail after your visit with us. Thank you!             Your Updated Medication List - Protect others around you: Learn how to safely use, store and throw away your medicines at www.disposemymeds.org.          This list is accurate as of 10/4/18 10:21 AM.  Always use your most recent med list.                   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)       CALCIUM CITRATE + D PO      Take 600 mg by mouth 2 times daily        coenzyme Q-10 capsule     90 capsule    Take 1 capsule (100 mg) by mouth daily    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)       fluticasone-salmeterol 250-50 MCG/DOSE diskus inhaler    ADVAIR DISKUS    1 Inhaler    Inhale 1 puff into the lungs 2 times daily    Cough, Coronary artery disease involving native coronary artery of native heart with  unstable angina pectoris (H)       lisinopril 5 MG tablet    PRINIVIL/ZESTRIL    90 tablet    Take 1 tablet (5 mg) by mouth daily    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    Erectile dysfunction, unspecified erectile dysfunction type       VITAMIN D3 PO      Take 1,000 Units by mouth 2 times daily        warfarin 5 MG tablet    COUMADIN    150 tablet    Take 2 tabs on Wednesdays and take 1 1/2 tabs on all other days or as directed per INR clinic    Long term current use of anticoagulant therapy

## 2018-10-04 NOTE — PROGRESS NOTES
Service Date: 10/04/2018      HISTORY OF PRESENT ILLNESS:  I got together with Kelsea Abreu today at age 78.  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  He had angioplasty stenting of that vessel with a great acute angiographic result.  I reviewed that angiogram and showed him the images from 2015 today.  He had moderate disease of the intermediate branch between the LAD and the left circumflex.  He had irregularities here and there but no other high-grade lesions.      When I saw him last month, he alluded to a sense of throat tightness that was somewhat worrisome for angina.  I asked him to do a stress echo.  The  -- my colleague, Dr. Verdugo -- felt there was a baseline inferior lateral hypokinesia that persisted with exercise.  I reviewed the stress echo myself.  Indeed there is mild inferior hypokinesia, but his overall ejection fraction at rest is normal at 55%-60%.  There was no obvious deterioration in LV systolic function, and I believe it was a negative stress echo for significant ischemia.  No EKG changes were noted, and of import he did not develop throat tightness.  Accordingly, I am not sure why he had these vague symptoms, but I am less suspicious that it was recurrent angina.      He felt well on the stress test.  He has been active.  He has not had recurrent throat tightness, and I believe the stress EKG and stress echo were negative for significant ischemia.  I do not feel further assessment is needed.      PHYSICAL EXAMINATION:  Chronicled in Epic and basically unchanged from his last visit.  Blood pressure was 120/70, heart rate 64 beats per minute, and cardiovascular and physical exam was normal.      I continued his current program of:   1.  Aspirin 81 mg a day.   2.  Atorvastatin 40 mg at bedtime.   3.  Lisinopril 5 mg a day.   4.  Metoprolol succinate 25 mg a day.   5.  He is also on chronic warfarin for  hypercoagulability.      IMPRESSION:   1.  I doubt that the throat tightness was recurrent angina.   2.  Clinically negative stress echo study.   3.  Coronary artery disease, post-large RCA dilated and stented in  with no subsequent classic angina.   4.  Treated hyperlipidemia.      PLAN:  I made no changes.  I did not order any tests and did not order any studies.  We agreed to get together in a year.      If you have any questions, concerns or disagreements, give me a call.      Thanks for the privilege.  Warm regards.      cc:   Filipe Goldberg MD    [address], [address]    [place] [address]         PABLITO WALKER MD, [facility]             D: 10/04/2018   T: 10/04/2018   MT: TORSTEN      Name:     RUIZ LAIRD   MRN:      [id number]        Account:      [id number]   :      1940           Service Date: 10/04/2018      Document: [id number]

## 2018-10-04 NOTE — PROGRESS NOTES
HPI and Plan:   See dictation            No orders of the defined types were placed in this encounter.    No orders of the defined types were placed in this encounter.    There are no discontinued medications.      Encounter Diagnoses   Name Primary?     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) Yes     Essential hypertension      Mixed hyperlipidemia      Antiphospholipid antibody syndrome (H)        CURRENT MEDICATIONS:  Current Outpatient Prescriptions   Medication Sig Dispense Refill     albuterol (PROAIR HFA/PROVENTIL HFA/VENTOLIN HFA) 108 (90 BASE) MCG/ACT Inhaler Inhale 2 puffs into the lungs every 6 hours as needed for shortness of breath / dyspnea or wheezing 1 Inhaler 0     atorvastatin (LIPITOR) 40 MG tablet Take 1 tablet (40 mg) by mouth At Bedtime 90 tablet 3     Calcium Citrate-Vitamin D (CALCIUM CITRATE + D PO) Take 600 mg by mouth 2 times daily       Cholecalciferol (VITAMIN D3 PO) Take 1,000 Units by mouth 2 times daily       coenzyme Q-10 capsule Take 1 capsule (100 mg) by mouth daily 90 capsule 3     fluticasone-salmeterol (ADVAIR DISKUS) 250-50 MCG/DOSE diskus inhaler Inhale 1 puff into the lungs 2 times daily 1 Inhaler 12     lisinopril (PRINIVIL/ZESTRIL) 5 MG tablet Take 1 tablet (5 mg) by mouth daily 90 tablet 3     metoprolol succinate (TOPROL-XL) 25 MG 24 hr tablet Take 1 tablet (25 mg) by mouth daily (Patient taking differently: Take 12.5 mg by mouth daily ) 90 tablet 3     nitroglycerin (NITROSTAT) 0.4 MG sublingual tablet Place 1 tablet (0.4 mg) under the tongue every 5 minutes as needed for chest pain 25 tablet 3     vardenafil (LEVITRA) 20 MG tablet Take 0.5-1 tablets (10-20 mg) by mouth daily as needed for erectile dysfunction Never use with nitroglycerin, terazosin or doxazosin. 12 tablet 3     warfarin (COUMADIN) 5 MG tablet Take 2 tabs on Wednesdays and take 1 1/2 tabs on all other days or as directed per INR clinic  (Patient taking differently: Take 2 tabs on Tuesdays and take 1 1/2 tabs on all other days or as directed per INR clinic) 150 tablet 1     ASPIRIN NOT PRESCRIBED (INTENTIONAL) Please choose reason not prescribed, below 0 each 0     methylPREDNISolone (MEDROL) 4 MG tablet Take as directed.         ALLERGIES     Allergies   Allergen Reactions     Simvastatin        PAST MEDICAL HISTORY:  Past Medical History:   Diagnosis Date     Antiphospholipid antibody syndrome (H) 5/25/2017     CAD (coronary artery disease), native coronary artery 10/2015    9/2015 Heart cath - 90% diagonal, 50-60% CFX, 100% prox RCA occlusion - MAL placed in RCA, 10/2015 EF 35-40% by Echo     DVT (deep venous thrombosis) (H) 9/2015 9/2015 Occlusive DVT extending from left common femoral to mid left popliteal vein     Hypercholesteraemia      Hypertension      PE (pulmonary embolism) 9/2015     PMR (polymyalgia rheumatica) (H)      Polymyalgia rheumatica (H)      STEMI (ST elevation myocardial infarction) (H) 10/2015    10/2015 Inferior STEMI - 100% RCA occlusion with MAL placed       PAST SURGICAL HISTORY:  Past Surgical History:   Procedure Laterality Date     HEART CATH STENT COR W/WO PTCA  10/2015    90% diagonal, 50-60% CFX, 100% prox RCA occlusion - MAL placed in RCA     ORTHOPEDIC SURGERY  08/2015    right carpal tunnel       FAMILY HISTORY:  Family History   Problem Relation Age of Onset     Hypertension Brother      Hypertension Brother        SOCIAL HISTORY:  Social History     Social History     Marital status:      Spouse name: N/A     Number of children: N/A     Years of education: N/A     Social History Main Topics     Smoking status: Former Smoker     Packs/day: 0.50     Years: 5.00     Types: Cigarettes     Start date: 1965     Quit date: 1975     Smokeless tobacco: Never Used     Alcohol use Yes      Comment: 1 drink per day     Drug use: No     Sexual activity: Yes     Partners: Female     Other Topics Concern      "Caffeine Concern Yes     1 cup coffee daily     Sleep Concern No     Stress Concern Yes     related to relationship     Weight Concern No     Special Diet Yes     mediterranian diet     Exercise Yes     walking, cardiac rehab starting     Social History Narrative         Review of Systems:  Skin:  Negative       Eyes:  Positive for glasses    ENT:  Negative      Respiratory:  Negative       Cardiovascular:    chest pain;Positive for felt like heartburn  Gastroenterology: Positive for heartburn    Genitourinary:  Positive for urinary frequency    Musculoskeletal:  Positive for arthritis;joint pain    Neurologic:  Negative      Psychiatric:  Positive for excessive stress    Heme/Lymph/Imm:  Positive for allergies seasonal  Endocrine:  Negative        Physical Exam:  Vitals: /72  Pulse 64  Ht 1.727 m (5' 8\")  Wt 89.3 kg (196 lb 14.4 oz)  BMI 29.94 kg/m2    Constitutional:  in no acute distress;cooperative;well developed;well nourished        Skin:  no apparent skin lesions or masses noted          Head:  normocephalic, no masses or lesions        Eyes:  EOMS intact        Lymph:      ENT:  not assessed this visit        Neck:  JVP normal;no carotid bruit;no stiffness        Respiratory:  clear to auscultation;normal symmetry         Cardiac: regular rhythm;normal S1 and S2;no murmurs, gallops or rubs detected                      1+             1+                    GI:  abdomen soft;no masses;non-tender        Extremities and Muscular Skeletal:  no edema;no deformities, clubbing, cyanosis, erythema observed              Neurological:  affect appropriate        Psych:  oriented to time, person and place        Recent Lab Results:  LIPID RESULTS:  Lab Results   Component Value Date    CHOL 140 08/20/2018    HDL 37 (L) 08/20/2018    LDL 86 08/20/2018    TRIG 85 08/20/2018    CHOLHDLRATIO 4.0 10/10/2015       LIVER ENZYME RESULTS:  Lab Results   Component Value Date    AST 34 08/20/2018    ALT 31 08/20/2018 "       CBC RESULTS:  Lab Results   Component Value Date    WBC 5.0 08/20/2018    RBC 5.51 08/20/2018    HGB 16.6 08/20/2018    HCT 50.2 08/20/2018    MCV 91 08/20/2018    MCH 30.1 08/20/2018    MCHC 33.1 08/20/2018    RDW 13.9 08/20/2018     08/20/2018       BMP RESULTS:  Lab Results   Component Value Date     08/20/2018    POTASSIUM 3.7 08/20/2018    CHLORIDE 104 08/20/2018    CO2 27 08/20/2018    ANIONGAP 6 08/20/2018    GLC 93 08/20/2018    BUN 18 08/20/2018    CR 0.82 08/20/2018    GFRESTIMATED >90 08/20/2018    GFRESTBLACK >90 08/20/2018    RONNY 9.4 08/20/2018        A1C RESULTS:  No results found for: A1C    INR RESULTS:  Lab Results   Component Value Date    INR 2.3 (A) 10/02/2018    INR 2.6 09/18/2018    INR 2.7 09/04/2018           CC  No referring provider defined for this encounter.

## 2018-10-04 NOTE — LETTER
10/4/2018      Filipe Goldberg MD  [address]  [facility] [address]      RE: Yogesh TENA Brady       Dear Colleague,    I had the pleasure of seeing Yogesh Abreu in the [facility].    Service Date: 10/04/2018      HISTORY OF PRESENT ILLNESS:  I got together with Kelsea Abreu today at age 78.  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  He had angioplasty stenting of that vessel with a great acute angiographic result.  I reviewed that angiogram and showed him the images from 2015 today.  He had moderate disease of the intermediate branch between the LAD and the left circumflex.  He had irregularities here and there but no other high-grade lesions.      When I saw him last month, he alluded to a sense of throat tightness that was somewhat worrisome for angina.  I asked him to do a stress echo.  The  -- my colleague, Dr. Verdugo -- felt there was a baseline inferior lateral hypokinesia that persisted with exercise.  I reviewed the stress echo myself.  Indeed there is mild inferior hypokinesia, but his overall ejection fraction at rest is normal at 55%-60%.  There was no obvious deterioration in LV systolic function, and I believe it was a negative stress echo for significant ischemia.  No EKG changes were noted, and of import he did not develop throat tightness.  Accordingly, I am not sure why he had these vague symptoms, but I am less suspicious that it was recurrent angina.      He felt well on the stress test.  He has been active.  He has not had recurrent throat tightness, and I believe the stress EKG and stress echo were negative for significant ischemia.  I do not feel further assessment is needed.      PHYSICAL EXAMINATION:  Chronicled in Epic and basically unchanged from his last visit.  Blood pressure was 120/70, heart rate 64 beats per minute, and cardiovascular and physical exam was  normal.      I continued his current program of:   1.  Aspirin 81 mg a day.   2.  Atorvastatin 40 mg at bedtime.   3.  Lisinopril 5 mg a day.   4.  Metoprolol succinate 25 mg a day.   5.  He is also on chronic warfarin for hypercoagulability.      IMPRESSION:   1.  I doubt that the throat tightness was recurrent angina.   2.  Clinically negative stress echo study.   3.  Coronary artery disease, post-large RCA dilated and stented in  with no subsequent classic angina.   4.  Treated hyperlipidemia.      PLAN:  I made no changes.  I did not order any tests and did not order any studies.  We agreed to get together in a year.      If you have any questions, concerns or disagreements, give me a call.      Thanks for the privilege.  Warm regards.      cc:   Filipe Goldberg MD    [address], [address]    [place] [address]         PABLITO WALKER MD, [facility]             D: 10/04/2018   T: 10/04/2018   MT: TORSTEN      Name:     RUIZ LAIRD   MRN:      [id number]        Account:      [id number]   :      1940           Service Date: 10/04/2018      Document: [id number]         Outpatient Encounter Prescriptions as of 10/4/2018   Medication Sig Dispense Refill     albuterol (PROAIR HFA/PROVENTIL HFA/VENTOLIN HFA) 108 (90 BASE) MCG/ACT Inhaler Inhale 2 puffs into the lungs every 6 hours as needed for shortness of breath / dyspnea or wheezing 1 Inhaler 0     atorvastatin (LIPITOR) 40 MG tablet Take 1 tablet (40 mg) by mouth At Bedtime 90 tablet 3     Calcium Citrate-Vitamin D (CALCIUM CITRATE + D PO) Take 600 mg by mouth 2 times daily       Cholecalciferol (VITAMIN D3 PO) Take 1,000 Units by mouth 2 times daily       coenzyme Q-10 capsule Take 1 capsule (100 mg) by mouth daily 90 capsule 3     fluticasone-salmeterol (ADVAIR DISKUS) 250-50 MCG/DOSE diskus inhaler Inhale 1 puff into the lungs 2 times daily 1 Inhaler 12     lisinopril (PRINIVIL/ZESTRIL) 5 MG tablet Take 1 tablet (5  mg) by mouth daily 90 tablet 3     metoprolol succinate (TOPROL-XL) 25 MG 24 hr tablet Take 1 tablet (25 mg) by mouth daily (Patient taking differently: Take 12.5 mg by mouth daily ) 90 tablet 3     nitroglycerin (NITROSTAT) 0.4 MG sublingual tablet Place 1 tablet (0.4 mg) under the tongue every 5 minutes as needed for chest pain 25 tablet 3     vardenafil (LEVITRA) 20 MG tablet Take 0.5-1 tablets (10-20 mg) by mouth daily as needed for erectile dysfunction Never use with nitroglycerin, terazosin or doxazosin. 12 tablet 3     warfarin (COUMADIN) 5 MG tablet Take 2 tabs on Wednesdays and take 1 1/2 tabs on all other days or as directed per INR clinic (Patient taking differently: Take 2 tabs on Tuesdays and take 1 1/2 tabs on all other days or as directed per INR clinic) 150 tablet 1     ASPIRIN NOT PRESCRIBED (INTENTIONAL) Please choose reason not prescribed, below 0 each 0     methylPREDNISolone (MEDROL) 4 MG tablet Take as directed.       No facility-administered encounter medications on file as of 10/4/2018.        Again, thank you for allowing me to participate in the care of your patient.      Sincerely,    PABLITO WALKER MD     [facility]

## 2018-10-16 ENCOUNTER — TRANSFERRED RECORDS (OUTPATIENT)
Dept: HEALTH INFORMATION MANAGEMENT | Facility: CLINIC | Age: 78
End: 2018-10-16

## 2018-10-16 ENCOUNTER — ANTICOAGULATION THERAPY VISIT (OUTPATIENT)
Dept: CARDIOLOGY | Facility: CLINIC | Age: 78
End: 2018-10-16
Payer: COMMERCIAL

## 2018-10-16 LAB — INR PPP: 3

## 2018-10-16 PROCEDURE — 99207 ZZC NO CHARGE NURSE ONLY: CPT

## 2018-10-16 NOTE — MR AVS SNAPSHOT
Yogesh Abreu   10/16/2018   Anticoagulation Therapy Visit    Description:  78 year old male   Provider:  Tanja Wills, RN   Department:  [facility]           INR as of 10/16/2018     Today's INR 3.0      Anticoagulation Summary as of 10/16/2018     INR goal 2.0-3.0   Today's INR 3.0   Full warfarin instructions 10 mg on Tue; 7.5 mg all other days   Next INR check 10/30/2018    Indications   Long-term (current) use of anticoagulants [Z79.01] [Z79.01]  PE (pulmonary embolism) [I26.99]         Contact Numbers     Anticoagulant (INR) Clinic Number: [phone]          October 2018 Details    Sun Mon Tue Wed Thu Fri Sat      1               2               3               4               5               6                 7               8               9               10               11               12               13                 14               15               16      10 mg   See details      17      7.5 mg         18      7.5 mg         19      7.5 mg         20      7.5 mg           21      7.5 mg         22      7.5 mg         23      10 mg         24      7.5 mg         25      7.5 mg         26      7.5 mg         27      7.5 mg           28      7.5 mg         29      7.5 mg         30            31                   Date Details   10/16 This INR check       Date of next INR:  10/30/2018         How to take your warfarin dose     To take:  7.5 mg Take 1.5 of the 5 mg tablets.    To take:  10 mg Take 2 of the 5 mg tablets.

## 2018-10-16 NOTE — PROGRESS NOTES
ANTICOAGULATION FOLLOW-UP CLINIC VISIT    Patient Name:  Yogesh Abreu  Date:  10/16/2018  Contact Type:  Telephone/ message from patient    SUBJECTIVE:     Patient Findings     Positives Change in diet/appetite (no greens recently while travelling in [place])           OBJECTIVE    INR   Date Value Ref Range Status   10/16/2018 3.0  Final     Chromogenic Factor 10   Date Value Ref Range Status   10/12/2015 28 (L) 70 - 130 % Final     Comment:     Therapeutic Range:  A Chromogenic Factor 10 level of approximately 20-40%   inversely correlates with an INR of 2-3 for patients receiving Warfarin.   Chromogenic Factor 10 levels below 20% indicate an INR greater than 3 and   levels above 40% indicate an INR less than 2.         ASSESSMENT / PLAN  INR assessment THER    Recheck INR In: 2 WEEKS    INR Location Home INR    Billed home INR No      Anticoagulation Summary as of 10/16/2018     INR goal 2.0-3.0   Today's INR 3.0   Warfarin maintenance plan 10 mg (5 mg x 2) on Tue; 7.5 mg (5 mg x 1.5) all other days   Full warfarin instructions 10 mg on Tue; 7.5 mg all other days   Weekly warfarin total 55 mg   No change documented Katarzyna Austin, RN   Plan last modified Tanja Wills, RN (6/12/2018)   Next INR check 10/30/2018   Target end date Indefinite    Indications   Long-term (current) use of anticoagulants [Z79.01] [Z79.01]  PE (pulmonary embolism) [I26.99]         Anticoagulation Episode Summary     INR check location     Preferred lab     Send INR reminders to [facility] HEART INR NURSE    Comments       Anticoagulation Care Providers     Provider Role Specialty Phone number    Satya Rogers MD [facility] Cardiology [phone]            See the Encounter Report to view Anticoagulation Flowsheet and Dosing Calendar (Go to Encounters tab in chart review, and find the Anticoagulation Therapy Visit)    Home INR 3.0 Pt left a voicemail message that he is still in [place] and hasn't been eating greens this week. No  other changes. No bleeding or bruising. He stated that he would resume eating greens. Will continue current dosing of 10 mg Tu and 7.5 mg all other days with recheck in 2 weeks. Pt did not bring his [employer] password so we can't communicate through [employer]. Rickey

## 2018-10-31 ENCOUNTER — TRANSFERRED RECORDS (OUTPATIENT)
Dept: HEALTH INFORMATION MANAGEMENT | Facility: CLINIC | Age: 78
End: 2018-10-31

## 2018-10-31 ENCOUNTER — ANTICOAGULATION THERAPY VISIT (OUTPATIENT)
Dept: CARDIOLOGY | Facility: CLINIC | Age: 78
End: 2018-10-31
Payer: COMMERCIAL

## 2018-10-31 DIAGNOSIS — Z86.711 HISTORY OF PULMONARY EMBOLISM: Primary | ICD-10-CM

## 2018-10-31 DIAGNOSIS — Z79.01 LONG TERM CURRENT USE OF ANTICOAGULANT THERAPY: ICD-10-CM

## 2018-10-31 LAB — INR PPP: 2.9

## 2018-10-31 PROCEDURE — 99207 ZZC NO CHARGE NURSE ONLY: CPT

## 2018-10-31 RX ORDER — WARFARIN SODIUM 5 MG/1
TABLET ORAL
Qty: 150 TABLET | Refills: 1 | Status: SHIPPED | OUTPATIENT
Start: 2018-10-31 | End: 2019-04-04

## 2018-10-31 NOTE — MR AVS SNAPSHOT
Yogesh Abreu   10/31/2018   Anticoagulation Therapy Visit    Description:  78 year old male   Provider:  Tanja Wills, RN   Department:  [facility]           INR as of 10/31/2018     Today's INR 2.9      Anticoagulation Summary as of 10/31/2018     INR goal 2.0-3.0   Today's INR 2.9   Full warfarin instructions 10 mg on Tue; 7.5 mg all other days   Next INR check 11/14/2018    Indications   Long-term (current) use of anticoagulants [Z79.01] [Z79.01]  PE (pulmonary embolism) [I26.99]         Contact Numbers     Anticoagulant (INR) Clinic Number: [phone]          October 2018 Details    Sun Mon Tue Wed Thu Fri Sat      1               2               3               4               5               6                 7               8               9               10               11               12               13                 14               15               16               17               18               19               20                 21               22               23               24               25               26               27                 28               29               30               31      7.5 mg   See details          Date Details   10/31 This INR check               How to take your warfarin dose     To take:  7.5 mg Take 1.5 of the 5 mg tablets.           November 2018 Details    Sun Mon Tue Wed Thu Fri Sat         1      7.5 mg         2      7.5 mg         3      7.5 mg           4      7.5 mg         5      7.5 mg         6      10 mg         7      7.5 mg         8      7.5 mg         9      7.5 mg         10      7.5 mg           11      7.5 mg         12      7.5 mg         13      10 mg         14            15               16               17                 18               19               20               21               22               23               24                 25               26               27               28                29 30                 Date Details   No additional details    Date of next INR:  11/14/2018         How to take your warfarin dose     To take:  7.5 mg Take 1.5 of the 5 mg tablets.    To take:  10 mg Take 2 of the 5 mg tablets.

## 2018-10-31 NOTE — PROGRESS NOTES
ANTICOAGULATION FOLLOW-UP CLINIC VISIT    Patient Name:  Yogesh Abreu  Date:  10/31/2018  Contact Type:  [employer]    SUBJECTIVE:        OBJECTIVE    INR   Date Value Ref Range Status   10/31/2018 2.9  Final     Chromogenic Factor 10   Date Value Ref Range Status   10/12/2015 28 (L) 70 - 130 % Final     Comment:     Therapeutic Range:  A Chromogenic Factor 10 level of approximately 20-40%   inversely correlates with an INR of 2-3 for patients receiving Warfarin.   Chromogenic Factor 10 levels below 20% indicate an INR greater than 3 and   levels above 40% indicate an INR less than 2.         ASSESSMENT / PLAN  INR assessment THER    Recheck INR In: 2 WEEKS    INR Location Home INR    Billed home INR No      Anticoagulation Summary as of 10/31/2018     INR goal 2.0-3.0   Today's INR 2.9   Warfarin maintenance plan 10 mg (5 mg x 2) on Tue; 7.5 mg (5 mg x 1.5) all other days   Full warfarin instructions 10 mg on Tue; 7.5 mg all other days   Weekly warfarin total 55 mg   No change documented Tanja Wills RN   Plan last modified Tanja Wills RN (6/12/2018)   Next INR check 11/14/2018   Target end date Indefinite    Indications   Long-term (current) use of anticoagulants [Z79.01] [Z79.01]  PE (pulmonary embolism) [I26.99]         Anticoagulation Episode Summary     INR check location     Preferred lab     Send INR reminders to [facility] HEART INR NURSE    Comments       Anticoagulation Care Providers     Provider Role Specialty Phone number    Satya Rogers MD Responsible Cardiology [phone]            See the Encounter Report to view Anticoagulation Flowsheet and Dosing Calendar (Go to Encounters tab in chart review, and find the Anticoagulation Therapy Visit)    Home INR 2.9 Will continue current dosing of 10 mg Tuesdays and 7.5 mg all other days with recheck in 2 weeks. Will call pt after the next home INR check.    Tanja Wills RN

## 2018-11-26 ENCOUNTER — TRANSFERRED RECORDS (OUTPATIENT)
Dept: HEALTH INFORMATION MANAGEMENT | Facility: CLINIC | Age: 78
End: 2018-11-26

## 2018-11-26 ENCOUNTER — ANTICOAGULATION THERAPY VISIT (OUTPATIENT)
Dept: CARDIOLOGY | Facility: CLINIC | Age: 78
End: 2018-11-26
Payer: COMMERCIAL

## 2018-11-26 LAB — INR PPP: 2.5

## 2018-11-26 PROCEDURE — 99207 ZZC NO CHARGE NURSE ONLY: CPT

## 2018-11-26 NOTE — MR AVS SNAPSHOT
Yogesh Abreu   11/26/2018   Anticoagulation Therapy Visit    Description:  78 year old male   Provider:  Tanja Wills, RN   Department:  [facility]           INR as of 11/26/2018     Today's INR 2.5      Anticoagulation Summary as of 11/26/2018     INR goal 2.0-3.0   Today's INR 2.5   Full warfarin instructions 10 mg on Tue; 7.5 mg all other days   Next INR check 12/10/2018    Indications   Long-term (current) use of anticoagulants [Z79.01] [Z79.01]  PE (pulmonary embolism) [I26.99]         Contact Numbers     Anticoagulant (INR) Clinic Number: [phone]          November 2018 Details    Sun Mon Tue Wed Thu Fri Sat         1               2               3                 4               5               6               7               8               9               10                 11               12               13               14               15               16               17                 18               19               20               21               22               23               24                 25               26      7.5 mg   See details      27      10 mg         28      7.5 mg         29      7.5 mg         30      7.5 mg           Date Details   11/26 This INR check               How to take your warfarin dose     To take:  7.5 mg Take 1.5 of the 5 mg tablets.    To take:  10 mg Take 2 of the 5 mg tablets.           December 2018 Details    Sun Mon Tue Wed Thu Fri Sat           1      7.5 mg           2      7.5 mg         3      7.5 mg         4      10 mg         5      7.5 mg         6      7.5 mg         7      7.5 mg         8      7.5 mg           9      7.5 mg         10            11               12               13               14               15                 16               17               18               19               20               21               22                 23               24               25               26                27               28               29                 30               31                     Date Details   No additional details    Date of next INR:  12/10/2018         How to take your warfarin dose     To take:  7.5 mg Take 1.5 of the 5 mg tablets.    To take:  10 mg Take 2 of the 5 mg tablets.

## 2018-11-26 NOTE — PROGRESS NOTES
ANTICOAGULATION FOLLOW-UP CLINIC VISIT    Patient Name:  Yogesh Abreu  Date:  11/26/2018  Contact Type:  [employer]    SUBJECTIVE:        OBJECTIVE    INR   Date Value Ref Range Status   11/26/2018 2.5  Final     Chromogenic Factor 10   Date Value Ref Range Status   10/12/2015 28 (L) 70 - 130 % Final     Comment:     Therapeutic Range:  A Chromogenic Factor 10 level of approximately 20-40%   inversely correlates with an INR of 2-3 for patients receiving Warfarin.   Chromogenic Factor 10 levels below 20% indicate an INR greater than 3 and   levels above 40% indicate an INR less than 2.         ASSESSMENT / PLAN  INR assessment THER    Recheck INR In: 2 WEEKS    INR Location Home INR    Billed home INR No      Anticoagulation Summary as of 11/26/2018     INR goal 2.0-3.0   Today's INR 2.5   Warfarin maintenance plan 10 mg (5 mg x 2) on Tue; 7.5 mg (5 mg x 1.5) all other days   Full warfarin instructions 10 mg on Tue; 7.5 mg all other days   Weekly warfarin total 55 mg   No change documented Tanja Wills RN   Plan last modified Tanja Wills RN (6/12/2018)   Next INR check 12/10/2018   Target end date Indefinite    Indications   Long-term (current) use of anticoagulants [Z79.01] [Z79.01]  PE (pulmonary embolism) [I26.99]         Anticoagulation Episode Summary     INR check location     Preferred lab     Send INR reminders to [facility] HEART INR NURSE    Comments       Anticoagulation Care Providers     Provider Role Specialty Phone number    Satya Rogers MD Responsible Cardiology [phone]            See the Encounter Report to view Anticoagulation Flowsheet and Dosing Calendar (Go to Encounters tab in chart review, and find the Anticoagulation Therapy Visit)    Home INR 2.5 per [employer] [facility] [facility]. Left a voicemail message asking pt to call back if he has had any change in meds, diet, any bleeding/bruising or clotting issues and that if there have not been any changes then he should continue current  dosing of 10 mg Tu and 7.5 mg all other days with recheck in 2 weeks.    Tanja Wills RN

## 2018-12-02 DIAGNOSIS — R05.9 COUGH: ICD-10-CM

## 2018-12-02 DIAGNOSIS — I25.110 CORONARY ARTERY DISEASE INVOLVING NATIVE CORONARY ARTERY OF NATIVE HEART WITH UNSTABLE ANGINA PECTORIS (H): ICD-10-CM

## 2018-12-04 NOTE — TELEPHONE ENCOUNTER
"Requested Prescriptions   Pending Prescriptions Disp Refills     ADVAIR DISKUS 250-50 MCG/DOSE inhaler [Pharmacy Med Name: ADVAIR DISKUS 250/50MCG (YELLOW) 60]  Last Written Prescription Date:  05/25/2017  Last Fill Quantity: 1 INHALER,  # refills: 05/25/2017   Last office visit: 8/20/2018 with prescribing provider:  CHUY   Future Office Visit:      0     Sig: INHALE 1 PUFF INTO THE LUNGS TWICE DAILY    Inhaled Steroids Protocol Passed    12/2/2018  1:16 PM       Passed - Patient is age 12 or older       Passed - Asthma control assessment score within normal limits in last 6 months    Please review ACT score.   ACT Total Scores 5/25/2017 8/20/2018   ACT TOTAL SCORE (Goal Greater than or Equal to 20) 25 25   In the past 12 months, how many times did you visit the emergency room for your asthma without being admitted to the hospital? 0 0   In the past 12 months, how many times were you hospitalized overnight because of your asthma? 0 0              Passed - Recent (6 mo) or future (30 days) visit within the authorizing provider's specialty    Patient had office visit in the last 6 months or has a visit in the next 30 days with authorizing provider or within the authorizing provider's specialty.  See \"Patient Info\" tab in inbasket, or \"Choose Columns\" in Meds & Orders section of the refill encounter.              "

## 2018-12-05 NOTE — TELEPHONE ENCOUNTER
Prescription approved per [facility] Refill Protocol.  Elena MUNOZ RN

## 2018-12-11 ENCOUNTER — TRANSFERRED RECORDS (OUTPATIENT)
Dept: HEALTH INFORMATION MANAGEMENT | Facility: CLINIC | Age: 78
End: 2018-12-11

## 2018-12-11 LAB — INR PPP: 2.3

## 2018-12-12 ENCOUNTER — ANTICOAGULATION THERAPY VISIT (OUTPATIENT)
Dept: CARDIOLOGY | Facility: CLINIC | Age: 78
End: 2018-12-12
Payer: COMMERCIAL

## 2018-12-12 PROCEDURE — 99207 ZZC NO CHARGE NURSE ONLY: CPT | Performed by: INTERNAL MEDICINE

## 2018-12-12 NOTE — PROGRESS NOTES
ANTICOAGULATION FOLLOW-UP CLINIC VISIT    Patient Name:  Yogesh Abreu  Date:  2018  Contact Type:  [employer]    SUBJECTIVE:        OBJECTIVE    INR   Date Value Ref Range Status   2018 2.3  Final     Chromogenic Factor 10   Date Value Ref Range Status   10/12/2015 28 (L) 70 - 130 % Final     Comment:     Therapeutic Range:  A Chromogenic Factor 10 level of approximately 20-40%   inversely correlates with an INR of 2-3 for patients receiving Warfarin.   Chromogenic Factor 10 levels below 20% indicate an INR greater than 3 and   levels above 40% indicate an INR less than 2.         ASSESSMENT / PLAN  INR assessment THER    Recheck INR In: 2 WEEKS    INR Location Home INR    Billed home INR No      Anticoagulation Summary  As of 2018    INR goal:   2.0-3.0   TTR:   75.4 % (2.5 y)   INR used for dosin.3 (2018)   Warfarin maintenance plan:   10 mg (5 mg x 2) every Tue; 7.5 mg (5 mg x 1.5) all other days   Full warfarin instructions:   10 mg every Tue; 7.5 mg all other days   Weekly warfarin total:   55 mg   No change documented:   Tanja Wills, RN   Plan last modified:   Tanja Wills RN (2018)   Next INR check:   2018   Target end date:   Indefinite    Indications    Long-term (current) use of anticoagulants [Z79.01] [Z79.01]  PE (pulmonary embolism) [I26.99]             Anticoagulation Episode Summary     INR check location:       Preferred lab:       Send INR reminders to:   [facility] HEART INR NURSE    Comments:         Anticoagulation Care Providers     Provider Role Specialty Phone number    Satya Rogers MD [facility] Cardiology [phone]            See the Encounter Report to view Anticoagulation Flowsheet and Dosing Calendar (Go to Encounters tab in chart review, and find the Anticoagulation Therapy Visit)    18 Home INR 2.3 Left message for pt that if there haven't been any changes in meds or diet then he should continue current dosing of 10 mg Tu  and 7.5 mg all other days with recheck in 2 weeks (on 12/26 or 12/27 because clinic is closed on 12/24 and 12/25). Asked that he call back if there have been changes.     Tanja Wills RN

## 2018-12-26 ENCOUNTER — TRANSFERRED RECORDS (OUTPATIENT)
Dept: HEALTH INFORMATION MANAGEMENT | Facility: CLINIC | Age: 78
End: 2018-12-26

## 2018-12-26 LAB — INR PPP: 2.7

## 2018-12-27 ENCOUNTER — ANTICOAGULATION THERAPY VISIT (OUTPATIENT)
Dept: CARDIOLOGY | Facility: CLINIC | Age: 78
End: 2018-12-27
Payer: COMMERCIAL

## 2018-12-27 PROCEDURE — 99207 ZZC NO CHARGE NURSE ONLY: CPT | Performed by: INTERNAL MEDICINE

## 2018-12-27 NOTE — PROGRESS NOTES
ANTICOAGULATION FOLLOW-UP CLINIC VISIT    Patient Name:  Yogesh Abreu  Date:  2018  Contact Type:  [facility]    SUBJECTIVE:        OBJECTIVE    INR   Date Value Ref Range Status   2018 2.7  Final     Chromogenic Factor 10   Date Value Ref Range Status   10/12/2015 28 (L) 70 - 130 % Final     Comment:     Therapeutic Range:  A Chromogenic Factor 10 level of approximately 20-40%   inversely correlates with an INR of 2-3 for patients receiving Warfarin.   Chromogenic Factor 10 levels below 20% indicate an INR greater than 3 and   levels above 40% indicate an INR less than 2.         ASSESSMENT / PLAN  INR assessment THER    Recheck INR In: 2 WEEKS    INR Location Home INR    Billed home INR No      Anticoagulation Summary  As of 2018    INR goal:   2.0-3.0   TTR:   75.8 % (2.6 y)   INR used for dosin.7 (2018)   Warfarin maintenance plan:   10 mg (5 mg x 2) every Tue; 7.5 mg (5 mg x 1.5) all other days   Full warfarin instructions:   10 mg every Tue; 7.5 mg all other days   Weekly warfarin total:   55 mg   No change documented:   Tanja Wills, RN   Plan last modified:   Tanja Wills RN (2018)   Next INR check:   2019   Target end date:   Indefinite    Indications    Long-term (current) use of anticoagulants [Z79.01] [Z79.01]  PE (pulmonary embolism) [I26.99]             Anticoagulation Episode Summary     INR check location:       Preferred lab:       Send INR reminders to:   [facility] HEART INR NURSE    Comments:         Anticoagulation Care Providers     Provider Role Specialty Phone number    Satya Rogers MD [facility] Cardiology [phone]            See the Encounter Report to view Anticoagulation Flowsheet and Dosing Calendar (Go to Encounters tab in chart review, and find the Anticoagulation Therapy Visit)    18 Home INR 2.7 Will continue current dosing of 10 mg Tu and 7.5 mg all other days with recheck in 2 weeks. Will call pt after next INR  check.    Tanja Wills RN

## 2019-01-08 ENCOUNTER — TRANSFERRED RECORDS (OUTPATIENT)
Dept: HEALTH INFORMATION MANAGEMENT | Facility: CLINIC | Age: 79
End: 2019-01-08

## 2019-01-08 LAB — INR PPP: 2.5

## 2019-01-09 ENCOUNTER — ANTICOAGULATION THERAPY VISIT (OUTPATIENT)
Dept: CARDIOLOGY | Facility: CLINIC | Age: 79
End: 2019-01-09

## 2019-01-09 PROCEDURE — 99207 ZZC NO CHARGE NURSE ONLY: CPT | Performed by: INTERNAL MEDICINE

## 2019-01-09 NOTE — PROGRESS NOTES
ANTICOAGULATION FOLLOW-UP CLINIC VISIT    Patient Name:  Yogesh Abreu  Date:  2019  Contact Type:  [facility] monitoring fax    SUBJECTIVE:        OBJECTIVE    INR   Date Value Ref Range Status   2019 2.5  Final     Chromogenic Factor 10   Date Value Ref Range Status   10/12/2015 28 (L) 70 - 130 % Final     Comment:     Therapeutic Range:  A Chromogenic Factor 10 level of approximately 20-40%   inversely correlates with an INR of 2-3 for patients receiving Warfarin.   Chromogenic Factor 10 levels below 20% indicate an INR greater than 3 and   levels above 40% indicate an INR less than 2.         ASSESSMENT / PLAN  INR assessment THER    Recheck INR In: 2 WEEKS    INR Location Clinic      Anticoagulation Summary  As of 2019    INR goal:   2.0-3.0   TTR:   76.2 % (2.6 y)   INR used for dosin.5 (2019)   Warfarin maintenance plan:   10 mg (5 mg x 2) every Tue; 7.5 mg (5 mg x 1.5) all other days   Full warfarin instructions:   10 mg every Tue; 7.5 mg all other days   Weekly warfarin total:   55 mg   No change documented:   Penfield, Lynette E, RN   Plan last modified:   Tanja Wills RN (2018)   Next INR check:   2019   Target end date:   Indefinite    Indications    Long-term (current) use of anticoagulants [Z79.01] [Z79.01]  PE (pulmonary embolism) [I26.99]             Anticoagulation Episode Summary     INR check location:       Preferred lab:       Send INR reminders to:   [facility] HEART INR NURSE    Comments:         Anticoagulation Care Providers     Provider Role Specialty Phone number    Satya Rogers MD [facility] Cardiology [phone]            See the Encounter Report to view Anticoagulation Flowsheet and Dosing Calendar (Go to Encounters tab in chart review, and find the Anticoagulation Therapy Visit)    INR=2.5  Called pt with results. No changes or complications. Will continue same confirmed dose and recheck in 2 weeks. Shabbir GIRALDO

## 2019-01-15 PROBLEM — M54.42 CHRONIC LEFT-SIDED LOW BACK PAIN WITH LEFT-SIDED SCIATICA: Status: RESOLVED | Noted: 2017-06-19 | Resolved: 2019-01-15

## 2019-01-15 PROBLEM — M25.511 RIGHT SHOULDER PAIN: Status: RESOLVED | Noted: 2017-07-11 | Resolved: 2019-01-15

## 2019-01-15 PROBLEM — G89.29 CHRONIC LEFT-SIDED LOW BACK PAIN WITH LEFT-SIDED SCIATICA: Status: RESOLVED | Noted: 2017-06-19 | Resolved: 2019-01-15

## 2019-01-15 NOTE — PROGRESS NOTES
Subjective:  HPI                    Objective:  System    Physical Exam    General     ROS    Assessment/Plan:    DISCHARGE REPORT    Progress reporting period is from 7-11-17 to 8-2-17, 4 visits.       SUBJECTIVE  At last visit patient reported right shoulder was slowly improving.  He was able to reach sunglasses off of visor with minimal to no discomfort.  Was  doing strength exercises every other day, horizontal adduction 3-4x/day; using 12 oz for ER and extension, no weight for horizontal abduction.     Pain level at last visit: 0/10.      Initial Pain level:  0-10/10 R shoulder  Changes in function:  Yes (See Goal flowsheet attached for changes in current functional level)  Adverse reaction to treatment or activity: None    OBJECTIVE  At last visit AROM right shoulder abduction 151 degrees, painful arc starting about 80 degrees.  Corrected and progressed strength program.      ASSESSMENT/PLAN  Updated problem list and treatment plan: Diagnosis 1:  Right shoulder pain  Assessment of Progress: The patient has not returned to therapy. Current status is unknown.  Self Management Plans:  Patient has been instructed in a home treatment program.      Recommendations:  Discharge PT as did not return for follow-up visits.    Please refer to the daily flowsheet for treatment today, total treatment time and time spent performing 1:1 timed codes.

## 2019-01-15 NOTE — PROGRESS NOTES
System    Physical Exam    General     ROS    Assessment/Plan:    DISCHARGE REPORT    Progress reporting period is from 6-19-17 to 7-18-17, 4 visits.       SUBJECTIVE  Patient initially had c/o L LB, L hip, thigh, lower leg, and foot pain 4/10, increased with sitting, sit-stand, standing, walking.   At last visit he reported he had minimal symptoms up to 1/10 left lateral lower LE since the previous visit - had one morning.  Had golfed 3x in the last week without increase in symptoms.  States he has had right lateral thigh numbness for ~10 years (did not report at initial evaluation) and has noticed this diminishing recently.    Pain level at last visit: 1/10(right shoulder).      Initial Pain level: (4/10 LB/LE).   Changes in function:  Yes (See Goal flowsheet attached for changes in current functional level)  Adverse reaction to treatment or activity: None    OBJECTIVE   Patient able to demonstrate proper posture and body mechanics as well as HEP.    ASSESSMENT/PLAN  Updated problem list and treatment plan: Diagnosis 1:  LB and left LE pain  STG/LTGs have been met or progress has been made towards goals:  Yes (See Goal flow sheet completed today.)  Assessment of Progress: The patient's condition is improving.  Self Management Plans:  Patient is independent in a home treatment program.      Recommendations:  This patient is ready to be discharged from therapy and continue their home treatment program.    Please refer to the daily flowsheet for treatment today, total treatment time and time spent performing 1:1 timed codes.

## 2019-01-22 ENCOUNTER — TRANSFERRED RECORDS (OUTPATIENT)
Dept: HEALTH INFORMATION MANAGEMENT | Facility: CLINIC | Age: 79
End: 2019-01-22

## 2019-01-22 ENCOUNTER — ANTICOAGULATION THERAPY VISIT (OUTPATIENT)
Dept: CARDIOLOGY | Facility: CLINIC | Age: 79
End: 2019-01-22

## 2019-01-22 LAB — INR PPP: 3.2

## 2019-01-22 PROCEDURE — 99207 ZZC NO CHARGE NURSE ONLY: CPT | Performed by: INTERNAL MEDICINE

## 2019-01-30 ENCOUNTER — TELEPHONE (OUTPATIENT)
Dept: CARDIOLOGY | Facility: CLINIC | Age: 79
End: 2019-01-30

## 2019-02-04 ENCOUNTER — MEDICAL CORRESPONDENCE (OUTPATIENT)
Dept: HEALTH INFORMATION MANAGEMENT | Facility: CLINIC | Age: 79
End: 2019-02-04

## 2019-02-04 NOTE — TELEPHONE ENCOUNTER
Pt states that he needs the newly signed order for [facility] home INR monitoring for [facility] to be able to send him the test strips. Order for [facility] Home INR monitoring signed by Dr Eric Verdugo (Dr Rogers not available in the clinic) and order faxed to [facility] [phone]. [employer]

## 2019-02-05 ENCOUNTER — ANTICOAGULATION THERAPY VISIT (OUTPATIENT)
Dept: CARDIOLOGY | Facility: CLINIC | Age: 79
End: 2019-02-05

## 2019-02-05 ENCOUNTER — TRANSFERRED RECORDS (OUTPATIENT)
Dept: HEALTH INFORMATION MANAGEMENT | Facility: CLINIC | Age: 79
End: 2019-02-05

## 2019-02-05 LAB — INR PPP: 3.1

## 2019-02-05 PROCEDURE — 99207 ZZC NO CHARGE NURSE ONLY: CPT | Performed by: INTERNAL MEDICINE

## 2019-02-05 NOTE — PROGRESS NOTES
ANTICOAGULATION FOLLOW-UP CLINIC VISIT    Patient Name:  Yogesh Abreu  Date:  2/5/2019  Contact Type:  Telephone/patient    SUBJECTIVE:     Patient Findings     Positives:   Extra doses (he mistakenly took 10 mg last Tuesday instead of 7.5 mg)           OBJECTIVE    INR   Date Value Ref Range Status   02/05/2019 3.1  Final     Chromogenic Factor 10   Date Value Ref Range Status   10/12/2015 28 (L) 70 - 130 % Final     Comment:     Therapeutic Range:  A Chromogenic Factor 10 level of approximately 20-40%   inversely correlates with an INR of 2-3 for patients receiving Warfarin.   Chromogenic Factor 10 levels below 20% indicate an INR greater than 3 and   levels above 40% indicate an INR less than 2.         ASSESSMENT / PLAN  INR assessment THER    Recheck INR In: 2 WEEKS    INR Location Clinic      Anticoagulation Summary  As of 2/5/2019    INR goal:   2.0-3.0   TTR:   75.0 % (2.7 y)   INR used for dosing:   3.1! (2/5/2019)   Warfarin maintenance plan:   7.5 mg (5 mg x 1.5) every day   Full warfarin instructions:   7.5 mg every day   Weekly warfarin total:   52.5 mg   No change documented:   Tanja Wills, RN   Plan last modified:   Tanja Wills RN (1/22/2019)   Next INR check:   2/19/2019   Target end date:   Indefinite    Indications    Long-term (current) use of anticoagulants [Z79.01] [Z79.01]  PE (pulmonary embolism) [I26.99]             Anticoagulation Episode Summary     INR check location:       Preferred lab:       Send INR reminders to:   [facility] HEART INR NURSE    Comments:         Anticoagulation Care Providers     Provider Role Specialty Phone number    Satya Rogers MD [facility] Cardiology [phone]            See the Encounter Report to view Anticoagulation Flowsheet and Dosing Calendar (Go to Encounters tab in chart review, and find the Anticoagulation Therapy Visit)    Home INR 3.1 He mistakenly took 10 mg last Tuesday instead of the recently lowered 7.5 mg dose (he had previously set  up the 10 mg dose in his pillbox). No change in other meds or diet. No abnormal bleeding or bruising. Will resume the lower dosing schedule of 7.5 mg daily and recheck in 2 weeks.  Tanja Wills RN

## 2019-02-21 ENCOUNTER — DOCUMENTATION ONLY (OUTPATIENT)
Dept: CARDIOLOGY | Facility: CLINIC | Age: 79
End: 2019-02-21

## 2019-02-21 NOTE — PROGRESS NOTES
Pt is overdue for home INR testing. Spoke with Torrie at [facility] [phone] ext [phone], who states that there is documentation that pt is waiting for test strips to be mailed to him from [employer] which could take 1-2 weeks to arrive at pt's winter home. Pt is in [place] for the winter so he can't come into the clinic for an INR. Rickey

## 2019-02-27 ENCOUNTER — TELEPHONE (OUTPATIENT)
Dept: CARDIOLOGY | Facility: CLINIC | Age: 79
End: 2019-02-27

## 2019-02-27 NOTE — TELEPHONE ENCOUNTER
Pt called to notify us that he is still waiting for the Home INR test strips and was told by [employer] (the company that supplies equipment for his insurance and [employer]) that it would be 2 weeks because they are sending both a new monitor and new strips. His last INR was 3.1 on 2/5/19 so will watch for the next INR result in early March. He is in [place] for the winter so he can't come into the clinic for an INR test. Rickey

## 2019-03-08 ENCOUNTER — ANTICOAGULATION THERAPY VISIT (OUTPATIENT)
Dept: CARDIOLOGY | Facility: CLINIC | Age: 79
End: 2019-03-08
Payer: COMMERCIAL

## 2019-03-08 ENCOUNTER — TRANSFERRED RECORDS (OUTPATIENT)
Dept: HEALTH INFORMATION MANAGEMENT | Facility: CLINIC | Age: 79
End: 2019-03-08

## 2019-03-08 LAB — INR PPP: 2.4

## 2019-03-08 NOTE — PROGRESS NOTES
ANTICOAGULATION FOLLOW-UP CLINIC VISIT    Patient Name:  Yogesh Abreu  Date:  3/8/2019  Contact Type:  Telephone/ patient    SUBJECTIVE:        OBJECTIVE    INR   Date Value Ref Range Status   2019 2.4  Final     Chromogenic Factor 10   Date Value Ref Range Status   10/12/2015 28 (L) 70 - 130 % Final     Comment:     Therapeutic Range:  A Chromogenic Factor 10 level of approximately 20-40%   inversely correlates with an INR of 2-3 for patients receiving Warfarin.   Chromogenic Factor 10 levels below 20% indicate an INR greater than 3 and   levels above 40% indicate an INR less than 2.         ASSESSMENT / PLAN  INR assessment THER    Recheck INR In: 2 WEEKS    INR Location Clinic      Anticoagulation Summary  As of 3/8/2019    INR goal:   2.0-3.0   TTR:   75.3 % (2.8 y)   INR used for dosin.4 (3/8/2019)   Warfarin maintenance plan:   7.5 mg (5 mg x 1.5) every day   Full warfarin instructions:   7.5 mg every day   Weekly warfarin total:   52.5 mg   No change documented:   Tanja Wills RN   Plan last modified:   Tanja Wills RN (2019)   Next INR check:   3/21/2019   Target end date:   Indefinite    Indications    Long-term (current) use of anticoagulants [Z79.01] [Z79.01]  PE (pulmonary embolism) [I26.99]             Anticoagulation Episode Summary     INR check location:       Preferred lab:       Send INR reminders to:   [facility] HEART INR NURSE    Comments:         Anticoagulation Care Providers     Provider Role Specialty Phone number    Satya Rogers MD [facility] Cardiology [phone]            See the Encounter Report to view Anticoagulation Flowsheet and Dosing Calendar (Go to Encounters tab in chart review, and find the Anticoagulation Therapy Visit)    Home INR 2.4 He has not checked his INR for 4 weeks because of a delay in shipment of his test strips. No change in meds or diet. No abnormal bleeding or bruising. Will continue current dosing of 7.5 mg daily and recheck in 2  weeks.    Tanja Wills RN

## 2019-03-21 ENCOUNTER — TRANSFERRED RECORDS (OUTPATIENT)
Dept: HEALTH INFORMATION MANAGEMENT | Facility: CLINIC | Age: 79
End: 2019-03-21

## 2019-03-21 LAB — INR PPP: 3.2

## 2019-03-22 ENCOUNTER — ANTICOAGULATION THERAPY VISIT (OUTPATIENT)
Dept: CARDIOLOGY | Facility: CLINIC | Age: 79
End: 2019-03-22

## 2019-03-22 PROCEDURE — 99207 ZZC NO CHARGE NURSE ONLY: CPT

## 2019-03-22 NOTE — PROGRESS NOTES
ANTICOAGULATION FOLLOW-UP CLINIC VISIT    Patient Name:  Yogesh Abreu  Date:  3/22/2019  Contact Type:  Telephone/ patient    SUBJECTIVE:        OBJECTIVE    INR   Date Value Ref Range Status   03/21/2019 3.2  Final     Chromogenic Factor 10   Date Value Ref Range Status   10/12/2015 28 (L) 70 - 130 % Final     Comment:     Therapeutic Range:  A Chromogenic Factor 10 level of approximately 20-40%   inversely correlates with an INR of 2-3 for patients receiving Warfarin.   Chromogenic Factor 10 levels below 20% indicate an INR greater than 3 and   levels above 40% indicate an INR less than 2.         ASSESSMENT / PLAN  INR assessment SUPRA    Recheck INR In: 2 WEEKS    INR Location Home INR    Billed home INR No      Anticoagulation Summary  As of 3/22/2019    INR goal:   2.0-3.0   TTR:   75.3 % (2.8 y)   INR used for dosing:   3.2! (3/21/2019)   Warfarin maintenance plan:   7.5 mg (5 mg x 1.5) every day   Full warfarin instructions:   7.5 mg every day   Weekly warfarin total:   52.5 mg   Plan last modified:   Tanja Wills RN (1/22/2019)   Next INR check:   4/4/2019   Target end date:   Indefinite    Indications    Long-term (current) use of anticoagulants [Z79.01] [Z79.01]  PE (pulmonary embolism) [I26.99]             Anticoagulation Episode Summary     INR check location:       Preferred lab:       Send INR reminders to:   [facility] HEART INR NURSE    Comments:         Anticoagulation Care Providers     Provider Role Specialty Phone number    SueSatya MD [facility] Cardiology [phone]            See the Encounter Report to view Anticoagulation Flowsheet and Dosing Calendar (Go to Encounters tab in chart review, and find the Anticoagulation Therapy Visit)    3/21/19 Home INR 3.2 Left message for pt to return the call to ask if there have been any changes in med, diet, health status, bleeding or bruising.  10:45 am Spoke with pt. He had hip pain so was taking Ibuprofen 600 mg daily for 1 week (last  dose today). He is aware that he is to avoid NSAIDs while taking Warfarin. Has not been eating greens/veggies this week. No abnormal bleeding or bruising. Will resume eating greens/veggies and he will stop taking Ibuprofen so will continue current Warfarin dose of 7.5 mg daily and recheck INR in 2 weeks.  Dosage adjustment made based on physician directed care plan.    Tanja Wills RN

## 2019-04-04 ENCOUNTER — ANTICOAGULATION THERAPY VISIT (OUTPATIENT)
Dept: CARDIOLOGY | Facility: CLINIC | Age: 79
End: 2019-04-04
Payer: COMMERCIAL

## 2019-04-04 ENCOUNTER — TRANSFERRED RECORDS (OUTPATIENT)
Dept: HEALTH INFORMATION MANAGEMENT | Facility: CLINIC | Age: 79
End: 2019-04-04

## 2019-04-04 DIAGNOSIS — Z86.711 HISTORY OF PULMONARY EMBOLISM: ICD-10-CM

## 2019-04-04 DIAGNOSIS — Z79.01 LONG TERM CURRENT USE OF ANTICOAGULANT THERAPY: ICD-10-CM

## 2019-04-04 LAB — INR PPP: 2.9

## 2019-04-04 RX ORDER — WARFARIN SODIUM 5 MG/1
TABLET ORAL
Qty: 150 TABLET | Refills: 1 | Status: SHIPPED | OUTPATIENT
Start: 2019-04-04 | End: 2019-12-09

## 2019-04-04 NOTE — PROGRESS NOTES
ANTICOAGULATION FOLLOW-UP CLINIC VISIT    Patient Name:  Yogesh Abreu  Date:  2019  Contact Type:  [employer]    SUBJECTIVE:        OBJECTIVE    INR   Date Value Ref Range Status   2019 2.9  Final     Chromogenic Factor 10   Date Value Ref Range Status   10/12/2015 28 (L) 70 - 130 % Final     Comment:     Therapeutic Range:  A Chromogenic Factor 10 level of approximately 20-40%   inversely correlates with an INR of 2-3 for patients receiving Warfarin.   Chromogenic Factor 10 levels below 20% indicate an INR greater than 3 and   levels above 40% indicate an INR less than 2.         ASSESSMENT / PLAN  INR assessment THER    Recheck INR In: 2 WEEKS    INR Location Home INR    Billed home INR No      Anticoagulation Summary  As of 2019    INR goal:   2.0-3.0   TTR:   74.8 % (2.8 y)   INR used for dosin.9 (2019)   Warfarin maintenance plan:   7.5 mg (5 mg x 1.5) every day   Full warfarin instructions:   7.5 mg every day   Weekly warfarin total:   52.5 mg   No change documented:   Tanja Wills RN   Plan last modified:   Tanja Wills RN (2019)   Next INR check:   2019   Target end date:   Indefinite    Indications    Long-term (current) use of anticoagulants [Z79.01] [Z79.01]  PE (pulmonary embolism) [I26.99]             Anticoagulation Episode Summary     INR check location:       Preferred lab:       Send INR reminders to:   [facility] HEART INR NURSE    Comments:         Anticoagulation Care Providers     Provider Role Specialty Phone number    Satya Rogers MD [facility] Cardiology [phone]            See the Encounter Report to view Anticoagulation Flowsheet and Dosing Calendar (Go to Encounters tab in chart review, and find the Anticoagulation Therapy Visit)    Home INR 2.9 per [employer]. Will continue current dosing of 7.5 mg daily and recheck in 2 weeks. Will call pt after the next INR check.     Tanja Wills RN

## 2019-04-18 ENCOUNTER — TRANSFERRED RECORDS (OUTPATIENT)
Dept: HEALTH INFORMATION MANAGEMENT | Facility: CLINIC | Age: 79
End: 2019-04-18

## 2019-04-18 ENCOUNTER — OFFICE VISIT (OUTPATIENT)
Dept: FAMILY MEDICINE | Facility: CLINIC | Age: 79
End: 2019-04-18
Payer: COMMERCIAL

## 2019-04-18 VITALS
SYSTOLIC BLOOD PRESSURE: 136 MMHG | HEIGHT: 68 IN | HEART RATE: 70 BPM | WEIGHT: 194 LBS | OXYGEN SATURATION: 97 % | TEMPERATURE: 97.9 F | BODY MASS INDEX: 29.4 KG/M2 | DIASTOLIC BLOOD PRESSURE: 88 MMHG

## 2019-04-18 DIAGNOSIS — M54.42 LEFT-SIDED LOW BACK PAIN WITH LEFT-SIDED SCIATICA, UNSPECIFIED CHRONICITY: Primary | ICD-10-CM

## 2019-04-18 LAB — INR PPP: 2.5

## 2019-04-18 PROCEDURE — 99213 OFFICE O/P EST LOW 20 MIN: CPT | Performed by: NURSE PRACTITIONER

## 2019-04-18 RX ORDER — HYDROCODONE BITARTRATE AND ACETAMINOPHEN 5; 325 MG/1; MG/1
.5-1 TABLET ORAL 2 TIMES DAILY PRN
Qty: 15 TABLET | Refills: 0 | Status: SHIPPED | OUTPATIENT
Start: 2019-04-18 | End: 2019-07-25

## 2019-04-18 ASSESSMENT — MIFFLIN-ST. JEOR: SCORE: 1584.48

## 2019-04-18 NOTE — NURSING NOTE
Faxed referral to [facility] Fax# [phone].      Zuly Saldana ,CMA

## 2019-04-18 NOTE — PATIENT INSTRUCTIONS
ROM Meyer   Address: [address] [place] [address] [address], [place] [address]  Phone: [phone]

## 2019-04-18 NOTE — PROGRESS NOTES
HPI      SUBJECTIVE:   Yogesh Abreu is a 78 year old male who presents to clinic today for the following   health issues:        Chief Complaint   Patient presents with     Follow Up     Chronic bilateral low back pain without sciatica        Chronic L low back pain with radiation to knee. Occasionally gets a twinge down calf and brief tingle of toes. Has had more intense pain for 5 years off and on.   Back pain actually started in college  Had a bad flare in Oct. They winter in [place].   Had a medrol dose pack this winter that didn't help   Had a few sessions of PT that didn't help   Goes to the gym 3-4 times a week. When he increased resistance on bike it caused more pain the next day    No bowel change  Has a little more nocturia for about 1 month. Goes about every 3 hours.   Ibuprofen 600mg in the morning helps the pain for the day     Past Medical History:   Diagnosis Date     Antiphospholipid antibody syndrome (H) 5/25/2017     CAD (coronary artery disease), native coronary artery 10/2015    9/2015 Heart cath - 90% diagonal, 50-60% CFX, 100% prox RCA occlusion - MAL placed in RCA, 10/2015 EF 35-40% by Echo     DVT (deep venous thrombosis) (H) 9/2015 9/2015 Occlusive DVT extending from left common femoral to mid left popliteal vein     Hypercholesteraemia      Hypertension      PE (pulmonary embolism) 9/2015     PMR (polymyalgia rheumatica) (H)      Polymyalgia rheumatica (H)      STEMI (ST elevation myocardial infarction) (H) 10/2015    10/2015 Inferior STEMI - 100% RCA occlusion with MAL placed     Family History   Problem Relation Age of Onset     Hypertension Brother      Hypertension Brother      Past Surgical History:   Procedure Laterality Date     HEART CATH STENT COR W/WO PTCA  10/2015    90% diagonal, 50-60% CFX, 100% prox RCA occlusion - MAL placed in RCA     ORTHOPEDIC SURGERY  08/2015    right carpal tunnel     Social History     Tobacco Use     Smoking status: Former Smoker     Packs/day: 0.50      Years: 5.00     Pack years: 2.50     Types: Cigarettes     Start date:      Last attempt to quit:      Years since quittin.3     Smokeless tobacco: Never Used   Substance Use Topics     Alcohol use: Yes     Comment: 1 drink per day     Current Outpatient Medications   Medication Sig Dispense Refill     ADVAIR DISKUS 250-50 MCG/DOSE inhaler INHALE 1 PUFF INTO THE LUNGS TWICE DAILY 60 Inhaler 2     albuterol (PROAIR HFA/PROVENTIL HFA/VENTOLIN HFA) 108 (90 BASE) MCG/ACT Inhaler Inhale 2 puffs into the lungs every 6 hours as needed for shortness of breath / dyspnea or wheezing 1 Inhaler 0     ASPIRIN NOT PRESCRIBED (INTENTIONAL) Please choose reason not prescribed, below 0 each 0     atorvastatin (LIPITOR) 40 MG tablet Take 1 tablet (40 mg) by mouth At Bedtime 90 tablet 3     Calcium Citrate-Vitamin D (CALCIUM CITRATE + D PO) Take 600 mg by mouth 2 times daily       Cholecalciferol (VITAMIN D3 PO) Take 1,000 Units by mouth 2 times daily       coenzyme Q-10 capsule Take 1 capsule (100 mg) by mouth daily 90 capsule 3     lisinopril (PRINIVIL/ZESTRIL) 5 MG tablet Take 1 tablet (5 mg) by mouth daily 90 tablet 3     methylPREDNISolone (MEDROL) 4 MG tablet Take as directed.       metoprolol succinate (TOPROL-XL) 25 MG 24 hr tablet Take 1 tablet (25 mg) by mouth daily (Patient taking differently: Take 12.5 mg by mouth daily ) 90 tablet 3     nitroglycerin (NITROSTAT) 0.4 MG sublingual tablet Place 1 tablet (0.4 mg) under the tongue every 5 minutes as needed for chest pain 25 tablet 3     vardenafil (LEVITRA) 20 MG tablet Take 0.5-1 tablets (10-20 mg) by mouth daily as needed for erectile dysfunction Never use with nitroglycerin, terazosin or doxazosin. 12 tablet 3     warfarin (COUMADIN) 5 MG tablet Take 1 1/2 tabs daily or as directed per INR clinic 150 tablet 1     Allergies   Allergen Reactions     Simvastatin        Reviewed and updated as needed this visit by clinical staff and provider  "      ROS  Detailed as above     /88 (BP Location: Left arm, Patient Position: Sitting, Cuff Size: Adult Regular)   Pulse 70   Temp 97.9  F (36.6  C) (Oral)   Ht 1.727 m (5' 8\")   Wt 88 kg (194 lb)   SpO2 97%   BMI 29.50 kg/m     Physical Exam   Constitutional: He appears well-developed.   HENT:   Head: Normocephalic.   Eyes: Conjunctivae are normal.   Pulmonary/Chest: Effort normal.   Musculoskeletal:   L lumbar tenderness   Neurological: He is alert.   Difficulty standing. Difficulty with L SLR but does not cause back pain   Skin: Skin is warm and dry.   Psychiatric: He has a normal mood and affect. Judgment normal.       Assessment and Plan:       ICD-10-CM    1. Left-sided low back pain with left-sided sciatica, unspecified chronicity M54.42 [facility] AND [facility] REFERRAL     HYDROcodone-acetaminophen (NORCO) 5-325 MG tablet     Acute on chronic LBP. He is quite tender of left paraspinal lumbar musculature. He has difficult standing which he states is d/t the severe pain. Will send him to PT for evaluation. Would recommend MRI if PT is not helping in the next couple weeks. Gave small amt of norco and recommended he take half tab for starters. Recommend no Ibuprofen with his anticoagulants. Can take tylenol. We discussed risks. Call right away with any new symptoms         MELL Bloom, CNP  [facility]   "

## 2019-04-19 ENCOUNTER — ANTICOAGULATION THERAPY VISIT (OUTPATIENT)
Dept: CARDIOLOGY | Facility: CLINIC | Age: 79
End: 2019-04-19
Payer: COMMERCIAL

## 2019-04-19 PROCEDURE — 99207 ZZC NO CHARGE NURSE ONLY: CPT

## 2019-04-19 ASSESSMENT — ASTHMA QUESTIONNAIRES: ACT_TOTALSCORE: 24

## 2019-04-19 NOTE — PROGRESS NOTES
ANTICOAGULATION FOLLOW-UP CLINIC VISIT    Patient Name:  Yogesh Abreu  Date:  2019  Contact Type:  [employer]    SUBJECTIVE:        OBJECTIVE    INR   Date Value Ref Range Status   2019 2.5  Final     Chromogenic Factor 10   Date Value Ref Range Status   10/12/2015 28 (L) 70 - 130 % Final     Comment:     Therapeutic Range:  A Chromogenic Factor 10 level of approximately 20-40%   inversely correlates with an INR of 2-3 for patients receiving Warfarin.   Chromogenic Factor 10 levels below 20% indicate an INR greater than 3 and   levels above 40% indicate an INR less than 2.         ASSESSMENT / PLAN  INR assessment THER    Recheck INR In: 2 WEEKS    INR Location Home INR    Billed home INR No      Anticoagulation Summary  As of 2019    INR goal:   2.0-3.0   TTR:   75.1 % (2.9 y)   INR used for dosin.5 (2019)   Warfarin maintenance plan:   7.5 mg (5 mg x 1.5) every day   Full warfarin instructions:   7.5 mg every day   Weekly warfarin total:   52.5 mg   No change documented:   Tanja Wills RN   Plan last modified:   Tanja Wills RN (2019)   Next INR check:   2019   Target end date:   Indefinite    Indications    Long-term (current) use of anticoagulants [Z79.01] [Z79.01]  PE (pulmonary embolism) [I26.99]             Anticoagulation Episode Summary     INR check location:       Preferred lab:       Send INR reminders to:   [facility] HEART INR NURSE    Comments:         Anticoagulation Care Providers     Provider Role Specialty Phone number    Satya Rogers MD [facility] Cardiology [phone]            See the Encounter Report to view Anticoagulation Flowsheet and Dosing Calendar (Go to Encounters tab in chart review, and find the Anticoagulation Therapy Visit)    19 Home INR 2.5 Left message asking pt to call back if he has had any change in meds, diet or health status. If no changes, will continue current dosing of 7.5 mg daily and recheck INR in clinic prior  to the OV with Dr Sharan Mauro on 4/30 (pt is due for the 6 month in clinic INR appt). Pt to bring his INR meter to verify home INR readings. Also asked him to call back if the 4/30/19 8 am in clinic INR appt would not work for him.    Tanja Wills RN

## 2019-04-22 ENCOUNTER — TRANSFERRED RECORDS (OUTPATIENT)
Dept: HEALTH INFORMATION MANAGEMENT | Facility: CLINIC | Age: 79
End: 2019-04-22

## 2019-04-25 DIAGNOSIS — E78.5 HLD (HYPERLIPIDEMIA): ICD-10-CM

## 2019-04-25 DIAGNOSIS — I10 ESSENTIAL HYPERTENSION: ICD-10-CM

## 2019-04-25 DIAGNOSIS — I25.10 CORONARY ARTERY DISEASE INVOLVING NATIVE CORONARY ARTERY OF NATIVE HEART WITHOUT ANGINA PECTORIS: Primary | ICD-10-CM

## 2019-04-30 ENCOUNTER — ANTICOAGULATION THERAPY VISIT (OUTPATIENT)
Dept: CARDIOLOGY | Facility: CLINIC | Age: 79
End: 2019-04-30
Payer: COMMERCIAL

## 2019-04-30 ENCOUNTER — OFFICE VISIT (OUTPATIENT)
Dept: CARDIOLOGY | Facility: CLINIC | Age: 79
End: 2019-04-30
Payer: COMMERCIAL

## 2019-04-30 ENCOUNTER — TELEPHONE (OUTPATIENT)
Dept: CARDIOLOGY | Facility: CLINIC | Age: 79
End: 2019-04-30

## 2019-04-30 VITALS
DIASTOLIC BLOOD PRESSURE: 75 MMHG | SYSTOLIC BLOOD PRESSURE: 114 MMHG | BODY MASS INDEX: 30.14 KG/M2 | HEART RATE: 69 BPM | HEIGHT: 68 IN | WEIGHT: 198.9 LBS

## 2019-04-30 DIAGNOSIS — I10 ESSENTIAL HYPERTENSION: ICD-10-CM

## 2019-04-30 DIAGNOSIS — I25.10 CORONARY ARTERY DISEASE INVOLVING NATIVE CORONARY ARTERY OF NATIVE HEART WITHOUT ANGINA PECTORIS: Primary | ICD-10-CM

## 2019-04-30 DIAGNOSIS — E78.5 HLD (HYPERLIPIDEMIA): ICD-10-CM

## 2019-04-30 DIAGNOSIS — I25.110 CORONARY ARTERY DISEASE INVOLVING NATIVE CORONARY ARTERY OF NATIVE HEART WITH UNSTABLE ANGINA PECTORIS (H): ICD-10-CM

## 2019-04-30 DIAGNOSIS — I25.10 CORONARY ARTERY DISEASE INVOLVING NATIVE CORONARY ARTERY OF NATIVE HEART WITHOUT ANGINA PECTORIS: ICD-10-CM

## 2019-04-30 DIAGNOSIS — R00.0 TACHYCARDIA: ICD-10-CM

## 2019-04-30 DIAGNOSIS — D68.61 ANTIPHOSPHOLIPID ANTIBODY SYNDROME (H): ICD-10-CM

## 2019-04-30 DIAGNOSIS — E78.2 MIXED HYPERLIPIDEMIA: ICD-10-CM

## 2019-04-30 LAB
ALT SERPL W P-5'-P-CCNC: <5 U/L (ref 5–30)
ANION GAP SERPL CALCULATED.3IONS-SCNC: 11.2 MMOL/L (ref 6–17)
BUN SERPL-MCNC: 21 MG/DL (ref 7–30)
CALCIUM SERPL-MCNC: 10.3 MG/DL (ref 8.5–10.5)
CHLORIDE SERPL-SCNC: 102 MMOL/L (ref 98–107)
CHOLEST SERPL-MCNC: 172 MG/DL
CO2 SERPL-SCNC: 29 MMOL/L (ref 23–29)
CREAT SERPL-MCNC: 0.94 MG/DL (ref 0.7–1.3)
GFR SERPL CREATININE-BSD FRML MDRD: 78 ML/MIN/{1.73_M2}
GLUCOSE SERPL-MCNC: 106 MG/DL (ref 70–105)
HDLC SERPL-MCNC: 42 MG/DL
INR POINT OF CARE: 3.2 (ref 0.86–1.14)
LDLC SERPL CALC-MCNC: 113 MG/DL
NONHDLC SERPL-MCNC: 130 MG/DL
POTASSIUM SERPL-SCNC: 4.2 MMOL/L (ref 3.5–5.1)
SODIUM SERPL-SCNC: 138 MMOL/L (ref 136–145)
TRIGL SERPL-MCNC: 86 MG/DL

## 2019-04-30 PROCEDURE — 80048 BASIC METABOLIC PNL TOTAL CA: CPT | Performed by: INTERNAL MEDICINE

## 2019-04-30 PROCEDURE — 36416 COLLJ CAPILLARY BLOOD SPEC: CPT | Performed by: INTERNAL MEDICINE

## 2019-04-30 PROCEDURE — 85610 PROTHROMBIN TIME: CPT | Mod: QW | Performed by: INTERNAL MEDICINE

## 2019-04-30 PROCEDURE — 84460 ALANINE AMINO (ALT) (SGPT): CPT | Performed by: INTERNAL MEDICINE

## 2019-04-30 PROCEDURE — 80061 LIPID PANEL: CPT | Performed by: INTERNAL MEDICINE

## 2019-04-30 PROCEDURE — 99214 OFFICE O/P EST MOD 30 MIN: CPT | Performed by: INTERNAL MEDICINE

## 2019-04-30 RX ORDER — ATORVASTATIN CALCIUM 80 MG/1
80 TABLET, FILM COATED ORAL AT BEDTIME
Qty: 90 TABLET | Refills: 3 | Status: SHIPPED | OUTPATIENT
Start: 2019-04-30 | End: 2020-06-10

## 2019-04-30 RX ORDER — METOPROLOL SUCCINATE 25 MG/1
12.5 TABLET, EXTENDED RELEASE ORAL DAILY
COMMUNITY
End: 2020-04-20

## 2019-04-30 ASSESSMENT — MIFFLIN-ST. JEOR: SCORE: 1606.7

## 2019-04-30 NOTE — PROGRESS NOTES
ANTICOAGULATION FOLLOW-UP CLINIC VISIT    Patient Name:  Yogesh Abreu  Date:  4/30/2019  Contact Type:  Face to Face    SUBJECTIVE:     Patient Findings     Positives:   Change in medications (taking vicodin 1/2-1 tab daily and has taken ibuprofen for 1 week)           OBJECTIVE    INR Protime   Date Value Ref Range Status   04/30/2019 3.2 (A) 0.86 - 1.14 Final     Chromogenic Factor 10   Date Value Ref Range Status   10/12/2015 28 (L) 70 - 130 % Final     Comment:     Therapeutic Range:  A Chromogenic Factor 10 level of approximately 20-40%   inversely correlates with an INR of 2-3 for patients receiving Warfarin.   Chromogenic Factor 10 levels below 20% indicate an INR greater than 3 and   levels above 40% indicate an INR less than 2.         ASSESSMENT / PLAN  INR assessment SUPRA    Recheck INR In: 2 WEEKS    INR Location Clinic      Anticoagulation Summary  As of 4/30/2019    INR goal:   2.0-3.0   TTR:   75.1 % (2.9 y)   INR used for dosing:   3.2! (4/30/2019)   Warfarin maintenance plan:   7.5 mg (5 mg x 1.5) every day   Full warfarin instructions:   4/30: 5 mg; Otherwise 7.5 mg every day   Weekly warfarin total:   52.5 mg   Plan last modified:   Tanja Wills RN (1/22/2019)   Next INR check:   5/14/2019   Target end date:   Indefinite    Indications    Long-term (current) use of anticoagulants [Z79.01] [Z79.01]  PE (pulmonary embolism) [I26.99]             Anticoagulation Episode Summary     INR check location:       Preferred lab:       Send INR reminders to:   [facility] HEART INR NURSE    Comments:         Anticoagulation Care Providers     Provider Role Specialty Phone number    Frederic Isaacs MD Responsible Cardiology [phone]            See the Encounter Report to view Anticoagulation Flowsheet and Dosing Calendar (Go to Encounters tab in chart review, and find the Anticoagulation Therapy Visit)    INR 3.2 He has a recent hip flexor injury so has been taking Ibuprofen 3 tabs daily x1  week along with 1/2-1 Vicodin daily. Advised that he should not take Ibuprofen due to increased bleeding risk (he should take Tylenol instead). He has started physical therapy and has noticed improvement in symptoms. No change in diet. No abnormal bleeding or bruising. Will decrease today's dose to 5 mg then resume 7.5 mg daily. He prefers to increase intake of greens/veggies by one serving a week instead of decreasing maintenance dose. Recheck in 2 weeks using home INR meter. Verified home INR results were accurate on his meter. Notified [employer] of next INR due date.  Dosage adjustment made based on physician directed care plan.    Tanja Wills RN

## 2019-04-30 NOTE — TELEPHONE ENCOUNTER
Reviewed BMP showing   Recent Labs   Lab Test 04/30/19  0720 08/20/18  0921    137   POTASSIUM 4.2 3.7   CHLORIDE 102 104   CO2 29 27   ANIONGAP 11.2 6   * 93   BUN 21 18   CR 0.94 0.82   RONNY 10.3 9.4     Reviewed lipid/alt showing   Recent Labs   Lab Test 04/30/19  0720 08/20/18  0921  10/10/15  0705 09/15/15   CHOL 172 140   < > 168 159   HDL 42 37*   < > 42 26   * 86   < > 101 115   TRIG 86 85   < > 124 91   CHOLHDLRATIO  --   --   --  4.0 6.12    < > = values in this interval not displayed.      Lab Results   Component Value Date    ALT <5 04/30/2019      Will message Dr. Isaacs to review. Shabbir GIRALDO

## 2019-04-30 NOTE — PROGRESS NOTES
HPI and Plan:   See dictation    Orders Placed This Encounter   Procedures     Lipid Profile     ALT     Lipid Profile     ALT     Follow-Up with Cardiologist     Holter Monitor 24 hour Adult Pediatric       Orders Placed This Encounter   Medications     metoprolol succinate ER (TOPROL-XL) 25 MG 24 hr tablet     Sig: Take 12.5 mg by mouth daily     atorvastatin (LIPITOR) 80 MG tablet     Sig: Take 1 tablet (80 mg) by mouth At Bedtime     Dispense:  90 tablet     Refill:  3       Medications Discontinued During This Encounter   Medication Reason     metoprolol succinate (TOPROL-XL) 25 MG 24 hr tablet Medication Reconciliation Clean Up     atorvastatin (LIPITOR) 40 MG tablet Reorder         Encounter Diagnoses   Name Primary?     Coronary artery disease involving native coronary artery of native heart without angina pectoris Yes     Essential hypertension      Mixed hyperlipidemia      Tachycardia      Antiphospholipid antibody syndrome (H)      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)        CURRENT MEDICATIONS:  Current Outpatient Medications   Medication Sig Dispense Refill     ADVAIR DISKUS 250-50 MCG/DOSE inhaler INHALE 1 PUFF INTO THE LUNGS TWICE DAILY 60 Inhaler 2     albuterol (PROAIR HFA/PROVENTIL HFA/VENTOLIN HFA) 108 (90 BASE) MCG/ACT Inhaler Inhale 2 puffs into the lungs every 6 hours as needed for shortness of breath / dyspnea or wheezing 1 Inhaler 0     atorvastatin (LIPITOR) 80 MG tablet Take 1 tablet (80 mg) by mouth At Bedtime 90 tablet 3     Calcium Citrate-Vitamin D (CALCIUM CITRATE + D PO) Take 600 mg by mouth 2 times daily       Cholecalciferol (VITAMIN D3 PO) Take 1,000 Units by mouth 2 times daily       coenzyme Q-10 capsule Take 1 capsule (100 mg) by mouth daily 90 capsule 3     HYDROcodone-acetaminophen (NORCO) 5-325 MG tablet Take 0.5-1 tablets by mouth 2 times daily as needed for pain 15 tablet 0     lisinopril  (PRINIVIL/ZESTRIL) 5 MG tablet Take 1 tablet (5 mg) by mouth daily 90 tablet 3     metoprolol succinate ER (TOPROL-XL) 25 MG 24 hr tablet Take 12.5 mg by mouth daily       nitroglycerin (NITROSTAT) 0.4 MG sublingual tablet Place 1 tablet (0.4 mg) under the tongue every 5 minutes as needed for chest pain 25 tablet 3     vardenafil (LEVITRA) 20 MG tablet Take 0.5-1 tablets (10-20 mg) by mouth daily as needed for erectile dysfunction Never use with nitroglycerin, terazosin or doxazosin. 12 tablet 3     warfarin (COUMADIN) 5 MG tablet Take 1 1/2 tabs daily or as directed per INR clinic 150 tablet 1     ASPIRIN NOT PRESCRIBED (INTENTIONAL) Please choose reason not prescribed, below 0 each 0       ALLERGIES     Allergies   Allergen Reactions     Simvastatin        PAST MEDICAL HISTORY:  Past Medical History:   Diagnosis Date     Antiphospholipid antibody syndrome (H) 5/25/2017     CAD (coronary artery disease), native coronary artery 10/2015    9/2015 Heart cath - 90% diagonal, 50-60% CFX, 100% prox RCA occlusion - MAL placed in RCA, 10/2015 EF 35-40% by Echo     DVT (deep venous thrombosis) (H) 9/2015 9/2015 Occlusive DVT extending from left common femoral to mid left popliteal vein     Hypercholesteraemia      Hypertension      PE (pulmonary embolism) 9/2015     PMR (polymyalgia rheumatica) (H)      Polymyalgia rheumatica (H)      STEMI (ST elevation myocardial infarction) (H) 10/2015    10/2015 Inferior STEMI - 100% RCA occlusion with MAL placed       PAST SURGICAL HISTORY:  Past Surgical History:   Procedure Laterality Date     HEART CATH STENT COR W/WO PTCA  10/2015    90% diagonal, 50-60% CFX, 100% prox RCA occlusion - MAL placed in RCA     ORTHOPEDIC SURGERY  08/2015    right carpal tunnel       FAMILY HISTORY:  Family History   Problem Relation Age of Onset     Hypertension Brother      Hypertension Brother        SOCIAL HISTORY:  Social History     Socioeconomic History     Marital status:      Spouse  name: None     Number of children: None     Years of education: None     Highest education level: None   Occupational History     None   Social Needs     Financial resource strain: None     Food insecurity:     Worry: None     Inability: None     Transportation needs:     Medical: None     Non-medical: None   Tobacco Use     Smoking status: Former Smoker     Packs/day: 0.50     Years: 5.00     Pack years: 2.50     Types: Cigarettes     Start date:      Last attempt to quit:      Years since quittin.3     Smokeless tobacco: Never Used   Substance and Sexual Activity     Alcohol use: Yes     Comment: 1 drink per day     Drug use: No     Sexual activity: Yes     Partners: Female   Lifestyle     Physical activity:     Days per week: None     Minutes per session: None     Stress: None   Relationships     Social connections:     Talks on phone: None     Gets together: None     Attends [employer] service: None     Active member of club or organization: None     Attends meetings of clubs or organizations: None     Relationship status: None     Intimate partner violence:     Fear of current or ex partner: None     Emotionally abused: None     Physically abused: None     Forced sexual activity: None   Other Topics Concern      Service Not Asked     Blood Transfusions Not Asked     Caffeine Concern Yes     Comment: 1 cup coffee daily     Occupational Exposure Not Asked     Hobby Hazards Not Asked     Sleep Concern No     Stress Concern Yes     Comment: related to relationship     Weight Concern No     Special Diet Yes     Comment: mediterranian diet     Back Care Not Asked     Exercise Yes     Comment: walking, cardiac rehab starting     Bike Helmet Not Asked     Seat Belt Not Asked     Self-Exams Not Asked     Parent/sibling w/ CABG, MI or angioplasty before 65F 55M? Not Asked   Social History Narrative     None       Review of Systems:  Skin:  Negative       Eyes:  Positive for glasses    ENT:  Negative    "   Respiratory:  Negative       Cardiovascular:  Negative      Gastroenterology: Negative      Genitourinary:  Negative      Musculoskeletal:  Positive for arthritis;joint pain left hip pain  Neurologic:  Negative      Psychiatric:  Negative      Heme/Lymph/Imm:  Positive for allergies    Endocrine:  Negative        Physical Exam:  Vitals: /75   Pulse 69   Ht 1.727 m (5' 8\")   Wt 90.2 kg (198 lb 14.4 oz)   BMI 30.24 kg/m      Constitutional:  in no acute distress;cooperative;well developed;well nourished        Skin:  no apparent skin lesions or masses noted          Head:  normocephalic, no masses or lesions        Eyes:  pupils equal and round        Lymph:No Cervical lymphadenopathy present     ENT:  no pallor or cyanosis        Neck:  JVP normal;no carotid bruit;carotid pulses are full and equal bilaterally        Respiratory:  clear to auscultation;normal symmetry         Cardiac: regular rhythm, normal S1/S2, no S3 or S4, apical impulse not displaced, no murmurs, gallops or rubs                pulses full and equal                                        GI:  not assessed this visit        Extremities and Muscular Skeletal:  no edema;no deformities, clubbing, cyanosis, erythema observed              Neurological:  affect appropriate;no gross motor deficits        Psych:  Alert and Oriented x 3        CC  No referring provider defined for this encounter.              "

## 2019-04-30 NOTE — LETTER
4/30/2019      Filipe Goldberg MD  [address]  [facility] [address]      RE: Yogesh Abreu       Dear Colleague,    I had the pleasure of seeing Yogesh Abreu in the [facility].    Service Date: 04/30/2019      HISTORY OF PRESENT ILLNESS:  It was my pleasure to see your patient, Yogesh Abreu, who was previously followed by Ji Rogers.  This is a patient with a past history of an occluded right coronary artery which was stented with a drug-eluting stent in 2015.  He also has a history of antiphospholipid antibody with a deep venous thrombosis and pulmonary embolism on chronic Coumadin therapy.  He did have a stress echocardiogram performed in October which showed evidence of a possible nontransmural basal inferior myocardial infarct.  He also had short run of nonsustained VT and ventricular ectopics during the stress test which was not felt at that time by the echo reader or by Dr. Rogers to be clinically significant.  Dr. Rogers in fact saw the patient immediately after the stress echocardiogram.  It was also felt that there was no evidence of ischemia.      With that background in mind, the patient is feeling well.  He has no chest pains, chest pressure or shortness of breath.  He has had no syncope or near-syncope.  He did happen to mention on his Fitbit that he will occasionally notice a spike in heart rate.  It is unclear whether that is due to exercise or due to another cause, but he does not feel palpitations.        His last lipid profile was today showing an LDL of 113, HDL of 42 and triglycerides of 86 with a total cholesterol of 172.  He has not had lipids drawn since that time.  Certainly, one would like to see the LDL lower than that and he is not on the highest dose of atorvastatin; 80 mg is recommended, but 40 mg is acceptable  according to the language of the ACC guidelines.        His blood pressure was excellent today at 114/75.      IMPRESSION:   1.   Coronary artery disease and probable non-ST elevation basal inferior myocardial infarct.  The patient is feeling well with no symptoms of angina pectoris.   2.  Lipid profile.  He has mild HDL deficiency and his LDL should probably be a little should probably be less than it is present.   3.  History of DVT and pulmonary embolism with antiphospholipid antibody present on chronic Coumadin therapy.   4.  Normotensive.   5.  Episodes of tachycardia on Fitbit, the origin of which is unclear at this stage.      PLAN:     1.  We will obtain a 24-hour Holter monitor to determine heart rate and arrhythmia.   2.  I am going to increase the dose of atorvastatin to 80 mg per day.  We will check a lipid profile in 6 weeks' time with liver function tests.  He is to call us if he has not noticed anything abnormal such as muscle aching or muscle fatigue.        Otherwise, I will see the patient back again in 1 year's time, but as always, he has been told to contact us if he has any questions or any concerns.  It has been my pleasure to be involved in the care of this very nice,      cc:      Filipe Goldberg MD    [facility]   [address]   [place] [address]         MONI JOHNSON MD, [facility]             D: 2019   T: 2019   MT: SUMI      Name:     RUIZ LAIRD   MRN:      [id number]        Account:      [id number]   :      1940           Service Date: 2019      Document: [id number]           Outpatient Encounter Medications as of 2019   Medication Sig Dispense Refill     ADVAIR DISKUS 250-50 MCG/DOSE inhaler INHALE 1 PUFF INTO THE LUNGS TWICE DAILY 60 Inhaler 2     albuterol (PROAIR HFA/PROVENTIL HFA/VENTOLIN HFA) 108 (90 BASE) MCG/ACT Inhaler Inhale 2 puffs into the lungs every 6 hours as needed for shortness of breath / dyspnea or wheezing 1 Inhaler 0     atorvastatin (LIPITOR) 80 MG tablet Take 1 tablet (80 mg) by mouth At Bedtime 90 tablet 3     Calcium Citrate-Vitamin  D (CALCIUM CITRATE + D PO) Take 600 mg by mouth 2 times daily       Cholecalciferol (VITAMIN D3 PO) Take 1,000 Units by mouth 2 times daily       coenzyme Q-10 capsule Take 1 capsule (100 mg) by mouth daily 90 capsule 3     [] HYDROcodone-acetaminophen (NORCO) 5-325 MG tablet Take 0.5-1 tablets by mouth 2 times daily as needed for pain 15 tablet 0     lisinopril (PRINIVIL/ZESTRIL) 5 MG tablet Take 1 tablet (5 mg) by mouth daily 90 tablet 3     metoprolol succinate ER (TOPROL-XL) 25 MG 24 hr tablet Take 12.5 mg by mouth daily       nitroglycerin (NITROSTAT) 0.4 MG sublingual tablet Place 1 tablet (0.4 mg) under the tongue every 5 minutes as needed for chest pain 25 tablet 3     vardenafil (LEVITRA) 20 MG tablet Take 0.5-1 tablets (10-20 mg) by mouth daily as needed for erectile dysfunction Never use with nitroglycerin, terazosin or doxazosin. 12 tablet 3     warfarin (COUMADIN) 5 MG tablet Take 1 1/2 tabs daily or as directed per INR clinic 150 tablet 1     ASPIRIN NOT PRESCRIBED (INTENTIONAL) Please choose reason not prescribed, below 0 each 0     [DISCONTINUED] atorvastatin (LIPITOR) 40 MG tablet Take 1 tablet (40 mg) by mouth At Bedtime 90 tablet 3     [DISCONTINUED] metoprolol succinate (TOPROL-XL) 25 MG 24 hr tablet Take 1 tablet (25 mg) by mouth daily (Patient taking differently: Take 12.5 mg by mouth daily ) 90 tablet 3     No facility-administered encounter medications on file as of 2019.        Again, thank you for allowing me to participate in the care of your patient.      Sincerely,    Frederic Isaacs MD, MD     [facility]

## 2019-04-30 NOTE — LETTER
4/30/2019    Filipe Goldberg MD  [address]  [place] [address]    RE: Yogesh Abreu       Dear Colleague,    I had the pleasure of seeing Yogesh Abreu in the [facility].    HPI and Plan:   See dictation    Orders Placed This Encounter   Procedures     Lipid Profile     ALT     Lipid Profile     ALT     Follow-Up with Cardiologist     Holter Monitor 24 hour Adult Pediatric       Orders Placed This Encounter   Medications     metoprolol succinate ER (TOPROL-XL) 25 MG 24 hr tablet     Sig: Take 12.5 mg by mouth daily     atorvastatin (LIPITOR) 80 MG tablet     Sig: Take 1 tablet (80 mg) by mouth At Bedtime     Dispense:  90 tablet     Refill:  3       Medications Discontinued During This Encounter   Medication Reason     metoprolol succinate (TOPROL-XL) 25 MG 24 hr tablet Medication Reconciliation Clean Up     atorvastatin (LIPITOR) 40 MG tablet Reorder         Encounter Diagnoses   Name Primary?     Coronary artery disease involving native coronary artery of native heart without angina pectoris Yes     Essential hypertension      Mixed hyperlipidemia      Tachycardia      Antiphospholipid antibody syndrome (H)      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)        CURRENT MEDICATIONS:  Current Outpatient Medications   Medication Sig Dispense Refill     ADVAIR DISKUS 250-50 MCG/DOSE inhaler INHALE 1 PUFF INTO THE LUNGS TWICE DAILY 60 Inhaler 2     albuterol (PROAIR HFA/PROVENTIL HFA/VENTOLIN HFA) 108 (90 BASE) MCG/ACT Inhaler Inhale 2 puffs into the lungs every 6 hours as needed for shortness of breath / dyspnea or wheezing 1 Inhaler 0     atorvastatin (LIPITOR) 80 MG tablet Take 1 tablet (80 mg) by mouth At Bedtime 90 tablet 3     Calcium Citrate-Vitamin D (CALCIUM CITRATE + D PO) Take 600 mg by mouth 2 times daily       Cholecalciferol (VITAMIN D3 PO) Take 1,000 Units by mouth 2 times daily        coenzyme Q-10 capsule Take 1 capsule (100 mg) by mouth daily 90 capsule 3     HYDROcodone-acetaminophen (NORCO) 5-325 MG tablet Take 0.5-1 tablets by mouth 2 times daily as needed for pain 15 tablet 0     lisinopril (PRINIVIL/ZESTRIL) 5 MG tablet Take 1 tablet (5 mg) by mouth daily 90 tablet 3     metoprolol succinate ER (TOPROL-XL) 25 MG 24 hr tablet Take 12.5 mg by mouth daily       nitroglycerin (NITROSTAT) 0.4 MG sublingual tablet Place 1 tablet (0.4 mg) under the tongue every 5 minutes as needed for chest pain 25 tablet 3     vardenafil (LEVITRA) 20 MG tablet Take 0.5-1 tablets (10-20 mg) by mouth daily as needed for erectile dysfunction Never use with nitroglycerin, terazosin or doxazosin. 12 tablet 3     warfarin (COUMADIN) 5 MG tablet Take 1 1/2 tabs daily or as directed per INR clinic 150 tablet 1     ASPIRIN NOT PRESCRIBED (INTENTIONAL) Please choose reason not prescribed, below 0 each 0       ALLERGIES     Allergies   Allergen Reactions     Simvastatin        PAST MEDICAL HISTORY:  Past Medical History:   Diagnosis Date     Antiphospholipid antibody syndrome (H) 5/25/2017     CAD (coronary artery disease), native coronary artery 10/2015    9/2015 Heart cath - 90% diagonal, 50-60% CFX, 100% prox RCA occlusion - MAL placed in RCA, 10/2015 EF 35-40% by Echo     DVT (deep venous thrombosis) (H) 9/2015 9/2015 Occlusive DVT extending from left common femoral to mid left popliteal vein     Hypercholesteraemia      Hypertension      PE (pulmonary embolism) 9/2015     PMR (polymyalgia rheumatica) (H)      Polymyalgia rheumatica (H)      STEMI (ST elevation myocardial infarction) (H) 10/2015    10/2015 Inferior STEMI - 100% RCA occlusion with MAL placed       PAST SURGICAL HISTORY:  Past Surgical History:   Procedure Laterality Date     HEART CATH STENT COR W/WO PTCA  10/2015    90% diagonal, 50-60% CFX, 100% prox RCA occlusion - MAL placed in RCA     ORTHOPEDIC SURGERY  08/2015    right carpal tunnel        FAMILY HISTORY:  Family History   Problem Relation Age of Onset     Hypertension Brother      Hypertension Brother        SOCIAL HISTORY:  Social History     Socioeconomic History     Marital status:      Spouse name: None     Number of children: None     Years of education: None     Highest education level: None   Occupational History     None   Social Needs     Financial resource strain: None     Food insecurity:     Worry: None     Inability: None     Transportation needs:     Medical: None     Non-medical: None   Tobacco Use     Smoking status: Former Smoker     Packs/day: 0.50     Years: 5.00     Pack years: 2.50     Types: Cigarettes     Start date:      Last attempt to quit:      Years since quittin.3     Smokeless tobacco: Never Used   Substance and Sexual Activity     Alcohol use: Yes     Comment: 1 drink per day     Drug use: No     Sexual activity: Yes     Partners: Female   Lifestyle     Physical activity:     Days per week: None     Minutes per session: None     Stress: None   Relationships     Social connections:     Talks on phone: None     Gets together: None     Attends Yazdanism service: None     Active member of club or organization: None     Attends meetings of clubs or organizations: None     Relationship status: None     Intimate partner violence:     Fear of current or ex partner: None     Emotionally abused: None     Physically abused: None     Forced sexual activity: None   Other Topics Concern      Service Not Asked     Blood Transfusions Not Asked     Caffeine Concern Yes     Comment: 1 cup coffee daily     Occupational Exposure Not Asked     Hobby Hazards Not Asked     Sleep Concern No     Stress Concern Yes     Comment: related to relationship     Weight Concern No     Special Diet Yes     Comment: mediterranian diet     Back Care Not Asked     Exercise Yes     Comment: walking, cardiac rehab starting     Bike Helmet Not Asked     Seat Belt Not Asked      "Self-Exams Not Asked     Parent/sibling w/ CABG, MI or angioplasty before 65F 55M? Not Asked   Social History Narrative     None       Review of Systems:  Skin:  Negative       Eyes:  Positive for glasses    ENT:  Negative      Respiratory:  Negative       Cardiovascular:  Negative      Gastroenterology: Negative      Genitourinary:  Negative      Musculoskeletal:  Positive for arthritis;joint pain left hip pain  Neurologic:  Negative      Psychiatric:  Negative      Heme/Lymph/Imm:  Positive for allergies    Endocrine:  Negative        Physical Exam:  Vitals: /75   Pulse 69   Ht 1.727 m (5' 8\")   Wt 90.2 kg (198 lb 14.4 oz)   BMI 30.24 kg/m       Constitutional:  in no acute distress;cooperative;well developed;well nourished        Skin:  no apparent skin lesions or masses noted          Head:  normocephalic, no masses or lesions        Eyes:  pupils equal and round        Lymph:No Cervical lymphadenopathy present     ENT:  no pallor or cyanosis        Neck:  JVP normal;no carotid bruit;carotid pulses are full and equal bilaterally        Respiratory:  clear to auscultation;normal symmetry         Cardiac: regular rhythm, normal S1/S2, no S3 or S4, apical impulse not displaced, no murmurs, gallops or rubs                pulses full and equal                                        GI:  not assessed this visit        Extremities and Muscular Skeletal:  no edema;no deformities, clubbing, cyanosis, erythema observed              Neurological:  affect appropriate;no gross motor deficits        Psych:  Alert and Oriented x 3        CC  No referring provider defined for this encounter.                Thank you for allowing me to participate in the care of your patient.      Sincerely,     Frederci Isaacs MD, MD     [facility]    cc:   No referring provider defined for this encounter.        "

## 2019-04-30 NOTE — PROGRESS NOTES
Service Date: 04/30/2019      HISTORY OF PRESENT ILLNESS:  It was my pleasure to see your patient, Yogesh Abreu, who was previously followed by Ji Rogers.  This is a patient with a past history of an occluded right coronary artery which was stented with a drug-eluting stent in 2015.  He also has a history of antiphospholipid antibody with a deep venous thrombosis and pulmonary embolism on chronic Coumadin therapy.  He did have a stress echocardiogram performed in October which showed evidence of a possible nontransmural basal inferior myocardial infarct.  He also had short run of nonsustained VT and ventricular ectopics during the stress test which was not felt at that time by the echo reader or by Dr. Rogers to be clinically significant.  Dr. Rogers in fact saw the patient immediately after the stress echocardiogram.  It was also felt that there was no evidence of ischemia.      With that background in mind, the patient is feeling well.  He has no chest pains, chest pressure or shortness of breath.  He has had no syncope or near-syncope.  He did happen to mention on his Fitbit that he will occasionally notice a spike in heart rate.  It is unclear whether that is due to exercise or due to another cause, but he does not feel palpitations.        His last lipid profile was today showing an LDL of 113, HDL of 42 and triglycerides of 86 with a total cholesterol of 172.  He has not had lipids drawn since that time.  Certainly, one would like to see the LDL lower than that and he is not on the highest dose of atorvastatin; 80 mg is recommended, but 40 mg is acceptable  according to the language of the ACC guidelines.        His blood pressure was excellent today at 114/75.      IMPRESSION:   1.  Coronary artery disease and probable non-ST elevation basal inferior myocardial infarct.  The patient is feeling well with no symptoms of angina pectoris.   2.  Lipid profile.  He has mild HDL deficiency and his LDL should  probably be a little should probably be less than it is present.   3.  History of DVT and pulmonary embolism with antiphospholipid antibody present on chronic Coumadin therapy.   4.  Normotensive.   5.  Episodes of tachycardia on Fitbit, the origin of which is unclear at this stage.      PLAN:     1.  We will obtain a 24-hour Holter monitor to determine heart rate and arrhythmia.   2.  I am going to increase the dose of atorvastatin to 80 mg per day.  We will check a lipid profile in 6 weeks' time with liver function tests.  He is to call us if he has not noticed anything abnormal such as muscle aching or muscle fatigue.        Otherwise, I will see the patient back again in 1 year's time, but as always, he has been told to contact us if he has any questions or any concerns.  It has been my pleasure to be involved in the care of this very nice,      cc:      Filipe Goldberg MD    [facility]   [address]   [place] [address]         MONI JOHNSON MD, FACC             D: 2019   T: 2019   MT: SUMI      Name:     RUIZ LAIRD   MRN:      [id number]        Account:      [id number]   :      1940           Service Date: 2019      Document: [id number]

## 2019-05-06 ENCOUNTER — HOSPITAL ENCOUNTER (OUTPATIENT)
Dept: CARDIOLOGY | Facility: CLINIC | Age: 79
Discharge: HOME | End: 2019-05-06
Attending: INTERNAL MEDICINE | Admitting: INTERNAL MEDICINE
Payer: COMMERCIAL

## 2019-05-06 DIAGNOSIS — R00.0 TACHYCARDIA: ICD-10-CM

## 2019-05-06 PROCEDURE — 93227 XTRNL ECG REC<48 HR R&I: CPT | Performed by: INTERNAL MEDICINE

## 2019-05-06 PROCEDURE — 93225 XTRNL ECG REC<48 HRS REC: CPT | Mod: 52

## 2019-05-09 ENCOUNTER — TELEPHONE (OUTPATIENT)
Dept: CARDIOLOGY | Facility: CLINIC | Age: 79
End: 2019-05-09

## 2019-05-09 NOTE — TELEPHONE ENCOUNTER
No runs of VT which were seen on the stress test. Short asymtomatic runs of SVT. Continue on med Rx . Thx

## 2019-05-09 NOTE — TELEPHONE ENCOUNTER
24 hour holter results received. Will send to Dr. Isaacs for review.     24 hour holter results  1. Yogesh Abreu was monitored for 24 hours. The quality of the tracing was good. The predominant rhythm is sinus with sinu s arrhythmia. During this  time the average HR was 72 bpm with a minimum HR of 48 bpm at 3:23:17 and a maximum HR of 114 bpm at 8:53:10. The IN interval measured .12-.15  sec., the QRS measured .08-.09 sec., and the QT varied with R to R interval .31-.43 sec.  2. 15 Isolated PVCs.  3. 436 SVPBs with 17 paired beats, and 17 runs. The longest run was 8 beats at 9:05:45 with a rate of 106 bpm and the fastest was 3 beats at 13:30:20  with a rate of 160 bpm.  4. No symptoms were identified by the patient during the recording.      4/30/19 OV note Dr. Isaacs  IMPRESSION:   1.  Coronary artery disease and probable non-ST elevation basal inferior myocardial infarct.  The patient is feeling well with no symptoms of angina pectoris.   2.  Lipid profile.  He has mild HDL deficiency and his LDL should probably be a little should probably be less than it is present.   3.  History of DVT and pulmonary embolism with antiphospholipid antibody present on chronic Coumadin therapy.   4.  Normotensive.   5.  Episodes of tachycardia on Fitbit, the origin of which is unclear at this stage.      PLAN:     1.  We will obtain a 24-hour Holter monitor to determine heart rate and arrhythmia.   2.  I am going to increase the dose of atorvastatin to 80 mg per day.  We will check a lipid profile in 6 weeks' time with liver function tests.  He is to call us if he has not noticed anything abnormal such as muscle aching or muscle fatigue.        Otherwise, I will see the patient back again in 1 year's time, but as always, he has been told to contact us if he has any questions or any concerns.  It has been my pleasure to be involved in the care of this very nice,

## 2019-05-09 NOTE — TELEPHONE ENCOUNTER
RN called patient and left  for patient with results and Dr. Isaacs's recommendations below.       No runs of VT which were seen on the stress test. Short asymtomatic runs of SVT. Continue on med Rx . Thx

## 2019-05-14 ENCOUNTER — TRANSFERRED RECORDS (OUTPATIENT)
Dept: HEALTH INFORMATION MANAGEMENT | Facility: CLINIC | Age: 79
End: 2019-05-14

## 2019-05-14 LAB — INR PPP: 2.6

## 2019-05-15 ENCOUNTER — ANTICOAGULATION THERAPY VISIT (OUTPATIENT)
Dept: CARDIOLOGY | Facility: CLINIC | Age: 79
End: 2019-05-15
Payer: COMMERCIAL

## 2019-05-15 PROCEDURE — 99207 ZZC NO CHARGE NURSE ONLY: CPT

## 2019-05-15 NOTE — PROGRESS NOTES
ANTICOAGULATION FOLLOW-UP CLINIC VISIT    Patient Name:  Yogesh Abreu  Date:  5/15/2019  Contact Type:  [place]    SUBJECTIVE:         OBJECTIVE    INR   Date Value Ref Range Status   2019 2.6  Final     Chromogenic Factor 10   Date Value Ref Range Status   10/12/2015 28 (L) 70 - 130 % Final     Comment:     Therapeutic Range:  A Chromogenic Factor 10 level of approximately 20-40%   inversely correlates with an INR of 2-3 for patients receiving Warfarin.   Chromogenic Factor 10 levels below 20% indicate an INR greater than 3 and   levels above 40% indicate an INR less than 2.         ASSESSMENT / PLAN  INR assessment THER    Recheck INR In: 2 WEEKS    INR Location Home INR    Billed home INR No      Anticoagulation Summary  As of 5/15/2019    INR goal:   2.0-3.0   TTR:   74.9 % (3 y)   INR used for dosin.6 (2019)   Warfarin maintenance plan:   7.5 mg (5 mg x 1.5) every day   Full warfarin instructions:   7.5 mg every day   Weekly warfarin total:   52.5 mg   No change documented:   Tanja Wills RN   Plan last modified:   Tanja Wills RN (2019)   Next INR check:   2019   Target end date:   Indefinite    Indications    Long-term (current) use of anticoagulants [Z79.01] [Z79.01]  PE (pulmonary embolism) [I26.99]             Anticoagulation Episode Summary     INR check location:       Preferred lab:       Send INR reminders to:   [facility] HEART INR NURSE    Comments:         Anticoagulation Care Providers     Provider Role Specialty Phone number    Frederic Isaacs MD [facility] Cardiology [phone]            See the Encounter Report to view Anticoagulation Flowsheet and Dosing Calendar (Go to Encounters tab in chart review, and find the Anticoagulation Therapy Visit)    19 Home INR 2.6 Will continue current dosing of 7.5 mg daily with recheck of INR in 2 weeks. Will call pt after the next INR check.    Tanja Wills RN

## 2019-05-28 ENCOUNTER — ANTICOAGULATION THERAPY VISIT (OUTPATIENT)
Dept: CARDIOLOGY | Facility: CLINIC | Age: 79
End: 2019-05-28
Payer: COMMERCIAL

## 2019-05-28 ENCOUNTER — TRANSFERRED RECORDS (OUTPATIENT)
Dept: HEALTH INFORMATION MANAGEMENT | Facility: CLINIC | Age: 79
End: 2019-05-28

## 2019-05-28 LAB — INR PPP: 2.3

## 2019-05-28 PROCEDURE — 99207 ZZC NO CHARGE NURSE ONLY: CPT | Performed by: INTERNAL MEDICINE

## 2019-05-28 NOTE — PROGRESS NOTES
ANTICOAGULATION FOLLOW-UP CLINIC VISIT    Patient Name:  Yogesh Abreu  Date:  2019  Contact Type:  P[employer] [employer]    SUBJECTIVE:         OBJECTIVE    INR   Date Value Ref Range Status   2019 2.3  Final     Chromogenic Factor 10   Date Value Ref Range Status   10/12/2015 28 (L) 70 - 130 % Final     Comment:     Therapeutic Range:  A Chromogenic Factor 10 level of approximately 20-40%   inversely correlates with an INR of 2-3 for patients receiving Warfarin.   Chromogenic Factor 10 levels below 20% indicate an INR greater than 3 and   levels above 40% indicate an INR less than 2.         ASSESSMENT / PLAN  INR assessment THER    Recheck INR In: 2 WEEKS    INR Location Home INR    Billed home INR No      Anticoagulation Summary  As of 2019    INR goal:   2.0-3.0   TTR:   75.3 % (3 y)   INR used for dosin.3 (2019)   Warfarin maintenance plan:   7.5 mg (5 mg x 1.5) every day   Full warfarin instructions:   7.5 mg every day   Weekly warfarin total:   52.5 mg   No change documented:   Tanja Wills RN   Plan last modified:   Tanja Wills RN (2019)   Next INR check:   2019   Target end date:   Indefinite    Indications    Long-term (current) use of anticoagulants [Z79.01] [Z79.01]  PE (pulmonary embolism) [I26.99]             Anticoagulation Episode Summary     INR check location:       Preferred lab:       Send INR reminders to:   [facility] HEART INR NURSE    Comments:         Anticoagulation Care Providers     Provider Role Specialty Phone number    Frederic Isaacs MD [facility] Cardiology [phone]            See the Encounter Report to view Anticoagulation Flowsheet and Dosing Calendar (Go to Encounters tab in chart review, and find the Anticoagulation Therapy Visit)    Home INR 2.3 Left message asking pt to call back if he has had any change in meds or diet, any abnormal bleeding or bruising. If no changes, then he was instructed to continue current dosing of  7.5 mg daily and recheck home INR in 2 weeks.    Tanja Wills RN

## 2019-06-05 DIAGNOSIS — I25.10 CORONARY ARTERY DISEASE INVOLVING NATIVE CORONARY ARTERY OF NATIVE HEART WITHOUT ANGINA PECTORIS: ICD-10-CM

## 2019-06-05 LAB
ALT SERPL W P-5'-P-CCNC: 9 U/L (ref 5–30)
CHOLEST SERPL-MCNC: 135 MG/DL
HDLC SERPL-MCNC: 38 MG/DL
LDLC SERPL CALC-MCNC: 84 MG/DL
NONHDLC SERPL-MCNC: 97 MG/DL
TRIGL SERPL-MCNC: 66 MG/DL

## 2019-06-05 PROCEDURE — 80061 LIPID PANEL: CPT | Performed by: INTERNAL MEDICINE

## 2019-06-05 PROCEDURE — 84460 ALANINE AMINO (ALT) (SGPT): CPT | Performed by: INTERNAL MEDICINE

## 2019-06-05 PROCEDURE — 36415 COLL VENOUS BLD VENIPUNCTURE: CPT | Performed by: INTERNAL MEDICINE

## 2019-06-11 ENCOUNTER — TRANSFERRED RECORDS (OUTPATIENT)
Dept: HEALTH INFORMATION MANAGEMENT | Facility: CLINIC | Age: 79
End: 2019-06-11

## 2019-06-11 LAB — INR PPP: 2.5

## 2019-06-12 ENCOUNTER — ANTICOAGULATION THERAPY VISIT (OUTPATIENT)
Dept: CARDIOLOGY | Facility: CLINIC | Age: 79
End: 2019-06-12
Payer: COMMERCIAL

## 2019-06-12 PROCEDURE — 99207 ZZC NO CHARGE LOS: CPT

## 2019-06-12 NOTE — PROGRESS NOTES
ANTICOAGULATION FOLLOW-UP CLINIC VISIT    Patient Name:  Yogesh Abreu  Date:  2019  Contact Type:  [employer]    SUBJECTIVE:         OBJECTIVE    INR   Date Value Ref Range Status   2019 2.5  Final     Chromogenic Factor 10   Date Value Ref Range Status   10/12/2015 28 (L) 70 - 130 % Final     Comment:     Therapeutic Range:  A Chromogenic Factor 10 level of approximately 20-40%   inversely correlates with an INR of 2-3 for patients receiving Warfarin.   Chromogenic Factor 10 levels below 20% indicate an INR greater than 3 and   levels above 40% indicate an INR less than 2.         ASSESSMENT / PLAN  INR assessment THER    Recheck INR In: 2 WEEKS    INR Location Home INR    Billed home INR No      Anticoagulation Summary  As of 2019    INR goal:   2.0-3.0   TTR:   75.6 % (3 y)   INR used for dosin.5 (2019)   Warfarin maintenance plan:   7.5 mg (5 mg x 1.5) every day   Full warfarin instructions:   7.5 mg every day   Weekly warfarin total:   52.5 mg   No change documented:   Tanja Wills RN   Plan last modified:   Tanja Wills RN (2019)   Next INR check:   2019   Target end date:   Indefinite    Indications    Long-term (current) use of anticoagulants [Z79.01] [Z79.01]  PE (pulmonary embolism) [I26.99]             Anticoagulation Episode Summary     INR check location:       Preferred lab:       Send INR reminders to:   [facility] HEART INR NURSE    Comments:         Anticoagulation Care Providers     Provider Role Specialty Phone number    Frederic Isaacs MD [facility] Cardiology [phone]            See the Encounter Report to view Anticoagulation Flowsheet and Dosing Calendar (Go to Encounters tab in chart review, and find the Anticoagulation Therapy Visit)    19 Home INR 2.5 Will continue current dosing of 7.5 mg daily and recheck in 2 weeks. Will call pt after the next INR check.    Tanja Wills RN

## 2019-06-14 ENCOUNTER — CARE COORDINATION (OUTPATIENT)
Dept: CARDIOLOGY | Facility: CLINIC | Age: 79
End: 2019-06-14

## 2019-06-14 NOTE — PROGRESS NOTES
Reviewed flp/alt showing   Recent Labs   Lab Test 06/05/19  0817 04/30/19  0720  10/10/15  0705 09/15/15   CHOL 135 172   < > 168 159   HDL 38* 42   < > 42 26   LDL 84 113*   < > 101 115   TRIG 66 86   < > 124 91   CHOLHDLRATIO  --   --   --  4.0 6.12    < > = values in this interval not displayed.     Lab Results   Component Value Date    ALT 9 06/05/2019     Called pt with results. He was taking atorvastatin 80 mg daily but stopped a week ago as he was having muscle aches in the larger muscles of his legs. He denies dark urine. He will continue to hold atorvastatin for another week then call him for an update. Shabbir GIRALDO

## 2019-06-20 ENCOUNTER — OFFICE VISIT (OUTPATIENT)
Dept: FAMILY MEDICINE | Facility: CLINIC | Age: 79
End: 2019-06-20
Payer: COMMERCIAL

## 2019-06-20 ENCOUNTER — MYC MEDICAL ADVICE (OUTPATIENT)
Dept: FAMILY MEDICINE | Facility: CLINIC | Age: 79
End: 2019-06-20

## 2019-06-20 VITALS
TEMPERATURE: 98.5 F | HEART RATE: 77 BPM | BODY MASS INDEX: 30.01 KG/M2 | OXYGEN SATURATION: 96 % | SYSTOLIC BLOOD PRESSURE: 122 MMHG | HEIGHT: 68 IN | DIASTOLIC BLOOD PRESSURE: 81 MMHG | WEIGHT: 198 LBS

## 2019-06-20 DIAGNOSIS — M54.42 LEFT-SIDED LOW BACK PAIN WITH LEFT-SIDED SCIATICA, UNSPECIFIED CHRONICITY: Primary | ICD-10-CM

## 2019-06-20 PROCEDURE — 99214 OFFICE O/P EST MOD 30 MIN: CPT | Performed by: NURSE PRACTITIONER

## 2019-06-20 ASSESSMENT — PATIENT HEALTH QUESTIONNAIRE - PHQ9
SUM OF ALL RESPONSES TO PHQ QUESTIONS 1-9: 1
5. POOR APPETITE OR OVEREATING: NOT AT ALL

## 2019-06-20 ASSESSMENT — MIFFLIN-ST. JEOR: SCORE: 1602.62

## 2019-06-20 ASSESSMENT — ANXIETY QUESTIONNAIRES
6. BECOMING EASILY ANNOYED OR IRRITABLE: SEVERAL DAYS
2. NOT BEING ABLE TO STOP OR CONTROL WORRYING: NOT AT ALL
3. WORRYING TOO MUCH ABOUT DIFFERENT THINGS: NOT AT ALL
1. FEELING NERVOUS, ANXIOUS, OR ON EDGE: NOT AT ALL
7. FEELING AFRAID AS IF SOMETHING AWFUL MIGHT HAPPEN: NOT AT ALL
5. BEING SO RESTLESS THAT IT IS HARD TO SIT STILL: NOT AT ALL
IF YOU CHECKED OFF ANY PROBLEMS ON THIS QUESTIONNAIRE, HOW DIFFICULT HAVE THESE PROBLEMS MADE IT FOR YOU TO DO YOUR WORK, TAKE CARE OF THINGS AT HOME, OR GET ALONG WITH OTHER PEOPLE: NOT DIFFICULT AT ALL
GAD7 TOTAL SCORE: 1

## 2019-06-20 NOTE — TELEPHONE ENCOUNTER
"Spoke with patient:     S: On-going groin pain     B: OV with Dustin 4/19- Seeing PT     A:     Denies bulging in the area     Pt used to have to walk with a cane, now he can ambulate without walker, but     Groin pain comes/goes and can be worse than prior.     Continues to experience nocturia, which concerns him.     \"I walk like an old man\"     Denies burning   Denies cloudiness   Yes- odor at times.     R: Advised F/U visit with Dustin today before going [place] of [place] the weekend.           "

## 2019-06-20 NOTE — PROGRESS NOTES
Subjective     Yogesh Abreu is a 78 year old male who presents to clinic today for the following health issues:    HPI     Chief Complaint   Patient presents with     Follow Up     Nocturia and Left groin pain off and on       Since our last visit the pain has diminished but still gets sharp pain with standing up.  Not feeling weak currently. Has been doing exercises for the legs which is very helpful   Can't straighten up when he goes to walk   Still getting L groin pain  Groin pain was really bad today with walking. Pain also is in the hip and lower glute   No groin pain with lying. Sitting he can feel a tingle.   Had more nocuria which is new for him.     Past Medical History:   Diagnosis Date     Antiphospholipid antibody syndrome (H) 2017     CAD (coronary artery disease), native coronary artery 10/2015    2015 Heart cath - 90% diagonal, 50-60% CFX, 100% prox RCA occlusion - MAL placed in RCA, 10/2015 EF 35-40% by Echo     DVT (deep venous thrombosis) (H) 2015 Occlusive DVT extending from left common femoral to mid left popliteal vein     Hypercholesteraemia      Hypertension      PE (pulmonary embolism) 2015     PMR (polymyalgia rheumatica) (H)      Polymyalgia rheumatica (H)      STEMI (ST elevation myocardial infarction) (H) 10/2015    10/2015 Inferior STEMI - 100% RCA occlusion with MAL placed     Family History   Problem Relation Age of Onset     Hypertension Brother      Hypertension Brother      Past Surgical History:   Procedure Laterality Date     HEART CATH STENT COR W/WO PTCA  10/2015    90% diagonal, 50-60% CFX, 100% prox RCA occlusion - MAL placed in RCA     ORTHOPEDIC SURGERY  2015    right carpal tunnel     Social History     Tobacco Use     Smoking status: Former Smoker     Packs/day: 0.50     Years: 5.00     Pack years: 2.50     Types: Cigarettes     Start date:      Last attempt to quit: 1975     Years since quittin.5     Smokeless tobacco: Never Used  "  Substance Use Topics     Alcohol use: Yes     Comment: 1 drink per day     Current Outpatient Medications   Medication Sig Dispense Refill     ADVAIR DISKUS 250-50 MCG/DOSE inhaler INHALE 1 PUFF INTO THE LUNGS TWICE DAILY 60 Inhaler 2     albuterol (PROAIR HFA/PROVENTIL HFA/VENTOLIN HFA) 108 (90 BASE) MCG/ACT Inhaler Inhale 2 puffs into the lungs every 6 hours as needed for shortness of breath / dyspnea or wheezing 1 Inhaler 0     atorvastatin (LIPITOR) 80 MG tablet Take 1 tablet (80 mg) by mouth At Bedtime 90 tablet 3     Calcium Citrate-Vitamin D (CALCIUM CITRATE + D PO) Take 600 mg by mouth 2 times daily       Cholecalciferol (VITAMIN D3 PO) Take 1,000 Units by mouth 2 times daily       coenzyme Q-10 capsule Take 1 capsule (100 mg) by mouth daily 90 capsule 3     lisinopril (PRINIVIL/ZESTRIL) 5 MG tablet Take 1 tablet (5 mg) by mouth daily 90 tablet 3     metoprolol succinate ER (TOPROL-XL) 25 MG 24 hr tablet Take 12.5 mg by mouth daily       nitroglycerin (NITROSTAT) 0.4 MG sublingual tablet Place 1 tablet (0.4 mg) under the tongue every 5 minutes as needed for chest pain 25 tablet 3     vardenafil (LEVITRA) 20 MG tablet Take 0.5-1 tablets (10-20 mg) by mouth daily as needed for erectile dysfunction Never use with nitroglycerin, terazosin or doxazosin. 12 tablet 3     warfarin (COUMADIN) 5 MG tablet Take 1 1/2 tabs daily or as directed per INR clinic 150 tablet 1     ASPIRIN NOT PRESCRIBED (INTENTIONAL) Please choose reason not prescribed, below 0 each 0     Allergies   Allergen Reactions     Simvastatin        Reviewed and updated as needed this visit by clinical staff and provider      Review of Systems   Detailed as above       Objective    /81 (BP Location: Left arm, Patient Position: Sitting, Cuff Size: Adult Regular)   Pulse 77   Temp 98.5  F (36.9  C)   Ht 1.727 m (5' 8\")   Wt 89.8 kg (198 lb)   SpO2 96%   BMI 30.11 kg/m    There is no height or weight on file to calculate BMI.  Physical " Exam   Constitutional: He appears well-developed.   HENT:   Head: Normocephalic.   Eyes: Conjunctivae are normal.   Pulmonary/Chest: Effort normal.   Neurological: He is alert.   Slow to stand. Initially has slow slightly bouncing gait, then becomes steady.    Skin: Skin is warm and dry.   Psychiatric: He has a normal mood and affect. Judgment normal.          Assessment and Plan:       ICD-10-CM    1. Left-sided low back pain with left-sided sciatica, unspecified chronicity M54.42 MR Lumbar Spine w/o Contrast       Persistent radicular symptoms. Has done PT with improvement but still has persistent symptoms.   Will start with MRI today. Will complete CT abd/pelvis if this is negative. Continue exercises as this has been very beneficial.       Dustin Alvarado, APRN, CNP  [facility]

## 2019-06-21 ASSESSMENT — ASTHMA QUESTIONNAIRES: ACT_TOTALSCORE: 24

## 2019-06-21 ASSESSMENT — ANXIETY QUESTIONNAIRES: GAD7 TOTAL SCORE: 1

## 2019-06-21 NOTE — PROGRESS NOTES
Attempted to call pt to assess muscle aches after holding atorvastatin, left message for pt to call back. Shabbir GIRALDO

## 2019-06-25 ENCOUNTER — TRANSFERRED RECORDS (OUTPATIENT)
Dept: HEALTH INFORMATION MANAGEMENT | Facility: CLINIC | Age: 79
End: 2019-06-25

## 2019-06-25 ENCOUNTER — HOSPITAL ENCOUNTER (OUTPATIENT)
Dept: MRI IMAGING | Facility: CLINIC | Age: 79
Discharge: HOME | End: 2019-06-25
Attending: NURSE PRACTITIONER | Admitting: NURSE PRACTITIONER
Payer: COMMERCIAL

## 2019-06-25 DIAGNOSIS — M54.42 LEFT-SIDED LOW BACK PAIN WITH LEFT-SIDED SCIATICA, UNSPECIFIED CHRONICITY: ICD-10-CM

## 2019-06-25 PROCEDURE — 72148 MRI LUMBAR SPINE W/O DYE: CPT

## 2019-06-25 NOTE — RESULT ENCOUNTER NOTE
Hi Vincenzo  It looks like you do have a little nerve irritation in your low back. I put in a referral for the spine specialist so they can review this with you and talk about options. Let me know if you have any questions   Dustin Alvarado, SOPHIA

## 2019-06-26 ENCOUNTER — ANTICOAGULATION THERAPY VISIT (OUTPATIENT)
Dept: CARDIOLOGY | Facility: CLINIC | Age: 79
End: 2019-06-26
Payer: COMMERCIAL

## 2019-06-26 DIAGNOSIS — I26.99 PULMONARY EMBOLISM (H): ICD-10-CM

## 2019-06-26 DIAGNOSIS — Z79.01 LONG TERM CURRENT USE OF ANTICOAGULANTS WITH INR GOAL OF 2.0-3.0: ICD-10-CM

## 2019-06-26 LAB — INR PPP: 2

## 2019-06-26 PROCEDURE — 99207 ZZC NO CHARGE NURSE ONLY: CPT | Performed by: INTERNAL MEDICINE

## 2019-06-26 NOTE — PROGRESS NOTES
ANTICOAGULATION FOLLOW-UP CLINIC VISIT    Patient Name:  Yogesh Abreu  Date:  2019  Contact Type:  Telephone    SUBJECTIVE:         OBJECTIVE    INR   Date Value Ref Range Status   2019 2.0  Final     Chromogenic Factor 10   Date Value Ref Range Status   10/12/2015 28 (L) 70 - 130 % Final     Comment:     Therapeutic Range:  A Chromogenic Factor 10 level of approximately 20-40%   inversely correlates with an INR of 2-3 for patients receiving Warfarin.   Chromogenic Factor 10 levels below 20% indicate an INR greater than 3 and   levels above 40% indicate an INR less than 2.         ASSESSMENT / PLAN  INR assessment THER    Recheck INR In: 2 WEEKS    INR Location Home INR      Anticoagulation Summary  As of 2019    INR goal:   2.0-3.0   TTR:   75.9 % (3.1 y)   INR used for dosin.0 (2019)   Warfarin maintenance plan:   7.5 mg (5 mg x 1.5) every day   Full warfarin instructions:   7.5 mg every day   Weekly warfarin total:   52.5 mg   Plan last modified:   Tanja Wills RN (2019)   Next INR check:   2019   Target end date:   Indefinite    Indications    PE (pulmonary embolism) [I26.99]  Long term current use of anticoagulants with INR goal of 2.0-3.0 [Z79.01]             Anticoagulation Episode Summary     INR check location:       Preferred lab:       Send INR reminders to:   [facility] HEART INR NURSE    Comments:         Anticoagulation Care Providers     Provider Role Specialty Phone number    Frederic Isaacs MD [facility] Cardiology [phone]            See the Encounter Report to view Anticoagulation Flowsheet and Dosing Calendar (Go to Encounters tab in chart review, and find the Anticoagulation Therapy Visit)    Home INR was 2.0 today per fax from [employer]. Pt denies any abnormal bleeding or bruising. Denies any recent medication or dietary changes or recent illness. Admits to forgetting to take yesterday's 7.5 mg dose of Warfarin and has not  taken it yet today. Instructed to take 10 mg of Warfarin today and tomorrow and then return to regular weekly dosing of 7.5 mg daily. Next INR check is scheduled in 2 weeks on 7/9/19. Pt verbalized understanding.    Katarzyna Austin RN

## 2019-07-08 ENCOUNTER — OFFICE VISIT (OUTPATIENT)
Dept: NEUROSURGERY | Facility: CLINIC | Age: 79
End: 2019-07-08
Attending: NEUROLOGICAL SURGERY
Payer: COMMERCIAL

## 2019-07-08 VITALS
WEIGHT: 202 LBS | TEMPERATURE: 97.1 F | SYSTOLIC BLOOD PRESSURE: 125 MMHG | HEIGHT: 69 IN | OXYGEN SATURATION: 97 % | HEART RATE: 68 BPM | BODY MASS INDEX: 29.92 KG/M2 | DIASTOLIC BLOOD PRESSURE: 77 MMHG

## 2019-07-08 DIAGNOSIS — M41.56 OTHER SECONDARY SCOLIOSIS, LUMBAR REGION: Primary | ICD-10-CM

## 2019-07-08 DIAGNOSIS — M53.9 MULTILEVEL DEGENERATIVE DISC DISEASE: ICD-10-CM

## 2019-07-08 DIAGNOSIS — G89.29 CHRONIC LEFT-SIDED LOW BACK PAIN WITH LEFT-SIDED SCIATICA: ICD-10-CM

## 2019-07-08 DIAGNOSIS — M54.42 CHRONIC LEFT-SIDED LOW BACK PAIN WITH LEFT-SIDED SCIATICA: ICD-10-CM

## 2019-07-08 PROCEDURE — 99203 OFFICE O/P NEW LOW 30 MIN: CPT | Performed by: NEUROLOGICAL SURGERY

## 2019-07-08 PROCEDURE — G0463 HOSPITAL OUTPT CLINIC VISIT: HCPCS

## 2019-07-08 RX ORDER — OMEGA-3 FATTY ACIDS/FISH OIL 300-1000MG
600 CAPSULE ORAL DAILY PRN
COMMUNITY

## 2019-07-08 ASSESSMENT — PAIN SCALES - GENERAL: PAINLEVEL: MILD PAIN (3)

## 2019-07-08 ASSESSMENT — MIFFLIN-ST. JEOR: SCORE: 1628.71

## 2019-07-08 NOTE — PROGRESS NOTES
I was asked by Dr. Alvarado to see this patient in consultation    78M w/ severe lumbar disc degeneration, scoliosis, left groin/thigh pain.  Several months of 8/10, throbbing left groin and anterior thigh pain, worse when he stands or walks.  No numbness or weakness.  Past PT without durable improvement.  MR with multi-level disc degeneration L2-3 and L3-4 severe left foraminal stenosis.       Past Medical History:   Diagnosis Date     Antiphospholipid antibody syndrome (H) 5/25/2017     CAD (coronary artery disease), native coronary artery 10/2015    9/2015 Heart cath - 90% diagonal, 50-60% CFX, 100% prox RCA occlusion - MAL placed in RCA, 10/2015 EF 35-40% by Echo     DVT (deep venous thrombosis) (H) 9/2015 9/2015 Occlusive DVT extending from left common femoral to mid left popliteal vein     Hypercholesteraemia      Hypertension      PE (pulmonary embolism) 9/2015     PMR (polymyalgia rheumatica) (H)      Polymyalgia rheumatica (H)      STEMI (ST elevation myocardial infarction) (H) 10/2015    10/2015 Inferior STEMI - 100% RCA occlusion with MAL placed     Past Surgical History:   Procedure Laterality Date     HEART CATH STENT COR W/WO PTCA  10/2015    90% diagonal, 50-60% CFX, 100% prox RCA occlusion - MAL placed in RCA     ORTHOPEDIC SURGERY  08/2015    right carpal tunnel     Social History     Socioeconomic History     Marital status:      Spouse name: Not on file     Number of children: Not on file     Years of education: Not on file     Highest education level: Not on file   Occupational History     Not on file   Social Needs     Financial resource strain: Not on file     Food insecurity:     Worry: Not on file     Inability: Not on file     Transportation needs:     Medical: Not on file     Non-medical: Not on file   Tobacco Use     Smoking status: Former Smoker     Packs/day: 0.50     Years: 5.00     Pack years: 2.50     Types: Cigarettes     Start date: 1965     Last attempt to quit: 1975      "Years since quittin.5     Smokeless tobacco: Never Used   Substance and Sexual Activity     Alcohol use: Yes     Comment: 1 drink per day     Drug use: No     Sexual activity: Yes     Partners: Female   Lifestyle     Physical activity:     Days per week: Not on file     Minutes per session: Not on file     Stress: Not on file   Relationships     Social connections:     Talks on phone: Not on file     Gets together: Not on file     Attends [employer] service: Not on file     Active member of club or organization: Not on file     Attends meetings of clubs or organizations: Not on file     Relationship status: Not on file     Intimate partner violence:     Fear of current or ex partner: Not on file     Emotionally abused: Not on file     Physically abused: Not on file     Forced sexual activity: Not on file   Other Topics Concern      Service Not Asked     Blood Transfusions Not Asked     Caffeine Concern Yes     Comment: 1 cup coffee daily     Occupational Exposure Not Asked     Hobby Hazards Not Asked     Sleep Concern No     Stress Concern Yes     Comment: related to relationship     Weight Concern No     Special Diet Yes     Comment: mediterranian diet     Back Care Not Asked     Exercise Yes     Comment: walking, cardiac rehab starting     Bike Helmet Not Asked     Seat Belt Not Asked     Self-Exams Not Asked     Parent/sibling w/ CABG, MI or angioplasty before 65F 55M? Not Asked   Social History Narrative     Not on file     Family History   Problem Relation Age of Onset     Hypertension Brother      Hypertension Brother         ROS: 10 point ROS neg other than the symptoms noted above in the HPI.    Physical Exam  /77 (BP Location: Left arm, Patient Position: Sitting, Cuff Size: Adult Large)   Pulse 68   Temp 97.1  F (36.2  C) (Oral)   Ht 1.74 m (5' 8.5\")   Wt 91.6 kg (202 lb)   SpO2 97%   BMI 30.27 kg/m    HEENT:  Normocephalic, atraumatic.  PERRLA.  EOM s intact.  Visual fields full to " gross exam  Neck:  Supple, non-tender, without lymphadenopathy.  Heart:  No peripheral edema  Lungs:  No SOB  Abdomen:  Non-distended.   Skin:  Warm and dry.  Extremities:  No edema, cyanosis or clubbing.  Psychiatric:  No apparent distress  Musculoskeletal:  Normal bulk and tone    NEUROLOGICAL EXAMINATION:     Mental status:  Alert and Oriented x 3, speech is fluent.  Cranial nerves:  II-XII intact.   Motor:    Shoulder Abduction:  Right:  5/5   Left:  5/5  Biceps:                      Right:  5/5   Left:  5/5  Triceps:                     Right:  5/5   Left:  5/5  Wrist Extensors:       Right:  5/5   Left:  5/5  Wrist Flexors:           Right:  5/5   Left:  5/5  interosseus :            Right:  5/5   Left:  5/5  Hip Flexion:                Right: 5/5  Left:  5/5  Quadriceps:             Right:  5/5  Left:  5/5  Hamstrings:             Right:  5/5  Left:  5/5  Gastroc Soleus:        Right:  5/5  Left:  5/5  Tib/Ant:                      Right:  5/5  Left:  5/5  EHL:                     Right:  5/5  Left:  5/5  Sensation:  Intact  Reflexes:  Negative Babinski.  Negative Clonus.  Negative Ghotra's.  Coordination:  Smooth finger to nose testing.   Negative pronator drift.  Smooth tandem walking.    A/P:  78M w/ severe lumbar disc degeneration, scoliosis, left groin/thigh pain    I had a discussion with the patient, reviewing the history, symptoms, and imaging  Will try another course of PT with PDR  Will order Left L2-3 JOSE

## 2019-07-08 NOTE — NURSING NOTE
"Yogesh Abreu is a 78 year old male who presents for:  Chief Complaint   Patient presents with     Consult For     left sided low back pain with left sided sciatica        Initial Vitals:  /77 (BP Location: Left arm, Patient Position: Sitting, Cuff Size: Adult Large)   Pulse 68   Temp 97.1  F (36.2  C) (Oral)   Ht 5' 8.5\" (1.74 m)   Wt 202 lb (91.6 kg)   SpO2 97%   BMI 30.27 kg/m   Estimated body mass index is 30.27 kg/m  as calculated from the following:    Height as of this encounter: 5' 8.5\" (1.74 m).    Weight as of this encounter: 202 lb (91.6 kg).. Body surface area is 2.1 meters squared. BP completed using cuff size: large  Mild Pain (3)        Nursing Comments: Patient reports a pain level today of a 3.5 out of 10.         Yasmin Huynh    "

## 2019-07-08 NOTE — LETTER
7/8/2019         RE: Yogesh Abreu  [address]  [place] [address]        Dear Colleague,    Thank you for referring your patient, Yogesh Abreu, to the [facility]UROS[facility]GERY [facility]INIC. Please see a copy of my visit note below.    I was asked by Dr. Alvarado to see this patient in consultation    78M w/ severe lumbar disc degeneration, scoliosis, left groin/thigh pain.  Several months of 8/10, throbbing left groin and anterior thigh pain, worse when he stands or walks.  No numbness or weakness.  Past PT without durable improvement.  MR with multi-level disc degeneration L2-3 and L3-4 severe left foraminal stenosis.       Past Medical History:   Diagnosis Date     Antiphospholipid antibody syndrome (H) 5/25/2017     CAD (coronary artery disease), native coronary artery 10/2015    9/2015 Heart cath - 90% diagonal, 50-60% CFX, 100% prox RCA occlusion - MAL placed in RCA, 10/2015 EF 35-40% by Echo     DVT (deep venous thrombosis) (H) 9/2015 9/2015 Occlusive DVT extending from left common femoral to mid left popliteal vein     Hypercholesteraemia      Hypertension      PE (pulmonary embolism) 9/2015     PMR (polymyalgia rheumatica) (H)      Polymyalgia rheumatica (H)      STEMI (ST elevation myocardial infarction) (H) 10/2015    10/2015 Inferior STEMI - 100% RCA occlusion with MAL placed     Past Surgical History:   Procedure Laterality Date     HEART CATH STENT COR W/WO PTCA  10/2015    90% diagonal, 50-60% CFX, 100% prox RCA occlusion - MAL placed in RCA     ORTHOPEDIC SURGERY  08/2015    right carpal tunnel     Social History     Socioeconomic History     Marital status:      Spouse name: Not on file     Number of children: Not on file     Years of education: Not on file     Highest education level: Not on file   Occupational History     Not on file   Social Needs     Financial resource strain: Not on file     Food insecurity:     Worry: Not on file     Inability: Not  on file     Transportation needs:     Medical: Not on file     Non-medical: Not on file   Tobacco Use     Smoking status: Former Smoker     Packs/day: 0.50     Years: 5.00     Pack years: 2.50     Types: Cigarettes     Start date:      Last attempt to quit:      Years since quittin.5     Smokeless tobacco: Never Used   Substance and Sexual Activity     Alcohol use: Yes     Comment: 1 drink per day     Drug use: No     Sexual activity: Yes     Partners: Female   Lifestyle     Physical activity:     Days per week: Not on file     Minutes per session: Not on file     Stress: Not on file   Relationships     Social connections:     Talks on phone: Not on file     Gets together: Not on file     Attends Yarsanism service: Not on file     Active member of club or organization: Not on file     Attends meetings of clubs or organizations: Not on file     Relationship status: Not on file     Intimate partner violence:     Fear of current or ex partner: Not on file     Emotionally abused: Not on file     Physically abused: Not on file     Forced sexual activity: Not on file   Other Topics Concern      Service Not Asked     Blood Transfusions Not Asked     Caffeine Concern Yes     Comment: 1 cup coffee daily     Occupational Exposure Not Asked     Hobby Hazards Not Asked     Sleep Concern No     Stress Concern Yes     Comment: related to relationship     Weight Concern No     Special Diet Yes     Comment: mediterranian diet     Back Care Not Asked     Exercise Yes     Comment: walking, cardiac rehab starting     Bike Helmet Not Asked     Seat Belt Not Asked     Self-Exams Not Asked     Parent/sibling w/ CABG, MI or angioplasty before 65F 55M? Not Asked   Social History Narrative     Not on file     Family History   Problem Relation Age of Onset     Hypertension Brother      Hypertension Brother         ROS: 10 point ROS neg other than the symptoms noted above in the HPI.    Physical Exam  /77 (BP  "Location: Left arm, Patient Position: Sitting, Cuff Size: Adult Large)   Pulse 68   Temp 97.1  F (36.2  C) (Oral)   Ht 1.74 m (5' 8.5\")   Wt 91.6 kg (202 lb)   SpO2 97%   BMI 30.27 kg/m     HEENT:  Normocephalic, atraumatic.  PERRLA.  EOM s intact.  Visual fields full to gross exam  Neck:  Supple, non-tender, without lymphadenopathy.  Heart:  No peripheral edema  Lungs:  No SOB  Abdomen:  Non-distended.   Skin:  Warm and dry.  Extremities:  No edema, cyanosis or clubbing.  Psychiatric:  No apparent distress  Musculoskeletal:  Normal bulk and tone    NEUROLOGICAL EXAMINATION:     Mental status:  Alert and Oriented x 3, speech is fluent.  Cranial nerves:  II-XII intact.   Motor:    Shoulder Abduction:  Right:  5/5   Left:  5/5  Biceps:                      Right:  5/5   Left:  5/5  Triceps:                     Right:  5/5   Left:  5/5  Wrist Extensors:       Right:  5/5   Left:  5/5  Wrist Flexors:           Right:  5/5   Left:  5/5  interosseus :            Right:  5/5   Left:  5/5  Hip Flexion:                Right: 5/5  Left:  5/5  Quadriceps:             Right:  5/5  Left:  5/5  Hamstrings:             Right:  5/5  Left:  5/5  Gastroc Soleus:        Right:  5/5  Left:  5/5  Tib/Ant:                      Right:  5/5  Left:  5/5  EHL:                     Right:  5/5  Left:  5/5  Sensation:  Intact  Reflexes:  Negative Babinski.  Negative Clonus.  Negative Ghotra's.  Coordination:  Smooth finger to nose testing.   Negative pronator drift.  Smooth tandem walking.    A/P:  78M w/ severe lumbar disc degeneration, scoliosis, left groin/thigh pain    I had a discussion with the patient, reviewing the history, symptoms, and imaging  Will try another course of PT with PDR  Will order Left L2-3 JOSE         Again, thank you for allowing me to participate in the care of your patient.        Sincerely,        Cristhian Malcolm MD    "

## 2019-07-08 NOTE — PATIENT INSTRUCTIONS
-Order placed for epidural steroid injection. The steroid can take 10-14 days to reach max effect. Please call our clinic if symptoms persist after this timeframe.  You can call [facility] to schedule your injection: [phone]      -Order placed for physical therapy with PDR. Please call our clinic if symptoms persist after your course of physical therapy.    [facility]  Phone: [phone]  Fax: [phone]

## 2019-07-10 ENCOUNTER — TRANSFERRED RECORDS (OUTPATIENT)
Dept: HEALTH INFORMATION MANAGEMENT | Facility: CLINIC | Age: 79
End: 2019-07-10

## 2019-07-10 LAB — INR PPP: 2.1

## 2019-07-10 NOTE — PROGRESS NOTES
Called pt, states he went back on the atorvastatin a few weeks ago. He states he did not notice much  Difference with muscle aches but did notice a little loss of memory while on the atorvastatin. Pt states he will continue the atorvastatin & call if symptoms worsen. Shabbir GIRALDO

## 2019-07-11 ENCOUNTER — ANTICOAGULATION THERAPY VISIT (OUTPATIENT)
Dept: CARDIOLOGY | Facility: CLINIC | Age: 79
End: 2019-07-11
Payer: COMMERCIAL

## 2019-07-11 DIAGNOSIS — Z79.01 LONG TERM CURRENT USE OF ANTICOAGULANTS WITH INR GOAL OF 2.0-3.0: ICD-10-CM

## 2019-07-11 PROCEDURE — 99207 ZZC NO CHARGE NURSE ONLY: CPT | Performed by: INTERNAL MEDICINE

## 2019-07-11 NOTE — PROGRESS NOTES
ANTICOAGULATION FOLLOW-UP CLINIC VISIT    Patient Name:  Yogesh Abreu  Date:  2019  Contact Type:  Telephone    SUBJECTIVE:  Patient Findings         Clinical Outcomes     Negatives:   Major bleeding event, Thromboembolic event, Anticoagulation-related hospital admission, Anticoagulation-related ED visit, Anticoagulation-related fatality           OBJECTIVE    INR   Date Value Ref Range Status   07/10/2019 2.1  Final     Chromogenic Factor 10   Date Value Ref Range Status   10/12/2015 28 (L) 70 - 130 % Final     Comment:     Therapeutic Range:  A Chromogenic Factor 10 level of approximately 20-40%   inversely correlates with an INR of 2-3 for patients receiving Warfarin.   Chromogenic Factor 10 levels below 20% indicate an INR greater than 3 and   levels above 40% indicate an INR less than 2.         ASSESSMENT / PLAN  INR assessment THER    Recheck INR In: 2 WEEKS    INR Location Home INR      Anticoagulation Summary  As of 2019    INR goal:   2.0-3.0   TTR:   76.2 % (3.1 y)   INR used for dosin.1 (7/10/2019)   Warfarin maintenance plan:   7.5 mg (5 mg x 1.5) every day   Full warfarin instructions:   7.5 mg every day   Weekly warfarin total:   52.5 mg   No change documented:   Mandy Ferguson RN   Plan last modified:   Tanja Wills, ENZO (2019)   Next INR check:   2019   Target end date:   Indefinite    Indications    PE (pulmonary embolism) [I26.99]  Long term current use of anticoagulants with INR goal of 2.0-3.0 [Z79.01]             Anticoagulation Episode Summary     INR check location:       Preferred lab:       Send INR reminders to:   [facility] HEART INR NURSE    Comments:         Anticoagulation Care Providers     Provider Role Specialty Phone number    Frederic Isaacs MD [facility] Cardiology [phone]            See the Encounter Report to view Anticoagulation Flowsheet and Dosing Calendar (Go to Encounters tab in chart review, and find the Anticoagulation  Therapy Visit)    INR 2.1 done at home on 7/10 per patient reported by Deana.  Continue same schedule and recheck in 2 weeks and call patient next time.  Canelo Ferguson, RN

## 2019-07-23 ENCOUNTER — ANTICOAGULATION THERAPY VISIT (OUTPATIENT)
Dept: CARDIOLOGY | Facility: CLINIC | Age: 79
End: 2019-07-23
Payer: COMMERCIAL

## 2019-07-23 ENCOUNTER — TRANSFERRED RECORDS (OUTPATIENT)
Dept: HEALTH INFORMATION MANAGEMENT | Facility: CLINIC | Age: 79
End: 2019-07-23

## 2019-07-23 DIAGNOSIS — Z79.01 LONG TERM CURRENT USE OF ANTICOAGULANTS WITH INR GOAL OF 2.0-3.0: ICD-10-CM

## 2019-07-23 DIAGNOSIS — I26.99 PULMONARY EMBOLISM (H): ICD-10-CM

## 2019-07-23 LAB — INR PPP: 2.4

## 2019-07-23 PROCEDURE — 99207 ZZC NO CHARGE NURSE ONLY: CPT

## 2019-07-23 NOTE — PROGRESS NOTES
ANTICOAGULATION FOLLOW-UP CLINIC VISIT    Patient Name:  Yogesh Abreu  Date:  2019  Contact Type:  Telephone/ patient    SUBJECTIVE:  Patient Findings     Comments:   The patient was assessed for diet, medication, and activity level changes, missed or extra doses, bruising or bleeding, with no problem findings.          Clinical Outcomes     Negatives:   Major bleeding event, Thromboembolic event, Anticoagulation-related hospital admission, Anticoagulation-related ED visit, Anticoagulation-related fatality    Comments:   The patient was assessed for diet, medication, and activity level changes, missed or extra doses, bruising or bleeding, with no problem findings.             OBJECTIVE    INR   Date Value Ref Range Status   2019 2.4  Final     Chromogenic Factor 10   Date Value Ref Range Status   10/12/2015 28 (L) 70 - 130 % Final     Comment:     Therapeutic Range:  A Chromogenic Factor 10 level of approximately 20-40%   inversely correlates with an INR of 2-3 for patients receiving Warfarin.   Chromogenic Factor 10 levels below 20% indicate an INR greater than 3 and   levels above 40% indicate an INR less than 2.         ASSESSMENT / PLAN  INR assessment THER    Recheck INR In: 2 WEEKS    INR Location Home INR    Billed home INR No      Anticoagulation Summary  As of 2019    INR goal:   2.0-3.0   TTR:   76.5 % (3.1 y)   INR used for dosin.4 (2019)   Warfarin maintenance plan:   7.5 mg (5 mg x 1.5) every day   Full warfarin instructions:   7.5 mg every day   Weekly warfarin total:   52.5 mg   No change documented:   Tanja Wills RN   Plan last modified:   Tanja Wills RN (2019)   Next INR check:   2019   Target end date:   Indefinite    Indications    PE (pulmonary embolism) [I26.99]  Long term current use of anticoagulants with INR goal of 2.0-3.0 [Z79.01]             Anticoagulation Episode Summary     INR check location:       Preferred lab:       Send INR reminders  to:   P[facility] HEART INR NURSE    Comments:         Anticoagulation Care Providers     Provider Role Specialty Phone number    RobertFrederic Mauro MD Responsible Cardiology [phone]            See the Encounter Report to view Anticoagulation Flowsheet and Dosing Calendar (Go to Encounters tab in chart review, and find the Anticoagulation Therapy Visit)    Home INR 2.4 No change in meds or diet. No abnormal bleeding or bruising. Will continue current dosing of 7.5 mg daily and recheck in 2 weeks.    Tanja Wills RN

## 2019-07-25 ENCOUNTER — OFFICE VISIT (OUTPATIENT)
Dept: FAMILY MEDICINE | Facility: CLINIC | Age: 79
End: 2019-07-25
Payer: COMMERCIAL

## 2019-07-25 ENCOUNTER — ANCILLARY PROCEDURE (OUTPATIENT)
Dept: GENERAL RADIOLOGY | Facility: CLINIC | Age: 79
End: 2019-07-25
Attending: NURSE PRACTITIONER
Payer: COMMERCIAL

## 2019-07-25 VITALS
WEIGHT: 200 LBS | HEART RATE: 73 BPM | OXYGEN SATURATION: 96 % | BODY MASS INDEX: 29.62 KG/M2 | DIASTOLIC BLOOD PRESSURE: 77 MMHG | HEIGHT: 69 IN | SYSTOLIC BLOOD PRESSURE: 119 MMHG | TEMPERATURE: 98.1 F

## 2019-07-25 DIAGNOSIS — R10.32 LEFT GROIN PAIN: ICD-10-CM

## 2019-07-25 DIAGNOSIS — R35.0 URINARY FREQUENCY: ICD-10-CM

## 2019-07-25 DIAGNOSIS — R30.0 DYSURIA: Primary | ICD-10-CM

## 2019-07-25 LAB
ALBUMIN UR-MCNC: NEGATIVE MG/DL
APPEARANCE UR: CLEAR
BILIRUB UR QL STRIP: NEGATIVE
COLOR UR AUTO: YELLOW
GLUCOSE UR STRIP-MCNC: NEGATIVE MG/DL
HGB UR QL STRIP: ABNORMAL
KETONES UR STRIP-MCNC: NEGATIVE MG/DL
LEUKOCYTE ESTERASE UR QL STRIP: NEGATIVE
NITRATE UR QL: NEGATIVE
PH UR STRIP: 6 PH (ref 5–7)
RBC #/AREA URNS AUTO: NORMAL /HPF
SOURCE: ABNORMAL
SP GR UR STRIP: 1.02 (ref 1–1.03)
UROBILINOGEN UR STRIP-ACNC: 0.2 EU/DL (ref 0.2–1)
WBC #/AREA URNS AUTO: NORMAL /HPF

## 2019-07-25 PROCEDURE — 99214 OFFICE O/P EST MOD 30 MIN: CPT | Performed by: NURSE PRACTITIONER

## 2019-07-25 PROCEDURE — 2894A XR PELVIS AND HIP LEFT 1 VIEW: CPT | Performed by: RADIOLOGY

## 2019-07-25 PROCEDURE — 2894A XR PELVIS AND HIP LEFT 1 VIEW: CPT

## 2019-07-25 PROCEDURE — 81001 URINALYSIS AUTO W/SCOPE: CPT | Performed by: NURSE PRACTITIONER

## 2019-07-25 ASSESSMENT — MIFFLIN-ST. JEOR: SCORE: 1614.63

## 2019-07-25 NOTE — RESULT ENCOUNTER NOTE
Vincenzo, your xray is really bad. The radiologist said severe arthritis. Possible there was even a fracture in there! This wouldn't be a new fracture considering your history. Definitely get into ortho as soon as possible. Let me know if you have questions   Dustin

## 2019-07-25 NOTE — PROGRESS NOTES
Subjective     Yogesh Abreu is a 79 year old male who presents to clinic today for the following health issues:    HPI   Genitourinary symptoms      Duration: One month    Description:  dysuria and frequency    Intensity:  moderate    Accompanying signs and symptoms (fever/discharge/nausea/vomiting/back or abdominal pain):  None    History (frequent UTI's/kidney stones/prostate problems): None  Sexually active: no     Precipitating or alleviating factors: None    Therapies tried and outcome: none   Outcome: NA    One brother had a benign kidney cyst and the other just diagnosed with bladder cancer   Gets a tingling with urination   No hematuria   Does have a persistent left anterior deep groin pain. Did have some improvement with PT but reached a plateau   Taking a long step or sitting for awhile and getting up will make the pain worse     Past Medical History:   Diagnosis Date     Antiphospholipid antibody syndrome (H) 5/25/2017     CAD (coronary artery disease), native coronary artery 10/2015    9/2015 Heart cath - 90% diagonal, 50-60% CFX, 100% prox RCA occlusion - MAL placed in RCA, 10/2015 EF 35-40% by Echo     DVT (deep venous thrombosis) (H) 9/2015 9/2015 Occlusive DVT extending from left common femoral to mid left popliteal vein     Hypercholesteraemia      Hypertension      PE (pulmonary embolism) 9/2015     PMR (polymyalgia rheumatica) (H)      Polymyalgia rheumatica (H)      STEMI (ST elevation myocardial infarction) (H) 10/2015    10/2015 Inferior STEMI - 100% RCA occlusion with MAL placed     Family History   Problem Relation Age of Onset     Hypertension Brother      Hypertension Brother      Past Surgical History:   Procedure Laterality Date     HEART CATH STENT COR W/WO PTCA  10/2015    90% diagonal, 50-60% CFX, 100% prox RCA occlusion - MAL placed in RCA     ORTHOPEDIC SURGERY  08/2015    right carpal tunnel     Social History     Tobacco Use     Smoking status: Former Smoker     Packs/day:  0.50     Years: 5.00     Pack years: 2.50     Types: Cigarettes     Start date:      Last attempt to quit: 1975     Years since quittin.5     Smokeless tobacco: Never Used   Substance Use Topics     Alcohol use: Yes     Comment: 1 drink per day     Current Outpatient Medications   Medication Sig Dispense Refill     ADVAIR DISKUS 250-50 MCG/DOSE inhaler INHALE 1 PUFF INTO THE LUNGS TWICE DAILY 60 Inhaler 2     albuterol (PROAIR HFA/PROVENTIL HFA/VENTOLIN HFA) 108 (90 BASE) MCG/ACT Inhaler Inhale 2 puffs into the lungs every 6 hours as needed for shortness of breath / dyspnea or wheezing 1 Inhaler 0     atorvastatin (LIPITOR) 80 MG tablet Take 1 tablet (80 mg) by mouth At Bedtime 90 tablet 3     Calcium Citrate-Vitamin D (CALCIUM CITRATE + D PO) Take 600 mg by mouth 2 times daily       Cholecalciferol (VITAMIN D3 PO) Take 1,000 Units by mouth 2 times daily       ibuprofen (ADVIL/MOTRIN) 200 MG capsule Take 600 mg by mouth daily as needed for fever       lisinopril (PRINIVIL/ZESTRIL) 5 MG tablet Take 1 tablet (5 mg) by mouth daily 90 tablet 3     metoprolol succinate ER (TOPROL-XL) 25 MG 24 hr tablet Take 12.5 mg by mouth daily       nitroglycerin (NITROSTAT) 0.4 MG sublingual tablet Place 1 tablet (0.4 mg) under the tongue every 5 minutes as needed for chest pain 25 tablet 3     vardenafil (LEVITRA) 20 MG tablet Take 0.5-1 tablets (10-20 mg) by mouth daily as needed for erectile dysfunction Never use with nitroglycerin, terazosin or doxazosin. 12 tablet 3     warfarin (COUMADIN) 5 MG tablet Take 1 1/2 tabs daily or as directed per INR clinic 150 tablet 1     ASPIRIN NOT PRESCRIBED (INTENTIONAL) Please choose reason not prescribed, below 0 each 0     Allergies   Allergen Reactions     Simvastatin        Reviewed and updated as needed this visit by clinical staff and provider        Review of Systems   Detailed as above       Objective    /77 (BP Location: Left arm, Patient Position: Sitting, Cuff Size:  "Adult Regular)   Pulse 73   Temp 98.1  F (36.7  C) (Oral)   Ht 1.74 m (5' 8.5\")   Wt 90.7 kg (200 lb)   SpO2 96%   BMI 29.97 kg/m      Physical Exam   Constitutional: He appears well-developed.   Pulmonary/Chest: Effort normal.   Musculoskeletal:   Slightly unsteady with standing/ambulation   Neurological: He is alert.   Psychiatric: He has a normal mood and affect. Judgment normal.        Assessment and Plan:       ICD-10-CM    1. Dysuria R30.0 UA reflex to Microscopic and Culture     Urine Microscopic     UROLOGY ADULT REFERRAL   2. Left groin pain R10.32 XR Pelvis and Hip Left 1 View     ORTHOPEDICS ADULT REFERRAL   3. Urinary frequency R35.0 UROLOGY ADULT REFERRAL       New discomfort with urination and frequency that has been persistent for months. Neg UA today. Will send to urology for further eval. Had lumbar MRI that ruled out cauda equina.  Also persistent left groin/hip flexor pain. Previously had symptoms c/w lumbar radiculopathy and had really good results with PT. Pain is now more localized to anterior hip//groin. Lumbar MRI was rather unremarkable. Xray today concerning for notable arthritic change of left hip, reviewed by me, so will send to ortho.   Of note, he does have a large L renal cyst. Can be reviewed by urology or with PCP at upcoming appt.       Dustin Alvarado, APRN, CNP  [facility]       "

## 2019-07-30 ENCOUNTER — TRANSFERRED RECORDS (OUTPATIENT)
Dept: HEALTH INFORMATION MANAGEMENT | Facility: CLINIC | Age: 79
End: 2019-07-30

## 2019-08-05 ENCOUNTER — ANTICOAGULATION THERAPY VISIT (OUTPATIENT)
Dept: CARDIOLOGY | Facility: CLINIC | Age: 79
End: 2019-08-05
Payer: COMMERCIAL

## 2019-08-05 ENCOUNTER — TRANSFERRED RECORDS (OUTPATIENT)
Dept: HEALTH INFORMATION MANAGEMENT | Facility: CLINIC | Age: 79
End: 2019-08-05

## 2019-08-05 DIAGNOSIS — Z79.01 LONG TERM CURRENT USE OF ANTICOAGULANTS WITH INR GOAL OF 2.0-3.0: ICD-10-CM

## 2019-08-05 DIAGNOSIS — I26.99 PULMONARY EMBOLISM (H): ICD-10-CM

## 2019-08-05 LAB — INR PPP: 1.4

## 2019-08-05 PROCEDURE — 99207 ZZC NO CHARGE LOS: CPT

## 2019-08-05 NOTE — PROGRESS NOTES
ANTICOAGULATION FOLLOW-UP CLINIC VISIT    Patient Name:  Yogesh Abreu  Date:  2019  Contact Type:  Telephone/ patient    SUBJECTIVE:  Patient Findings     Positives:   Missed doses (he thinks he may have missed a dose), Change in medications (he held atorvastatin for 30 days but resumed it 2 weeks )        Clinical Outcomes     Negatives:   Major bleeding event, Thromboembolic event, Anticoagulation-related hospital admission, Anticoagulation-related ED visit, Anticoagulation-related fatality           OBJECTIVE    INR   Date Value Ref Range Status   2019 1.4  Final     Chromogenic Factor 10   Date Value Ref Range Status   10/12/2015 28 (L) 70 - 130 % Final     Comment:     Therapeutic Range:  A Chromogenic Factor 10 level of approximately 20-40%   inversely correlates with an INR of 2-3 for patients receiving Warfarin.   Chromogenic Factor 10 levels below 20% indicate an INR greater than 3 and   levels above 40% indicate an INR less than 2.         ASSESSMENT / PLAN  INR assessment SUB    Recheck INR In: 2 WEEKS    INR Location Home INR    Billed home INR No      Anticoagulation Summary  As of 2019    INR goal:   2.0-3.0   TTR:   76.1 % (3.2 y)   INR used for dosin.4! (2019)   Warfarin maintenance plan:   7.5 mg (5 mg x 1.5) every day   Full warfarin instructions:   : 12.5 mg; : 12.5 mg; Otherwise 7.5 mg every day   Weekly warfarin total:   52.5 mg   Plan last modified:   Tanja Wills RN (2019)   Next INR check:   2019   Target end date:   Indefinite    Indications    PE (pulmonary embolism) [I26.99]  Long term current use of anticoagulants with INR goal of 2.0-3.0 [Z79.01]             Anticoagulation Episode Summary     INR check location:       Preferred lab:       Send INR reminders to:   SH[facility] HEART INR NURSE    Comments:         Anticoagulation Care Providers     Provider Role Specialty Phone number    Frederic Isaacs MD Responsible Cardiology  [phone]            See the Encounter Report to view Anticoagulation Flowsheet and Dosing Calendar (Go to Encounters tab in chart review, and find the Anticoagulation Therapy Visit)    Home INR 1.4 He thinks he might have dropped a dose of warfarin. No change in other meds or diet. Denies bleeding, bruising or clotting symptoms. No abnormal bleeding or bruising. He is flying to [place] today and will be gone for 10 days. Will boost today and tomorrow's doses to 12.5 mg then resume 7.5 mg daily and recheck in 2 weeks. Dosage adjustment made based on physician directed care plan.    Tanja Wills RN

## 2019-08-08 ENCOUNTER — TRANSFERRED RECORDS (OUTPATIENT)
Dept: HEALTH INFORMATION MANAGEMENT | Facility: CLINIC | Age: 79
End: 2019-08-08

## 2019-08-20 ENCOUNTER — TRANSFERRED RECORDS (OUTPATIENT)
Dept: HEALTH INFORMATION MANAGEMENT | Facility: CLINIC | Age: 79
End: 2019-08-20

## 2019-08-20 LAB — INR PPP: 2.2

## 2019-08-21 ENCOUNTER — ANTICOAGULATION THERAPY VISIT (OUTPATIENT)
Dept: CARDIOLOGY | Facility: CLINIC | Age: 79
End: 2019-08-21
Payer: COMMERCIAL

## 2019-08-21 ENCOUNTER — OFFICE VISIT (OUTPATIENT)
Dept: FAMILY MEDICINE | Facility: CLINIC | Age: 79
End: 2019-08-21
Payer: COMMERCIAL

## 2019-08-21 VITALS
OXYGEN SATURATION: 95 % | SYSTOLIC BLOOD PRESSURE: 115 MMHG | HEART RATE: 63 BPM | WEIGHT: 200 LBS | DIASTOLIC BLOOD PRESSURE: 72 MMHG | BODY MASS INDEX: 29.62 KG/M2 | HEIGHT: 69 IN | TEMPERATURE: 98.3 F

## 2019-08-21 DIAGNOSIS — D68.61 ANTIPHOSPHOLIPID ANTIBODY SYNDROME (H): ICD-10-CM

## 2019-08-21 DIAGNOSIS — E78.2 MIXED HYPERLIPIDEMIA: ICD-10-CM

## 2019-08-21 DIAGNOSIS — I25.110 CORONARY ARTERY DISEASE INVOLVING NATIVE CORONARY ARTERY OF NATIVE HEART WITH UNSTABLE ANGINA PECTORIS (H): ICD-10-CM

## 2019-08-21 DIAGNOSIS — I10 ESSENTIAL HYPERTENSION: ICD-10-CM

## 2019-08-21 DIAGNOSIS — M35.3 POLYMYALGIA RHEUMATICA (H): ICD-10-CM

## 2019-08-21 DIAGNOSIS — J45.40 MODERATE PERSISTENT ASTHMA WITHOUT COMPLICATION: ICD-10-CM

## 2019-08-21 DIAGNOSIS — I26.99 PULMONARY EMBOLISM (H): ICD-10-CM

## 2019-08-21 DIAGNOSIS — Z12.11 SCREEN FOR COLON CANCER: ICD-10-CM

## 2019-08-21 DIAGNOSIS — Z79.01 LONG TERM CURRENT USE OF ANTICOAGULANTS WITH INR GOAL OF 2.0-3.0: ICD-10-CM

## 2019-08-21 DIAGNOSIS — I50.1 LEFT VENTRICULAR FAILURE (H): ICD-10-CM

## 2019-08-21 DIAGNOSIS — Z00.00 ROUTINE GENERAL MEDICAL EXAMINATION AT A HEALTH CARE FACILITY: Primary | ICD-10-CM

## 2019-08-21 DIAGNOSIS — N52.9 ERECTILE DYSFUNCTION, UNSPECIFIED ERECTILE DYSFUNCTION TYPE: ICD-10-CM

## 2019-08-21 PROCEDURE — 99397 PER PM REEVAL EST PAT 65+ YR: CPT | Performed by: INTERNAL MEDICINE

## 2019-08-21 PROCEDURE — 99207 ZZC NO CHARGE LOS: CPT

## 2019-08-21 RX ORDER — VARDENAFIL HYDROCHLORIDE 20 MG/1
10-20 TABLET ORAL DAILY PRN
Qty: 12 TABLET | Refills: 11 | Status: SHIPPED | OUTPATIENT
Start: 2019-08-21 | End: 2020-09-17

## 2019-08-21 ASSESSMENT — MIFFLIN-ST. JEOR: SCORE: 1614.63

## 2019-08-21 ASSESSMENT — ACTIVITIES OF DAILY LIVING (ADL): CURRENT_FUNCTION: NO ASSISTANCE NEEDED

## 2019-08-21 NOTE — PROGRESS NOTES
"SUBJECTIVE:   Yogesh Abreu is a 79 year old male who presents for Preventive Visit.    Are you in the first 12 months of your Medicare coverage?  No    Healthy Habits:     In general, how would you rate your overall health?  Very good    Frequency of exercise:  2-3 days/week    Duration of exercise:  45-60 minutes    Do you usually eat at least 4 servings of fruit and vegetables a day, include whole grains    & fiber and avoid regularly eating high fat or \"junk\" foods?  Yes    Taking medications regularly:  Yes    Barriers to taking medications:  None    Medication side effects:  None    Ability to successfully perform activities of daily living:  No assistance needed    Home Safety:  No safety concerns identified    Hearing Impairment:  Feel that people are mumbling or not speaking clearly, difficulty understanding soft or whispered speech and need to ask people to speak up or repeat themselves    In the past 6 months, have you been bothered by leaking of urine?  No    In general, how would you rate your overall mental or emotional health?  Very good      PHQ-2 Total Score: 0    Additional concerns today:  Yes    Do you feel safe in your environment? Yes    Do you have a Health Care Directive? No: Advance care planning reviewed with patient; information given to patient to review.      Fall risk  Fallen 2 or more times in the past year?: No  Any fall with injury in the past year?: No    Cognitive Screening   1) Repeat 3 items (Leader, Season, Table)    2) Clock draw: NORMAL  3) 3 item recall: Recalls 3 objects  Results: 3 items recalled: COGNITIVE IMPAIRMENT LESS LIKELY    Mini-CogTM Copyright RACHELE Heaton. Licensed by the author for use in [facility]; reprinted with permission (halley@.[email or web address]). All rights reserved.      Do you have sleep apnea, excessive snoring or daytime drowsiness?: no    Reviewed and updated as needed this visit by clinical staff  Tobacco  Allergies  Meds  Med Hx  Surg Hx  Fam " Hx  Soc Hx        Reviewed and updated as needed this visit by Provider  Tobacco  Med Hx  Surg Hx  Fam Hx  Soc Hx       Social History     Tobacco Use     Smoking status: Former Smoker     Packs/day: 0.50     Years: 5.00     Pack years: 2.50     Types: Cigarettes     Start date:      Last attempt to quit: 1975     Years since quittin.6     Smokeless tobacco: Never Used   Substance Use Topics     Alcohol use: Yes     Comment: 1 drink per day     If you drink alcohol do you typically have >3 drinks per day or >7 drinks per week? No    Current providers sharing in care for this patient include:   Patient Care Team:  Filipe Goldberg MD as PCP - General (Internal Medicine)  Filipe Goldberg MD as Assigned PCP    The following health maintenance items are reviewed in Epic and correct as of today:  Health Maintenance   Topic Date Due     HF ACTION PLAN  1940     URINE DRUG SCREEN  1940     ASTHMA ACTION PLAN  1940     ADVANCE CARE PLANNING  1940     ZOSTER IMMUNIZATION (2 of 3) 2011     OP ANNUAL INR REFERRAL  10/12/2016     CBC  2019     MEDICARE ANNUAL WELLNESS VISIT  2019     INFLUENZA VACCINE (1) 2019     BMP  10/30/2019     ASTHMA CONTROL TEST  2019     ALT  2020     LIPID  2020     MOIZ ASSESSMENT  2020     PHQ-9  2020     FALL RISK ASSESSMENT  2020     DTAP/TDAP/TD IMMUNIZATION (3 - Td) 2023     PNEUMOCOCCAL IMMUNIZATION 65+ LOW/MEDIUM RISK  Completed     IPV IMMUNIZATION  Aged Out     MENINGITIS IMMUNIZATION  Aged Out     Patient Active Problem List   Diagnosis     PE (pulmonary embolism)     CAD (coronary artery disease), IMI -  => rescue stent to  RCA     Hypertension     Mixed hyperlipidemia     Polymyalgia rheumatica (H)     ACS (acute coronary syndrome) (H)     Long-term (current) use of anticoagulants [Z79.01]     Antiphospholipid antibody syndrome (H)     Moderate persistent asthma without  complication     HL (hearing loss), bilateral     Left ventricular failure (H)     Chronic bilateral low back pain without sciatica     Long term current use of anticoagulants with INR goal of 2.0-3.0     Past Surgical History:   Procedure Laterality Date     HEART CATH STENT COR W/WO PTCA  10/2015    90% diagonal, 50-60% CFX, 100% prox RCA occlusion - MAL placed in RCA     ORTHOPEDIC SURGERY  2015    right carpal tunnel       Social History     Tobacco Use     Smoking status: Former Smoker     Packs/day: 0.50     Years: 5.00     Pack years: 2.50     Types: Cigarettes     Start date:      Last attempt to quit:      Years since quittin.6     Smokeless tobacco: Never Used   Substance Use Topics     Alcohol use: Yes     Comment: 1 drink per day     Family History   Problem Relation Age of Onset     Hypertension Brother      Hypertension Brother          Current Outpatient Medications   Medication Sig Dispense Refill     ADVAIR DISKUS 250-50 MCG/DOSE inhaler INHALE 1 PUFF INTO THE LUNGS TWICE DAILY 60 Inhaler 2     albuterol (PROAIR HFA/PROVENTIL HFA/VENTOLIN HFA) 108 (90 BASE) MCG/ACT Inhaler Inhale 2 puffs into the lungs every 6 hours as needed for shortness of breath / dyspnea or wheezing 1 Inhaler 0     atorvastatin (LIPITOR) 80 MG tablet Take 1 tablet (80 mg) by mouth At Bedtime 90 tablet 3     Calcium Citrate-Vitamin D (CALCIUM CITRATE + D PO) Take 600 mg by mouth 2 times daily       Cholecalciferol (VITAMIN D3 PO) Take 1,000 Units by mouth 2 times daily       ibuprofen (ADVIL/MOTRIN) 200 MG capsule Take 600 mg by mouth daily as needed for fever       lisinopril (PRINIVIL/ZESTRIL) 5 MG tablet Take 1 tablet (5 mg) by mouth daily 90 tablet 3     metoprolol succinate ER (TOPROL-XL) 25 MG 24 hr tablet Take 12.5 mg by mouth daily       nitroglycerin (NITROSTAT) 0.4 MG sublingual tablet Place 1 tablet (0.4 mg) under the tongue every 5 minutes as needed for chest pain 25 tablet 3     vardenafil (LEVITRA)  "20 MG tablet Take 0.5-1 tablets (10-20 mg) by mouth daily as needed for erectile dysfunction Never use with nitroglycerin, terazosin or doxazosin. 12 tablet 3     warfarin (COUMADIN) 5 MG tablet Take 1 1/2 tabs daily or as directed per INR clinic 150 tablet 1     ASPIRIN NOT PRESCRIBED (INTENTIONAL) Please choose reason not prescribed, below 0 each 0     Allergies   Allergen Reactions     Simvastatin          Review of Systems  Constitutional, HEENT, cardiovascular, pulmonary, gi and gu systems are negative, except as otherwise noted.    OBJECTIVE:   /72 (BP Location: Right arm, Patient Position: Sitting, Cuff Size: Adult Regular)   Pulse 63   Temp 98.3  F (36.8  C) (Oral)   Ht 1.74 m (5' 8.5\")   Wt 90.7 kg (200 lb)   SpO2 95%   BMI 29.97 kg/m   Estimated body mass index is 29.97 kg/m  as calculated from the following:    Height as of this encounter: 1.74 m (5' 8.5\").    Weight as of this encounter: 90.7 kg (200 lb).  Physical Exam  GENERAL: healthy, alert and no distress  EYES: Eyes grossly normal to inspection, PERRL and conjunctivae and sclerae normal  HENT: ear canals and TM's normal, nose and mouth without ulcers or lesions  NECK: no adenopathy, no asymmetry, masses, or scars and thyroid normal to palpation  RESP: lungs clear to auscultation - no rales, rhonchi or wheezes  CV: regular rate and rhythm, normal S1 S2, no S3 or S4, no murmur, click or rub, no peripheral edema and peripheral pulses strong  ABDOMEN: soft, nontender, no hepatosplenomegaly, no masses and bowel sounds normal  RECTAL: normal sphincter tone, no rectal masses, prostate large size, smooth, nontender without nodules or masses  MS: no gross musculoskeletal defects noted, no edema  SKIN: no suspicious lesions or rashes  NEURO: Normal strength and tone, mentation intact and speech normal  PSYCH: mentation appears normal, affect normal/bright      ASSESSMENT / PLAN:   1. Routine general medical examination at a [facility] " "facility      2. 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)  Stable symptoms     3. Antiphospholipid antibody syndrome (H)  On coumadin for this    4. Left ventricular failure (H)  Stable volume status    5. Polymyalgia rheumatica (H)  Does not seem to be an active problem     6. Moderate persistent asthma without complication  Stable     7. Essential hypertension  Under good control     8. Mixed hyperlipidemia  On statin therapy; lipids were OK in June 9. Screen for colon cancer    - Fecal colorectal cancer screen FIT; Future    End of Life Planning:  Patient currently has an advanced directive: No.  I have verified the patient's ablity to prepare an advanced directive/make health care decisions.  Literature was provided to assist patient in preparing an advanced directive.    COUNSELING:  Reviewed preventive health counseling, as reflected in patient instructions  Special attention given to:       Consider AAA screening for ages 65-75 and smoking history       Regular exercise       Healthy diet/nutrition       Immunizations    Shingrix was recommended          Consider lung cancer screening for ages 55-80 years and 30 pack-year smoking history ; he quit smoking 45 years ago        Colon cancer screening; he decided to repeat FIT testing today        Prostate cancer screening; AMINATA today, we decided to stop PSA screening     Estimated body mass index is 29.97 kg/m  as calculated from the following:    Height as of this encounter: 1.74 m (5' 8.5\").    Weight as of this encounter: 90.7 kg (200 lb).    Weight management plan: Discussed healthy diet and exercise guidelines     reports that he quit smoking about 44 years ago. His smoking use included cigarettes. He started smoking about 54 years ago. He has a 2.50 pack-year smoking history. He has never used smokeless tobacco.      Appropriate preventive services were discussed with this patient, " including applicable screening as appropriate for cardiovascular disease, diabetes, osteopenia/osteoporosis, and glaucoma.  As appropriate for age/gender, discussed screening for colorectal cancer, prostate cancer, breast cancer, and cervical cancer. Checklist reviewing preventive services available has been given to the patient.    Reviewed patients plan of care and provided an AVS. The Basic Care Plan (routine screening as documented in Health Maintenance) for Yogesh meets the Care Plan requirement. This Care Plan has been established and reviewed with the Patient.    Counseling Resources:  ATP IV Guidelines  Pooled Cohorts Equation Calculator  Breast Cancer Risk Calculator  FRAX Risk Assessment  ICSI Preventive Guidelines  Dietary Guidelines for Americans, 2010  USDA's MyPlate  ASA Prophylaxis  Lung CA Screening    Filipe Goldberg MD  [facility]    Identified Health Risks:

## 2019-08-21 NOTE — PROGRESS NOTES
ANTICOAGULATION FOLLOW-UP CLINIC VISIT    Patient Name:  Yogesh Abreu  Date:  2019  Contact Type:  [employer]    SUBJECTIVE:         OBJECTIVE    INR   Date Value Ref Range Status   2019 2.2  Final     Chromogenic Factor 10   Date Value Ref Range Status   10/12/2015 28 (L) 70 - 130 % Final     Comment:     Therapeutic Range:  A Chromogenic Factor 10 level of approximately 20-40%   inversely correlates with an INR of 2-3 for patients receiving Warfarin.   Chromogenic Factor 10 levels below 20% indicate an INR greater than 3 and   levels above 40% indicate an INR less than 2.         ASSESSMENT / PLAN  INR assessment THER    Recheck INR In: 2 WEEKS    INR Location Home INR    Billed home INR No      Anticoagulation Summary  As of 2019    INR goal:   2.0-3.0   TTR:   75.4 % (3.2 y)   INR used for dosin.2 (2019)   Warfarin maintenance plan:   7.5 mg (5 mg x 1.5) every day   Full warfarin instructions:   7.5 mg every day   Weekly warfarin total:   52.5 mg   No change documented:   Tanja Wills RN   Plan last modified:   Tanja Wills RN (2019)   Next INR check:   9/3/2019   Target end date:   Indefinite    Indications    PE (pulmonary embolism) [I26.99]  Long term current use of anticoagulants with INR goal of 2.0-3.0 [Z79.01]             Anticoagulation Episode Summary     INR check location:       Preferred lab:       Send INR reminders to:   SHAH [facility] HEART INR NURSE    Comments:         Anticoagulation Care Providers     Provider Role Specialty Phone number    Frederic Isaacs MD [facility] Cardiology [phone]            See the Encounter Report to view Anticoagulation Flowsheet and Dosing Calendar (Go to Encounters tab in chart review, and find the Anticoagulation Therapy Visit)    19 Home INR 2.2 INR back in range after making up a missed dose. Will continue current dosing of 7.5 mg daily and recheck in 2 weeks. Will call pt after the next INR  check.    Tanja Wills RN

## 2019-08-22 ASSESSMENT — ASTHMA QUESTIONNAIRES: ACT_TOTALSCORE: 24

## 2019-08-27 ENCOUNTER — TRANSFERRED RECORDS (OUTPATIENT)
Dept: HEALTH INFORMATION MANAGEMENT | Facility: CLINIC | Age: 79
End: 2019-08-27

## 2019-08-30 ENCOUNTER — MYC MEDICAL ADVICE (OUTPATIENT)
Dept: CARDIOLOGY | Facility: CLINIC | Age: 79
End: 2019-08-30

## 2019-09-03 ENCOUNTER — TRANSFERRED RECORDS (OUTPATIENT)
Dept: HEALTH INFORMATION MANAGEMENT | Facility: CLINIC | Age: 79
End: 2019-09-03

## 2019-09-03 LAB — INR PPP: 2.1

## 2019-09-03 NOTE — TELEPHONE ENCOUNTER
Pt sent Blip message requesting Dr. Isaacs's recommendations pre hip replacement.  Pt will come in for cardiac clearance visit sept 13th 2019

## 2019-09-04 ENCOUNTER — ANTICOAGULATION THERAPY VISIT (OUTPATIENT)
Dept: CARDIOLOGY | Facility: CLINIC | Age: 79
End: 2019-09-04
Payer: COMMERCIAL

## 2019-09-04 DIAGNOSIS — I26.99 PULMONARY EMBOLISM (H): ICD-10-CM

## 2019-09-04 DIAGNOSIS — Z79.01 LONG TERM CURRENT USE OF ANTICOAGULANTS WITH INR GOAL OF 2.0-3.0: ICD-10-CM

## 2019-09-04 PROCEDURE — 99207 ZZC NO CHARGE NURSE ONLY: CPT

## 2019-09-04 NOTE — PROGRESS NOTES
ANTICOAGULATION FOLLOW-UP CLINIC VISIT    Patient Name:  Yogesh Abreu  Date:  2019  Contact Type:  Telephone/ patient    SUBJECTIVE:         OBJECTIVE    INR   Date Value Ref Range Status   2019 2.1  Final     Chromogenic Factor 10   Date Value Ref Range Status   10/12/2015 28 (L) 70 - 130 % Final     Comment:     Therapeutic Range:  A Chromogenic Factor 10 level of approximately 20-40%   inversely correlates with an INR of 2-3 for patients receiving Warfarin.   Chromogenic Factor 10 levels below 20% indicate an INR greater than 3 and   levels above 40% indicate an INR less than 2.         ASSESSMENT / PLAN  INR assessment THER    Recheck INR In: 2 WEEKS    INR Location Home INR    Billed home INR No      Anticoagulation Summary  As of 2019    INR goal:   2.0-3.0   TTR:   75.7 % (3.3 y)   INR used for dosin.1 (9/3/2019)   Warfarin maintenance plan:   7.5 mg (5 mg x 1.5) every day   Full warfarin instructions:   7.5 mg every day   Weekly warfarin total:   52.5 mg   No change documented:   Tanja Wills RN   Plan last modified:   Tanja Wills RN (2019)   Next INR check:   2019   Target end date:   Indefinite    Indications    PE (pulmonary embolism) [I26.99]  Long term current use of anticoagulants with INR goal of 2.0-3.0 [Z79.01]             Anticoagulation Episode Summary     INR check location:       Preferred lab:       Send INR reminders to:   [facility] HEART INR NURSE    Comments:         Anticoagulation Care Providers     Provider Role Specialty Phone number    Frederic Isaacs MD [facility] Cardiology [phone]            See the Encounter Report to view Anticoagulation Flowsheet and Dosing Calendar (Go to Encounters tab in chart review, and find the Anticoagulation Therapy Visit)    9/3/19 Home INR 2.1 Left a message for pt to return the call to review his current status, meds and diet. Will plan to continue current dosing of 7.5 mg daily and recheck INR on  9/13 when he is in the clinic to see Dr Sharan Mauro to review cardiac clearance for hip surgery. He would need to bring his home INR meter so we could verify home readings. Pt's OV is for cardiac clearance prior to hip surgery.  09:00 am Pt called back. No changes except he is trying to decrease use of Ibuprofen because it increases bleeding risk.  Denies abnormal bleeding or bruising. He is scheduled for hip surgery on October 10th.     Tanja Wills RN

## 2019-09-13 ENCOUNTER — ANTICOAGULATION THERAPY VISIT (OUTPATIENT)
Dept: CARDIOLOGY | Facility: CLINIC | Age: 79
End: 2019-09-13
Payer: COMMERCIAL

## 2019-09-13 ENCOUNTER — OFFICE VISIT (OUTPATIENT)
Dept: CARDIOLOGY | Facility: CLINIC | Age: 79
End: 2019-09-13
Payer: COMMERCIAL

## 2019-09-13 VITALS
DIASTOLIC BLOOD PRESSURE: 80 MMHG | WEIGHT: 200 LBS | SYSTOLIC BLOOD PRESSURE: 125 MMHG | HEART RATE: 63 BPM | BODY MASS INDEX: 29.62 KG/M2 | HEIGHT: 69 IN

## 2019-09-13 DIAGNOSIS — I26.99 PULMONARY EMBOLISM (H): ICD-10-CM

## 2019-09-13 DIAGNOSIS — I10 ESSENTIAL HYPERTENSION: ICD-10-CM

## 2019-09-13 DIAGNOSIS — I25.110 CORONARY ARTERY DISEASE INVOLVING NATIVE CORONARY ARTERY OF NATIVE HEART WITH UNSTABLE ANGINA PECTORIS (H): ICD-10-CM

## 2019-09-13 DIAGNOSIS — I25.10 CORONARY ARTERY DISEASE INVOLVING NATIVE CORONARY ARTERY OF NATIVE HEART WITHOUT ANGINA PECTORIS: Primary | ICD-10-CM

## 2019-09-13 DIAGNOSIS — D68.61 ANTIPHOSPHOLIPID ANTIBODY SYNDROME (H): ICD-10-CM

## 2019-09-13 DIAGNOSIS — Z01.810 PRE-OPERATIVE CARDIOVASCULAR EXAMINATION: ICD-10-CM

## 2019-09-13 DIAGNOSIS — Z79.01 LONG TERM CURRENT USE OF ANTICOAGULANTS WITH INR GOAL OF 2.0-3.0: ICD-10-CM

## 2019-09-13 DIAGNOSIS — E78.2 MIXED HYPERLIPIDEMIA: ICD-10-CM

## 2019-09-13 DIAGNOSIS — I26.99 PULMONARY EMBOLISM WITHOUT ACUTE COR PULMONALE, UNSPECIFIED CHRONICITY, UNSPECIFIED PULMONARY EMBOLISM TYPE (H): ICD-10-CM

## 2019-09-13 DIAGNOSIS — I25.2 OLD MYOCARDIAL INFARCTION: ICD-10-CM

## 2019-09-13 LAB — INR POINT OF CARE: 2.6 (ref 0.86–1.14)

## 2019-09-13 PROCEDURE — 99214 OFFICE O/P EST MOD 30 MIN: CPT | Mod: 24 | Performed by: INTERNAL MEDICINE

## 2019-09-13 PROCEDURE — 85610 PROTHROMBIN TIME: CPT | Mod: QW | Performed by: INTERNAL MEDICINE

## 2019-09-13 PROCEDURE — 36416 COLLJ CAPILLARY BLOOD SPEC: CPT | Performed by: INTERNAL MEDICINE

## 2019-09-13 PROCEDURE — 93000 ELECTROCARDIOGRAM COMPLETE: CPT | Performed by: INTERNAL MEDICINE

## 2019-09-13 ASSESSMENT — MIFFLIN-ST. JEOR: SCORE: 1614.63

## 2019-09-13 NOTE — PROGRESS NOTES
Service Date: 09/13/2019      REFERERING PHYSICIAN:  Dr. Filipe Goldberg.      HISTORY OF PRESENT ILLNESS:  It is my pleasure to see your patient, Vincenzo Abreu, who was seen for many years by Satya Rogers, who has essentially retired from our group.  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  He did have a stress echocardiogram in 10/2018 which showed evidence of a possible nontransmural basal inferior myocardial infarct with a short run of VT.  It was felt by the echo reader and by Dr. Rogers that this was not clinically significant.      The patient is due to have surgery in early October.  He is having a hip replacement.  He is here for preop assessment.  His 12-lead electrocardiogram which was performed today shows evidence of a prior inferior myocardial infarct with Q-waves present in II, III, aVF and V6.  He has nonspecific ST sagging in V2, V3, V4 and V5.  The patient is not complaining of any chest pains, chest pressure or unusual shortness of breath.  He is not complaining of orthopnea, PND or ankle edema.  He has had no palpitations.  He has had no symptoms of syncope or near-syncope.  He has had no evidence in the past on echocardiography of severe obstructing valvular lesions.  Based upon the history alone and on the physical examination, he will be regarded as being low risk for an intraoperative cardiac event.      IMPRESSION:   1.  Coronary artery disease and status post inferior myocardial infarct.  The patient is asymptomatic with no symptoms of angina pectoris.   2.  Antiphospholipid antibody with history of DVT and pulmonary embolism for which he is on chronic Coumadin therapy.   3.  Would be regarded as a low risk for an intraoperative cardiac  event for the reasons that I have mentioned above.      PLAN:   1.  We will obtain a Lexiscan study, as the patient cannot walk, to determine if there is any evidence of ischemia prior to surgery.   2.  The patient will be seeing Dr. Goldberg preoperatively.  I suspect that Dr. Goldberg will recommend bridging for this patient who has antiphospholipid antibody syndrome with prior pulmonary embolism and DVT.   3.  I have recommended to the patient that he take his cardiac medications, which are lisinopril and metoprolol, as he usually takes it, which is the night before surgery.  The medication should be given the night of the surgery also.  Obviously, if the nuclear stress test shows a significant amount of ischemia, we will see the patient back immediately.  The patient is due to see me in, I believe, 8 months' time.      Many thanks for allowing me to be involved in the care of this extremely nice patient.      Frederic Isaacs MD, FACC       cc:   Filipe Goldberg MD    [address], [address] [place] [address]         FREDERIC JOHNSON MD, FACC             D: 2019   T: 2019   MT: TORSTEN      Name:     RUIZ LAIRD   MRN:      [id number]        Account:      [id number]   :      1940           Service Date: 2019      Document: [id number]

## 2019-09-13 NOTE — LETTER
9/13/2019    Filipe Goldberg MD  [address]  [place] [address]    RE: Yogesh Abreu       Dear Colleague,    I had the pleasure of seeing Yogesh Abreu in the [facility].    HPI and Plan:   See dictation    Orders Placed This Encounter   Procedures     NM Lexiscan stress test (nuc card)     EKG 12-lead complete w/read - Clinics (performed today)       No orders of the defined types were placed in this encounter.      Medications Discontinued During This Encounter   Medication Reason     ADVAIR DISKUS 250-50 MCG/DOSE inhaler Stopped by Patient         Encounter Diagnoses   Name Primary?     Coronary artery disease involving native coronary artery of native heart without angina pectoris Yes     Essential hypertension      Mixed hyperlipidemia      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)      Antiphospholipid antibody syndrome (H)      Pulmonary embolism without acute cor pulmonale, unspecified chronicity, unspecified pulmonary embolism type (H)      Old myocardial infarction      Pre-operative cardiovascular examination        CURRENT MEDICATIONS:  Current Outpatient Medications   Medication Sig Dispense Refill     albuterol (PROAIR HFA/PROVENTIL HFA/VENTOLIN HFA) 108 (90 BASE) MCG/ACT Inhaler Inhale 2 puffs into the lungs every 6 hours as needed for shortness of breath / dyspnea or wheezing 1 Inhaler 0     atorvastatin (LIPITOR) 80 MG tablet Take 1 tablet (80 mg) by mouth At Bedtime 90 tablet 3     Calcium Citrate-Vitamin D (CALCIUM CITRATE + D PO) Take 600 mg by mouth 2 times daily       Cholecalciferol (VITAMIN D3 PO) Take 1,000 Units by mouth 2 times daily       ibuprofen (ADVIL/MOTRIN) 200 MG capsule Take 600 mg by mouth daily as needed for fever       lisinopril (PRINIVIL/ZESTRIL) 5 MG tablet Take 1 tablet (5 mg) by mouth daily 90 tablet 3     metoprolol succinate ER (TOPROL-XL) 25 MG 24 hr tablet  Take 12.5 mg by mouth daily       nitroglycerin (NITROSTAT) 0.4 MG sublingual tablet Place 1 tablet (0.4 mg) under the tongue every 5 minutes as needed for chest pain 25 tablet 3     ranitidine (ZANTAC) 150 MG tablet Take 150 mg by mouth daily       vardenafil (LEVITRA) 20 MG tablet Take 0.5-1 tablets (10-20 mg) by mouth daily as needed Never use with nitroglycerin, terazosin or doxazosin. 12 tablet 11     warfarin (COUMADIN) 5 MG tablet Take 1 1/2 tabs daily or as directed per INR clinic 150 tablet 1     ASPIRIN NOT PRESCRIBED (INTENTIONAL) Please choose reason not prescribed, below 0 each 0       ALLERGIES     Allergies   Allergen Reactions     Simvastatin        PAST MEDICAL HISTORY:  Past Medical History:   Diagnosis Date     Antiphospholipid antibody syndrome (H) 5/25/2017     CAD (coronary artery disease), native coronary artery 10/2015    9/2015 Heart cath - 90% diagonal, 50-60% CFX, 100% prox RCA occlusion - MAL placed in RCA, 10/2015 EF 35-40% by Echo     DVT (deep venous thrombosis) (H) 9/2015 9/2015 Occlusive DVT extending from left common femoral to mid left popliteal vein     Hypercholesteraemia      Hypertension      PE (pulmonary embolism) 9/2015     PMR (polymyalgia rheumatica) (H)      Polymyalgia rheumatica (H)      STEMI (ST elevation myocardial infarction) (H) 10/2015    10/2015 Inferior STEMI - 100% RCA occlusion with MAL placed       PAST SURGICAL HISTORY:  Past Surgical History:   Procedure Laterality Date     HEART CATH STENT COR W/WO PTCA  10/2015    90% diagonal, 50-60% CFX, 100% prox RCA occlusion - MAL placed in RCA     ORTHOPEDIC SURGERY  08/2015    right carpal tunnel       FAMILY HISTORY:  Family History   Problem Relation Age of Onset     Hypertension Brother      Hypertension Brother        SOCIAL HISTORY:  Social History     Socioeconomic History     Marital status:      Spouse name: None     Number of children: None     Years of education: None     Highest education  level: None   Occupational History     None   Social Needs     Financial resource strain: None     Food insecurity:     Worry: None     Inability: None     Transportation needs:     Medical: None     Non-medical: None   Tobacco Use     Smoking status: Former Smoker     Packs/day: 0.50     Years: 5.00     Pack years: 2.50     Types: Cigarettes     Start date:      Last attempt to quit:      Years since quittin.7     Smokeless tobacco: Never Used   Substance and Sexual Activity     Alcohol use: Yes     Comment: 1 drink per day     Drug use: No     Sexual activity: Yes     Partners: Female   Lifestyle     Physical activity:     Days per week: None     Minutes per session: None     Stress: None   Relationships     Social connections:     Talks on phone: None     Gets together: None     Attends Hoahaoism service: None     Active member of club or organization: None     Attends meetings of clubs or organizations: None     Relationship status: None     Intimate partner violence:     Fear of current or ex partner: None     Emotionally abused: None     Physically abused: None     Forced sexual activity: None   Other Topics Concern      Service Not Asked     Blood Transfusions Not Asked     Caffeine Concern Yes     Comment: 1 cup coffee daily     Occupational Exposure Not Asked     Hobby Hazards Not Asked     Sleep Concern No     Stress Concern Yes     Comment: related to relationship     Weight Concern No     Special Diet Yes     Comment: mediterranian diet     Back Care Not Asked     Exercise Yes     Comment: walking, cardiac rehab starting     Bike Helmet Not Asked     Seat Belt Not Asked     Self-Exams Not Asked     Parent/sibling w/ CABG, MI or angioplasty before 65F 55M? Not Asked   Social History Narrative     None       Review of Systems:  Skin:  Negative       Eyes:  Positive for glasses    ENT:  Negative      Respiratory:  Positive for cough     Cardiovascular:  Negative      Gastroenterology:  "Negative      Genitourinary:  Negative      Musculoskeletal:  Positive for arthritis;joint pain left hip pain  Neurologic:  Negative (above eyes occasionally, something new)    Psychiatric:  Negative      Heme/Lymph/Imm:  Positive for allergies seasonal  Endocrine:  Negative        Physical Exam:  Vitals: /80   Pulse 63   Ht 1.74 m (5' 8.5\")   Wt 90.7 kg (200 lb)   BMI 29.97 kg/m       Constitutional:  in no acute distress;cooperative;well developed;well nourished        Skin:  no apparent skin lesions or masses noted          Head:  normocephalic, no masses or lesions        Eyes:  pupils equal and round        Lymph:No Cervical lymphadenopathy present     ENT:  no pallor or cyanosis        Neck:  JVP normal;no carotid bruit;carotid pulses are full and equal bilaterally        Respiratory:  clear to auscultation;normal symmetry         Cardiac: regular rhythm, normal S1/S2, no S3 or S4, apical impulse not displaced, no murmurs, gallops or rubs                pulses full and equal                                        GI:  not assessed this visit        Extremities and Muscular Skeletal:  no edema;no deformities, clubbing, cyanosis, erythema observed              Neurological:  affect appropriate;no gross motor deficits        Psych:  Alert and Oriented x 3        CC  Filipe Goldberg MD  [address]  [place] [address]                Thank you for allowing me to participate in the care of your patient.      Sincerely,     Frederic Isaacs MD, MD     [facility]    cc:   Filipe Goldberg MD  [address]  [place] [address]        "

## 2019-09-13 NOTE — PROGRESS NOTES
HPI and Plan:   See dictation    Orders Placed This Encounter   Procedures     NM Lexiscan stress test (nuc card)     EKG 12-lead complete w/read - Clinics (performed today)       No orders of the defined types were placed in this encounter.      Medications Discontinued During This Encounter   Medication Reason     ADVAIR DISKUS 250-50 MCG/DOSE inhaler Stopped by Patient         Encounter Diagnoses   Name Primary?     Coronary artery disease involving native coronary artery of native heart without angina pectoris Yes     Essential hypertension      Mixed hyperlipidemia      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)      Antiphospholipid antibody syndrome (H)      Pulmonary embolism without acute cor pulmonale, unspecified chronicity, unspecified pulmonary embolism type (H)      Old myocardial infarction      Pre-operative cardiovascular examination        CURRENT MEDICATIONS:  Current Outpatient Medications   Medication Sig Dispense Refill     albuterol (PROAIR HFA/PROVENTIL HFA/VENTOLIN HFA) 108 (90 BASE) MCG/ACT Inhaler Inhale 2 puffs into the lungs every 6 hours as needed for shortness of breath / dyspnea or wheezing 1 Inhaler 0     atorvastatin (LIPITOR) 80 MG tablet Take 1 tablet (80 mg) by mouth At Bedtime 90 tablet 3     Calcium Citrate-Vitamin D (CALCIUM CITRATE + D PO) Take 600 mg by mouth 2 times daily       Cholecalciferol (VITAMIN D3 PO) Take 1,000 Units by mouth 2 times daily       ibuprofen (ADVIL/MOTRIN) 200 MG capsule Take 600 mg by mouth daily as needed for fever       lisinopril (PRINIVIL/ZESTRIL) 5 MG tablet Take 1 tablet (5 mg) by mouth daily 90 tablet 3     metoprolol succinate ER (TOPROL-XL) 25 MG 24 hr tablet Take 12.5 mg by mouth daily       nitroglycerin (NITROSTAT) 0.4 MG sublingual tablet Place 1 tablet (0.4 mg) under the tongue every 5 minutes as needed for chest pain 25 tablet 3     ranitidine (ZANTAC) 150 MG tablet Take  150 mg by mouth daily       vardenafil (LEVITRA) 20 MG tablet Take 0.5-1 tablets (10-20 mg) by mouth daily as needed Never use with nitroglycerin, terazosin or doxazosin. 12 tablet 11     warfarin (COUMADIN) 5 MG tablet Take 1 1/2 tabs daily or as directed per INR clinic 150 tablet 1     ASPIRIN NOT PRESCRIBED (INTENTIONAL) Please choose reason not prescribed, below 0 each 0       ALLERGIES     Allergies   Allergen Reactions     Simvastatin        PAST MEDICAL HISTORY:  Past Medical History:   Diagnosis Date     Antiphospholipid antibody syndrome (H) 5/25/2017     CAD (coronary artery disease), native coronary artery 10/2015    9/2015 Heart cath - 90% diagonal, 50-60% CFX, 100% prox RCA occlusion - AML placed in RCA, 10/2015 EF 35-40% by Echo     DVT (deep venous thrombosis) (H) 9/2015 9/2015 Occlusive DVT extending from left common femoral to mid left popliteal vein     Hypercholesteraemia      Hypertension      PE (pulmonary embolism) 9/2015     PMR (polymyalgia rheumatica) (H)      Polymyalgia rheumatica (H)      STEMI (ST elevation myocardial infarction) (H) 10/2015    10/2015 Inferior STEMI - 100% RCA occlusion with MAL placed       PAST SURGICAL HISTORY:  Past Surgical History:   Procedure Laterality Date     HEART CATH STENT COR W/WO PTCA  10/2015    90% diagonal, 50-60% CFX, 100% prox RCA occlusion - MAL placed in RCA     ORTHOPEDIC SURGERY  08/2015    right carpal tunnel       FAMILY HISTORY:  Family History   Problem Relation Age of Onset     Hypertension Brother      Hypertension Brother        SOCIAL HISTORY:  Social History     Socioeconomic History     Marital status:      Spouse name: None     Number of children: None     Years of education: None     Highest education level: None   Occupational History     None   Social Needs     Financial resource strain: None     Food insecurity:     Worry: None     Inability: None     Transportation needs:     Medical: None     Non-medical: None   Tobacco  Use     Smoking status: Former Smoker     Packs/day: 0.50     Years: 5.00     Pack years: 2.50     Types: Cigarettes     Start date:      Last attempt to quit:      Years since quittin.7     Smokeless tobacco: Never Used   Substance and Sexual Activity     Alcohol use: Yes     Comment: 1 drink per day     Drug use: No     Sexual activity: Yes     Partners: Female   Lifestyle     Physical activity:     Days per week: None     Minutes per session: None     Stress: None   Relationships     Social connections:     Talks on phone: None     Gets together: None     Attends Congregation service: None     Active member of club or organization: None     Attends meetings of clubs or organizations: None     Relationship status: None     Intimate partner violence:     Fear of current or ex partner: None     Emotionally abused: None     Physically abused: None     Forced sexual activity: None   Other Topics Concern      Service Not Asked     Blood Transfusions Not Asked     Caffeine Concern Yes     Comment: 1 cup coffee daily     Occupational Exposure Not Asked     Hobby Hazards Not Asked     Sleep Concern No     Stress Concern Yes     Comment: related to relationship     Weight Concern No     Special Diet Yes     Comment: mediterranian diet     Back Care Not Asked     Exercise Yes     Comment: walking, cardiac rehab starting     Bike Helmet Not Asked     Seat Belt Not Asked     Self-Exams Not Asked     Parent/sibling w/ CABG, MI or angioplasty before 65F 55M? Not Asked   Social History Narrative     None       Review of Systems:  Skin:  Negative       Eyes:  Positive for glasses    ENT:  Negative      Respiratory:  Positive for cough     Cardiovascular:  Negative      Gastroenterology: Negative      Genitourinary:  Negative      Musculoskeletal:  Positive for arthritis;joint pain left hip pain  Neurologic:  Negative (above eyes occasionally, something new)    Psychiatric:  Negative      Heme/Lymph/Imm:   "Positive for allergies seasonal  Endocrine:  Negative        Physical Exam:  Vitals: /80   Pulse 63   Ht 1.74 m (5' 8.5\")   Wt 90.7 kg (200 lb)   BMI 29.97 kg/m      Constitutional:  in no acute distress;cooperative;well developed;well nourished        Skin:  no apparent skin lesions or masses noted          Head:  normocephalic, no masses or lesions        Eyes:  pupils equal and round        Lymph:No Cervical lymphadenopathy present     ENT:  no pallor or cyanosis        Neck:  JVP normal;no carotid bruit;carotid pulses are full and equal bilaterally        Respiratory:  clear to auscultation;normal symmetry         Cardiac: regular rhythm, normal S1/S2, no S3 or S4, apical impulse not displaced, no murmurs, gallops or rubs                pulses full and equal                                        GI:  not assessed this visit        Extremities and Muscular Skeletal:  no edema;no deformities, clubbing, cyanosis, erythema observed              Neurological:  affect appropriate;no gross motor deficits        Psych:  Alert and Oriented x 3        CC  Filipe Goldberg MD  [address]  [place] [address]              "

## 2019-09-13 NOTE — LETTER
9/13/2019      Filipe Goldberg MD  [address]  [facility] [address]      RE: Yogesh TENA Lenardneptalisruthi       Dear Colleague,    I had the pleasure of seeing Yogesh Abreu in the [facility].    Service Date: 09/13/2019      REFERERING PHYSICIAN:  Dr. Filipe Goldberg.      HISTORY OF PRESENT ILLNESS:  It is my pleasure to see your patient, Vincenzo Abreu, who was seen for many years by Satya Rogers, who has essentially retired from our group.  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  He did have a stress echocardiogram in 10/2018 which showed evidence of a possible nontransmural basal inferior myocardial infarct with a short run of VT.  It was felt by the echo reader and by Dr. Rogers that this was not clinically significant.      The patient is due to have surgery in early October.  He is having a hip replacement.  He is here for preop assessment.  His 12-lead electrocardiogram which was performed today shows evidence of a prior inferior myocardial infarct with Q-waves present in II, III, aVF and V6.  He has nonspecific ST sagging in V2, V3, V4 and V5.  The patient is not complaining of any chest pains, chest pressure or unusual shortness of breath.  He is not complaining of orthopnea, PND or ankle edema.  He has had no palpitations.  He has had no symptoms of syncope or near-syncope.  He has had no evidence in the past on echocardiography of severe obstructing valvular lesions.  Based upon the history alone and on the physical examination, he will be regarded as being low risk for an intraoperative cardiac event.      IMPRESSION:   1.  Coronary artery disease and status post inferior myocardial infarct.  The patient is  asymptomatic with no symptoms of angina pectoris.   2.  Antiphospholipid antibody with history of DVT and pulmonary embolism for which he is on chronic Coumadin therapy.   3.  Would be regarded as a low risk for an intraoperative cardiac event for the reasons that I have mentioned above.      PLAN:   1.  We will obtain a Lexiscan study, as the patient cannot walk, to determine if there is any evidence of ischemia prior to surgery.   2.  The patient will be seeing Dr. Goldberg preoperatively.  I suspect that Dr. Goldberg will recommend bridging for this patient who has antiphospholipid antibody syndrome with prior pulmonary embolism and DVT.   3.  I have recommended to the patient that he take his cardiac medications, which are lisinopril and metoprolol, as he usually takes it, which is the night before surgery.  The medication should be given the night of the surgery also.  Obviously, if the nuclear stress test shows a significant amount of ischemia, we will see the patient back immediately.  The patient is due to see me in, I believe, 8 months' time.      Many thanks for allowing me to be involved in the care of this extremely nice patient.      Frederic Isaacs MD, FACC       cc:   Filipe Goldberg MD    [address], [address]    [place] [address]         FREDERIC JOHNSON MD, FACC             D: 2019   T: 2019   MT: TORSTEN      Name:     RUIZ LAIRD   MRN:      [id number]        Account:      [id number]   :      1940           Service Date: 2019      Document: [id number]         Outpatient Encounter Medications as of 2019   Medication Sig Dispense Refill     albuterol (PROAIR HFA/PROVENTIL HFA/VENTOLIN HFA) 108 (90 BASE) MCG/ACT Inhaler Inhale 2 puffs into the lungs every 6 hours as needed for shortness of breath / dyspnea or wheezing 1 Inhaler 0     atorvastatin (LIPITOR) 80 MG tablet Take 1 tablet (80 mg) by mouth At Bedtime 90 tablet 3      Calcium Citrate-Vitamin D (CALCIUM CITRATE + D PO) Take 600 mg by mouth 2 times daily       Cholecalciferol (VITAMIN D3 PO) Take 1,000 Units by mouth 2 times daily       ibuprofen (ADVIL/MOTRIN) 200 MG capsule Take 600 mg by mouth daily as needed for fever       lisinopril (PRINIVIL/ZESTRIL) 5 MG tablet Take 1 tablet (5 mg) by mouth daily 90 tablet 3     metoprolol succinate ER (TOPROL-XL) 25 MG 24 hr tablet Take 12.5 mg by mouth daily       nitroglycerin (NITROSTAT) 0.4 MG sublingual tablet Place 1 tablet (0.4 mg) under the tongue every 5 minutes as needed for chest pain 25 tablet 3     ranitidine (ZANTAC) 150 MG tablet Take 150 mg by mouth daily       vardenafil (LEVITRA) 20 MG tablet Take 0.5-1 tablets (10-20 mg) by mouth daily as needed Never use with nitroglycerin, terazosin or doxazosin. 12 tablet 11     warfarin (COUMADIN) 5 MG tablet Take 1 1/2 tabs daily or as directed per INR clinic 150 tablet 1     ASPIRIN NOT PRESCRIBED (INTENTIONAL) Please choose reason not prescribed, below 0 each 0     [DISCONTINUED] ADVAIR DISKUS 250-50 MCG/DOSE inhaler INHALE 1 PUFF INTO THE LUNGS TWICE DAILY (Patient not taking: Reported on 9/13/2019) 60 Inhaler 2     No facility-administered encounter medications on file as of 9/13/2019.        Again, thank you for allowing me to participate in the care of your patient.      Sincerely,    Frederic Isaacs MD, MD     [facility]

## 2019-09-13 NOTE — PROGRESS NOTES
ANTICOAGULATION FOLLOW-UP CLINIC VISIT    Patient Name:  Yogehs Abreu  Date:  2019  Contact Type:  Face to Face    SUBJECTIVE:  Patient Findings     Positives:   Change in medications (started taking Zantac 4 days ago for gastric reflux; still taking Advil 600 mg once a day for hip pain (none for 2 days))        Clinical Outcomes     Negatives:   Major bleeding event, Thromboembolic event, Anticoagulation-related hospital admission, Anticoagulation-related ED visit, Anticoagulation-related fatality           OBJECTIVE    INR Protime   Date Value Ref Range Status   2019 2.6 (A) 0.86 - 1.14 Final     Chromogenic Factor 10   Date Value Ref Range Status   10/12/2015 28 (L) 70 - 130 % Final     Comment:     Therapeutic Range:  A Chromogenic Factor 10 level of approximately 20-40%   inversely correlates with an INR of 2-3 for patients receiving Warfarin.   Chromogenic Factor 10 levels below 20% indicate an INR greater than 3 and   levels above 40% indicate an INR less than 2.         ASSESSMENT / PLAN  INR assessment THER    Recheck INR In: 2 WEEKS    INR Location Clinic      Anticoagulation Summary  As of 2019    INR goal:   2.0-3.0   TTR:   75.9 % (3.3 y)   INR used for dosin.6 (2019)   Warfarin maintenance plan:   7.5 mg (5 mg x 1.5) every day   Full warfarin instructions:   7.5 mg every day   Weekly warfarin total:   52.5 mg   No change documented:   Tanja Wills RN   Plan last modified:   Tanja Wills RN (2019)   Next INR check:   2019   Target end date:   Indefinite    Indications    PE (pulmonary embolism) [I26.99]  Long term current use of anticoagulants with INR goal of 2.0-3.0 [Z79.01]             Anticoagulation Episode Summary     INR check location:       Preferred lab:       Send INR reminders to:   [facility] HEART INR NURSE    Comments:         Anticoagulation Care Providers     Provider Role Specialty Phone number    Frederic Isaacs MD Responsible  Cardiology [phone]            See the Encounter Report to view Anticoagulation Flowsheet and Dosing Calendar (Go to Encounters tab in chart review, and find the Anticoagulation Therapy Visit)    INR 2.6 Pt in clinic for INR appt. Verified home INR readings on his home coagucheck meter. He is still taking Advil 600 mg daily except none for 2 days. Advised that he should not be taking Advil along with warfarin (increases bleeding risk). He has had gastric reflux symptoms so started taking Zantac 150 mg daily (can increase INR). No change in diet. He is scheduled for a hip replacement on 10/10/19. He has an OV with Dr Sharan Mauro for cardiac clearance for surgery and to determine warfarin hold for surgery. Will continue current dosing of 7.5 mg daily and pt will recheck INR on home meter in 2 weeks. Notified [employer] of date of next INR check.    Tanja Wills RN

## 2019-09-15 PROCEDURE — 82274 ASSAY TEST FOR BLOOD FECAL: CPT | Performed by: INTERNAL MEDICINE

## 2019-09-17 ENCOUNTER — TELEPHONE (OUTPATIENT)
Dept: CARDIOLOGY | Facility: CLINIC | Age: 79
End: 2019-09-17

## 2019-09-17 ENCOUNTER — HOSPITAL ENCOUNTER (OUTPATIENT)
Dept: CARDIOLOGY | Facility: CLINIC | Age: 79
Discharge: HOME | End: 2019-09-17
Attending: INTERNAL MEDICINE | Admitting: INTERNAL MEDICINE
Payer: COMMERCIAL

## 2019-09-17 VITALS — SYSTOLIC BLOOD PRESSURE: 138 MMHG | DIASTOLIC BLOOD PRESSURE: 78 MMHG

## 2019-09-17 DIAGNOSIS — I25.10 CORONARY ARTERY DISEASE INVOLVING NATIVE CORONARY ARTERY OF NATIVE HEART WITHOUT ANGINA PECTORIS: ICD-10-CM

## 2019-09-17 DIAGNOSIS — Z01.810 PRE-OPERATIVE CARDIOVASCULAR EXAMINATION: ICD-10-CM

## 2019-09-17 DIAGNOSIS — I25.2 OLD MYOCARDIAL INFARCTION: ICD-10-CM

## 2019-09-17 PROCEDURE — 78452 HT MUSCLE IMAGE SPECT MULT: CPT | Mod: 26 | Performed by: INTERNAL MEDICINE

## 2019-09-17 PROCEDURE — A9502 TC99M TETROFOSMIN: HCPCS | Performed by: INTERNAL MEDICINE

## 2019-09-17 PROCEDURE — 25000128 H RX IP 250 OP 636: Performed by: INTERNAL MEDICINE

## 2019-09-17 PROCEDURE — 93016 CV STRESS TEST SUPVJ ONLY: CPT | Performed by: INTERNAL MEDICINE

## 2019-09-17 PROCEDURE — 93017 CV STRESS TEST TRACING ONLY: CPT

## 2019-09-17 PROCEDURE — 34300033 ZZH RX 343: Performed by: INTERNAL MEDICINE

## 2019-09-17 PROCEDURE — 93018 CV STRESS TEST I&R ONLY: CPT | Performed by: INTERNAL MEDICINE

## 2019-09-17 RX ORDER — ACYCLOVIR 200 MG/1
0-1 CAPSULE ORAL
Status: DISCONTINUED | OUTPATIENT
Start: 2019-09-17 | End: 2019-09-18 | Stop reason: HOSPADM

## 2019-09-17 RX ORDER — REGADENOSON 0.08 MG/ML
0.4 INJECTION, SOLUTION INTRAVENOUS ONCE
Status: COMPLETED | OUTPATIENT
Start: 2019-09-17 | End: 2019-09-17

## 2019-09-17 RX ORDER — AMINOPHYLLINE 25 MG/ML
50-100 INJECTION, SOLUTION INTRAVENOUS
Status: DISCONTINUED | OUTPATIENT
Start: 2019-09-17 | End: 2019-09-18 | Stop reason: HOSPADM

## 2019-09-17 RX ORDER — ALBUTEROL SULFATE 90 UG/1
2 AEROSOL, METERED RESPIRATORY (INHALATION) EVERY 5 MIN PRN
Status: DISCONTINUED | OUTPATIENT
Start: 2019-09-17 | End: 2019-09-18 | Stop reason: HOSPADM

## 2019-09-17 RX ADMIN — TETROFOSMIN 5.67 MCI.: 1.38 INJECTION, POWDER, LYOPHILIZED, FOR SOLUTION INTRAVENOUS at 08:27

## 2019-09-17 RX ADMIN — REGADENOSON 0.4 MG: 0.08 INJECTION, SOLUTION INTRAVENOUS at 10:22

## 2019-09-17 RX ADMIN — TETROFOSMIN 18.78 MCI.: 1.38 INJECTION, POWDER, LYOPHILIZED, FOR SOLUTION INTRAVENOUS at 10:25

## 2019-09-17 NOTE — TELEPHONE ENCOUNTER
Reviewed lexiscan showing   1. Myocardial perfusion imaging using single isotope technique demonstrated a moderate sized fixed perfusion defect in the inferior and inferolateral walls consistent with transmural infarct with minimal brian-infarct ischemia.  2. Gated images demonstrated hypokinesis of the basal to apical inferior, basal to mid inferolateral wall segments. Left ventricular cavity size is normal.  The left ventricular systolic function is mildly reduced, post stress LVEF 43%.  3. Compared to the prior study from 12/7/2015 there has been no significant change .    Will message Dr. Isaacs to review. Shabbir GIRALDO

## 2019-09-17 NOTE — TELEPHONE ENCOUNTER
Pt is scheduled for hip surgery on 10/10/19 (pending final cardiac clearance from Dr Sharan Mauro). Reviewed with Dr Sharan Mauro that our INR clinic manages his warfarin dosing. Pt takes Warfarin for antiphospholipid antibody syndrome with prior PE and DVT. Dr Sharan Mauro has authorized a 4 day warfarin hold with Lovenox bridging of 1 mg/kg subcutaneous every 12 hours when INR <2.0 with last pre procedure dose of Lovenox the evening before surgery (at least 12 hours before surgery) and pt to resume Warfarin and Lovenox evening of surgery (or as directed by surgeon). Left message for pt that we will discuss anticoagulation hold order and lovenox bridging next week when we call him after he checks his home INR (by then he should have heard from our office regarding his nuclear stress test results and also had his pre op physical exam). Rickey

## 2019-09-18 NOTE — TELEPHONE ENCOUNTER
Called pt with results of lexiscan & recommendations from Dr. Isaacs. Pt verbalized understanding. Shabbir GIRALDO

## 2019-09-20 ENCOUNTER — OFFICE VISIT (OUTPATIENT)
Dept: FAMILY MEDICINE | Facility: CLINIC | Age: 79
End: 2019-09-20
Payer: COMMERCIAL

## 2019-09-20 VITALS
OXYGEN SATURATION: 96 % | WEIGHT: 198 LBS | BODY MASS INDEX: 29.33 KG/M2 | SYSTOLIC BLOOD PRESSURE: 103 MMHG | TEMPERATURE: 97.4 F | HEART RATE: 69 BPM | HEIGHT: 69 IN | DIASTOLIC BLOOD PRESSURE: 68 MMHG

## 2019-09-20 DIAGNOSIS — R73.01 IFG (IMPAIRED FASTING GLUCOSE): ICD-10-CM

## 2019-09-20 DIAGNOSIS — D68.61 ANTIPHOSPHOLIPID ANTIBODY SYNDROME (H): ICD-10-CM

## 2019-09-20 DIAGNOSIS — I25.110 CORONARY ARTERY DISEASE INVOLVING NATIVE CORONARY ARTERY OF NATIVE HEART WITH UNSTABLE ANGINA PECTORIS (H): ICD-10-CM

## 2019-09-20 DIAGNOSIS — I10 ESSENTIAL HYPERTENSION: ICD-10-CM

## 2019-09-20 DIAGNOSIS — J45.40 MODERATE PERSISTENT ASTHMA WITHOUT COMPLICATION: ICD-10-CM

## 2019-09-20 DIAGNOSIS — E78.2 MIXED HYPERLIPIDEMIA: ICD-10-CM

## 2019-09-20 DIAGNOSIS — Z01.818 PREOP GENERAL PHYSICAL EXAM: Primary | ICD-10-CM

## 2019-09-20 LAB
ANION GAP SERPL CALCULATED.3IONS-SCNC: 8 MMOL/L (ref 3–14)
BUN SERPL-MCNC: 21 MG/DL (ref 7–30)
CALCIUM SERPL-MCNC: 10 MG/DL (ref 8.5–10.1)
CHLORIDE SERPL-SCNC: 104 MMOL/L (ref 94–109)
CO2 SERPL-SCNC: 25 MMOL/L (ref 20–32)
CREAT SERPL-MCNC: 0.84 MG/DL (ref 0.66–1.25)
ERYTHROCYTE [DISTWIDTH] IN BLOOD BY AUTOMATED COUNT: 13.9 % (ref 10–15)
GFR SERPL CREATININE-BSD FRML MDRD: 84 ML/MIN/{1.73_M2}
GLUCOSE SERPL-MCNC: 75 MG/DL (ref 70–99)
HBA1C MFR BLD: 5.6 % (ref 0–5.6)
HCT VFR BLD AUTO: 47.9 % (ref 40–53)
HGB BLD-MCNC: 16.2 G/DL (ref 13.3–17.7)
MCH RBC QN AUTO: 31 PG (ref 26.5–33)
MCHC RBC AUTO-ENTMCNC: 33.8 G/DL (ref 31.5–36.5)
MCV RBC AUTO: 92 FL (ref 78–100)
MRSA DNA SPEC QL NAA+PROBE: NEGATIVE
PLATELET # BLD AUTO: 172 10E9/L (ref 150–450)
POTASSIUM SERPL-SCNC: 3.8 MMOL/L (ref 3.4–5.3)
RBC # BLD AUTO: 5.23 10E12/L (ref 4.4–5.9)
SODIUM SERPL-SCNC: 137 MMOL/L (ref 133–144)
SPECIMEN SOURCE: NORMAL
WBC # BLD AUTO: 4.5 10E9/L (ref 4–11)

## 2019-09-20 PROCEDURE — 36415 COLL VENOUS BLD VENIPUNCTURE: CPT | Performed by: INTERNAL MEDICINE

## 2019-09-20 PROCEDURE — 85027 COMPLETE CBC AUTOMATED: CPT | Performed by: INTERNAL MEDICINE

## 2019-09-20 PROCEDURE — 83036 HEMOGLOBIN GLYCOSYLATED A1C: CPT | Performed by: INTERNAL MEDICINE

## 2019-09-20 PROCEDURE — 99215 OFFICE O/P EST HI 40 MIN: CPT | Performed by: INTERNAL MEDICINE

## 2019-09-20 PROCEDURE — 80048 BASIC METABOLIC PNL TOTAL CA: CPT | Performed by: INTERNAL MEDICINE

## 2019-09-20 PROCEDURE — 87641 MR-STAPH DNA AMP PROBE: CPT | Performed by: INTERNAL MEDICINE

## 2019-09-20 ASSESSMENT — MIFFLIN-ST. JEOR: SCORE: 1605.56

## 2019-09-20 NOTE — PATIENT INSTRUCTIONS
Skip lisinopril on PM prior to surgery, but take metoprolol on the night prior to surgery.        Before Your Surgery      Call your surgeon if there is any change in your health. This includes signs of a cold or flu (such as a sore throat, runny nose, cough, rash or fever).    Do not smoke, drink alcohol or take over the counter medicine (unless your surgeon or primary care doctor tells you to) for the 24 hours before and after surgery.    If you take prescribed drugs: Follow your doctor s orders about which medicines to take and which to stop until after surgery.    Eating and drinking prior to surgery: follow the instructions from your surgeon    Take a shower or bath the night before surgery. Use the soap your surgeon gave you to gently clean your skin. If you do not have soap from your surgeon, use your regular soap. Do not shave or scrub the surgery site.  Wear clean pajamas and have clean sheets on your bed.

## 2019-09-20 NOTE — LETTER
[facility]  [address] [place] [address] [facility]  [address]  [place]  [address]  Tel: [phone]    September 23, 2019    Yogesh DARINEL Abreu  [address]   [facility] [address]        Dear Dilan Weinbergshanthi,    The following letter pertains to your most recent diagnostic tests:     Labs look OK for proceeding with surgery.       Good news! Your stool test for colon cancer screening is negative.  This should be repeated in one year.       Sincerely,     Dr. Goldberg  / sherif    (These results are viewable via MY CHART.   If you are having trouble accessing your account call  [phone])       Results for orders placed or performed in visit on 09/20/19   CBC with Platelets     Result Value Ref Range    WBC 4.5 4.0 - 11.0 10e9/L    RBC Count 5.23 4.4 - 5.9 10e12/L    Hemoglobin 16.2 13.3 - 17.7 g/dL    Hematocrit 47.9 40.0 - 53.0 %    MCV 92 78 - 100 fl    MCH 31.0 26.5 - 33.0 pg    MCHC 33.8 31.5 - 36.5 g/dL    RDW 13.9 10.0 - 15.0 %    Platelet Count 172 150 - 450 10e9/L   Basic metabolic panel   Result Value Ref Range    Sodium 137 133 - 144 mmol/L    Potassium 3.8 3.4 - 5.3 mmol/L    Chloride 104 94 - 109 mmol/L    Carbon Dioxide 25 20 - 32 mmol/L    Anion Gap 8 3 - 14 mmol/L    Glucose 75 70 - 99 mg/dL    Urea Nitrogen 21 7 - 30 mg/dL    Creatinine 0.84 0.66 - 1.25 mg/dL    GFR Estimate 84 >60 mL/min/[1.73_m2]    GFR Estimate If Black >90 >60 mL/min/[1.73_m2]    Calcium 10.0 8.5 - 10.1 mg/dL   Hemoglobin A1c   Result Value Ref Range    Hemoglobin A1C 5.6 0 - 5.6 %   Methicillin Resistant Staph Aureus PCR   Result Value Ref Range    Specimen Description Nares     Methicillin Resist/Sens S. aureus PCR Negative NEG^Negative     Occult Blood Scn FIT Negative   NEG^Negative  Final 09/15/2019  7:38 AM

## 2019-09-20 NOTE — PROGRESS NOTES
[place] [address] [facility] [address]  [phone]  Dept: [phone]    PRE-OP EVALUATION:  Today's date: 2019     Yogesh Abreu (: 1940) presents for pre-operative evaluation assessment as requested by Dmitriy Turpin.  He requires evaluation and anesthesia risk assessment prior to undergoing surgery/procedure for treatment of left hip OA .    Proposed Surgery/ Procedure: LEFT TOTAL HIP ARTHROPLASTY  Date of Surgery/ Procedure: 10/10/2019  Time of Surgery/ Procedure: 11:00 AM  Hospital/Surgical Facility: [facility]  Fax number for surgical facility: [phone]  Primary Physician: Filipe Goldberg  Type of Anesthesia Anticipated: to be determined    Patient has a Health Care Directive or Living Will:  NO    1. YES - Do you have a history of heart attack, stroke, stent, bypass or surgery on an artery in the head, neck, heart or legs?   STEMI in   2. NO - Do you ever have any pain or discomfort in your chest?  3. NO - Do you have a history of  Heart Failure?  4. NO - Are you troubled by shortness of breath when: walking on the level, up a slight hill or at night?  5. NO - Do you currently have a cold, bronchitis or other respiratory infection?  6. NO- Do you have a cough, shortness of breath or wheezing?  7. NO - Do you sometimes get pains in the calves of your legs when you walk?  8. YES- Do you or anyone in your family have previous history of blood clots?  9. NO - Do you or does anyone in your family have a serious bleeding problem such as prolonged bleeding following surgeries or cuts?  10. NO - Have you ever had problems with anemia or been told to take iron pills?  11. NO - Have you had any abnormal blood loss such as black, tarry or bloody stools, or abnormal vaginal bleeding?  12. NO - Have you ever had a blood transfusion?  13. NO - Have you or any of your relatives ever had problems with anesthesia?  14. NO - Do you have sleep  apnea, excessive snoring or daytime drowsiness?  15. NO - Do you have any prosthetic heart valves?  16. NO - Do you have prosthetic joints?  17. NO - Is there any chance that you may be pregnant?      HPI:     HPI related to upcoming procedure: More that one year history of severe, progressive hip pain that now limits activities.   He can perform 4 METS of physical activity without chest pain or dyspnea.       MEDICAL HISTORY:     Patient Active Problem List    Diagnosis Date Noted     Long term current use of anticoagulants with INR goal of 2.0-3.0 06/26/2019     Priority: Medium     Chronic bilateral low back pain without sciatica 08/28/2018     Priority: Medium     Left ventricular failure (H) 08/20/2018     Priority: Medium     Antiphospholipid antibody syndrome (H) 05/25/2017     Priority: Medium     Moderate persistent asthma without complication 05/25/2017     Priority: Medium     HL (hearing loss), bilateral 05/25/2017     Priority: Medium     Long-term (current) use of anticoagulants [Z79.01] 05/20/2016     Priority: Medium     ACS (acute coronary syndrome) (H) 12/24/2015     Priority: Medium     Hypertension      Priority: Medium     Mixed hyperlipidemia      Priority: Medium     Polymyalgia rheumatica (H)      Priority: Medium     CAD (coronary artery disease), IMI -  => rescue stent to  RCA 10/08/2015     Priority: Medium     9/2015 Heart cath - 90% diagonal, 50-60% CFX, 100% prox RCA occlusion - MAL placed in RCA, 10/2015 EF 35-40% by Echo       PE (pulmonary embolism) 09/08/2015     Priority: Medium      Past Medical History:   Diagnosis Date     Antiphospholipid antibody syndrome (H) 5/25/2017     CAD (coronary artery disease), native coronary artery 10/2015    9/2015 Heart cath - 90% diagonal, 50-60% CFX, 100% prox RCA occlusion - MAL placed in RCA, 10/2015 EF 35-40% by Echo     DVT (deep venous thrombosis) (H) 9/2015 9/2015 Occlusive DVT extending from left common femoral to mid left  popliteal vein     Hypercholesteraemia      Hypertension      PE (pulmonary embolism) 9/2015     PMR (polymyalgia rheumatica) (H)      Polymyalgia rheumatica (H)      STEMI (ST elevation myocardial infarction) (H) 10/2015    10/2015 Inferior STEMI - 100% RCA occlusion with MAL placed     Past Surgical History:   Procedure Laterality Date     HEART CATH STENT COR W/WO PTCA  10/2015    90% diagonal, 50-60% CFX, 100% prox RCA occlusion - MAL placed in RCA     ORTHOPEDIC SURGERY  08/2015    right carpal tunnel     Current Outpatient Medications   Medication Sig Dispense Refill     albuterol (PROAIR HFA/PROVENTIL HFA/VENTOLIN HFA) 108 (90 BASE) MCG/ACT Inhaler Inhale 2 puffs into the lungs every 6 hours as needed for shortness of breath / dyspnea or wheezing 1 Inhaler 0     atorvastatin (LIPITOR) 80 MG tablet Take 1 tablet (80 mg) by mouth At Bedtime 90 tablet 3     Calcium Citrate-Vitamin D (CALCIUM CITRATE + D PO) Take 600 mg by mouth 2 times daily       Cholecalciferol (VITAMIN D3 PO) Take 1,000 Units by mouth 2 times daily       ibuprofen (ADVIL/MOTRIN) 200 MG capsule Take 600 mg by mouth daily as needed for fever       lisinopril (PRINIVIL/ZESTRIL) 5 MG tablet Take 1 tablet (5 mg) by mouth daily 90 tablet 3     metoprolol succinate ER (TOPROL-XL) 25 MG 24 hr tablet Take 12.5 mg by mouth daily       nitroglycerin (NITROSTAT) 0.4 MG sublingual tablet Place 1 tablet (0.4 mg) under the tongue every 5 minutes as needed for chest pain 25 tablet 3     ranitidine (ZANTAC) 150 MG tablet Take 150 mg by mouth daily       vardenafil (LEVITRA) 20 MG tablet Take 0.5-1 tablets (10-20 mg) by mouth daily as needed Never use with nitroglycerin, terazosin or doxazosin. 12 tablet 11     warfarin (COUMADIN) 5 MG tablet Take 1 1/2 tabs daily or as directed per INR clinic 150 tablet 1     ASPIRIN NOT PRESCRIBED (INTENTIONAL) Please choose reason not prescribed, below 0 each 0     OTC products: None, except as noted above    Allergies  "  Allergen Reactions     Simvastatin       Latex Allergy: NO    Social History     Tobacco Use     Smoking status: Former Smoker     Packs/day: 0.50     Years: 5.00     Pack years: 2.50     Types: Cigarettes     Start date:      Last attempt to quit: 1975     Years since quittin.7     Smokeless tobacco: Never Used   Substance Use Topics     Alcohol use: Yes     Comment: 1 drink per day     History   Drug Use No       REVIEW OF SYSTEMS:   Constitutional, neuro, ENT, endocrine, pulmonary, cardiac, gastrointestinal, genitourinary, musculoskeletal, integument and psychiatric systems are negative, except as otherwise noted.    EXAM:   /68 (BP Location: Right arm, Patient Position: Sitting, Cuff Size: Adult Large)   Pulse 69   Temp 97.4  F (36.3  C) (Oral)   Ht 1.74 m (5' 8.5\")   Wt 89.8 kg (198 lb)   SpO2 96%   BMI 29.67 kg/m      GENERAL APPEARANCE: healthy, alert and no distress     EYES: EOMI,  PERRL     HENT: ear canals and TM's normal and nose and mouth without ulcers or lesions     NECK: no adenopathy, no asymmetry, masses, or scars and thyroid normal to palpation     RESP: lungs clear to auscultation - no rales, rhonchi or wheezes     CV: regular rates and rhythm, normal S1 S2, no S3 or S4 and no murmur, click or rub     ABDOMEN:  soft, nontender, no HSM or masses and bowel sounds normal     MS: extremities normal- no gross deformities noted, no evidence of inflammation in joints, FROM in all extremities.     SKIN: no suspicious lesions or rashes     NEURO: Normal strength and tone, sensory exam grossly normal, mentation intact and speech normal     PSYCH: mentation appears normal. and affect normal/bright     LYMPHATICS: No cervical adenopathy    DIAGNOSTICS:   EKG: sinus rhythm rate 60 bpm old infarct noted     Recent Labs   Lab Test 19  0720  18  0921  17  1030   HGB  --   --   --   --   --  16.6  --  16.4   PLT  --   --   --   --   --  186  --  178 "   INR 2.6* 2.1   < >  --    < >  --    < >  --    NA  --   --   --  138  --  137  --  138   POTASSIUM  --   --   --  4.2  --  3.7  --  4.0   CR  --   --   --  0.94  --  0.82  --  0.80    < > = values in this interval not displayed.        IMPRESSION:   Reason for surgery/procedure: Left hip osteoarthritis   Diagnosis/reason for consult: Preoperative evaluation     The proposed surgical procedure is considered INTERMEDIATE risk.    REVISED CARDIAC RISK INDEX  The patient has the following serious cardiovascular risks for perioperative complications such as (MI, PE, VFib and 3  AV Block):  Coronary Artery Disease (MI, positive stress test, angina, Qs on EKG)  INTERPRETATION: 2 risks: Class III (moderate risk - 6.6% complication rate)    The patient has the following additional risks for perioperative complications:  No identified additional risks      ICD-10-CM    1. Preop general physical exam Z01.818 CBC with Platelets       Basic metabolic panel     Methicillin Resistant Staph Aureus PCR   2. 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) I25.110    3. Antiphospholipid antibody syndrome (H) D68.61    4. Moderate persistent asthma without complication J45.40    5. Essential hypertension I10    6. Mixed hyperlipidemia E78.2    7. IFG (impaired fasting glucose) R73.01 Hemoglobin A1c     Stable CAD symptoms  Recent stress test would suggest low periperative risk, although he will take perioperative beta blockade   He will need lovenox bridge due to APLA, this is to be coordinated through [facility] heart clinic where he has his anticoagulation managed  His asthma is stable and blood pressure is well controlled, can skip lisinopril on PM prior to surgery (he usually takes lisinopril at night), he will take metoprolol on PM prior to surgery , lipids were well controlled in June       RECOMMENDATIONS:     --Consult hospital rounder / IM to assist post-op medical  management  --Because of DVT or PE history, patient should be on low molecular weight heparin and wear compression hose before & after surgery    Cardiovascular Risk  Performs 4 METs exercise without symptoms (Climb a flight of stairs) .       --Patient is to take all scheduled medications on the day of surgery EXCEPT for modifications listed below.    Anticoagulant or Antiplatelet Medication Use  WARFARIN: Thromboembolic risk is moderate to high (e.g. artificial mitral valve, CVA/TIA within 6 months, DVT/PE within 3 months), hold warfarin 5 days prior to procedure and start Lovenox/ Heparin 3 days before procedure, last dose 24 hours before procedure .  Bridging therapy will be coordinated by [facility] heart anticoagulation clinic         APPROVAL GIVEN to proceed with proposed procedure, without further diagnostic evaluation       Signed Electronically by: Filipe Goldberg MD    Copy of this evaluation report is provided to requesting physician.    Section Preop Guidelines    Revised Cardiac Risk Index

## 2019-09-21 NOTE — RESULT ENCOUNTER NOTE
The following letter pertains to your most recent diagnostic tests:    Labs look OK for proceeding with surgery.          Sincerely,    Dr. Goldberg

## 2019-09-22 LAB — HEMOCCULT STL QL IA: NEGATIVE

## 2019-09-23 DIAGNOSIS — Z12.11 SCREEN FOR COLON CANCER: ICD-10-CM

## 2019-09-24 NOTE — TELEPHONE ENCOUNTER
Pt called back to verify his weight for Lovenox dosing -- 198# so his Lovenox bridging dose will be 90 mg subcutaneous every 12 hours when his INR is <2.0 during the 4 day  warfarin hold (last pre procedure dose the evening before surgery -- at least 12 hours before surgery) and pt to resume warfarin and lovenox bridging the evening of surgery (or as directed by surgeon if still in the hospital). Will send Lovenox Rx to [facility] on [address] after the next INR check. Rickey

## 2019-09-26 ENCOUNTER — TRANSFERRED RECORDS (OUTPATIENT)
Dept: HEALTH INFORMATION MANAGEMENT | Facility: CLINIC | Age: 79
End: 2019-09-26

## 2019-09-26 ENCOUNTER — ANTICOAGULATION THERAPY VISIT (OUTPATIENT)
Dept: CARDIOLOGY | Facility: CLINIC | Age: 79
End: 2019-09-26
Payer: COMMERCIAL

## 2019-09-26 DIAGNOSIS — Z79.01 LONG TERM CURRENT USE OF ANTICOAGULANTS WITH INR GOAL OF 2.0-3.0: ICD-10-CM

## 2019-09-26 DIAGNOSIS — D68.61 ANTIPHOSPHOLIPID ANTIBODY SYNDROME (H): Primary | ICD-10-CM

## 2019-09-26 DIAGNOSIS — I26.99 PULMONARY EMBOLISM WITHOUT ACUTE COR PULMONALE, UNSPECIFIED CHRONICITY, UNSPECIFIED PULMONARY EMBOLISM TYPE (H): ICD-10-CM

## 2019-09-26 LAB — INR PPP: 2.4

## 2019-09-26 PROCEDURE — 99207 ZZC NO CHARGE NURSE ONLY: CPT

## 2019-09-26 NOTE — PROGRESS NOTES
ANTICOAGULATION FOLLOW-UP CLINIC VISIT    Patient Name:  Yogesh Abreu  Date:  2019  Contact Type:  Telephone/ patient    SUBJECTIVE:  Patient Findings         Clinical Outcomes     Negatives:   Major bleeding event, Thromboembolic event, Anticoagulation-related hospital admission, Anticoagulation-related ED visit, Anticoagulation-related fatality           OBJECTIVE    INR   Date Value Ref Range Status   2019 2.4  Final     Chromogenic Factor 10   Date Value Ref Range Status   10/12/2015 28 (L) 70 - 130 % Final     Comment:     Therapeutic Range:  A Chromogenic Factor 10 level of approximately 20-40%   inversely correlates with an INR of 2-3 for patients receiving Warfarin.   Chromogenic Factor 10 levels below 20% indicate an INR greater than 3 and   levels above 40% indicate an INR less than 2.         ASSESSMENT / PLAN  INR assessment THER    Recheck INR In: 2 WEEKS    INR Location Home INR    Billed home INR No      Anticoagulation Summary  As of 2019    INR goal:   2.0-3.0   TTR:   76.2 % (3.3 y)   INR used for dosin.4 (2019)   Warfarin maintenance plan:   7.5 mg (5 mg x 1.5) every day   Full warfarin instructions:   10/6: Hold; 10/7: Hold; 10/8: Hold; 10/9: Hold; Otherwise 7.5 mg every day   Weekly warfarin total:   52.5 mg   Plan last modified:   Tanja Wills RN (2019)   Next INR check:   10/8/2019   Target end date:   Indefinite    Indications    PE (pulmonary embolism) [I26.99]  Long term current use of anticoagulants with INR goal of 2.0-3.0 [Z79.01]             Anticoagulation Episode Summary     INR check location:       Preferred lab:       Send INR reminders to:   [facility] HEART INR NURSE    Comments:         Anticoagulation Care Providers     Provider Role Specialty Phone number    Frederic Isaacs MD Responsible Cardiology [phone]            See the Encounter Report to view Anticoagulation Flowsheet and Dosing Calendar (Go to Encounters tab in chart  review, and find the Anticoagulation Therapy Visit)    Home INR 2.4 No change in meds or diet. Denies abnormal bleeding or bruising. Will continue current dosing of 7.5 mg daily. He is scheduled for a hip replacement on 10/10/19. Pt takes Warfarin for antiphospholipid antibody syndrome with prior PE and DVT. Dr Sharan Mauro has authorized a 4 day warfarin hold with Lovenox bridging of 1 mg/kg subcutaneous every 12 hours when INR <2.0 with last pre procedure dose of Lovenox the evening before surgery (at least 12 hours before surgery) and pt to resume Warfarin and Lovenox evening of surgery (or as directed by surgeon) and continue to administer Lovenox injections until INR above 2.3. Lovenox Rx escripted to [place]. He will start the Warfarin hold on 10/6/19 and check his INR on 10/8 to determine if he is to start Lovenox that day (starts Lovenox when INR <2.0). Mailed pt a copy of the INR dosing calender with instructions written on it. Will plan that he recheck his post op INR on 10/15/19 and call it in to the clinic to determine how long he will continue to bridge with Lovenox after surgery. Dosage adjustment made based on physician directed care plan.    Tanja Wills RN

## 2019-09-30 ENCOUNTER — OFFICE VISIT (OUTPATIENT)
Dept: FAMILY MEDICINE | Facility: CLINIC | Age: 79
End: 2019-09-30
Payer: COMMERCIAL

## 2019-09-30 VITALS
BODY MASS INDEX: 29.4 KG/M2 | WEIGHT: 198.5 LBS | OXYGEN SATURATION: 95 % | TEMPERATURE: 98.1 F | DIASTOLIC BLOOD PRESSURE: 82 MMHG | SYSTOLIC BLOOD PRESSURE: 120 MMHG | HEIGHT: 69 IN | HEART RATE: 71 BPM

## 2019-09-30 DIAGNOSIS — J06.9 UPPER RESPIRATORY TRACT INFECTION, UNSPECIFIED TYPE: ICD-10-CM

## 2019-09-30 DIAGNOSIS — I10 ESSENTIAL HYPERTENSION: ICD-10-CM

## 2019-09-30 DIAGNOSIS — J45.901 EXACERBATION OF ASTHMA, UNSPECIFIED ASTHMA SEVERITY, UNSPECIFIED WHETHER PERSISTENT: Primary | ICD-10-CM

## 2019-09-30 PROCEDURE — 99214 OFFICE O/P EST MOD 30 MIN: CPT | Performed by: INTERNAL MEDICINE

## 2019-09-30 RX ORDER — FLUTICASONE PROPIONATE 220 UG/1
1 AEROSOL, METERED RESPIRATORY (INHALATION) 2 TIMES DAILY
Qty: 1 INHALER | Refills: 3 | Status: SHIPPED | OUTPATIENT
Start: 2019-09-30 | End: 2020-04-23

## 2019-09-30 RX ORDER — FLUTICASONE PROPIONATE 220 UG/1
1 AEROSOL, METERED RESPIRATORY (INHALATION) 2 TIMES DAILY
Qty: 1 INHALER | Refills: 3 | Status: SHIPPED | OUTPATIENT
Start: 2019-09-30 | End: 2019-09-30

## 2019-09-30 RX ORDER — FLUTICASONE PROPIONATE AND SALMETEROL XINAFOATE 230; 21 UG/1; UG/1
2 AEROSOL, METERED RESPIRATORY (INHALATION) 2 TIMES DAILY
Qty: 1 INHALER | Refills: 3 | Status: SHIPPED | OUTPATIENT
Start: 2019-09-30 | End: 2020-04-23 | Stop reason: ALTCHOICE

## 2019-09-30 RX ORDER — FLUTICASONE PROPIONATE AND SALMETEROL XINAFOATE 230; 21 UG/1; UG/1
2 AEROSOL, METERED RESPIRATORY (INHALATION) 2 TIMES DAILY
Qty: 1 INHALER | Refills: 3 | Status: SHIPPED | OUTPATIENT
Start: 2019-09-30 | End: 2019-09-30

## 2019-09-30 ASSESSMENT — MIFFLIN-ST. JEOR: SCORE: 1607.83

## 2019-09-30 NOTE — PROGRESS NOTES
Chief Complaint:       Yogesh Abreu is a 79 year old male who presents to clinic today for the following health issues:      RESPIRATORY SYMPTOMS      Duration: 1 week    Description  nasal congestion, rhinorrhea, cough, wheezing and hoarse voice    Severity: mild in morning - moderate at night due to wheezing     Accompanying signs and symptoms: None    History (predisposing factors):  none    Precipitating or alleviating factors: Inhaler, Zyrtec     Therapies tried and outcome:  Inhaler (albuterol) and Zyrtec - helped           Current Medications:     Current Outpatient Medications   Medication Sig Dispense Refill     albuterol (PROAIR HFA/PROVENTIL HFA/VENTOLIN HFA) 108 (90 BASE) MCG/ACT Inhaler Inhale 2 puffs into the lungs every 6 hours as needed for shortness of breath / dyspnea or wheezing 1 Inhaler 0     amoxicillin-clavulanate (AUGMENTIN) 875-125 MG tablet Take 1 tablet by mouth 2 times daily for 7 days 14 tablet 1     ASPIRIN NOT PRESCRIBED (INTENTIONAL) Please choose reason not prescribed, below 0 each 0     atorvastatin (LIPITOR) 80 MG tablet Take 1 tablet (80 mg) by mouth At Bedtime 90 tablet 3     Calcium Citrate-Vitamin D (CALCIUM CITRATE + D PO) Take 600 mg by mouth 2 times daily       Cholecalciferol (VITAMIN D3 PO) Take 1,000 Units by mouth 2 times daily       enoxaparin (LOVENOX) 100 MG/ML syringe Inject 90 mg subcutaneous every 12 hours as directed. 20 Syringe 0     fluticasone (FLOVENT HFA) 220 MCG/ACT inhaler Inhale 1 puff into the lungs 2 times daily 1 Inhaler 3     fluticasone-salmeterol (ADVAIR-HFA) 230-21 MCG/ACT inhaler Inhale 2 puffs into the lungs 2 times daily 1 Inhaler 3     ibuprofen (ADVIL/MOTRIN) 200 MG capsule Take 600 mg by mouth daily as needed for fever       lisinopril (PRINIVIL/ZESTRIL) 5 MG tablet Take 1 tablet (5 mg) by mouth daily 90 tablet 3     metoprolol succinate ER (TOPROL-XL) 25 MG 24 hr tablet Take 12.5 mg by mouth daily       nitroglycerin (NITROSTAT) 0.4 MG  sublingual tablet Place 1 tablet (0.4 mg) under the tongue every 5 minutes as needed for chest pain 25 tablet 3     ranitidine (ZANTAC) 150 MG tablet Take 150 mg by mouth daily       vardenafil (LEVITRA) 20 MG tablet Take 0.5-1 tablets (10-20 mg) by mouth daily as needed Never use with nitroglycerin, terazosin or doxazosin. 12 tablet 11     warfarin (COUMADIN) 5 MG tablet Take 1 1/2 tabs daily or as directed per INR clinic 150 tablet 1         Allergies:      Allergies   Allergen Reactions     Simvastatin             Past Medical History:     Past Medical History:   Diagnosis Date     Antiphospholipid antibody syndrome (H) 5/25/2017     CAD (coronary artery disease), native coronary artery 10/2015    9/2015 Heart cath - 90% diagonal, 50-60% CFX, 100% prox RCA occlusion - MAL placed in RCA, 10/2015 EF 35-40% by Echo     DVT (deep venous thrombosis) (H) 9/2015 9/2015 Occlusive DVT extending from left common femoral to mid left popliteal vein     Hypercholesteraemia      Hypertension      PE (pulmonary embolism) 9/2015     PMR (polymyalgia rheumatica) (H)      Polymyalgia rheumatica (H)      STEMI (ST elevation myocardial infarction) (H) 10/2015    10/2015 Inferior STEMI - 100% RCA occlusion with MAL placed         Past Surgical History:     Past Surgical History:   Procedure Laterality Date     HEART CATH STENT COR W/WO PTCA  10/2015    90% diagonal, 50-60% CFX, 100% prox RCA occlusion - MAL placed in RCA     ORTHOPEDIC SURGERY  08/2015    right carpal tunnel         Family Medical History:     Family History   Problem Relation Age of Onset     Hypertension Brother      Hypertension Brother          Social History:     Social History     Socioeconomic History     Marital status:      Spouse name: Not on file     Number of children: Not on file     Years of education: Not on file     Highest education level: Not on file   Occupational History     Not on file   Social Needs     Financial resource strain: Not  on file     Food insecurity:     Worry: Not on file     Inability: Not on file     Transportation needs:     Medical: Not on file     Non-medical: Not on file   Tobacco Use     Smoking status: Former Smoker     Packs/day: 0.50     Years: 5.00     Pack years: 2.50     Types: Cigarettes     Start date:      Last attempt to quit:      Years since quittin.7     Smokeless tobacco: Never Used   Substance and Sexual Activity     Alcohol use: Yes     Comment: 1 drink per day     Drug use: No     Sexual activity: Yes     Partners: Female   Lifestyle     Physical activity:     Days per week: Not on file     Minutes per session: Not on file     Stress: Not on file   Relationships     Social connections:     Talks on phone: Not on file     Gets together: Not on file     Attends [employer] service: Not on file     Active member of club or organization: Not on file     Attends meetings of clubs or organizations: Not on file     Relationship status: Not on file     Intimate partner violence:     Fear of current or ex partner: Not on file     Emotionally abused: Not on file     Physically abused: Not on file     Forced sexual activity: Not on file   Other Topics Concern      Service Not Asked     Blood Transfusions Not Asked     Caffeine Concern Yes     Comment: 1 cup coffee daily     Occupational Exposure Not Asked     Hobby Hazards Not Asked     Sleep Concern No     Stress Concern Yes     Comment: related to relationship     Weight Concern No     Special Diet Yes     Comment: mediterranian diet     Back Care Not Asked     Exercise Yes     Comment: walking, cardiac rehab starting     Bike Helmet Not Asked     Seat Belt Not Asked     Self-Exams Not Asked     Parent/sibling w/ CABG, MI or angioplasty before 65F 55M? Not Asked   Social History Narrative     Not on file           Review of System:     Constitutional: Negative for fever or chills  Skin: Negative for rashes  Ears/Nose/Throat: positive for nasal  "congestion, sore throat  Respiratory: positive for cough, wheezing, asthma exacerbation  Cardiovascular: Negative for chest pain  Gastrointestinal: Negative for nausea, vomiting  Genitourinary: Negative for dysuria, hematuria  Musculoskeletal: Negative for myalgias  Neurologic: Negative for headaches  Psychiatric: Negative for depression, anxiety  Hematologic/Lymphatic/Immunologic: Negative  Endocrine: Negative  Behavioral: Negative for tobacco use       Physical Exam:   /82 (BP Location: Left arm, Patient Position: Sitting, Cuff Size: Adult Regular)   Pulse 71   Temp 98.1  F (36.7  C) (Oral)   Ht 1.74 m (5' 8.5\")   Wt 90 kg (198 lb 8 oz)   SpO2 95%   BMI 29.74 kg/m      GENERAL: alert and no distress  EYES: eyes grossly normal to inspection, and conjunctivae and sclerae normal  HENT: Normocephalic atraumatic. Nose and mouth without ulcers or lesions, nasal congestion present  NECK: supple  RESP: intermittent dry coughing spells, wheezing symptoms present   CV: regular rate and rhythm, normal S1 S2  LYMPH: no peripheral edema   ABDOMEN: nondistended  MS: no gross musculoskeletal defects noted  SKIN: no suspicious lesions or rashes  NEURO: Alert & Oriented x 3.   PSYCH: mentation appears normal, affect normal        Diagnostic Test Results:     Diagnostic Test Results:  Results for orders placed or performed in visit on 09/26/19   INR   Result Value Ref Range    INR 2.4        ASSESSMENT/PLAN:     (J06.9) Upper respiratory tract infection, unspecified type  (J45.901) Exacerbation of asthma, unspecified asthma severity, unspecified whether persistent  (primary encounter diagnosis)  Comment: acute on chronic asthma exacerbation symptoms following recent URI episode  Plan: fluticasone-salmeterol (ADVAIR-HFA) 230-21         MCG/ACT inhaler, fluticasone (FLOVENT HFA) 220         MCG/ACT inhaler, amoxicillin-clavulanate (AUGMENTIN) 875-125 MG         Tablet    (I10) Essential hypertension  Comment: BP is well " controlled  Plan: continue current BP medication regimen.      Follow Up Plan:     Patient is instructed to return to Internal Medicine clinic for follow-up visit in 1 week.        Ling Saini MD  Internal Medicine  [facility]

## 2019-10-01 ENCOUNTER — TELEPHONE (OUTPATIENT)
Dept: CARDIOLOGY | Facility: CLINIC | Age: 79
End: 2019-10-01

## 2019-10-01 NOTE — TELEPHONE ENCOUNTER
He is on warfarin for non cardiac reasons. He is on it for a hematological disorder (antiphospholipid antibody). We are happy to give advice about anticoagulation for cardiac conditions but not for non cardiac,  hematological conditions. Recommendations should come from PMD or hematology. Thanks

## 2019-10-01 NOTE — TELEPHONE ENCOUNTER
NORMAN Britton  from [facility] Pre-op clinic called requesting a change in pt's warfarin and lovenox bridging instructions. Pt is scheduled for hip surgery on 10/10/19. The proposed instructions per Vivian:    Hold warfarin 5 days instead of 4-starting hold on 10/5/19  Start lovenox for INR <2.0 (pt does home INR)  Take lovenox on 10/7 morning and evening,10/8 morning and evening, 10/9 take only in the morning for a lovenox hold for 24 hours prior to surgery.     Will route to Dr. Isaacs for review and recommendation.

## 2019-10-02 NOTE — TELEPHONE ENCOUNTER
RN spoke with Vivian AUSTIN from [facility] pre-op clinic and provided Dr. Isaacs's recommendation:  He is on warfarin for non cardiac reasons. He is on it for a hematological disorder (antiphospholipid antibody). We are happy to give advice about anticoagulation for cardiac conditions but not for non cardiac,  hematological conditions. Recommendations should come from PMD or hematology. Thanks    Per Vivian-they will manage going forward.

## 2019-10-07 ENCOUNTER — ANCILLARY PROCEDURE (OUTPATIENT)
Dept: GENERAL RADIOLOGY | Facility: CLINIC | Age: 79
End: 2019-10-07
Attending: INTERNAL MEDICINE
Payer: COMMERCIAL

## 2019-10-07 ENCOUNTER — OFFICE VISIT (OUTPATIENT)
Dept: FAMILY MEDICINE | Facility: CLINIC | Age: 79
End: 2019-10-07
Payer: COMMERCIAL

## 2019-10-07 VITALS
WEIGHT: 197 LBS | SYSTOLIC BLOOD PRESSURE: 114 MMHG | DIASTOLIC BLOOD PRESSURE: 73 MMHG | BODY MASS INDEX: 29.18 KG/M2 | HEIGHT: 69 IN | OXYGEN SATURATION: 96 % | HEART RATE: 76 BPM | TEMPERATURE: 98.4 F

## 2019-10-07 DIAGNOSIS — R05.9 COUGH: ICD-10-CM

## 2019-10-07 DIAGNOSIS — R05.9 COUGH: Primary | ICD-10-CM

## 2019-10-07 PROCEDURE — G0008 ADMIN INFLUENZA VIRUS VAC: HCPCS | Performed by: INTERNAL MEDICINE

## 2019-10-07 PROCEDURE — 99213 OFFICE O/P EST LOW 20 MIN: CPT | Mod: 25 | Performed by: INTERNAL MEDICINE

## 2019-10-07 PROCEDURE — 90662 IIV NO PRSV INCREASED AG IM: CPT | Performed by: INTERNAL MEDICINE

## 2019-10-07 PROCEDURE — 71046 X-RAY EXAM CHEST 2 VIEWS: CPT

## 2019-10-07 ASSESSMENT — MIFFLIN-ST. JEOR: SCORE: 1601.03

## 2019-10-07 NOTE — Clinical Note
Please call [facility] and leave message for Dr. Dmitriy Queen's nurse that this patient CAN have surgery on Thursday

## 2019-10-07 NOTE — PROGRESS NOTES
Subjective     Yogesh Abreu is a 79 year old male who presents to clinic today for the following health issues:    HPI       Follow up recent URI/cough treatment. Has surgery on 10/10/19 for left total hip arthroplasty - wants to make sure it is safe to proceed.     79-year-old man with asthma, hyperlipidemia, antiphospholipid antibody syndrome, coronary artery disease, polymyalgia rheumatica history, history of pulmonary embolism, cardiomyopathy.  He was seen for preop several weeks ago.  In the interim, he developed a mild cough and wheezing.  He was treated with Augmentin as well as an inhaled corticosteroid and long-acting beta agonist inhaler.  He is feeling better.  He wonders if he can proceed with surgery?  He no longer has a severe cough or dyspnea or wheezing.  He has a mildly hoarse voice although this is improving.  He denies current fevers or sputum production.      Patient Active Problem List   Diagnosis     PE (pulmonary embolism)     CAD (coronary artery disease), IMI -  => rescue stent to  RCA     Hypertension     Mixed hyperlipidemia     Polymyalgia rheumatica (H)     ACS (acute coronary syndrome) (H)     Long-term (current) use of anticoagulants [Z79.01]     Antiphospholipid antibody syndrome (H)     Moderate persistent asthma without complication     HL (hearing loss), bilateral     Left ventricular failure (H)     Chronic bilateral low back pain without sciatica     Long term current use of anticoagulants with INR goal of 2.0-3.0     Past Surgical History:   Procedure Laterality Date     HEART CATH STENT COR W/WO PTCA  10/2015    90% diagonal, 50-60% CFX, 100% prox RCA occlusion - MAL placed in RCA     ORTHOPEDIC SURGERY  2015    right carpal tunnel       Social History     Tobacco Use     Smoking status: Former Smoker     Packs/day: 0.50     Years: 5.00     Pack years: 2.50     Types: Cigarettes     Start date:      Last attempt to quit:      Years since quittin.7      Smokeless tobacco: Never Used   Substance Use Topics     Alcohol use: Yes     Comment: 1 drink per day     Family History   Problem Relation Age of Onset     Hypertension Brother      Hypertension Brother          Current Outpatient Medications   Medication Sig Dispense Refill     albuterol (PROAIR HFA/PROVENTIL HFA/VENTOLIN HFA) 108 (90 BASE) MCG/ACT Inhaler Inhale 2 puffs into the lungs every 6 hours as needed for shortness of breath / dyspnea or wheezing 1 Inhaler 0     amoxicillin-clavulanate (AUGMENTIN) 875-125 MG tablet Take 1 tablet by mouth 2 times daily for 7 days 14 tablet 1     ASPIRIN NOT PRESCRIBED (INTENTIONAL) Please choose reason not prescribed, below 0 each 0     atorvastatin (LIPITOR) 80 MG tablet Take 1 tablet (80 mg) by mouth At Bedtime 90 tablet 3     Calcium Citrate-Vitamin D (CALCIUM CITRATE + D PO) Take 600 mg by mouth 2 times daily       Cholecalciferol (VITAMIN D3 PO) Take 1,000 Units by mouth 2 times daily       enoxaparin (LOVENOX) 100 MG/ML syringe Inject 90 mg subcutaneous every 12 hours as directed. 20 Syringe 0     fluticasone (FLOVENT HFA) 220 MCG/ACT inhaler Inhale 1 puff into the lungs 2 times daily 1 Inhaler 3     fluticasone-salmeterol (ADVAIR-HFA) 230-21 MCG/ACT inhaler Inhale 2 puffs into the lungs 2 times daily 1 Inhaler 3     ibuprofen (ADVIL/MOTRIN) 200 MG capsule Take 600 mg by mouth daily as needed for fever       lisinopril (PRINIVIL/ZESTRIL) 5 MG tablet Take 1 tablet (5 mg) by mouth daily 90 tablet 3     metoprolol succinate ER (TOPROL-XL) 25 MG 24 hr tablet Take 12.5 mg by mouth daily       nitroglycerin (NITROSTAT) 0.4 MG sublingual tablet Place 1 tablet (0.4 mg) under the tongue every 5 minutes as needed for chest pain 25 tablet 3     ranitidine (ZANTAC) 150 MG tablet Take 150 mg by mouth daily       vardenafil (LEVITRA) 20 MG tablet Take 0.5-1 tablets (10-20 mg) by mouth daily as needed Never use with nitroglycerin, terazosin or doxazosin. 12 tablet 11     warfarin  "(COUMADIN) 5 MG tablet Take 1 1/2 tabs daily or as directed per INR clinic 150 tablet 1     Allergies   Allergen Reactions     Simvastatin        Reviewed and updated as needed this visit by Provider         Review of Systems   ROS COMP: Pertinent positive negatives are reviewed in history of present illness      Objective    /73 (BP Location: Right arm, Cuff Size: Adult Regular)   Pulse 76   Temp 98.4  F (36.9  C) (Tympanic)   Ht 1.74 m (5' 8.5\")   Wt 89.4 kg (197 lb)   SpO2 96%   BMI 29.52 kg/m    Body mass index is 29.52 kg/m .  Physical Exam   GENERAL: healthy, alert and no distress  EYES: Eyes grossly normal to inspection, PERRL and conjunctivae and sclerae normal  HENT: ear canals and TM's normal, nose and mouth without ulcers or lesions  NECK: no adenopathy, no asymmetry, masses, or scars and thyroid normal to palpation  RESP: lungs clear to auscultation - no rales, rhonchi or wheezes  CV: Heart has a regular rate and rhythm  ABDOMEN: soft, nontender, no hepatosplenomegaly, no masses and bowel sounds normal  NEURO: Normal strength and tone, mentation intact and speech normal  PSYCH: mentation appears normal, affect normal/bright    Diagnostic Test Results:    CXR -pending        Assessment & Plan     1. Cough  Symptoms are significantly improved, no wheezing on exam today, oxygen saturation okay, provided that there is no acute infiltrate on chest x-ray, I think it would be more than reasonable to proceed with surgery on Thursday.  I do not see any ongoing reason to continue with Advair.  He can return to just taking albuterol on an as-needed basis to manage his asthma.  He will complete the course of Augmentin as previously prescribed  - XR Chest 2 Views; Future            Total face to face contact time was greater than 20 minutes of which more than 50% of this time was spent counseling and coordinating care regarding the above topics.    Return in about 10 months (around 8/15/2020) for " Preventive Visit.    Filipe Goldberg MD  [facility]

## 2019-10-08 NOTE — RESULT ENCOUNTER NOTE
The following letter pertains to your most recent diagnostic tests:    Good news! Your chest x-ray looks good.  No problems with proceeding with hip surgery as planned.        Sincerely,    Dr. Goldberg

## 2019-10-09 ENCOUNTER — TELEPHONE (OUTPATIENT)
Dept: CARDIOLOGY | Facility: CLINIC | Age: 79
End: 2019-10-09

## 2019-10-09 DIAGNOSIS — I25.110 CORONARY ARTERY DISEASE INVOLVING NATIVE CORONARY ARTERY OF NATIVE HEART WITH UNSTABLE ANGINA PECTORIS (H): ICD-10-CM

## 2019-10-09 NOTE — TELEPHONE ENCOUNTER
Spoke with pt to clarify the anticoagulationorders that he was given by the [facility] pre op clinic that took over pre op anticoagulation hold and bridging (see previous telephone notes). He states that he started holding warfarin Saturday and started bridging with Lovenox every 12 hours on Monday with last pre op dose of Lovenox this morning (10/9). He states that they told him to resume Lovenox every 12 hours and Warfarin daily the evening of surgery (10/10). He will check his Home INR on Tuesday 10/15 which will be called to [facility] and our clinic will then resume INR management. Rickey

## 2019-10-10 RX ORDER — LISINOPRIL 5 MG/1
TABLET ORAL
Qty: 90 TABLET | Refills: 3 | Status: SHIPPED | OUTPATIENT
Start: 2019-10-10 | End: 2020-03-16

## 2019-10-10 NOTE — TELEPHONE ENCOUNTER
"Routing refill request to provider for review/approval because:  Last ordered from [facility]   Please authorize if appropriate.  Thanks,  Christina Gomes RN    Lisinopril      Last Written Prescription Date:  9/26/18  Last Fill Quantity: 90,   # refills: 3  Last Office Visit: 10/7/19  Future Office visit:           Requested Prescriptions   Pending Prescriptions Disp Refills     lisinopril (PRINIVIL/ZESTRIL) 5 MG tablet [Pharmacy Med Name: LISINOPRIL 5MG TABLETS] 90 tablet 0     Sig: TAKE 1 TABLET(5 MG) BY MOUTH DAILY       ACE Inhibitors (Including Combos) Protocol Passed - 10/9/2019  4:46 PM        Passed - Blood pressure under 140/90 in past 12 months     BP Readings from Last 3 Encounters:   10/07/19 114/73   09/30/19 120/82   09/20/19 103/68                 Passed - Recent (12 mo) or future (30 days) visit within the authorizing provider's specialty     Patient has had an office visit with the authorizing provider or a provider within the authorizing providers department within the previous 12 mos or has a future within next 30 days. See \"Patient Info\" tab in inbasket, or \"Choose Columns\" in Meds & Orders section of the refill encounter.              Passed - Medication is active on med list        Passed - Patient is age 18 or older        Passed - Normal serum creatinine on file in past 12 months     Recent Labs   Lab Test 09/20/19  0930   CR 0.84             Passed - Normal serum potassium on file in past 12 months     Recent Labs   Lab Test 09/20/19  0930   POTASSIUM 3.8             "

## 2019-10-15 ENCOUNTER — TRANSFERRED RECORDS (OUTPATIENT)
Dept: HEALTH INFORMATION MANAGEMENT | Facility: CLINIC | Age: 79
End: 2019-10-15

## 2019-10-15 ENCOUNTER — ANTICOAGULATION THERAPY VISIT (OUTPATIENT)
Dept: CARDIOLOGY | Facility: CLINIC | Age: 79
End: 2019-10-15
Payer: COMMERCIAL

## 2019-10-15 DIAGNOSIS — I26.99 PULMONARY EMBOLISM WITHOUT ACUTE COR PULMONALE, UNSPECIFIED CHRONICITY, UNSPECIFIED PULMONARY EMBOLISM TYPE (H): ICD-10-CM

## 2019-10-15 DIAGNOSIS — Z79.01 LONG TERM CURRENT USE OF ANTICOAGULANTS WITH INR GOAL OF 2.0-3.0: ICD-10-CM

## 2019-10-15 LAB — INR PPP: 1.5

## 2019-10-15 PROCEDURE — 99207 ZZC NO CHARGE NURSE ONLY: CPT

## 2019-10-15 NOTE — PROGRESS NOTES
ANTICOAGULATION FOLLOW-UP CLINIC VISIT    Patient Name:  Yogesh Abreu  Date:  10/15/2019  Contact Type:  Telephone/patient    SUBJECTIVE:  Patient Findings     Positives:   Missed doses (held 4 doses prior to hip surgery), Change in medications (taking Tylenol 500 mg 6x/day and Oxycodone every 4 hours), Hospital admission (hospitalized for hip surgery)        Clinical Outcomes     Negatives:   Major bleeding event, Thromboembolic event, Anticoagulation-related hospital admission, Anticoagulation-related ED visit, Anticoagulation-related fatality           OBJECTIVE    INR   Date Value Ref Range Status   10/15/2019 1.5  Final     Chromogenic Factor 10   Date Value Ref Range Status   10/12/2015 28 (L) 70 - 130 % Final     Comment:     Therapeutic Range:  A Chromogenic Factor 10 level of approximately 20-40%   inversely correlates with an INR of 2-3 for patients receiving Warfarin.   Chromogenic Factor 10 levels below 20% indicate an INR greater than 3 and   levels above 40% indicate an INR less than 2.         ASSESSMENT / PLAN  INR assessment SUB    Recheck INR In: 2 DAYS    INR Location Home INR    Billed home INR No      Anticoagulation Summary  As of 10/15/2019    INR goal:   2.0-3.0   TTR:   75.7 % (3.4 y)   INR used for dosin.5! (10/15/2019)   Warfarin maintenance plan:   7.5 mg (5 mg x 1.5) every day   Full warfarin instructions:   10/15: 10 mg; 10/16: 10 mg; Otherwise 7.5 mg every day   Weekly warfarin total:   52.5 mg   Plan last modified:   Tanja Wills RN (2019)   Next INR check:   10/17/2019   Target end date:   Indefinite    Indications    PE (pulmonary embolism) [I26.99]  Long term current use of anticoagulants with INR goal of 2.0-3.0 [Z79.01]             Anticoagulation Episode Summary     INR check location:       Preferred lab:       Send INR reminders to:   [facility] HEART INR NURSE    Comments:         Anticoagulation Care Providers     Provider Role Specialty Phone number     Frederic Isaacs MD [facility] Cardiology [phone]            See the Encounter Report to view Anticoagulation Flowsheet and Dosing Calendar (Go to Encounters tab in chart review, and find the Anticoagulation Therapy Visit)    Home INR 1.5 He had held warfarin for 4 days and was bridging with Lovenox before hip replacement on 10/10/19. He resumed Warfarin evening of surgery and while hospitalized, they gave him Lovenox 40 mg daily for 2 days then they increased his dose of Lovenox to 80 mg subcutaneous every 12 hours on 10/12. Denies abnormal bleeding or bruising. Taking Tylenol 500 mg 6x/day and Oxycodone every 4 hours for pain control. Not eating greens yet. Will dose with Warfarin 10 mg today and tomorrow along with continuing the Lovenox bridging until INR 2.3 or above. He has more Lovenox syringes available at the pharmacy for him to . Will continue to avoid greens this week. Dosage adjustment made based on physician directed care plan.  INR clinic referral order submitted for signature.  Has the patient previously taken warfarin? yes  If yes, for what indication? PE    Does the patient have any of the following indications for a higher range of 2.5-3.5:    Mitral position mechanical valve? no    Michaela-Shiley, Ball and Cage or Monoleaflet valve (regardless of position) no    Other (if yes, please explain) no    Tanja Wills RN

## 2019-10-17 ENCOUNTER — ANTICOAGULATION THERAPY VISIT (OUTPATIENT)
Dept: CARDIOLOGY | Facility: CLINIC | Age: 79
End: 2019-10-17
Payer: COMMERCIAL

## 2019-10-17 ENCOUNTER — TRANSFERRED RECORDS (OUTPATIENT)
Dept: HEALTH INFORMATION MANAGEMENT | Facility: CLINIC | Age: 79
End: 2019-10-17

## 2019-10-17 ENCOUNTER — OFFICE VISIT (OUTPATIENT)
Dept: FAMILY MEDICINE | Facility: CLINIC | Age: 79
End: 2019-10-17
Payer: COMMERCIAL

## 2019-10-17 VITALS
OXYGEN SATURATION: 95 % | TEMPERATURE: 99.3 F | BODY MASS INDEX: 29.62 KG/M2 | WEIGHT: 200 LBS | SYSTOLIC BLOOD PRESSURE: 95 MMHG | HEART RATE: 87 BPM | HEIGHT: 69 IN | DIASTOLIC BLOOD PRESSURE: 61 MMHG

## 2019-10-17 DIAGNOSIS — R35.0 URINARY FREQUENCY: Primary | ICD-10-CM

## 2019-10-17 DIAGNOSIS — M16.12 PRIMARY OSTEOARTHRITIS OF LEFT HIP: ICD-10-CM

## 2019-10-17 DIAGNOSIS — I26.99 PULMONARY EMBOLISM WITHOUT ACUTE COR PULMONALE, UNSPECIFIED CHRONICITY, UNSPECIFIED PULMONARY EMBOLISM TYPE (H): ICD-10-CM

## 2019-10-17 DIAGNOSIS — Z79.01 LONG TERM CURRENT USE OF ANTICOAGULANTS WITH INR GOAL OF 2.0-3.0: ICD-10-CM

## 2019-10-17 LAB
ALBUMIN UR-MCNC: NEGATIVE MG/DL
APPEARANCE UR: CLEAR
BILIRUB UR QL STRIP: NEGATIVE
COLOR UR AUTO: YELLOW
GLUCOSE UR STRIP-MCNC: NEGATIVE MG/DL
HGB UR QL STRIP: ABNORMAL
INR PPP: 1.9
KETONES UR STRIP-MCNC: NEGATIVE MG/DL
LEUKOCYTE ESTERASE UR QL STRIP: NEGATIVE
NITRATE UR QL: NEGATIVE
PH UR STRIP: 6.5 PH (ref 5–7)
RBC #/AREA URNS AUTO: NORMAL /HPF
SOURCE: ABNORMAL
SP GR UR STRIP: 1.02 (ref 1–1.03)
UROBILINOGEN UR STRIP-ACNC: 2 EU/DL (ref 0.2–1)
WBC #/AREA URNS AUTO: NORMAL /HPF

## 2019-10-17 PROCEDURE — 81001 URINALYSIS AUTO W/SCOPE: CPT | Performed by: INTERNAL MEDICINE

## 2019-10-17 PROCEDURE — 99213 OFFICE O/P EST LOW 20 MIN: CPT | Performed by: INTERNAL MEDICINE

## 2019-10-17 PROCEDURE — 99207 ZZC NO CHARGE NURSE ONLY: CPT

## 2019-10-17 ASSESSMENT — MIFFLIN-ST. JEOR: SCORE: 1614.63

## 2019-10-17 NOTE — LETTER
[facility]  [address] [facility]  [address]  [place]  [address]  Tel: [phone]    October 17, 2019    Yogesh TENA Brady  [address]   [facility] [address]        Dear Mr. Abreu,    The following letter pertains to your most recent diagnostic tests:    Good news! The urine test does NOT show evidence for infection to explain your urinary symptoms.  If they persist you could always return for further evaluation, but for now I would give it a week or so to see if it gets better on its own     If you have any further questions or problems, please contact our office.      Sincerely,    Filipe Goldberg MD/ Alina Andino CMA  Results for orders placed or performed in visit on 10/17/19   *UA reflex to Microscopic and Culture ([place] [facility] (except [place] and [place])   Result Value Ref Range    Color Urine Yellow     Appearance Urine Clear     Glucose Urine Negative NEG^Negative mg/dL    Bilirubin Urine Negative NEG^Negative    Ketones Urine Negative NEG^Negative mg/dL    Specific Gravity Urine 1.020 1.003 - 1.035    Blood Urine Trace (A) NEG^Negative    pH Urine 6.5 5.0 - 7.0 pH    Protein Albumin Urine Negative NEG^Negative mg/dL    Urobilinogen Urine 2.0 (H) 0.2 - 1.0 EU/dL    Nitrite Urine Negative NEG^Negative    Leukocyte Esterase Urine Negative NEG^Negative    Source Midstream Urine    Urine Microscopic   Result Value Ref Range    WBC Urine 0 - 5 OTO5^0 - 5 /HPF    RBC Urine O - 2 OTO2^O - 2 /HPF               Enclosure: Lab Results

## 2019-10-17 NOTE — PROGRESS NOTES
ANTICOAGULATION FOLLOW-UP CLINIC VISIT    Patient Name:  Yogesh Abreu  Date:  10/17/2019  Contact Type:  Telephone/ left message for pt to return the call    SUBJECTIVE:         OBJECTIVE    INR   Date Value Ref Range Status   10/17/2019 1.9  Final     Chromogenic Factor 10   Date Value Ref Range Status   10/12/2015 28 (L) 70 - 130 % Final     Comment:     Therapeutic Range:  A Chromogenic Factor 10 level of approximately 20-40%   inversely correlates with an INR of 2-3 for patients receiving Warfarin.   Chromogenic Factor 10 levels below 20% indicate an INR greater than 3 and   levels above 40% indicate an INR less than 2.         ASSESSMENT / PLAN  INR assessment SUB    Recheck INR In: 1 DAY    INR Location Home INR      Anticoagulation Summary  As of 10/17/2019    INR goal:   2.0-3.0   TTR:   75.6 % (3.4 y)   INR used for dosin.9! (10/17/2019)   Warfarin maintenance plan:   7.5 mg (5 mg x 1.5) every day   Full warfarin instructions:   10/17: 10 mg; Otherwise 7.5 mg every day   Weekly warfarin total:   52.5 mg   Plan last modified:   Tanja Wills RN (2019)   Next INR check:   10/18/2019   Target end date:   Indefinite    Indications    PE (pulmonary embolism) [I26.99]  Long term current use of anticoagulants with INR goal of 2.0-3.0 [Z79.01]             Anticoagulation Episode Summary     INR check location:       Preferred lab:       Send INR reminders to:   [facility] HEART INR NURSE    Comments:         Anticoagulation Care Providers     Provider Role Specialty Phone number    Frederic Isaacs MD [facility] Cardiology [phone]            See the Encounter Report to view Anticoagulation Flowsheet and Dosing Calendar (Go to Encounters tab in chart review, and find the Anticoagulation Therapy Visit)    Home INR 1.9 Pt had held warfarin for 4 days prior to hip replacement on 10/10/19. He resumed warfarin and Lovenox 80 mg subcutaneous every 12 hours bridging after surgery. On Tuesday,  he was still taking Tylenol 500 mg 6x/day and Oxycodone every 4 hours and he was avoiding greens. Left a message for pt to return the call to review his current status, meds and diet. Will plan to have him take Warfarin 10 mg tonight along with continuine the Lovenox bridging until INR 2.3 or above. Recheck Home INR tomorrow. Dosage adjustment made based on physician directed care plan.  Tanja Wills, RN  3 pm Left another message asking pt to call back. Also left instructions for pt to take Warfarin 10 mg tonight and to continue to bridge with Lovenox 80 mg subcutaneous every 12 hours until INR 2.3 or above. Requested that he check an INR tomorrow and call the INR clinic with result. Per PMD note, he had urinary frequency and a UA is pending so will need to watch Epic for UA results and PMD plan. Rickey     10/18/19 Pt left a voicemail message that he received the above instructions (he read back the instructions correctly) and will recheck INR 10/18/19. Rickey

## 2019-10-17 NOTE — RESULT ENCOUNTER NOTE
The following letter pertains to your most recent diagnostic tests:    Good news! The urine test does NOT show evidence for infection to explain your urinary symptoms.  If they persist you could always return for further evaluation, but for now I would give it a week or so to see if it gets better on its own         Sincerely,    Dr. Goldberg

## 2019-10-17 NOTE — PROGRESS NOTES
Subjective     Yogesh Abreu is a 79 year old male who presents to clinic today for the following health issues:    HPI   Chief Complaint   Patient presents with     Surgical Followup     LEFT TOTAL HIP ARTHROPLASTY        Doing well after surgery  Pain controlled off opioids  Improving constipation  Mild urinary frequency since discharge  No dysuria or hematuria noted  No cough, dyspnea or wheezing  No blood in stools  He is participating in physical therapy    Patient Active Problem List   Diagnosis     PE (pulmonary embolism)     CAD (coronary artery disease), IMI -  => rescue stent to  RCA     Hypertension     Mixed hyperlipidemia     Polymyalgia rheumatica (H)     ACS (acute coronary syndrome) (H)     Long-term (current) use of anticoagulants [Z79.01]     Antiphospholipid antibody syndrome (H)     Moderate persistent asthma without complication     HL (hearing loss), bilateral     Left ventricular failure (H)     Chronic bilateral low back pain without sciatica     Long term current use of anticoagulants with INR goal of 2.0-3.0     Past Surgical History:   Procedure Laterality Date     HEART CATH STENT COR W/WO PTCA  10/2015    90% diagonal, 50-60% CFX, 100% prox RCA occlusion - MAL placed in RCA     ORTHOPEDIC SURGERY  2015    right carpal tunnel       Social History     Tobacco Use     Smoking status: Former Smoker     Packs/day: 0.50     Years: 5.00     Pack years: 2.50     Types: Cigarettes     Start date:      Last attempt to quit:      Years since quittin.8     Smokeless tobacco: Never Used   Substance Use Topics     Alcohol use: Yes     Comment: 1 drink per day     Family History   Problem Relation Age of Onset     Hypertension Brother      Hypertension Brother          Current Outpatient Medications   Medication Sig Dispense Refill     albuterol (PROAIR HFA/PROVENTIL HFA/VENTOLIN HFA) 108 (90 BASE) MCG/ACT Inhaler Inhale 2 puffs into the lungs every 6 hours as needed for  "shortness of breath / dyspnea or wheezing 1 Inhaler 0     ASPIRIN NOT PRESCRIBED (INTENTIONAL) Please choose reason not prescribed, below 0 each 0     atorvastatin (LIPITOR) 80 MG tablet Take 1 tablet (80 mg) by mouth At Bedtime 90 tablet 3     Calcium Citrate-Vitamin D (CALCIUM CITRATE + D PO) Take 600 mg by mouth 2 times daily       Cholecalciferol (VITAMIN D3 PO) Take 1,000 Units by mouth 2 times daily       enoxaparin (LOVENOX) 100 MG/ML syringe Inject 90 mg subcutaneous every 12 hours as directed. 20 Syringe 0     fluticasone (FLOVENT HFA) 220 MCG/ACT inhaler Inhale 1 puff into the lungs 2 times daily 1 Inhaler 3     fluticasone-salmeterol (ADVAIR-HFA) 230-21 MCG/ACT inhaler Inhale 2 puffs into the lungs 2 times daily 1 Inhaler 3     ibuprofen (ADVIL/MOTRIN) 200 MG capsule Take 600 mg by mouth daily as needed for fever       lisinopril (PRINIVIL/ZESTRIL) 5 MG tablet TAKE 1 TABLET(5 MG) BY MOUTH DAILY 90 tablet 3     metoprolol succinate ER (TOPROL-XL) 25 MG 24 hr tablet Take 12.5 mg by mouth daily       nitroglycerin (NITROSTAT) 0.4 MG sublingual tablet Place 1 tablet (0.4 mg) under the tongue every 5 minutes as needed for chest pain 25 tablet 3     ranitidine (ZANTAC) 150 MG tablet Take 150 mg by mouth daily       vardenafil (LEVITRA) 20 MG tablet Take 0.5-1 tablets (10-20 mg) by mouth daily as needed Never use with nitroglycerin, terazosin or doxazosin. 12 tablet 11     warfarin (COUMADIN) 5 MG tablet Take 1 1/2 tabs daily or as directed per INR clinic 150 tablet 1     Allergies   Allergen Reactions     Simvastatin        Reviewed and updated as needed this visit by Provider         Review of Systems   ROS COMP: Constitutional, HEENT, cardiovascular, pulmonary, gi and gu systems are negative, except as otherwise noted.      Objective    BP 95/61 (BP Location: Right arm, Patient Position: Sitting, Cuff Size: Adult Regular)   Pulse 87   Temp 99.3  F (37.4  C) (Tympanic)   Ht 1.74 m (5' 8.5\")   Wt 90.7 kg " (200 lb)   SpO2 95%   BMI 29.97 kg/m    Body mass index is 29.97 kg/m .  Physical Exam   General: Well-appearing man in no acute distress appears comfortable.  Musculoskeletal: Well-healing left total hip arthroplasty incision, mild bruising surrounding the incision, no obvious large hematoma.  No evidence of infection.  Cardiovascular: The heart has a regular rate and rhythm.  Pulmonary: The lungs are clear to auscultation bilaterally, no wheezing, breathing is not labored.  Extremities: Mild edema in the left greater than right leg without Homans sign.  Neurological: Symmetric strength, walks with walker, alert and oriented to person place and time, cranial nerves II to XII are grossly intact.  Mental status: Appropriate mood and affect, well-groomed.        Assessment & Plan     1. Urinary frequency  Exclude catheter related UTI with urinalysis today, if antibiotic is started, close follow-up of INR will be necessary  - *UA reflex to Microscopic and Culture ([place] [facility] (except [place] and [place])    2. Primary osteoarthritis of left hip  Continue follow-up with orthopedic surgery as directed, healing seems to be going as planned, continue physical therapy              Return in about 10 months (around 8/17/2020) for Preventive Visit; or return sooner as needed or pending labs.    Filipe Goldberg MD  [facility]

## 2019-10-18 ENCOUNTER — TRANSFERRED RECORDS (OUTPATIENT)
Dept: HEALTH INFORMATION MANAGEMENT | Facility: CLINIC | Age: 79
End: 2019-10-18

## 2019-10-18 ENCOUNTER — ANTICOAGULATION THERAPY VISIT (OUTPATIENT)
Dept: CARDIOLOGY | Facility: CLINIC | Age: 79
End: 2019-10-18
Payer: COMMERCIAL

## 2019-10-18 DIAGNOSIS — Z79.01 LONG TERM CURRENT USE OF ANTICOAGULANTS WITH INR GOAL OF 2.0-3.0: ICD-10-CM

## 2019-10-18 DIAGNOSIS — I26.99 PULMONARY EMBOLISM WITHOUT ACUTE COR PULMONALE, UNSPECIFIED CHRONICITY, UNSPECIFIED PULMONARY EMBOLISM TYPE (H): ICD-10-CM

## 2019-10-18 LAB — INR PPP: 2.2

## 2019-10-18 PROCEDURE — 99207 ZZC NO CHARGE NURSE ONLY: CPT

## 2019-10-18 NOTE — PROGRESS NOTES
ANTICOAGULATION FOLLOW-UP CLINIC VISIT    Patient Name:  Yogesh Abreu  Date:  10/18/2019  Contact Type:  Telephone/ patient    SUBJECTIVE:  Patient Findings     Positives:   Change in medications (stopped taking Rx pain meds yesterday)        Clinical Outcomes     Negatives:   Major bleeding event, Thromboembolic event, Anticoagulation-related hospital admission, Anticoagulation-related ED visit, Anticoagulation-related fatality           OBJECTIVE    INR   Date Value Ref Range Status   10/18/2019 2.2  Final     Chromogenic Factor 10   Date Value Ref Range Status   10/12/2015 28 (L) 70 - 130 % Final     Comment:     Therapeutic Range:  A Chromogenic Factor 10 level of approximately 20-40%   inversely correlates with an INR of 2-3 for patients receiving Warfarin.   Chromogenic Factor 10 levels below 20% indicate an INR greater than 3 and   levels above 40% indicate an INR less than 2.         ASSESSMENT / PLAN  INR assessment THER    Recheck INR In: 6 DAYS    INR Location Home INR    Billed home INR No      Anticoagulation Summary  As of 10/18/2019    INR goal:   2.0-3.0   TTR:   75.5 % (3.4 y)   INR used for dosin.2 (10/18/2019)   Warfarin maintenance plan:   7.5 mg (5 mg x 1.5) every day   Full warfarin instructions:   7.5 mg every day   Weekly warfarin total:   52.5 mg   No change documented:   Tanja Wills RN   Plan last modified:   Tanja Wills RN (2019)   Next INR check:   10/24/2019   Target end date:   Indefinite    Indications    PE (pulmonary embolism) [I26.99]  Long term current use of anticoagulants with INR goal of 2.0-3.0 [Z79.01]             Anticoagulation Episode Summary     INR check location:       Preferred lab:       Send INR reminders to:   [facility] HEART INR NURSE    Comments:         Anticoagulation Care Providers     Provider Role Specialty Phone number    Frederic Isaacs MD [facility] Cardiology [phone]            See the Encounter Report to view  Anticoagulation Flowsheet and Dosing Calendar (Go to Encounters tab in chart review, and find the Anticoagulation Therapy Visit)    INR 2.2 INR back in therapeutic range after boost in dosing. He will use Lovenox 80 mg subcutaneous every 12 hours today then stop Lovenox. He stopped taking Rx pain meds yesterday and is not taking Tylenol regularly. Eating his usual diet. Abdomen and hip incision have bruising. Will resume previously successful dosing schedule of 7.5 mg daily and recheck INR in 6 days. Dosage adjustment made based on physician directed care plan.    Tanja Wills RN

## 2019-10-23 ENCOUNTER — TRANSFERRED RECORDS (OUTPATIENT)
Dept: HEALTH INFORMATION MANAGEMENT | Facility: CLINIC | Age: 79
End: 2019-10-23

## 2019-10-24 ENCOUNTER — ANTICOAGULATION THERAPY VISIT (OUTPATIENT)
Dept: CARDIOLOGY | Facility: CLINIC | Age: 79
End: 2019-10-24
Payer: COMMERCIAL

## 2019-10-24 DIAGNOSIS — Z79.01 LONG TERM CURRENT USE OF ANTICOAGULANTS WITH INR GOAL OF 2.0-3.0: ICD-10-CM

## 2019-10-24 LAB — INR POINT OF CARE: 2.2 (ref 0.86–1.14)

## 2019-10-24 PROCEDURE — 99207 ZZC NO CHARGE NURSE ONLY: CPT | Performed by: INTERNAL MEDICINE

## 2019-10-24 PROCEDURE — 85610 PROTHROMBIN TIME: CPT | Mod: QW | Performed by: INTERNAL MEDICINE

## 2019-10-24 PROCEDURE — 36416 COLLJ CAPILLARY BLOOD SPEC: CPT | Performed by: INTERNAL MEDICINE

## 2019-10-24 NOTE — PROGRESS NOTES
ANTICOAGULATION FOLLOW-UP CLINIC VISIT    Patient Name:  Yogesh Abreu  Date:  10/24/2019  Contact Type:  Face to Face    SUBJECTIVE:  Patient Findings     Positives:   Change in medications        Clinical Outcomes     Negatives:   Major bleeding event, Thromboembolic event, Anticoagulation-related hospital admission, Anticoagulation-related ED visit, Anticoagulation-related fatality           OBJECTIVE    INR Protime   Date Value Ref Range Status   10/24/2019 2.2 (A) 0.86 - 1.14 Final     Chromogenic Factor 10   Date Value Ref Range Status   10/12/2015 28 (L) 70 - 130 % Final     Comment:     Therapeutic Range:  A Chromogenic Factor 10 level of approximately 20-40%   inversely correlates with an INR of 2-3 for patients receiving Warfarin.   Chromogenic Factor 10 levels below 20% indicate an INR greater than 3 and   levels above 40% indicate an INR less than 2.         ASSESSMENT / PLAN  INR assessment THER    Recheck INR In: 2 WEEKS    INR Location Clinic      Anticoagulation Summary  As of 10/24/2019    INR goal:   2.0-3.0   TTR:   75.7 % (3.4 y)   INR used for dosin.2 (10/24/2019)   Warfarin maintenance plan:   7.5 mg (5 mg x 1.5) every day   Full warfarin instructions:   7.5 mg every day   Weekly warfarin total:   52.5 mg   No change documented:   Mandy Ferguson RN   Plan last modified:   Tanja Wills RN (2019)   Next INR check:   2019   Target end date:   Indefinite    Indications    PE (pulmonary embolism) [I26.99]  Long term current use of anticoagulants with INR goal of 2.0-3.0 [Z79.01]             Anticoagulation Episode Summary     INR check location:       Preferred lab:       Send INR reminders to:   [facility] HEART INR NURSE    Comments:         Anticoagulation Care Providers     Provider Role Specialty Phone number    Frederic Isaacs MD [facility] Cardiology [phone]            See the Encounter Report to view Anticoagulation Flowsheet and Dosing Calendar (Go  to Encounters tab in chart review, and find the Anticoagulation Therapy Visit)    INR 2.2 in the clinic since he ran out of POC strips at home and they are back ordered.  He had hip surgery on 10/10.  He only took pain meds for 3 days so he isn't taking any pain meds and he even stopped the Advil 1 week prior to surgery.  He has some leg cramps so he will try taking Valium and Magnesium for 2 days.  He finished taking Lovenox last week on Friday.  Continue the normal 7.5mg/day and recheck at home in 2 weeks.  Told him to call and come in sooner if he takes the Magnesium or Valium for longer then 2 days or if he doesn't get strips at home then he will come into the clinic again for an INR check.  I called and left message for his home testing company with an update.  Canelo Ferguson RN

## 2019-10-31 ENCOUNTER — TRANSFERRED RECORDS (OUTPATIENT)
Dept: HEALTH INFORMATION MANAGEMENT | Facility: CLINIC | Age: 79
End: 2019-10-31

## 2019-11-08 ENCOUNTER — ANTICOAGULATION THERAPY VISIT (OUTPATIENT)
Dept: CARDIOLOGY | Facility: CLINIC | Age: 79
End: 2019-11-08
Payer: COMMERCIAL

## 2019-11-08 DIAGNOSIS — Z79.01 LONG TERM CURRENT USE OF ANTICOAGULANTS WITH INR GOAL OF 2.0-3.0: ICD-10-CM

## 2019-11-08 DIAGNOSIS — I26.99 PULMONARY EMBOLISM (H): ICD-10-CM

## 2019-11-08 LAB — INR POINT OF CARE: 2.6 (ref 0.86–1.14)

## 2019-11-08 PROCEDURE — 99207 ZZC NO CHARGE NURSE ONLY: CPT | Performed by: INTERNAL MEDICINE

## 2019-11-08 PROCEDURE — 85610 PROTHROMBIN TIME: CPT | Mod: QW | Performed by: INTERNAL MEDICINE

## 2019-11-08 PROCEDURE — 36416 COLLJ CAPILLARY BLOOD SPEC: CPT | Performed by: INTERNAL MEDICINE

## 2019-11-08 NOTE — PROGRESS NOTES
ANTICOAGULATION FOLLOW-UP CLINIC VISIT    Patient Name:  oYgesh Abreu  Date:  2019  Contact Type:  Face to Face    SUBJECTIVE:  Patient Findings     Comments:   The patient was assessed for diet, medication, and activity level changes, missed or extra doses, bruising or bleeding, with no problem findings.        Clinical Outcomes     Negatives:   Major bleeding event, Thromboembolic event, Anticoagulation-related hospital admission, Anticoagulation-related ED visit, Anticoagulation-related fatality    Comments:   The patient was assessed for diet, medication, and activity level changes, missed or extra doses, bruising or bleeding, with no problem findings.           OBJECTIVE    INR Protime   Date Value Ref Range Status   2019 2.6 (A) 0.86 - 1.14 Final     Chromogenic Factor 10   Date Value Ref Range Status   10/12/2015 28 (L) 70 - 130 % Final     Comment:     Therapeutic Range:  A Chromogenic Factor 10 level of approximately 20-40%   inversely correlates with an INR of 2-3 for patients receiving Warfarin.   Chromogenic Factor 10 levels below 20% indicate an INR greater than 3 and   levels above 40% indicate an INR less than 2.         ASSESSMENT / PLAN  INR assessment THER    Recheck INR In: 2 WEEKS    INR Location Clinic      Anticoagulation Summary  As of 2019    INR goal:   2.0-3.0   TTR:   76.0 % (3.4 y)   INR used for dosin.6 (2019)   Warfarin maintenance plan:   7.5 mg (5 mg x 1.5) every day   Full warfarin instructions:   7.5 mg every day   Weekly warfarin total:   52.5 mg   No change documented:   Tanja Wills RN   Plan last modified:   Tanja Wills RN (2019)   Next INR check:   2019   Target end date:   Indefinite    Indications    PE (pulmonary embolism) [I26.99]  Long term current use of anticoagulants with INR goal of 2.0-3.0 [Z79.01]             Anticoagulation Episode Summary     INR check location:       Preferred lab:       Send INR reminders to:   [facility] HEART INR NURSE    Comments:         Anticoagulation Care Providers     Provider Role Specialty Phone number    Ferny Frederic Collado MD Responsible Cardiology [phone]            See the Encounter Report to view Anticoagulation Flowsheet and Dosing Calendar (Go to Encounters tab in chart review, and find the Anticoagulation Therapy Visit)    INR 2.6 He usually checks his INR at home but he ran out of test strips and [employer] had them on backorder for the last month. He got a message late yesterday that they were finally being shipped. No change in meds or diet. He is recovering from hip replacement surgery and stopped taking ibuprofen prior to surgery. Denies abnormal bleeding or bruising. Will continue current dosing of 7.5 mg daily with recheck of home INR in 2 weeks. Notified [employer] of date of next INR due date.    Tanja Wills RN

## 2019-11-20 ENCOUNTER — TRANSFERRED RECORDS (OUTPATIENT)
Dept: HEALTH INFORMATION MANAGEMENT | Facility: CLINIC | Age: 79
End: 2019-11-20

## 2019-11-21 ENCOUNTER — TRANSFERRED RECORDS (OUTPATIENT)
Dept: HEALTH INFORMATION MANAGEMENT | Facility: CLINIC | Age: 79
End: 2019-11-21

## 2019-11-21 LAB — INR PPP: 2.1

## 2019-11-22 ENCOUNTER — ANTICOAGULATION THERAPY VISIT (OUTPATIENT)
Dept: CARDIOLOGY | Facility: CLINIC | Age: 79
End: 2019-11-22
Payer: COMMERCIAL

## 2019-11-22 DIAGNOSIS — I26.99 PULMONARY EMBOLISM (H): ICD-10-CM

## 2019-11-22 DIAGNOSIS — Z79.01 LONG TERM CURRENT USE OF ANTICOAGULANTS WITH INR GOAL OF 2.0-3.0: ICD-10-CM

## 2019-11-22 PROCEDURE — 99207 ZZC NO CHARGE LOS: CPT

## 2019-11-22 NOTE — PROGRESS NOTES
ANTICOAGULATION FOLLOW-UP CLINIC VISIT    Patient Name:  Yogesh Abreu  Date:  2019  Contact Type: [email or web address]    SUBJECTIVE:         OBJECTIVE    INR   Date Value Ref Range Status   2019 2.1  Final     Chromogenic Factor 10   Date Value Ref Range Status   10/12/2015 28 (L) 70 - 130 % Final     Comment:     Therapeutic Range:  A Chromogenic Factor 10 level of approximately 20-40%   inversely correlates with an INR of 2-3 for patients receiving Warfarin.   Chromogenic Factor 10 levels below 20% indicate an INR greater than 3 and   levels above 40% indicate an INR less than 2.         ASSESSMENT / PLAN  INR assessment THER    Recheck INR In: 2 WEEKS    INR Location Home INR    Billed home INR No      Anticoagulation Summary  As of 2019    INR goal:   2.0-3.0   TTR:   79.4 % (1 y)   INR used for dosin.1 (2019)   Warfarin maintenance plan:   7.5 mg (5 mg x 1.5) every day   Full warfarin instructions:   7.5 mg every day   Weekly warfarin total:   52.5 mg   No change documented:   Tanja Wills RN   Plan last modified:   Tanja Wills RN (2019)   Next INR check:   2019   Priority:   Maintenance   Target end date:   Indefinite    Indications    PE (pulmonary embolism) [I26.99]  Long term current use of anticoagulants with INR goal of 2.0-3.0 [Z79.01]             Anticoagulation Episode Summary     INR check location:       Preferred lab:       Send INR reminders to:   [facility] HEART INR NURSE    Comments:         Anticoagulation Care Providers     Provider Role Specialty Phone number    Frederic Isaacs MD [facility] Cardiology [phone]            See the Encounter Report to view Anticoagulation Flowsheet and Dosing Calendar (Go to Encounters tab in chart review, and find the Anticoagulation Therapy Visit)    19 Home INR 2.1 Will continue current dosing of 7.5 mg daily and recheck in 2 weeks. Will contact pt after the next INR check.    Tanja Wills  RN

## 2019-12-05 ENCOUNTER — ANTICOAGULATION THERAPY VISIT (OUTPATIENT)
Dept: CARDIOLOGY | Facility: CLINIC | Age: 79
End: 2019-12-05
Payer: COMMERCIAL

## 2019-12-05 ENCOUNTER — TRANSFERRED RECORDS (OUTPATIENT)
Dept: HEALTH INFORMATION MANAGEMENT | Facility: CLINIC | Age: 79
End: 2019-12-05

## 2019-12-05 DIAGNOSIS — I26.99 PULMONARY EMBOLISM (H): ICD-10-CM

## 2019-12-05 DIAGNOSIS — Z79.01 LONG TERM CURRENT USE OF ANTICOAGULANTS WITH INR GOAL OF 2.0-3.0: ICD-10-CM

## 2019-12-05 LAB — INR PPP: 3.7

## 2019-12-05 PROCEDURE — 99207 ZZC NO CHARGE NURSE ONLY: CPT

## 2019-12-05 NOTE — PROGRESS NOTES
ANTICOAGULATION FOLLOW-UP CLINIC VISIT    Patient Name:  Yogesh Abreu  Date:  12/5/2019  Contact Type:  Telephone/ patient    SUBJECTIVE:         OBJECTIVE    INR   Date Value Ref Range Status   12/05/2019 3.7  Final     Chromogenic Factor 10   Date Value Ref Range Status   10/12/2015 28 (L) 70 - 130 % Final     Comment:     Therapeutic Range:  A Chromogenic Factor 10 level of approximately 20-40%   inversely correlates with an INR of 2-3 for patients receiving Warfarin.   Chromogenic Factor 10 levels below 20% indicate an INR greater than 3 and   levels above 40% indicate an INR less than 2.         ASSESSMENT / PLAN  INR assessment SUPRA    Recheck INR In: 2 WEEKS    INR Location Home INR    Billed home INR No      Anticoagulation Summary  As of 12/5/2019    INR goal:   2.0-3.0   TTR:   77.8 % (1 y)   INR used for dosing:   3.7! (12/5/2019)   Warfarin maintenance plan:   7.5 mg (5 mg x 1.5) every day   Full warfarin instructions:   12/5: 5 mg; Otherwise 7.5 mg every day   Weekly warfarin total:   52.5 mg   Plan last modified:   Tanja Wills RN (8/5/2019)   Next INR check:   12/19/2019   Priority:   High   Target end date:   Indefinite    Indications    PE (pulmonary embolism) [I26.99]  Long term current use of anticoagulants with INR goal of 2.0-3.0 [Z79.01]             Anticoagulation Episode Summary     INR check location:       Preferred lab:       Send INR reminders to:   [facility] HEART INR NURSE    Comments:         Anticoagulation Care Providers     Provider Role Specialty Phone number    Frederic Isaacs MD [facility] Cardiology [phone]            See the Encounter Report to view Anticoagulation Flowsheet and Dosing Calendar (Go to Encounters tab in chart review, and find the Anticoagulation Therapy Visit)    Home INR 3.7 Pt was not eating greens while he was travelling over the last week. He is now in [place] for the winter. No change in meds (not taking pain meds). Denies abnormal  bleeding or bruising. Will take 5 mg today then resume 7.5 mg daily and will resume eating his usual diet. Recheck in 2 weeks. Dosage adjustment made based on physician directed care plan.    Tanja Wills RN

## 2019-12-09 DIAGNOSIS — Z79.01 LONG TERM CURRENT USE OF ANTICOAGULANT THERAPY: ICD-10-CM

## 2019-12-09 DIAGNOSIS — Z86.711 HISTORY OF PULMONARY EMBOLISM: ICD-10-CM

## 2019-12-09 RX ORDER — WARFARIN SODIUM 5 MG/1
TABLET ORAL
Qty: 145 TABLET | Refills: 1 | Status: SHIPPED | OUTPATIENT
Start: 2019-12-09 | End: 2020-05-26

## 2019-12-19 ENCOUNTER — ANTICOAGULATION THERAPY VISIT (OUTPATIENT)
Dept: CARDIOLOGY | Facility: CLINIC | Age: 79
End: 2019-12-19
Payer: COMMERCIAL

## 2019-12-19 ENCOUNTER — TRANSFERRED RECORDS (OUTPATIENT)
Dept: HEALTH INFORMATION MANAGEMENT | Facility: CLINIC | Age: 79
End: 2019-12-19

## 2019-12-19 DIAGNOSIS — Z79.01 LONG TERM CURRENT USE OF ANTICOAGULANTS WITH INR GOAL OF 2.0-3.0: ICD-10-CM

## 2019-12-19 LAB — INR PPP: 2.8 (ref 0.9–1.1)

## 2019-12-19 PROCEDURE — 99207 ZZC NO CHARGE NURSE ONLY: CPT

## 2019-12-19 NOTE — PROGRESS NOTES
ANTICOAGULATION FOLLOW-UP CLINIC VISIT    Patient Name:  Yogesh Abreu  Date:  2019  Contact Type:  Telephone    SUBJECTIVE:  Patient Findings         Clinical Outcomes     Negatives:   Major bleeding event, Thromboembolic event, Anticoagulation-related hospital admission, Anticoagulation-related ED visit, Anticoagulation-related fatality           OBJECTIVE    INR   Date Value Ref Range Status   2019 2.8 (A) 0.90 - 1.10 Final     Chromogenic Factor 10   Date Value Ref Range Status   10/12/2015 28 (L) 70 - 130 % Final     Comment:     Therapeutic Range:  A Chromogenic Factor 10 level of approximately 20-40%   inversely correlates with an INR of 2-3 for patients receiving Warfarin.   Chromogenic Factor 10 levels below 20% indicate an INR greater than 3 and   levels above 40% indicate an INR less than 2.         ASSESSMENT / PLAN  INR assessment THER    Recheck INR In: 2 WEEKS    INR Location Home INR      Anticoagulation Summary  As of 2019    INR goal:   2.0-3.0   TTR:   74.8 % (1 y)   INR used for dosin.8 (2019)   Warfarin maintenance plan:   7.5 mg (5 mg x 1.5) every day   Full warfarin instructions:   7.5 mg every day   Weekly warfarin total:   52.5 mg   No change documented:   Mandy Ferguson RN   Plan last modified:   Tanja Wills, RN (2019)   Next INR check:   2020   Priority:   High   Target end date:   Indefinite    Indications    PE (pulmonary embolism) [I26.99]  Long term current use of anticoagulants with INR goal of 2.0-3.0 [Z79.01]             Anticoagulation Episode Summary     INR check location:       Preferred lab:       Send INR reminders to:   [facility] HEART INR NURSE    Comments:         Anticoagulation Care Providers     Provider Role Specialty Phone number    Frederic Isaacs MD Responsible Cardiology [phone]            See the Encounter Report to view Anticoagulation Flowsheet and Dosing Calendar (Go to Encounters tab in chart  review, and find the Anticoagulation Therapy Visit)    INR 2.8 via [employer] home INR check.  Back in range after 3.7 last time.  Continue same schedule and recheck in 2 weeks.  Call patient next time.  Canelo Ferguson RN

## 2020-01-02 ENCOUNTER — TRANSFERRED RECORDS (OUTPATIENT)
Dept: HEALTH INFORMATION MANAGEMENT | Facility: CLINIC | Age: 80
End: 2020-01-02

## 2020-01-02 ENCOUNTER — ANTICOAGULATION THERAPY VISIT (OUTPATIENT)
Dept: CARDIOLOGY | Facility: CLINIC | Age: 80
End: 2020-01-02
Payer: COMMERCIAL

## 2020-01-02 DIAGNOSIS — Z79.01 LONG TERM CURRENT USE OF ANTICOAGULANTS WITH INR GOAL OF 2.0-3.0: ICD-10-CM

## 2020-01-02 LAB — INR PPP: 3.1 (ref 0.9–1.1)

## 2020-01-02 PROCEDURE — 99207 ZZC NO CHARGE NURSE ONLY: CPT

## 2020-01-02 NOTE — PROGRESS NOTES
ANTICOAGULATION FOLLOW-UP CLINIC VISIT    Patient Name:  Yogesh Abreu  Date:  1/2/2020  Contact Type:  Telephone    SUBJECTIVE:  Patient Findings         Clinical Outcomes     Negatives:   Major bleeding event, Thromboembolic event, Anticoagulation-related hospital admission, Anticoagulation-related ED visit, Anticoagulation-related fatality           OBJECTIVE    INR   Date Value Ref Range Status   01/02/2020 3.1 (A) 0.90 - 1.10 Final     Chromogenic Factor 10   Date Value Ref Range Status   10/12/2015 28 (L) 70 - 130 % Final     Comment:     Therapeutic Range:  A Chromogenic Factor 10 level of approximately 20-40%   inversely correlates with an INR of 2-3 for patients receiving Warfarin.   Chromogenic Factor 10 levels below 20% indicate an INR greater than 3 and   levels above 40% indicate an INR less than 2.         ASSESSMENT / PLAN  INR assessment SUPRA    Recheck INR In: 2 WEEKS    INR Location Home INR      Anticoagulation Summary  As of 1/2/2020    INR goal:   2.0-3.0   TTR:   73.5 % (1 y)   INR used for dosing:   3.1! (1/2/2020)   Warfarin maintenance plan:   7.5 mg (5 mg x 1.5) every day   Full warfarin instructions:   7.5 mg every day   Weekly warfarin total:   52.5 mg   No change documented:   Mandy Ferguson RN   Plan last modified:   Tanja Wills RN (8/5/2019)   Next INR check:   1/16/2020   Priority:   High   Target end date:   Indefinite    Indications    PE (pulmonary embolism) [I26.99]  Long term current use of anticoagulants with INR goal of 2.0-3.0 [Z79.01]             Anticoagulation Episode Summary     INR check location:       Preferred lab:       Send INR reminders to:   [facility] HEART INR NURSE    Comments:         Anticoagulation Care Providers     Provider Role Specialty Phone number    Frederic Isaacs MD Responsible Cardiology [phone]            See the Encounter Report to view Anticoagulation Flowsheet and Dosing Calendar (Go to Encounters tab in chart review,  and find the Anticoagulation Therapy Visit)    INR 3.1 via [employer] at home.  Called patient.  Possibly less greens or more ETOH during the holidays.  Continue same schedule and recheck at home in 2 weeks.  Canelo Ferguson RN

## 2020-01-16 ENCOUNTER — ANTICOAGULATION THERAPY VISIT (OUTPATIENT)
Dept: CARDIOLOGY | Facility: CLINIC | Age: 80
End: 2020-01-16
Payer: COMMERCIAL

## 2020-01-16 ENCOUNTER — TRANSFERRED RECORDS (OUTPATIENT)
Dept: HEALTH INFORMATION MANAGEMENT | Facility: CLINIC | Age: 80
End: 2020-01-16

## 2020-01-16 DIAGNOSIS — Z79.01 LONG TERM CURRENT USE OF ANTICOAGULANTS WITH INR GOAL OF 2.0-3.0: ICD-10-CM

## 2020-01-16 DIAGNOSIS — I26.99 PULMONARY EMBOLISM (H): ICD-10-CM

## 2020-01-16 LAB — INR PPP: 2.9 (ref 0.9–1.1)

## 2020-01-16 PROCEDURE — 99207 ZZC NO CHARGE NURSE ONLY: CPT

## 2020-01-16 NOTE — PROGRESS NOTES
ANTICOAGULATION FOLLOW-UP CLINIC VISIT    Patient Name:  Yogesh Abreu  Date:  2020  Contact Type:  [employer]    SUBJECTIVE:         OBJECTIVE    INR   Date Value Ref Range Status   2020 2.9 (A) 0.90 - 1.10 Final     Chromogenic Factor 10   Date Value Ref Range Status   10/12/2015 28 (L) 70 - 130 % Final     Comment:     Therapeutic Range:  A Chromogenic Factor 10 level of approximately 20-40%   inversely correlates with an INR of 2-3 for patients receiving Warfarin.   Chromogenic Factor 10 levels below 20% indicate an INR greater than 3 and   levels above 40% indicate an INR less than 2.         ASSESSMENT / PLAN  INR assessment THER    Recheck INR In: 2 WEEKS    INR Location Home INR    Billed home INR No      Anticoagulation Summary  As of 2020    INR goal:   2.0-3.0   TTR:   71.6 % (1 y)   INR used for dosin.9 (2020)   Warfarin maintenance plan:   7.5 mg (5 mg x 1.5) every day   Full warfarin instructions:   7.5 mg every day   Weekly warfarin total:   52.5 mg   No change documented:   Tanja Wills RN   Plan last modified:   Tanja Wills RN (2019)   Next INR check:   2020   Priority:   Maintenance   Target end date:   Indefinite    Indications    PE (pulmonary embolism) [I26.99]  Long term current use of anticoagulants with INR goal of 2.0-3.0 [Z79.01]             Anticoagulation Episode Summary     INR check location:       Preferred lab:       Send INR reminders to:   [facility] HEART INR NURSE    Comments:         Anticoagulation Care Providers     Provider Role Specialty Phone number    Frederic Isaacs MD [facility] Cardiology [phone]            See the Encounter Report to view Anticoagulation Flowsheet and Dosing Calendar (Go to Encounters tab in chart review, and find the Anticoagulation Therapy Visit)    Home INR 2.9 Will continue current dosing of 7.5 mg daily and recheck in 2 weeks. Will call pt after the next INR check.    Tanja Wills  RN

## 2020-01-27 ENCOUNTER — MYC REFILL (OUTPATIENT)
Dept: FAMILY MEDICINE | Facility: CLINIC | Age: 80
End: 2020-01-27

## 2020-01-27 DIAGNOSIS — R05.9 COUGH: ICD-10-CM

## 2020-01-28 NOTE — TELEPHONE ENCOUNTER
"Pending Prescriptions:                       Disp   Refills    albuterol (PROAIR HFA/PROVENTIL HFA/YONY*1 Inha*0            Sig: Inhale 2 puffs into the lungs every 6 hours as           needed for shortness of breath / dyspnea or           wheezing    Last Written Prescription Date:  05/25/2017  Last Fill Quantity: 1 Inhaler,  # refills: 0   Last office visit: 10/17/2019 with prescribing provider:     Future Office Visit:    Requested Prescriptions   Pending Prescriptions Disp Refills     albuterol (PROAIR HFA/PROVENTIL HFA/VENTOLIN HFA) 108 (90 Base) MCG/ACT inhaler 1 Inhaler 0     Sig: Inhale 2 puffs into the lungs every 6 hours as needed for shortness of breath / dyspnea or wheezing       Asthma Maintenance Inhalers - Anticholinergics Passed - 1/27/2020 10:36 AM        Passed - Patient is age 12 years or older        Passed - Asthma control assessment score within normal limits in last 6 months     Please review ACT score.           Passed - Medication is active on med list        Passed - Recent (6 mo) or future (30 days) visit within the authorizing provider's specialty     Patient had office visit in the last 6 months or has a visit in the next 30 days with authorizing provider or within the authorizing provider's specialty.  See \"Patient Info\" tab in inbasket, or \"Choose Columns\" in Meds & Orders section of the refill encounter.              "

## 2020-01-29 DIAGNOSIS — R05.9 COUGH: ICD-10-CM

## 2020-01-29 RX ORDER — ALBUTEROL SULFATE 90 UG/1
2 AEROSOL, METERED RESPIRATORY (INHALATION) EVERY 6 HOURS PRN
Qty: 1 INHALER | Refills: 0 | Status: SHIPPED | OUTPATIENT
Start: 2020-01-29

## 2020-01-29 NOTE — TELEPHONE ENCOUNTER
Routing refill request to provider for review/approval because:  A break in medication, Rx has not been prescribed since 2017, however, patient is requesting via my chart.    FERNANDO MunguiaN, RN  [employer] [facility]

## 2020-01-30 ENCOUNTER — TRANSFERRED RECORDS (OUTPATIENT)
Dept: HEALTH INFORMATION MANAGEMENT | Facility: CLINIC | Age: 80
End: 2020-01-30

## 2020-01-30 LAB — INR PPP: 2.2 (ref 0.9–1.1)

## 2020-01-30 RX ORDER — ALBUTEROL SULFATE 90 UG/1
AEROSOL, METERED RESPIRATORY (INHALATION)
Start: 2020-01-30

## 2020-01-30 NOTE — TELEPHONE ENCOUNTER
"Duplicate.  Refill sent yesterday.  Faye Gonzalez RN    Requested Prescriptions   Refused Prescriptions Disp Refills     albuterol (PROAIR HFA/PROVENTIL HFA/VENTOLIN HFA) 108 (90 Base) MCG/ACT inhaler [Pharmacy Med Name: ALBUTEROL HFA INH (200 PUFFS) 18GM]       Sig: INHALE 2 PUFFS INTO THE LUNGS EVERY 6 HOURS AS NEEDED FOR SHORTNESS OF BREATH OR DIFFICULT BREATHING OR WHEEZING       Asthma Maintenance Inhalers - Anticholinergics Passed - 1/29/2020 10:46 AM        Passed - Patient is age 12 years or older        Passed - Asthma control assessment score within normal limits in last 6 months     Please review ACT score.           Passed - Medication is active on med list        Passed - Recent (6 mo) or future (30 days) visit within the authorizing provider's specialty     Patient had office visit in the last 6 months or has a visit in the next 30 days with authorizing provider or within the authorizing provider's specialty.  See \"Patient Info\" tab in inbasket, or \"Choose Columns\" in Meds & Orders section of the refill encounter.              "

## 2020-01-31 ENCOUNTER — ANTICOAGULATION THERAPY VISIT (OUTPATIENT)
Dept: CARDIOLOGY | Facility: CLINIC | Age: 80
End: 2020-01-31
Payer: COMMERCIAL

## 2020-01-31 DIAGNOSIS — Z79.01 LONG TERM CURRENT USE OF ANTICOAGULANTS WITH INR GOAL OF 2.0-3.0: ICD-10-CM

## 2020-01-31 DIAGNOSIS — I26.99 PULMONARY EMBOLISM (H): ICD-10-CM

## 2020-01-31 PROCEDURE — 99207 ZZC NO CHARGE NURSE ONLY: CPT | Performed by: INTERNAL MEDICINE

## 2020-01-31 NOTE — PROGRESS NOTES
ANTICOAGULATION FOLLOW-UP CLINIC VISIT    Patient Name:  Yogesh Abreu  Date:  2020  Contact Type:  Face to Face    SUBJECTIVE:         OBJECTIVE    INR   Date Value Ref Range Status   2020 2.2 (A) 0.90 - 1.10 Final     Chromogenic Factor 10   Date Value Ref Range Status   10/12/2015 28 (L) 70 - 130 % Final     Comment:     Therapeutic Range:  A Chromogenic Factor 10 level of approximately 20-40%   inversely correlates with an INR of 2-3 for patients receiving Warfarin.   Chromogenic Factor 10 levels below 20% indicate an INR greater than 3 and   levels above 40% indicate an INR less than 2.         ASSESSMENT / PLAN  INR assessment THER    Recheck INR In: 2 WEEKS    INR Location Home INR    Billed home INR No      Anticoagulation Summary  As of 2020    INR goal:   2.0-3.0   TTR:   75.1 % (1 y)   INR used for dosin.2 (2020)   Warfarin maintenance plan:   7.5 mg (5 mg x 1.5) every day   Full warfarin instructions:   7.5 mg every day   Weekly warfarin total:   52.5 mg   No change documented:   Tanja Wills RN   Plan last modified:   Tanja Wills RN (2019)   Next INR check:   2020   Priority:   Maintenance   Target end date:   Indefinite    Indications    PE (pulmonary embolism) [I26.99]  Long term current use of anticoagulants with INR goal of 2.0-3.0 [Z79.01]             Anticoagulation Episode Summary     INR check location:       Preferred lab:       Send INR reminders to:   [facility] HEART INR NURSE    Comments:         Anticoagulation Care Providers     Provider Role Specialty Phone number    Frederic Isaacs MD [facility] Cardiology [phone]            See the Encounter Report to view Anticoagulation Flowsheet and Dosing Calendar (Go to Encounters tab in chart review, and find the Anticoagulation Therapy Visit)    20 Home INR 2.2 Left a voicemail message asking pt to call back if he has had any changes in  health, meds, diet, bleeding or bruising  issues. If not changes, pt to continue current dosing of 7.5 mg daily and recheck in 2 weeks.    Tanja Wills RN     1:15 pm Pt returned the call to report that he was on a cruise last week so his diet was variable. He has had Bronchial asthmalike symptoms so was started on Doxycycline (10 day course), Methylprednisone, Tessalon pearles and albuterol. He started the meds yesterday. Doxycycline and Methylprednisone can increase bleeding risk. Denies abnormal bleeding or bruising. Will take 7.5 mg today then 5 mg Saturday and 7.5 mg Sunday with recheck of INR on Monday to see the effect of the new meds. Pt verbalized understanding. Rickey

## 2020-02-03 ENCOUNTER — ANTICOAGULATION THERAPY VISIT (OUTPATIENT)
Dept: CARDIOLOGY | Facility: CLINIC | Age: 80
End: 2020-02-03
Payer: COMMERCIAL

## 2020-02-03 ENCOUNTER — TRANSFERRED RECORDS (OUTPATIENT)
Dept: HEALTH INFORMATION MANAGEMENT | Facility: CLINIC | Age: 80
End: 2020-02-03

## 2020-02-03 DIAGNOSIS — I26.99 PULMONARY EMBOLISM (H): ICD-10-CM

## 2020-02-03 DIAGNOSIS — Z79.01 LONG TERM CURRENT USE OF ANTICOAGULANTS WITH INR GOAL OF 2.0-3.0: ICD-10-CM

## 2020-02-03 LAB — INR PPP: 3.3 (ref 0.9–1.1)

## 2020-02-03 PROCEDURE — 99207 ZZC NO CHARGE LOS: CPT

## 2020-02-03 NOTE — PROGRESS NOTES
ANTICOAGULATION FOLLOW-UP CLINIC VISIT    Patient Name:  Yogesh Abreu  Date:  2/3/2020  Contact Type:  Telephone/patient    SUBJECTIVE:  Patient Findings     Positives:   Change in medications (done with methylprednisone and this is the last day of Doxycycline)             OBJECTIVE    INR   Date Value Ref Range Status   02/03/2020 3.3 (A) 0.90 - 1.10 Final     Chromogenic Factor 10   Date Value Ref Range Status   10/12/2015 28 (L) 70 - 130 % Final     Comment:     Therapeutic Range:  A Chromogenic Factor 10 level of approximately 20-40%   inversely correlates with an INR of 2-3 for patients receiving Warfarin.   Chromogenic Factor 10 levels below 20% indicate an INR greater than 3 and   levels above 40% indicate an INR less than 2.         ASSESSMENT / PLAN  INR assessment SUPRA    Recheck INR In: 2 WEEKS    INR Location Clinic      Anticoagulation Summary  As of 2/3/2020    INR goal:   2.0-3.0   TTR:   75.7 % (1 y)   INR used for dosing:   3.3! (2/3/2020)   Warfarin maintenance plan:   7.5 mg (5 mg x 1.5) every day   Full warfarin instructions:   2/3: 5 mg; Otherwise 7.5 mg every day   Weekly warfarin total:   52.5 mg   Plan last modified:   Tanja Wills RN (8/5/2019)   Next INR check:   2/17/2020   Priority:   Maintenance   Target end date:   Indefinite    Indications    PE (pulmonary embolism) [I26.99]  Long term current use of anticoagulants with INR goal of 2.0-3.0 [Z79.01]             Anticoagulation Episode Summary     INR check location:       Preferred lab:       Send INR reminders to:   [facility] HEART INR NURSE    Comments:         Anticoagulation Care Providers     Provider Role Specialty Phone number    Frederic Isaacs MD [facility] Cardiology [phone]            See the Encounter Report to view Anticoagulation Flowsheet and Dosing Calendar (Go to Encounters tab in chart review, and find the Anticoagulation Therapy Visit)    Home INR 3.3 He completed a course of Doxycycline (last  dose today) and Methylprednisone with improvement in respiratory symptoms. No other changes. Will decrease today's dose to 5 mg then resume 7.5 mg daily with recheck in 2 weeks. Dosage adjustment made based on physician directed care plan.    Tanja Wills RN

## 2020-02-05 ENCOUNTER — DOCUMENTATION ONLY (OUTPATIENT)
Dept: CARDIOLOGY | Facility: CLINIC | Age: 80
End: 2020-02-05

## 2020-02-05 ENCOUNTER — MEDICAL CORRESPONDENCE (OUTPATIENT)
Dept: HEALTH INFORMATION MANAGEMENT | Facility: CLINIC | Age: 80
End: 2020-02-05

## 2020-02-05 NOTE — PROGRESS NOTES
Pt does home INR monitoring. Received a notice from [facility] that they will need a new signed order for home INR monitoring (they require annual orders). Will review with Dr Sharan Mauro. Rickey  1:35 pm Dr Sharan Mauro signed the order for home INR monitoring. [facility] at [phone] and sent a copy of the order to be scanned into the chart. Notified pt that the new order was signed and faxed to [facility]. Rickey

## 2020-02-17 ENCOUNTER — TRANSFERRED RECORDS (OUTPATIENT)
Dept: HEALTH INFORMATION MANAGEMENT | Facility: CLINIC | Age: 80
End: 2020-02-17

## 2020-02-17 LAB — INR PPP: 2.5 (ref 0.9–1.1)

## 2020-02-18 ENCOUNTER — ANTICOAGULATION THERAPY VISIT (OUTPATIENT)
Dept: CARDIOLOGY | Facility: CLINIC | Age: 80
End: 2020-02-18
Payer: COMMERCIAL

## 2020-02-18 DIAGNOSIS — Z79.01 LONG TERM CURRENT USE OF ANTICOAGULANTS WITH INR GOAL OF 2.0-3.0: ICD-10-CM

## 2020-02-18 DIAGNOSIS — I26.99 PULMONARY EMBOLISM (H): ICD-10-CM

## 2020-02-18 PROCEDURE — 99207 ZZC NO CHARGE NURSE ONLY: CPT | Performed by: INTERNAL MEDICINE

## 2020-02-18 NOTE — PROGRESS NOTES
ANTICOAGULATION FOLLOW-UP CLINIC VISIT    Patient Name:  Yogesh Abreu  Date:  2020  Contact Type:  [employer]    SUBJECTIVE:         OBJECTIVE    INR   Date Value Ref Range Status   2020 2.5 (A) 0.90 - 1.10 Final     Chromogenic Factor 10   Date Value Ref Range Status   10/12/2015 28 (L) 70 - 130 % Final     Comment:     Therapeutic Range:  A Chromogenic Factor 10 level of approximately 20-40%   inversely correlates with an INR of 2-3 for patients receiving Warfarin.   Chromogenic Factor 10 levels below 20% indicate an INR greater than 3 and   levels above 40% indicate an INR less than 2.         ASSESSMENT / PLAN  INR assessment THER    Recheck INR In: 2 WEEKS    INR Location Home INR    Billed home INR No      Anticoagulation Summary  As of 2020    INR goal:   2.0-3.0   TTR:   75.9 % (1 y)   INR used for dosin.5 (2020)   Warfarin maintenance plan:   7.5 mg (5 mg x 1.5) every day   Full warfarin instructions:   7.5 mg every day   Weekly warfarin total:   52.5 mg   No change documented:   Tanja Wills RN   Plan last modified:   Tanja Wills RN (2019)   Next INR check:   3/2/2020   Priority:   Maintenance   Target end date:   Indefinite    Indications    PE (pulmonary embolism) [I26.99]  Long term current use of anticoagulants with INR goal of 2.0-3.0 [Z79.01]             Anticoagulation Episode Summary     INR check location:       Preferred lab:       Send INR reminders to:   [facility] HEART INR NURSE    Comments:         Anticoagulation Care Providers     Provider Role Specialty Phone number    Frederic Isaacs MD [facility] Cardiology [phone]            See the Encounter Report to view Anticoagulation Flowsheet and Dosing Calendar (Go to Encounters tab in chart review, and find the Anticoagulation Therapy Visit)    20 Home INR 2.5 Continue current dosing of 7.5 mg daily and recheck in 2 weeks. Will contact pt after the next INR check.    Tanja  ENZO Wills

## 2020-02-21 ENCOUNTER — TELEPHONE (OUTPATIENT)
Dept: CARDIOLOGY | Facility: CLINIC | Age: 80
End: 2020-02-21

## 2020-02-21 DIAGNOSIS — I26.99 PULMONARY EMBOLISM (H): ICD-10-CM

## 2020-02-21 DIAGNOSIS — Z79.01 LONG TERM CURRENT USE OF ANTICOAGULANTS WITH INR GOAL OF 2.0-3.0: ICD-10-CM

## 2020-02-21 NOTE — TELEPHONE ENCOUNTER
Pt calling to report that he has a flareup of his seasonal allergies so today he started taking a Prednisone taper (30 mg daily for 3 days then 20 mg daily for 3 days then 10 mg daily for 3 days), Advair 250/50 mcg 1 puff twice daily and Tessalon Pearles for cough. Prednisone interacts with warfarin (increases INR) so will have pt take Warfarin 7.5 mg today, 5 mg tomorrow and 7.5 mg Sunday with recheck of Home INR on Monday 2/24. Pt verbalized understanding. Rickey

## 2020-02-24 ENCOUNTER — ANTICOAGULATION THERAPY VISIT (OUTPATIENT)
Dept: CARDIOLOGY | Facility: CLINIC | Age: 80
End: 2020-02-24
Payer: COMMERCIAL

## 2020-02-24 DIAGNOSIS — Z79.01 LONG TERM CURRENT USE OF ANTICOAGULANTS WITH INR GOAL OF 2.0-3.0: ICD-10-CM

## 2020-02-24 DIAGNOSIS — I26.99 PULMONARY EMBOLISM (H): ICD-10-CM

## 2020-02-24 LAB — INR PPP: 2.9 (ref 0.9–1.1)

## 2020-02-24 PROCEDURE — 99207 ZZC NO CHARGE NURSE ONLY: CPT

## 2020-02-24 NOTE — PROGRESS NOTES
ANTICOAGULATION FOLLOW-UP CLINIC VISIT    Patient Name:  Yogesh Abreu  Date:  2/24/2020  Contact Type:  Telephone/ patient    SUBJECTIVE:         OBJECTIVE    INR   Date Value Ref Range Status   02/17/2020 2.5 (A) 0.90 - 1.10 Final     Chromogenic Factor 10   Date Value Ref Range Status   10/12/2015 28 (L) 70 - 130 % Final     Comment:     Therapeutic Range:  A Chromogenic Factor 10 level of approximately 20-40%   inversely correlates with an INR of 2-3 for patients receiving Warfarin.   Chromogenic Factor 10 levels below 20% indicate an INR greater than 3 and   levels above 40% indicate an INR less than 2.         ASSESSMENT / PLAN  INR assessment THER    Recheck INR In: 2 WEEKS    INR Location Home INR      Anticoagulation Summary  As of 2/24/2020    INR goal:   2.0-3.0   TTR:   75.5 % (11.9 mo)   INR used for dosing:      Warfarin maintenance plan:   7.5 mg (5 mg x 1.5) every day   Full warfarin instructions:   7.5 mg every day   Weekly warfarin total:   52.5 mg   Plan last modified:   Tanja Wills RN (8/5/2019)   Next INR check:      Priority:   Maintenance   Target end date:   Indefinite    Indications    PE (pulmonary embolism) [I26.99]  Long term current use of anticoagulants with INR goal of 2.0-3.0 [Z79.01]             Anticoagulation Episode Summary     INR check location:       Preferred lab:       Send INR reminders to:   [facility] HEART INR NURSE    Comments:         Anticoagulation Care Providers     Provider Role Specialty Phone number    Frederic Isaacs MD [facility] Cardiology [phone]            See the Encounter Report to view Anticoagulation Flowsheet and Dosing Calendar (Go to Encounters tab in chart review, and find the Anticoagulation Therapy Visit)    Home INR 2.9 Pt had flareup of his seasonal allergies so last week he started a Prednisone taper (30 mg daily for 3 days then 20 mg daily for 3 days then 10 mg daily for 3 days), Advair 250/50 mcg 1 puff twice daily and  Дмитрий Whiting for cough.  He took a lower dose of Warfarin on Saturday night (5 mg instead of 7.5 mg).  No change in diet. Denies abnormal bleeding or bruising. Because he still has 4 doses of Prednisone to take, will decrease today's Warfarin dose to 5 mg then resume usual dosing of 7.5 mg daily and recheck in 2 weeks.  Tanja Wills RN

## 2020-03-09 ENCOUNTER — ANTICOAGULATION THERAPY VISIT (OUTPATIENT)
Dept: CARDIOLOGY | Facility: CLINIC | Age: 80
End: 2020-03-09
Payer: COMMERCIAL

## 2020-03-09 ENCOUNTER — TRANSFERRED RECORDS (OUTPATIENT)
Dept: HEALTH INFORMATION MANAGEMENT | Facility: CLINIC | Age: 80
End: 2020-03-09

## 2020-03-09 DIAGNOSIS — Z79.01 LONG TERM CURRENT USE OF ANTICOAGULANTS WITH INR GOAL OF 2.0-3.0: ICD-10-CM

## 2020-03-09 DIAGNOSIS — I26.99 PULMONARY EMBOLISM (H): ICD-10-CM

## 2020-03-09 LAB — INR PPP: 2.7 (ref 0.9–1.1)

## 2020-03-09 PROCEDURE — 99207 ZZC NO CHARGE NURSE ONLY: CPT

## 2020-03-09 NOTE — PROGRESS NOTES
ANTICOAGULATION FOLLOW-UP CLINIC VISIT    Patient Name:  Yogesh Abreu  Date:  3/9/2020  Contact Type:  [employer]    SUBJECTIVE:         OBJECTIVE    INR   Date Value Ref Range Status   2020 2.7 (A) 0.90 - 1.10 Final     Chromogenic Factor 10   Date Value Ref Range Status   10/12/2015 28 (L) 70 - 130 % Final     Comment:     Therapeutic Range:  A Chromogenic Factor 10 level of approximately 20-40%   inversely correlates with an INR of 2-3 for patients receiving Warfarin.   Chromogenic Factor 10 levels below 20% indicate an INR greater than 3 and   levels above 40% indicate an INR less than 2.         ASSESSMENT / PLAN  INR assessment THER    Recheck INR In: 2 WEEKS    INR Location Home INR    Billed home INR No      Anticoagulation Summary  As of 3/9/2020    INR goal:   2.0-3.0   TTR:   76.0 % (1 y)   INR used for dosin.7 (3/9/2020)   Warfarin maintenance plan:   7.5 mg (5 mg x 1.5) every day   Full warfarin instructions:   7.5 mg every day   Weekly warfarin total:   52.5 mg   No change documented:   Tanja Wills RN   Plan last modified:   Tanja Wills RN (2019)   Next INR check:   3/23/2020   Priority:   Maintenance   Target end date:   Indefinite    Indications    PE (pulmonary embolism) [I26.99]  Long term current use of anticoagulants with INR goal of 2.0-3.0 [Z79.01]             Anticoagulation Episode Summary     INR check location:       Preferred lab:       Send INR reminders to:   [facility] HEART INR NURSE    Comments:         Anticoagulation Care Providers     Provider Role Specialty Phone number    Frederic Isaacs MD [facility] Cardiology [phone]            See the Encounter Report to view Anticoagulation Flowsheet and Dosing Calendar (Go to Encounters tab in chart review, and find the Anticoagulation Therapy Visit)    Home INR 2.7 Will continue current dosing of 7.5 mg daily and recheck in 2 weeks. Will call pt after the next INR check.    Tanja Wills  RN

## 2020-03-15 DIAGNOSIS — I25.110 CORONARY ARTERY DISEASE INVOLVING NATIVE CORONARY ARTERY OF NATIVE HEART WITH UNSTABLE ANGINA PECTORIS (H): ICD-10-CM

## 2020-03-16 RX ORDER — LISINOPRIL 5 MG/1
TABLET ORAL
Qty: 90 TABLET | Refills: 3 | Status: SHIPPED | OUTPATIENT
Start: 2020-03-16 | End: 2020-04-23

## 2020-03-16 NOTE — TELEPHONE ENCOUNTER
"lisinopril (ZESTRIL) 5 MG tablet    Last Written Prescription Date:  10/10/2019  Last Fill Quantity: 90,  # refills: 3   Last office visit: 10/17/2019 with prescribing provider:  Dr. Goldberg   Future Office Visit:  Unknown     Requested Prescriptions   Pending Prescriptions Disp Refills     lisinopril (ZESTRIL) 5 MG tablet [Pharmacy Med Name: LISINOPRIL 5MG TABLETS] 30 tablet      Sig: TAKE 1 TABLET BY MOUTH EVERY DAY       ACE Inhibitors (Including Combos) Protocol Passed - 3/15/2020 11:36 AM        Passed - Blood pressure under 140/90 in past 12 months     BP Readings from Last 3 Encounters:   10/17/19 95/61   10/07/19 114/73   09/30/19 120/82                 Passed - Recent (12 mo) or future (30 days) visit within the authorizing provider's specialty     Patient has had an office visit with the authorizing provider or a provider within the authorizing providers department within the previous 12 mos or has a future within next 30 days. See \"Patient Info\" tab in inbasket, or \"Choose Columns\" in Meds & Orders section of the refill encounter.              Passed - Medication is active on med list        Passed - Patient is age 18 or older        Passed - Normal serum creatinine on file in past 12 months     Recent Labs   Lab Test 09/20/19  0930   CR 0.84       Ok to refill medication if creatinine is low          Passed - Normal serum potassium on file in past 12 months     Recent Labs   Lab Test 09/20/19  0930   POTASSIUM 3.8                  "

## 2020-03-20 ENCOUNTER — TELEPHONE (OUTPATIENT)
Dept: CARDIOLOGY | Facility: CLINIC | Age: 80
End: 2020-03-20
Payer: COMMERCIAL

## 2020-03-20 DIAGNOSIS — Z79.01 LONG TERM CURRENT USE OF ANTICOAGULANTS WITH INR GOAL OF 2.0-3.0: ICD-10-CM

## 2020-03-20 LAB — INR PPP: 2.3 (ref 0.9–1.1)

## 2020-03-20 PROCEDURE — 99207 ZZC NO CHARGE NURSE ONLY: CPT | Performed by: INTERNAL MEDICINE

## 2020-03-20 NOTE — TELEPHONE ENCOUNTER
Pt calling to report that he was started on Keflex 3 days ago for hip incisional redness. Advised that he should check an INR today if possible and call with results. He is travelling back from [place] now but will try to call with INR. Rickey

## 2020-03-20 NOTE — ADDENDUM NOTE
Addended by: KYLIE PERRY on: 3/20/2020 10:39 AM     Modules accepted: Level of Service, SmartSet

## 2020-03-20 NOTE — TELEPHONE ENCOUNTER
ANTICOAGULATION FOLLOW-UP CLINIC VISIT    Patient Name:  Yogesh Abreu  Date:  3/20/2020  Contact Type:  Telephone/ Patient    SUBJECTIVE:  Patient Findings     Positives:   Change in medications (keflex for hip incision redness)        Clinical Outcomes     Negatives:   Major bleeding event, Thromboembolic event, Anticoagulation-related hospital admission, Anticoagulation-related ED visit, Anticoagulation-related fatality           OBJECTIVE    INR   Date Value Ref Range Status   2020 2.3 (A) 0.90 - 1.10 Final     Chromogenic Factor 10   Date Value Ref Range Status   10/12/2015 28 (L) 70 - 130 % Final     Comment:     Therapeutic Range:  A Chromogenic Factor 10 level of approximately 20-40%   inversely correlates with an INR of 2-3 for patients receiving Warfarin.   Chromogenic Factor 10 levels below 20% indicate an INR greater than 3 and   levels above 40% indicate an INR less than 2.         ASSESSMENT / PLAN      Anticoagulation Summary  As of 3/20/2020    INR goal:   2.0-3.0   TTR:   76.9 % (1 y)   INR used for dosin.3 (3/20/2020)   Warfarin maintenance plan:   7.5 mg (5 mg x 1.5) every day   Full warfarin instructions:   7.5 mg every day   Weekly warfarin total:   52.5 mg   Plan last modified:   Tanja Wills RN (2019)   Next INR check:   3/20/2020   Priority:   Maintenance   Target end date:   Indefinite    Indications    PE (pulmonary embolism) [I26.99]  Long term current use of anticoagulants with INR goal of 2.0-3.0 [Z79.01]             Anticoagulation Episode Summary     INR check location:       Preferred lab:       Send INR reminders to:   [facility] HEART INR NURSE    Comments:         Anticoagulation Care Providers     Provider Role Specialty Phone number    Frederic Isaacs MD [facility] Cardiology [phone]            See the Encounter Report to view Anticoagulation Flowsheet and Dosing Calendar (Go to Encounters tab in chart review, and find the Anticoagulation  Therapy Visit)    Home INR 2.3 He was started on keflex this week because of hip incision redness. No change in other meds or diet. Denies abnormal bleeding or bruising. Will continue current dosing of 7.5 mg daily and recheck in 2 weeks.    Tanja Wills RN

## 2020-03-24 ENCOUNTER — TRANSFERRED RECORDS (OUTPATIENT)
Dept: HEALTH INFORMATION MANAGEMENT | Facility: CLINIC | Age: 80
End: 2020-03-24

## 2020-04-03 ENCOUNTER — TRANSFERRED RECORDS (OUTPATIENT)
Dept: HEALTH INFORMATION MANAGEMENT | Facility: CLINIC | Age: 80
End: 2020-04-03

## 2020-04-03 ENCOUNTER — ANTICOAGULATION THERAPY VISIT (OUTPATIENT)
Dept: CARDIOLOGY | Facility: CLINIC | Age: 80
End: 2020-04-03
Payer: COMMERCIAL

## 2020-04-03 DIAGNOSIS — I26.99 PULMONARY EMBOLISM (H): ICD-10-CM

## 2020-04-03 DIAGNOSIS — Z79.01 LONG TERM CURRENT USE OF ANTICOAGULANTS WITH INR GOAL OF 2.0-3.0: ICD-10-CM

## 2020-04-03 LAB — INR PPP: 2.4 (ref 0.9–1.1)

## 2020-04-03 PROCEDURE — 99207 ZZC NO CHARGE NURSE ONLY: CPT | Performed by: INTERNAL MEDICINE

## 2020-04-03 NOTE — PROGRESS NOTES
ANTICOAGULATION FOLLOW-UP CLINIC VISIT    Patient Name:  Yogesh Abreu  Date:  4/3/2020  Contact Type:  Telephone    SUBJECTIVE:         OBJECTIVE    INR   Date Value Ref Range Status   2020 2.4 (A) 0.90 - 1.10 Final     Chromogenic Factor 10   Date Value Ref Range Status   10/12/2015 28 (L) 70 - 130 % Final     Comment:     Therapeutic Range:  A Chromogenic Factor 10 level of approximately 20-40%   inversely correlates with an INR of 2-3 for patients receiving Warfarin.   Chromogenic Factor 10 levels below 20% indicate an INR greater than 3 and   levels above 40% indicate an INR less than 2.         ASSESSMENT / PLAN  INR assessment THER    Recheck INR In: 2 WEEKS    INR Location Home INR    Billed home INR No      Anticoagulation Summary  As of 4/3/2020    INR goal:   2.0-3.0   TTR:   79.4 % (1 y)   INR used for dosin.4 (4/3/2020)   Warfarin maintenance plan:   7.5 mg (5 mg x 1.5) every day   Full warfarin instructions:   7.5 mg every day   Weekly warfarin total:   52.5 mg   No change documented:   Tanja Wills RN   Plan last modified:   Tanja Wills RN (2019)   Next INR check:   2020   Priority:   Maintenance   Target end date:   Indefinite    Indications    PE (pulmonary embolism) [I26.99]  Long term current use of anticoagulants with INR goal of 2.0-3.0 [Z79.01]             Anticoagulation Episode Summary     INR check location:       Preferred lab:       Send INR reminders to:   [facility] HEART INR NURSE    Comments:         Anticoagulation Care Providers     Provider Role Specialty Phone number    Frederic Isaacs MD [facility] Cardiology [phone]            See the Encounter Report to view Anticoagulation Flowsheet and Dosing Calendar (Go to Encounters tab in chart review, and find the Anticoagulation Therapy Visit)    Home INR 2.4 Will continue current dosing of 7.5 mg daily and recheck in 2 weeks. Will call pt after the next INR check.    Tanja Wills RN

## 2020-04-06 DIAGNOSIS — I25.110 CORONARY ARTERY DISEASE INVOLVING NATIVE CORONARY ARTERY OF NATIVE HEART WITH UNSTABLE ANGINA PECTORIS (H): ICD-10-CM

## 2020-04-06 DIAGNOSIS — R05.9 COUGH: ICD-10-CM

## 2020-04-06 NOTE — TELEPHONE ENCOUNTER
"    fluticasone-salmeterol (ADVAIR-HFA) 230-21 MCG/ACT inhaler  1 Inhaler  3  9/30/2019        Last Written Prescription Date:  09/30/2019  Last Fill Quantity: 1,  # refills: 3   Last office visit: 10/17/2019 with prescribing provider:     Future Office Visit:  Unknown      Requested Prescriptions   Pending Prescriptions Disp Refills     fluticasone-salmeterol (ADVAIR DISKUS) 250-50 MCG/DOSE inhaler 1 Inhaler 12     Sig: Inhale 1 puff into the lungs 2 times daily       Inhaled Steroids Protocol Failed - 4/6/2020 10:58 AM        Failed - Asthma control assessment score within normal limits in last 6 months     Please review ACT score.           Passed - Patient is age 12 or older        Passed - Medication is active on med list        Passed - Recent (6 mo) or future (30 days) visit within the authorizing provider's specialty     Patient had office visit in the last 6 months or has a visit in the next 30 days with authorizing provider or within the authorizing provider's specialty.  See \"Patient Info\" tab in inbasket, or \"Choose Columns\" in Meds & Orders section of the refill encounter.           Long-Acting Beta Agonist Inhalers Protocol  Failed - 4/6/2020 10:58 AM        Failed - Asthma control assessment score within normal limits in last 6 months     Please review ACT score.           Failed - Order for Serevent, Striverdi, or Foradil and pt has steroid inhaler        Passed - Patient is age 12 or older        Passed - Medication is active on med list        Passed - Recent (6 mo) or future (30 days) visit within the authorizing provider's specialty     Patient had office visit in the last 6 months or has a visit in the next 30 days with authorizing provider or within the authorizing provider's specialty.  See \"Patient Info\" tab in inbasket, or \"Choose Columns\" in Meds & Orders section of the refill encounter.                 "

## 2020-04-09 ENCOUNTER — VIRTUAL VISIT (OUTPATIENT)
Dept: FAMILY MEDICINE | Facility: OTHER | Age: 80
End: 2020-04-09

## 2020-04-09 NOTE — PROGRESS NOTES
"Date: 2020 14:17:09  Clinician: Adal Meneses  Clinician NPI: [id number]  Patient: mariana dickey  Patient : 1940  Patient Address: [address] [place] [address] [address], [place] [id number]  Patient Phone: [phone]  Visit Protocol: URI  Patient Summary:  mariana is a 79 year old ( : 1940 ) male who initiated a Visit for cold, sinus infection, or influenza. When asked the question \"Please sign me up to receive news, health information and promotions from [employer].\", mariana responded \"No\".    mariana states his symptoms started gradually 1 month or more ago.   His symptoms consist of rhinitis, a cough, tooth pain, and wheezing. He is experiencing mild difficulty breathing with activities but can speak normally in full sentences.   Symptom details     Nasal secretions: The color of his mucus is clear and white.    Cough: mariana coughs a few times an hour and his cough is not more bothersome at night. Phlegm comes into his throat when he coughs. He believes his cough is caused by post-nasal drip. The color of the phlegm is clear and white.     Wheezing: mariana has been diagnosed with asthma. The wheezing interferes with his normal daily activities.    Tooth pain: The tooth pain is not caused by a cavity, recent dental work, or other mouth problems.      mariana denies having facial pain or pressure, myalgias, sore throat, nasal congestion, malaise, ear pain, enlarged lymph nodes, chills, diarrhea, vomiting, nausea, headache, and fever. He also denies taking antibiotic medication for the symptoms, having recent facial or sinus surgery in the past 60 days, and double sickening (worsening symptoms after initial improvement).   Precipitating events  He has not recently been exposed to someone with influenza. mariana has been in close contact with the following high risk individuals: adults 65 or older.   Pertinent COVID-19 (Coronavirus) information  mariana has traveled internationally or to the areas where COVID-19 " (Coronavirus) is widespread, including cruise ship travel in the last 14 days before the start of his symptoms. Countries or locations traveled as reported by the patient (free text): Kamaljit peña has not had a close contact with a laboratory-confirmed COVID-19 patient within 14 days of symptom onset. He also has not had a close contact with a suspected COVID-19 patient within 14 days of symptom onset.   mariana does not work or volunteer as healthcare worker or a  and does not work or volunteer in a healthcare facility. He does not live with a healthcare worker.   Triage Point(s) temporarily suspended for COVID-19 (Coronavirus) screening  mariana reported the following symptoms which were previously protocol referral points. These protocol referral points have temporarily been removed for purposes of COVID-19 (Coronavirus) screening.   Wheezing that keeps mariana from doing daily activities   Pertinent medical history  mariana does not need a return to work/school note.   Weight: 195 lbs   mariana does not smoke or use smokeless tobacco.   Additional information as reported by the patient (free text): went to urgent care [facility] 1/26/20 they thought it might be an allergy that caused upper respiratory congestion was given a medrol dosepak and amoxicillin 500 mg tid, it helped a bit.  Went back in on 2/21/20, same Dx, was the given Advair and a 10 day burst dose of prednisone, improvment somewhat marginal, oxygen was about 97. Drove back from [place] to [place], got back 3/38 and have gotten slightly worse deeper cough, no temp.   Weight: 195 lbs    MEDICATIONS: calcium citrate-ergocalciferol (vitamin D2) oral, Wal-Zyr (cetirizine) oral, Nasonex nasal, Mucinex oral, albuterol sulfate inhalation, Advair Diskus inhalation, atorvastatin oral, metoprolol succinate oral, lisinopril oral, warfarin oral, ALLERGIES: Seasonale (91), Zyrtec  Clinician Response:  Dear mariana,  Based on the information provided, you have viral  bronchitis, also known as a chest cold. This is a cough that occurs when a cold or other virus settles into your chest. The cough may be dry or you could notice you are coughing up some phlegm.  It is not unusual for a cough to last 3 weeks or more. Treatment focuses on controlling your symptoms as much as possible while you recover.  Medication information  Because you have a viral infection, antibiotics will not help you get better. Treating a viral infection with antibiotics could actually make you feel worse.  Self care  Steps you can take to be as comfortable as possible:     Rest.    Drink plenty of fluids.    Take a warm shower to loosen congestion    Use a cool-mist humidifier.    Take a spoonful of honey to reduce your cough.     When to seek care  Please be seen in a clinic or urgent care if any of the following occur:   New symptoms develop, or symptoms become worse   Call ahead before going to the clinic or urgent care.  Additional treatment plan   Take the full course of antibiotic - Cefdinir 300 mg twice a day for 10 days.  continue with inhalers  contact INR clinic for any coumadin adjustment due to antibiotic use (cefdinir should not affect the INR but should check to make sure)    Diagnosis: Cough  Diagnosis ICD: R05  Additional Clinician Notes: Due to prolong symptoms, I have prescribed a different antibiotic - Cefdinir to treat for possible lower respiratory tract infection (bacterial bronchitis).    Continue with inhaler use for cough, wheezing  Addendum created: April 10 11:29:04, 2020 created by: Ceferino Eddy body: I have sent in a prescription for Omnicef 300 mg to be taken twice a day for 10 days. The prescription was send to the [facility] at [address] in [place].

## 2020-04-10 DIAGNOSIS — J22 LOWER RESPIRATORY INFECTION: Primary | ICD-10-CM

## 2020-04-10 RX ORDER — CEFDINIR 300 MG/1
300 CAPSULE ORAL 2 TIMES DAILY
Qty: 20 CAPSULE | Refills: 0 | Status: SHIPPED | OUTPATIENT
Start: 2020-04-10 | End: 2020-04-20

## 2020-04-14 ENCOUNTER — TRANSFERRED RECORDS (OUTPATIENT)
Dept: HEALTH INFORMATION MANAGEMENT | Facility: CLINIC | Age: 80
End: 2020-04-14

## 2020-04-14 ENCOUNTER — ANTICOAGULATION THERAPY VISIT (OUTPATIENT)
Dept: CARDIOLOGY | Facility: CLINIC | Age: 80
End: 2020-04-14
Payer: COMMERCIAL

## 2020-04-14 DIAGNOSIS — Z79.01 LONG TERM CURRENT USE OF ANTICOAGULANTS WITH INR GOAL OF 2.0-3.0: ICD-10-CM

## 2020-04-14 DIAGNOSIS — I26.99 PULMONARY EMBOLISM (H): ICD-10-CM

## 2020-04-14 LAB — INR PPP: 2.4 (ref 0.9–1.1)

## 2020-04-14 PROCEDURE — 99207 ZZC NO CHARGE LOS: CPT

## 2020-04-14 NOTE — PROGRESS NOTES
ANTICOAGULATION FOLLOW-UP CLINIC VISIT    Patient Name:  Yogesh Abreu  Date:  2020  Contact Type:  Telephone    SUBJECTIVE:  Patient Findings     Positives:   Change in medications (taking Omnicef for sinus infection and URI)        Clinical Outcomes     Negatives:   Major bleeding event, Thromboembolic event, Anticoagulation-related hospital admission, Anticoagulation-related ED visit, Anticoagulation-related fatality           OBJECTIVE    INR   Date Value Ref Range Status   2020 2.4 (A) 0.90 - 1.10 Final     Chromogenic Factor 10   Date Value Ref Range Status   10/12/2015 28 (L) 70 - 130 % Final     Comment:     Therapeutic Range:  A Chromogenic Factor 10 level of approximately 20-40%   inversely correlates with an INR of 2-3 for patients receiving Warfarin.   Chromogenic Factor 10 levels below 20% indicate an INR greater than 3 and   levels above 40% indicate an INR less than 2.         ASSESSMENT / PLAN  INR assessment THER    Recheck INR In: 2 WEEKS    INR Location Home INR    Billed home INR No      Anticoagulation Summary  As of 2020    INR goal:   2.0-3.0   TTR:   79.4 % (1 y)   INR used for dosin.4 (2020)   Warfarin maintenance plan:   7.5 mg (5 mg x 1.5) every day   Full warfarin instructions:   7.5 mg every day   Weekly warfarin total:   52.5 mg   No change documented:   Tanja Wills RN   Plan last modified:   Tanja Wills RN (2019)   Next INR check:   2020   Priority:   Maintenance   Target end date:   Indefinite    Indications    PE (pulmonary embolism) [I26.99]  Long term current use of anticoagulants with INR goal of 2.0-3.0 [Z79.01]             Anticoagulation Episode Summary     INR check location:       Preferred lab:       Send INR reminders to:   [facility] HEART INR NURSE    Comments:         Anticoagulation Care Providers     Provider Role Specialty Phone number    Frederic Isaacs MD Responsible Cardiology [phone]            See the  Encounter Report to view Anticoagulation Flowsheet and Dosing Calendar (Go to Encounters tab in chart review, and find the Anticoagulation Therapy Visit)    Home INR 2.4 He started taking Omnicef on 4/11 (10 day course for sinus infection, cough, URI). Taking Mucinex. No other med or diet changes. Denies abnormal bleeding or bruising. Will continue current dosing of 7.5 mg daily and recheck in 2 weeks.     Tanja Wills RN

## 2020-04-15 ENCOUNTER — VIRTUAL VISIT (OUTPATIENT)
Dept: CARDIOLOGY | Facility: CLINIC | Age: 80
End: 2020-04-15
Attending: INTERNAL MEDICINE
Payer: COMMERCIAL

## 2020-04-15 VITALS — BODY MASS INDEX: 29.03 KG/M2 | HEIGHT: 69 IN | WEIGHT: 196 LBS

## 2020-04-15 DIAGNOSIS — I10 ESSENTIAL HYPERTENSION: ICD-10-CM

## 2020-04-15 DIAGNOSIS — I25.110 CORONARY ARTERY DISEASE INVOLVING NATIVE CORONARY ARTERY OF NATIVE HEART WITH UNSTABLE ANGINA PECTORIS (H): ICD-10-CM

## 2020-04-15 DIAGNOSIS — D68.61 ANTIPHOSPHOLIPID ANTIBODY SYNDROME (H): ICD-10-CM

## 2020-04-15 DIAGNOSIS — R00.0 TACHYCARDIA: ICD-10-CM

## 2020-04-15 DIAGNOSIS — I25.10 CORONARY ARTERY DISEASE INVOLVING NATIVE CORONARY ARTERY OF NATIVE HEART WITHOUT ANGINA PECTORIS: ICD-10-CM

## 2020-04-15 DIAGNOSIS — E78.2 MIXED HYPERLIPIDEMIA: ICD-10-CM

## 2020-04-15 PROCEDURE — 99213 OFFICE O/P EST LOW 20 MIN: CPT | Mod: GT | Performed by: INTERNAL MEDICINE

## 2020-04-15 RX ORDER — NITROGLYCERIN 0.4 MG/1
0.4 TABLET SUBLINGUAL EVERY 5 MIN PRN
Qty: 25 TABLET | Refills: 3 | Status: SHIPPED | OUTPATIENT
Start: 2020-04-15 | End: 2021-12-06

## 2020-04-15 ASSESSMENT — MIFFLIN-ST. JEOR: SCORE: 1596.49

## 2020-04-15 NOTE — PROGRESS NOTES
"Yogesh Abreu is a 79 year old male who is being evaluated via a billable video visit.      The patient has been notified of following:     \"This video visit will be conducted via a call between you and your physician/provider. We have found that certain health care needs can be provided without the need for an in-person physical exam.  This service lets us provide the care you need with a video conversation.  If a prescription is necessary we can send it directly to your pharmacy.  If lab work is needed we can place an order for that and you can then stop by our lab to have the test done at a later time.    Video visits are billed at different rates depending on your insurance coverage.  Please reach out to your insurance provider with any questions.    If during the course of the call the physician/provider feels a video visit is not appropriate, you will not be charged for this service.\"    Patient has given verbal consent for Video visit? Yes    How would you like to obtain your AVS? Mail a copy    Patient would like the video invitation sent by: Text to cell phone: [phone]      Review Of Systems  Skin: Negative  Eyes: Positive for glasses  Ears/Nose/Throat: Negative  Respiratory: Positive for dyspnea when at rest, productive cough  Cardiovascular: Negative  Gastrointestinal: Negative  Genitourinary: Negative  Musculoskeletal: Positive for joint pain, stiffness in back, hx left hip replacement, nocturnal cramping  Neurologic: Negative  Psychiatric: Negative  Hematologic/Lymphatic/Immunologic: Negative  Endocrine: Negative    Patient reported vitals:  BP: 100/70  Heart rate: N/A  Weight:196 lbs    Paula Velásquez CMA    Video Start Time: 8:40am    Additional provider notes: General:  no apparent distress, normal body habitus, sitting upright.  ENT/Mouth:  membranes moist, no nasal discharge.  Normal head shape, no apparent injury or laceration.  Eyes:  no scleral icterus, normal conjunctivae.  No observed " jaundice.  Neck:  no apparent neck swelling.   Chest/Lungs:  No breathing difficulty while speaking.  No audible wheezing.  No cough during conversation.  Cardiovascular:  No obviously elevated jugular venous pressure.  No apparent edema bilaterally in LE.   Abdomen:  no obvious abdominal distention.   Extremities:  no cyanosis seen.  Skin:  no xanthelasma.  No facial lacerations.  Neurologic:  Normal arm motion bilateral, no tremors.    Psychiatric:  Alert and oriented x3, calm demeanor      Video-Visit Details    Type of service:  Video Visit    Video End Time (time video stopped): 9.00am    Originating Location (pt. Location): Home    Distant Location (provider location):  [facility]    Mode of Communication:  Video Conference via D[employer]      Frederic Isaacs MD, [facility]

## 2020-04-15 NOTE — PROGRESS NOTES
Service Date: 04/15/2020      VIDEO VISIT USING [place]      The patient consented to the video visit.        HISTORY OF PRESENT ILLNESS:  If you remember, Mr. 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  He was followed by my former partner, Dr. Ji Rogers, who has retired.  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  He is chronically anticoagulated with warfarin.      Since I last saw him, he has been feeling well other than he has had an upper respiratory tract infection, which he is battling, and he had some shortness of breath associated with that.  He has not been tested for COVID-19.  He has had no chest pains or chest pressure.  No arm, throat or jaw discomfort.  The patient did have a nuclear stress test performed preoperatively prior to hip surgery on 09/17/2019, which is essentially 7 months ago, and this showed the prior transmural moderate-sized inferior and inferolateral wall infarct, but only minimal periinfarct ischemia.  Ejection fraction on nuclear stress testing was felt to be 43%.  Last echocardiogram, which was in the setting of a stress echocardiogram, felt that the ejection fraction at rest was about 55%-60% with inferior wall hypokinesis.  His last lipid profile showed mild HDL deficiency at 38, LDL was 84, and triglycerides were 66.  He is on high-intensity statin therapy, being on atorvastatin 80 mg per day.  On 09/20, which was around the time of his surgery, his electrolytes were normal with a sodium of 137, potassium of 3.8, BUN of 21, creatinine of 0.84 with a GFR of 84.  His blood pressure today was 100/70.  He does not complain of any dizziness or lightheadedness.      IMPRESSION:   1.  Coronary artery disease and status post prior inferior myocardial infarct and stenting of the right  coronary artery.  The patient is asymptomatic with respect to coronary artery disease, and nuclear stress testing 7 months ago would be regarded as being low risk.   2.  Reasonably good lipid profile and is on high-intensity statin therapy.  Mild HDL deficiency is present.   3.  Normal renal function and electrolytes on BMP on 2019.   4.  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   5.  Recent upper respiratory tract infection.  He has not been tested for COVID-19, but is feeling well and recovering.      PLAN:  We will continue the patient on his present medications.  I will have the patient return to see me in 1 year's time, and hopefully the COVID-19 pandemic will be behind us at that stage.  We will repeat his lipids at that time, but as always, he has been told to contact us if he has any questions or any concerns.         MONI JOHNSON MD, [facility]             D: 04/15/2020   T: 04/15/2020   MT: anoop      Name:     RUIZ LAIRD   MRN:      [id number]        Account:      [id number]   :      1940           Service Date: 04/15/2020      Document: [id number]

## 2020-04-20 DIAGNOSIS — I25.10 CORONARY ARTERY DISEASE INVOLVING NATIVE CORONARY ARTERY OF NATIVE HEART WITHOUT ANGINA PECTORIS: ICD-10-CM

## 2020-04-20 DIAGNOSIS — R00.0 TACHYCARDIA: ICD-10-CM

## 2020-04-20 DIAGNOSIS — I10 ESSENTIAL HYPERTENSION: Primary | ICD-10-CM

## 2020-04-20 RX ORDER — METOPROLOL SUCCINATE 25 MG/1
12.5 TABLET, EXTENDED RELEASE ORAL DAILY
Qty: 90 TABLET | Refills: 3 | Status: SHIPPED | OUTPATIENT
Start: 2020-04-20 | End: 2021-08-03

## 2020-04-23 ENCOUNTER — VIRTUAL VISIT (OUTPATIENT)
Dept: FAMILY MEDICINE | Facility: CLINIC | Age: 80
End: 2020-04-23
Payer: COMMERCIAL

## 2020-04-23 DIAGNOSIS — R05.9 COUGH: Primary | ICD-10-CM

## 2020-04-23 PROCEDURE — 99213 OFFICE O/P EST LOW 20 MIN: CPT | Mod: 95 | Performed by: INTERNAL MEDICINE

## 2020-04-23 RX ORDER — LOSARTAN POTASSIUM 25 MG/1
12.5 TABLET ORAL DAILY
Qty: 45 TABLET | Refills: 3 | Status: SHIPPED | OUTPATIENT
Start: 2020-04-23 | End: 2020-09-17

## 2020-04-23 NOTE — PROGRESS NOTES
"Yogesh Abreu is a 79 year old male who is being evaluated via a billable video visit.      The patient has been notified of following:     \"This video visit will be conducted via a call between you and your physician/provider. We have found that certain health care needs can be provided without the need for an in-person physical exam.  This service lets us provide the care you need with a video conversation.  If a prescription is necessary we can send it directly to your pharmacy.  If lab work is needed we can place an order for that and you can then stop by our lab to have the test done at a later time.    Video visits are billed at different rates depending on your insurance coverage.  Please reach out to your insurance provider with any questions.    If during the course of the call the physician/provider feels a video visit is not appropriate, you will not be charged for this service.\"    Patient has given verbal consent for Video visit? Yes    How would you like to obtain your AVS? [place]    Patient would like the video invitation sent by: Text to cell phone: [phone]    Will anyone else be joining your video visit? No        Subjective     Yogesh Abreu is a 79 year old male who presents to clinic today for the following health issues:    HPI    Cough and nasal drainage ongoing for about 3 months, was having some fatigue previously but that has since resolved.      Video Start Time: 12:38    3 month history of cough  Seen in [place] where it was thought cough was from allergies   He was also treated with steroids  Seen here 4/10 and treated for sinusitis  Cough is improving, but slowly  No current fevers or chills  No current dyspnea   History of asthma, symptoms improved when got back on advair      Patient Active Problem List   Diagnosis     PE (pulmonary embolism)     CAD (coronary artery disease), IMI -  => rescue stent to  RCA     Hypertension     Mixed hyperlipidemia     Polymyalgia " rheumatica (H)     ACS (acute coronary syndrome) (H)     Long-term (current) use of anticoagulants [Z79.01]     Antiphospholipid antibody syndrome (H)     Moderate persistent asthma without complication     HL (hearing loss), bilateral     Left ventricular failure (H)     Chronic bilateral low back pain without sciatica     Long term current use of anticoagulants with INR goal of 2.0-3.0     Past Surgical History:   Procedure Laterality Date     HEART CATH STENT COR W/WO PTCA  10/2015    90% diagonal, 50-60% CFX, 100% prox RCA occlusion - MAL placed in RCA     ORTHOPEDIC SURGERY  2015    right carpal tunnel       Social History     Tobacco Use     Smoking status: Former Smoker     Packs/day: 0.50     Years: 5.00     Pack years: 2.50     Types: Cigarettes     Start date:      Last attempt to quit:      Years since quittin.3     Smokeless tobacco: Never Used   Substance Use Topics     Alcohol use: Yes     Comment: 1 drink per day     Family History   Problem Relation Age of Onset     Hypertension Brother      Hypertension Brother          Current Outpatient Medications   Medication Sig Dispense Refill     albuterol (PROAIR HFA/PROVENTIL HFA/VENTOLIN HFA) 108 (90 Base) MCG/ACT inhaler Inhale 2 puffs into the lungs every 6 hours as needed for shortness of breath / dyspnea or wheezing 1 Inhaler 0     atorvastatin (LIPITOR) 80 MG tablet Take 1 tablet (80 mg) by mouth At Bedtime 90 tablet 3     Calcium Citrate-Vitamin D (CALCIUM CITRATE + D PO) Take 600 mg by mouth 2 times daily       Cholecalciferol (VITAMIN D3 PO) Take 1,000 Units by mouth 2 times daily       fluticasone-salmeterol (ADVAIR DISKUS) 250-50 MCG/DOSE inhaler Inhale 1 puff into the lungs 2 times daily 1 Inhaler 1     ibuprofen (ADVIL/MOTRIN) 200 MG capsule Take 600 mg by mouth daily as needed for fever       losartan (COZAAR) 25 MG tablet Take 0.5 tablets (12.5 mg) by mouth daily 45 tablet 3     metoprolol succinate ER (TOPROL-XL) 25 MG 24 hr  "tablet Take 0.5 tablets (12.5 mg) by mouth daily 90 tablet 3     nitroGLYcerin (NITROSTAT) 0.4 MG sublingual tablet Place 1 tablet (0.4 mg) under the tongue every 5 minutes as needed for chest pain 25 tablet 3     vardenafil (LEVITRA) 20 MG tablet Take 0.5-1 tablets (10-20 mg) by mouth daily as needed Never use with nitroglycerin, terazosin or doxazosin. 12 tablet 11     warfarin ANTICOAGULANT (COUMADIN) 5 MG tablet Take 1 1/2 tabs daily or as directed per INR clinic 145 tablet 1     ASPIRIN NOT PRESCRIBED (INTENTIONAL) Please choose reason not prescribed, below 0 each 0     Allergies   Allergen Reactions     Seasonal Allergies      Simvastatin        Reviewed and updated as needed this visit by Provider         Review of Systems   ROS COMP: Constitutional, HEENT, cardiovascular, pulmonary, gi and gu systems are negative, except as otherwise noted.      Objective    There were no vitals taken for this visit.  Estimated body mass index is 29.37 kg/m  as calculated from the following:    Height as of 4/15/20: 1.74 m (5' 8.5\").    Weight as of 4/15/20: 88.9 kg (196 lb).  Physical Exam     GENERAL: healthy, alert and no distress  EYES: Eyes grossly normal to inspection, conjunctivae and sclerae normal  RESP: no audible wheeze, some cough throughout visit mild, or visible cyanosis.  No visible retractions or increased work of breathing.  Able to speak fully in complete sentences.  NEURO: Cranial nerves grossly intact, mentation intact and speech normal  PSYCH: mentation appears normal, affect normal/bright, judgement and insight intact, normal speech and appearance well-groomed              Assessment & Plan     1. Cough  Etiology is unclear, but symptoms are slowly improving, cough is often last symptom to resolve after URI particularly in patients with asthma  In case the lisinopril is contributing to cough, I think it reasonable to switch to losartan and we discussed this   Technically, since cough has been present " for so long, he should have chest x-ray to fully evaluate, but he is reluctant to come in for that due to viral pandemic  We decided to monitor cough until it is safer to come in for exam and chest x-ray   We discussed some symptom management strategies                    Return in about 6 weeks (around 6/4/2020) for for exam and cxr if cough persists; sooner if symptoms worsen or new symptom develo.    Filipe Goldberg MD  [facility]      Video-Visit Details    Type of service:  Video Visit    Video End Time:12:56    Originating Location (pt. Location): Home    Distant Location (provider location):  [facility]     Mode of Communication:  Video Conference via [employer]    Return in about 6 weeks (around 6/4/2020) for for exam and cxr if cough persists; sooner if symptoms worsen or new symptom develo.       Filipe Goldberg MD

## 2020-04-28 ENCOUNTER — ANTICOAGULATION THERAPY VISIT (OUTPATIENT)
Dept: CARDIOLOGY | Facility: CLINIC | Age: 80
End: 2020-04-28
Payer: COMMERCIAL

## 2020-04-28 ENCOUNTER — TRANSFERRED RECORDS (OUTPATIENT)
Dept: HEALTH INFORMATION MANAGEMENT | Facility: CLINIC | Age: 80
End: 2020-04-28

## 2020-04-28 DIAGNOSIS — I26.99 PULMONARY EMBOLISM (H): ICD-10-CM

## 2020-04-28 DIAGNOSIS — Z79.01 LONG TERM CURRENT USE OF ANTICOAGULANTS WITH INR GOAL OF 2.0-3.0: ICD-10-CM

## 2020-04-28 LAB — INR PPP: 2.2 (ref 0.9–1.1)

## 2020-04-28 PROCEDURE — 99207 ZZC NO CHARGE NURSE ONLY: CPT

## 2020-04-28 NOTE — PROGRESS NOTES
ANTICOAGULATION FOLLOW-UP CLINIC VISIT    Patient Name:  Yogesh Abreu  Date:  2020  Contact Type:  Telephone    SUBJECTIVE:         OBJECTIVE    INR   Date Value Ref Range Status   2020 2.2 (A) 0.90 - 1.10 Final     Chromogenic Factor 10   Date Value Ref Range Status   10/12/2015 28 (L) 70 - 130 % Final     Comment:     Therapeutic Range:  A Chromogenic Factor 10 level of approximately 20-40%   inversely correlates with an INR of 2-3 for patients receiving Warfarin.   Chromogenic Factor 10 levels below 20% indicate an INR greater than 3 and   levels above 40% indicate an INR less than 2.         ASSESSMENT / PLAN  INR assessment THER    Recheck INR In: 2 WEEKS    INR Location Home INR    Billed home INR No      Anticoagulation Summary  As of 2020    INR goal:   2.0-3.0   TTR:   80.1 % (1 y)   INR used for dosin.2 (2020)   Warfarin maintenance plan:   7.5 mg (5 mg x 1.5) every day   Full warfarin instructions:   7.5 mg every day   Weekly warfarin total:   52.5 mg   No change documented:   Tanja Wills RN   Plan last modified:   Tanja Wills RN (2019)   Next INR check:   2020   Priority:   Maintenance   Target end date:   Indefinite    Indications    PE (pulmonary embolism) [I26.99]  Long term current use of anticoagulants with INR goal of 2.0-3.0 [Z79.01]             Anticoagulation Episode Summary     INR check location:       Preferred lab:       Send INR reminders to:   [facility] HEART INR NURSE    Comments:         Anticoagulation Care Providers     Provider Role Specialty Phone number    Frederic Isaacs MD [facility] Cardiology [phone]            See the Encounter Report to view Anticoagulation Flowsheet and Dosing Calendar (Go to Encounters tab in chart review, and find the Anticoagulation Therapy Visit)    Home INR 2.2 No longer taking meds for URI. Cough is slowly improving. No change in diet. Denies abnormal bleeding or bruising. Will continue  current dosing of 7.5 mg daily and recheck in 2 weeks.    Tanja Wills RN

## 2020-05-13 ENCOUNTER — ANTICOAGULATION THERAPY VISIT (OUTPATIENT)
Dept: CARDIOLOGY | Facility: CLINIC | Age: 80
End: 2020-05-13
Payer: COMMERCIAL

## 2020-05-13 ENCOUNTER — TRANSFERRED RECORDS (OUTPATIENT)
Dept: HEALTH INFORMATION MANAGEMENT | Facility: CLINIC | Age: 80
End: 2020-05-13

## 2020-05-13 DIAGNOSIS — I26.99 PULMONARY EMBOLISM (H): ICD-10-CM

## 2020-05-13 DIAGNOSIS — Z79.01 LONG TERM CURRENT USE OF ANTICOAGULANTS WITH INR GOAL OF 2.0-3.0: ICD-10-CM

## 2020-05-13 LAB — INR PPP: 2.6 (ref 0.9–1.1)

## 2020-05-13 PROCEDURE — 99207 ZZC NO CHARGE NURSE ONLY: CPT | Performed by: INTERNAL MEDICINE

## 2020-05-13 NOTE — PROGRESS NOTES
ANTICOAGULATION FOLLOW-UP CLINIC VISIT    Patient Name:  Yogesh Abreu  Date:  2020  Contact Type:  [employer]    SUBJECTIVE:         OBJECTIVE    Recent labs: (last 7 days)     20   INR 2.6*       ASSESSMENT / PLAN  INR assessment THER    Recheck INR In: 2 WEEKS    INR Location Home INR    Billed home INR No      Anticoagulation Summary  As of 2020    INR goal:   2.0-3.0   TTR:   81.7 % (1 y)   INR used for dosin.6 (2020)   Warfarin maintenance plan:   7.5 mg (5 mg x 1.5) every day   Full warfarin instructions:   7.5 mg every day   Weekly warfarin total:   52.5 mg   No change documented:   Tanja Wills RN   Plan last modified:   Tanja Wills RN (2019)   Next INR check:   2020   Priority:   Maintenance   Target end date:   Indefinite    Indications    PE (pulmonary embolism) [I26.99]  Long term current use of anticoagulants with INR goal of 2.0-3.0 [Z79.01]             Anticoagulation Episode Summary     INR check location:       Preferred lab:       Send INR reminders to:   [facility] HEART INR NURSE    Comments:         Anticoagulation Care Providers     Provider Role Specialty Phone number    Frederic Isaacs MD Responsible Cardiology [phone]            See the Encounter Report to view Anticoagulation Flowsheet and Dosing Calendar (Go to Encounters tab in chart review, and find the Anticoagulation Therapy Visit)    Home INR 2.6 Will continue current dosing of 7.5 mg daily and recheck in 2 weeks. Will call pt after the next INR check.    Tanja Wills RN

## 2020-05-26 DIAGNOSIS — Z86.711 HISTORY OF PULMONARY EMBOLISM: ICD-10-CM

## 2020-05-26 DIAGNOSIS — Z79.01 LONG TERM CURRENT USE OF ANTICOAGULANT THERAPY: ICD-10-CM

## 2020-05-26 RX ORDER — WARFARIN SODIUM 5 MG/1
TABLET ORAL
Qty: 145 TABLET | Refills: 1 | Status: SHIPPED | OUTPATIENT
Start: 2020-05-26 | End: 2021-06-28

## 2020-05-26 NOTE — TELEPHONE ENCOUNTER
Received a refill request for Warfarin from [place] in [place]. Left a voicemail message asking pt to call back to clarify which pharmacy to send the refill request to ([place] or one in [place] - if so, which one?). Rickey

## 2020-05-27 ENCOUNTER — TRANSFERRED RECORDS (OUTPATIENT)
Dept: HEALTH INFORMATION MANAGEMENT | Facility: CLINIC | Age: 80
End: 2020-05-27

## 2020-05-27 ENCOUNTER — ANTICOAGULATION THERAPY VISIT (OUTPATIENT)
Dept: CARDIOLOGY | Facility: CLINIC | Age: 80
End: 2020-05-27
Payer: COMMERCIAL

## 2020-05-27 DIAGNOSIS — I26.99 PULMONARY EMBOLISM (H): ICD-10-CM

## 2020-05-27 DIAGNOSIS — Z79.01 LONG TERM CURRENT USE OF ANTICOAGULANTS WITH INR GOAL OF 2.0-3.0: ICD-10-CM

## 2020-05-27 LAB — INR PPP: 3.6 (ref 0.9–1.1)

## 2020-05-27 PROCEDURE — 99207 ZZC NO CHARGE NURSE ONLY: CPT

## 2020-05-27 NOTE — PROGRESS NOTES
ANTICOAGULATION FOLLOW-UP CLINIC VISIT    Patient Name:  Yoegsh Abreu  Date:  5/27/2020  Contact Type:  Telephone    SUBJECTIVE:  Patient Findings         Clinical Outcomes     Negatives:   Major bleeding event, Thromboembolic event, Anticoagulation-related hospital admission, Anticoagulation-related ED visit, Anticoagulation-related fatality           OBJECTIVE    Recent labs: (last 7 days)     05/27/20   INR 3.6*       ASSESSMENT / PLAN  INR assessment SUPRA    Recheck INR In: 2 WEEKS    INR Location Outside lab      Anticoagulation Summary  As of 5/27/2020    INR goal:   2.0-3.0   TTR:   79.4 % (1 y)   INR used for dosing:   3.6! (5/27/2020)   Warfarin maintenance plan:   7.5 mg (5 mg x 1.5) every day   Full warfarin instructions:   5/27: Hold; Otherwise 7.5 mg every day   Weekly warfarin total:   52.5 mg   Plan last modified:   Tanja Wills RN (8/5/2019)   Next INR check:   6/10/2020   Priority:   Maintenance   Target end date:   Indefinite    Indications    PE (pulmonary embolism) [I26.99]  Long term current use of anticoagulants with INR goal of 2.0-3.0 [Z79.01]             Anticoagulation Episode Summary     INR check location:       Preferred lab:       Send INR reminders to:   [facility] HEART INR NURSE    Comments:         Anticoagulation Care Providers     Provider Role Specialty Phone number    Frederic Isaacs MD [facility] Cardiology [phone]            See the Encounter Report to view Anticoagulation Flowsheet and Dosing Calendar (Go to Encounters tab in chart review, and find the Anticoagulation Therapy Visit)    Home INR 3.6 No change in meds or diet or ETOH. Denies infection or diarrhea. Drnies abnormal bleeding or bruising. Unknown cause for rise in INR. Will hold today's dose then resume 7.5 mg daily and recheck in 2 weeks. Dosage adjustment made based on physician directed care plan.    Tanja Wills RN

## 2020-06-10 ENCOUNTER — ANTICOAGULATION THERAPY VISIT (OUTPATIENT)
Dept: CARDIOLOGY | Facility: CLINIC | Age: 80
End: 2020-06-10
Payer: COMMERCIAL

## 2020-06-10 ENCOUNTER — TRANSFERRED RECORDS (OUTPATIENT)
Dept: HEALTH INFORMATION MANAGEMENT | Facility: CLINIC | Age: 80
End: 2020-06-10

## 2020-06-10 DIAGNOSIS — Z79.01 LONG TERM CURRENT USE OF ANTICOAGULANTS WITH INR GOAL OF 2.0-3.0: ICD-10-CM

## 2020-06-10 DIAGNOSIS — I25.110 CORONARY ARTERY DISEASE INVOLVING NATIVE CORONARY ARTERY OF NATIVE HEART WITH UNSTABLE ANGINA PECTORIS (H): ICD-10-CM

## 2020-06-10 DIAGNOSIS — I26.99 PULMONARY EMBOLISM (H): ICD-10-CM

## 2020-06-10 LAB — INR PPP: 2.8 (ref 0.9–1.1)

## 2020-06-10 PROCEDURE — 99207 ZZC NO CHARGE NURSE ONLY: CPT

## 2020-06-10 RX ORDER — ATORVASTATIN CALCIUM 80 MG/1
80 TABLET, FILM COATED ORAL AT BEDTIME
Qty: 90 TABLET | Refills: 2 | Status: SHIPPED | OUTPATIENT
Start: 2020-06-10 | End: 2021-04-14

## 2020-06-25 ENCOUNTER — TRANSFERRED RECORDS (OUTPATIENT)
Dept: HEALTH INFORMATION MANAGEMENT | Facility: CLINIC | Age: 80
End: 2020-06-25

## 2020-06-25 ENCOUNTER — ANTICOAGULATION THERAPY VISIT (OUTPATIENT)
Dept: CARDIOLOGY | Facility: CLINIC | Age: 80
End: 2020-06-25
Payer: COMMERCIAL

## 2020-06-25 DIAGNOSIS — I26.99 PULMONARY EMBOLISM (H): ICD-10-CM

## 2020-06-25 DIAGNOSIS — Z79.01 LONG TERM CURRENT USE OF ANTICOAGULANTS WITH INR GOAL OF 2.0-3.0: ICD-10-CM

## 2020-06-25 LAB — INR PPP: 3.1 (ref 0.9–1.1)

## 2020-06-25 PROCEDURE — 99207 ZZC NO CHARGE NURSE ONLY: CPT

## 2020-06-25 NOTE — PROGRESS NOTES
ANTICOAGULATION FOLLOW-UP CLINIC VISIT    Patient Name:  Yogesh Abreu  Date:  6/25/2020  Contact Type:  Telephone    SUBJECTIVE:         OBJECTIVE    Recent labs: (last 7 days)     06/25/20   INR 3.1*       ASSESSMENT / PLAN  INR assessment SUPRA    Recheck INR In: 2 WEEKS    INR Location Outside lab      Anticoagulation Summary  As of 6/25/2020    INR goal:   2.0-3.0   TTR:   75.1 % (1 y)   INR used for dosing:   3.1! (6/25/2020)   Warfarin maintenance plan:   7.5 mg (5 mg x 1.5) every day   Full warfarin instructions:   7.5 mg every day   Weekly warfarin total:   52.5 mg   No change documented:   Tanja Wills RN   Plan last modified:   Tanja Wills RN (8/5/2019)   Next INR check:   7/9/2020   Priority:   Maintenance   Target end date:   Indefinite    Indications    PE (pulmonary embolism) [I26.99]  Long term current use of anticoagulants with INR goal of 2.0-3.0 [Z79.01]             Anticoagulation Episode Summary     INR check location:       Preferred lab:       Send INR reminders to:   [facility] HEART INR NURSE    Comments:         Anticoagulation Care Providers     Provider Role Specialty Phone number    Frederic Isaacs MD Responsible Cardiology [phone]            See the Encounter Report to view Anticoagulation Flowsheet and Dosing Calendar (Go to Encounters tab in chart review, and find the Anticoagulation Therapy Visit)    Home INR 3.1 No change in meds. He ate fewer greens recently but plans to resume his usual diet. Denies abnormal bleeding or bruising. Will increase intake of greens and continue current dosing of 7.5 mg daily with recheck in 2 weeks.  Tanja Wills RN

## 2020-06-26 ENCOUNTER — TELEPHONE (OUTPATIENT)
Dept: CARDIOLOGY | Facility: CLINIC | Age: 80
End: 2020-06-26

## 2020-06-26 NOTE — TELEPHONE ENCOUNTER
Faye from [employer] [facility] calling to request the updated home INR order form. The Home INR monitoring order was signed by Dr Sharan Mauro on 2/5/20, faxed to them at [phone] then was scanned into EPIC. Sent a message to medical records to request that the signed order form [facility], Attn: Faye at [phone]. Rickey

## 2020-07-01 ENCOUNTER — E-VISIT (OUTPATIENT)
Dept: FAMILY MEDICINE | Facility: CLINIC | Age: 80
End: 2020-07-01
Payer: COMMERCIAL

## 2020-07-01 DIAGNOSIS — R42 VERTIGO: Primary | ICD-10-CM

## 2020-07-01 PROCEDURE — 99421 OL DIG E/M SVC 5-10 MIN: CPT | Performed by: INTERNAL MEDICINE

## 2020-07-09 ENCOUNTER — TRANSFERRED RECORDS (OUTPATIENT)
Dept: HEALTH INFORMATION MANAGEMENT | Facility: CLINIC | Age: 80
End: 2020-07-09

## 2020-07-09 ENCOUNTER — ANTICOAGULATION THERAPY VISIT (OUTPATIENT)
Dept: CARDIOLOGY | Facility: CLINIC | Age: 80
End: 2020-07-09
Payer: COMMERCIAL

## 2020-07-09 DIAGNOSIS — I26.99 PULMONARY EMBOLISM (H): ICD-10-CM

## 2020-07-09 DIAGNOSIS — Z79.01 LONG TERM CURRENT USE OF ANTICOAGULANTS WITH INR GOAL OF 2.0-3.0: ICD-10-CM

## 2020-07-09 LAB — INR PPP: 2.8 (ref 0.9–1.1)

## 2020-07-09 PROCEDURE — 99207 ZZC NO CHARGE NURSE ONLY: CPT

## 2020-07-09 NOTE — PROGRESS NOTES
ANTICOAGULATION FOLLOW-UP CLINIC VISIT    Patient Name:  Yogesh Abreu  Date:  2020  Contact Type:  [employer]    SUBJECTIVE:         OBJECTIVE    Recent labs: (last 7 days)     20   INR 2.8*       ASSESSMENT / PLAN  INR assessment THER    Recheck INR In: 2 WEEKS    INR Location Home INR    Billed home INR No      Anticoagulation Summary  As of 2020    INR goal:   2.0-3.0   TTR:   73.8 % (1 y)   INR used for dosin.8 (2020)   Warfarin maintenance plan:   7.5 mg (5 mg x 1.5) every day   Full warfarin instructions:   7.5 mg every day   Weekly warfarin total:   52.5 mg   No change documented:   Tanja Wills RN   Plan last modified:   Tanja Wills RN (2019)   Next INR check:   2020   Target end date:   Indefinite    Indications    PE (pulmonary embolism) [I26.99]  Long term current use of anticoagulants with INR goal of 2.0-3.0 [Z79.01]             Anticoagulation Episode Summary     INR check location:       Preferred lab:       Send INR reminders to:   [facility] HEART INR NURSE    Comments:         Anticoagulation Care Providers     Provider Role Specialty Phone number    Frederic Isaacs MD [facility] Cardiology [phone]            See the Encounter Report to view Anticoagulation Flowsheet and Dosing Calendar (Go to Encounters tab in chart review, and find the Anticoagulation Therapy Visit)    Home INR 2.8 Will continue current dosing of 7.5 mg daily and recheck in 2 weeks. Will call pt after the next INR check.    Tanja Wills RN

## 2020-07-22 ENCOUNTER — TRANSFERRED RECORDS (OUTPATIENT)
Dept: HEALTH INFORMATION MANAGEMENT | Facility: CLINIC | Age: 80
End: 2020-07-22

## 2020-07-22 LAB — INR PPP: 3 (ref 0.9–1.1)

## 2020-07-23 ENCOUNTER — ANTICOAGULATION THERAPY VISIT (OUTPATIENT)
Dept: CARDIOLOGY | Facility: CLINIC | Age: 80
End: 2020-07-23
Payer: COMMERCIAL

## 2020-07-23 DIAGNOSIS — I26.99 PULMONARY EMBOLISM (H): ICD-10-CM

## 2020-07-23 DIAGNOSIS — Z79.01 LONG TERM CURRENT USE OF ANTICOAGULANTS WITH INR GOAL OF 2.0-3.0: ICD-10-CM

## 2020-07-23 PROCEDURE — 99207 ZZC NO CHARGE NURSE ONLY: CPT

## 2020-07-23 NOTE — PROGRESS NOTES
ANTICOAGULATION FOLLOW-UP CLINIC VISIT    Patient Name:  Yogesh Abreu  Date:  7/23/2020  Contact Type:  Telephone    SUBJECTIVE:         OBJECTIVE    Recent labs: (last 7 days)     07/22/20   INR 3.0*       ASSESSMENT / PLAN  INR assessment THER    Recheck INR In: 2 WEEKS    INR Location Home INR    Billed home INR No      Anticoagulation Summary  As of 7/23/2020    INR goal:   2.0-3.0   TTR:   73.8 % (1 y)   INR used for dosing:   3.0 (7/22/2020)   Warfarin maintenance plan:   7.5 mg (5 mg x 1.5) every day   Full warfarin instructions:   7.5 mg every day   Weekly warfarin total:   52.5 mg   No change documented:   Tanja Wills RN   Plan last modified:   Tanja Wills RN (8/5/2019)   Next INR check:   8/5/2020   Target end date:   Indefinite    Indications    PE (pulmonary embolism) [I26.99]  Long term current use of anticoagulants with INR goal of 2.0-3.0 [Z79.01]             Anticoagulation Episode Summary     INR check location:       Preferred lab:       Send INR reminders to:   [facility] HEART INR NURSE    Comments:         Anticoagulation Care Providers     Provider Role Specialty Phone number    Frederic Isaacs MD Responsible Cardiology [phone]            See the Encounter Report to view Anticoagulation Flowsheet and Dosing Calendar (Go to Encounters tab in chart review, and find the Anticoagulation Therapy Visit)    7/22/20 Home INR 3.0 INR was checked after he was golfing so he thinks he could have been a little dehydrated. No change in diet or meds. Denies abnormal bleeding or bruising. Will continue current dosing of 7.5 mg daily and recheck in 2 weeks.    Tanja Wills RN

## 2020-08-05 ENCOUNTER — TRANSFERRED RECORDS (OUTPATIENT)
Dept: HEALTH INFORMATION MANAGEMENT | Facility: CLINIC | Age: 80
End: 2020-08-05

## 2020-08-05 ENCOUNTER — ANTICOAGULATION THERAPY VISIT (OUTPATIENT)
Dept: CARDIOLOGY | Facility: CLINIC | Age: 80
End: 2020-08-05
Payer: COMMERCIAL

## 2020-08-05 DIAGNOSIS — Z79.01 LONG TERM CURRENT USE OF ANTICOAGULANTS WITH INR GOAL OF 2.0-3.0: ICD-10-CM

## 2020-08-05 DIAGNOSIS — I26.99 PULMONARY EMBOLISM (H): ICD-10-CM

## 2020-08-05 LAB — INR PPP: 2.5 (ref 0.9–1.1)

## 2020-08-05 PROCEDURE — 99207 ZZC NO CHARGE LOS: CPT

## 2020-08-05 NOTE — PROGRESS NOTES
ANTICOAGULATION FOLLOW-UP CLINIC VISIT    Patient Name:  Yogesh Abreu  Date:  2020  Contact Type:  [employer]    SUBJECTIVE:         OBJECTIVE    Recent labs: (last 7 days)     20   INR 2.5*       ASSESSMENT / PLAN  INR assessment THER    Recheck INR In: 2 WEEKS    INR Location Home INR    Billed home INR No      Anticoagulation Summary  As of 2020    INR goal:   2.0-3.0   TTR:   76.2 % (1 y)   INR used for dosin.5 (2020)   Warfarin maintenance plan:   7.5 mg (5 mg x 1.5) every day   Full warfarin instructions:   7.5 mg every day   Weekly warfarin total:   52.5 mg   No change documented:   Tanja Wills RN   Plan last modified:   Tanja Wills RN (2019)   Next INR check:   2020   Target end date:   Indefinite    Indications    PE (pulmonary embolism) [I26.99]  Long term current use of anticoagulants with INR goal of 2.0-3.0 [Z79.01]             Anticoagulation Episode Summary     INR check location:       Preferred lab:       Send INR reminders to:   [facility] HEART INR NURSE    Comments:         Anticoagulation Care Providers     Provider Role Specialty Phone number    Frederic Isaacs MD [facility] Cardiology [phone]            See the Encounter Report to view Anticoagulation Flowsheet and Dosing Calendar (Go to Encounters tab in chart review, and find the Anticoagulation Therapy Visit)    Home INR 2.5 Will continue current dosing of 7.5 mg daily and recheck in 2 weeks. Will call pt after the next INR check.    Tanja Wills RN

## 2020-08-19 ENCOUNTER — DOCUMENTATION ONLY (OUTPATIENT)
Dept: FAMILY MEDICINE | Facility: CLINIC | Age: 80
End: 2020-08-19

## 2020-08-19 ENCOUNTER — TRANSFERRED RECORDS (OUTPATIENT)
Dept: HEALTH INFORMATION MANAGEMENT | Facility: CLINIC | Age: 80
End: 2020-08-19

## 2020-08-19 ENCOUNTER — ANTICOAGULATION THERAPY VISIT (OUTPATIENT)
Dept: CARDIOLOGY | Facility: CLINIC | Age: 80
End: 2020-08-19
Payer: COMMERCIAL

## 2020-08-19 DIAGNOSIS — Z79.01 LONG TERM CURRENT USE OF ANTICOAGULANTS WITH INR GOAL OF 2.0-3.0: ICD-10-CM

## 2020-08-19 DIAGNOSIS — I26.99 PULMONARY EMBOLISM (H): ICD-10-CM

## 2020-08-19 LAB — INR PPP: 3 (ref 0.9–1.1)

## 2020-08-19 PROCEDURE — 99207 ZZC NO CHARGE NURSE ONLY: CPT

## 2020-08-19 NOTE — TELEPHONE ENCOUNTER
To minimize potential exposure to COVID by coming to clinic twice, would recommend labs be drawn after we meet of office visit fasting state is not that important

## 2020-08-19 NOTE — PROGRESS NOTES
ANTICOAGULATION FOLLOW-UP CLINIC VISIT    Patient Name:  Yogesh Abreu  Date:  8/19/2020  Contact Type:  Telephone    SUBJECTIVE:  Patient Findings         Clinical Outcomes     Negatives:   Major bleeding event, Thromboembolic event, Anticoagulation-related hospital admission, Anticoagulation-related ED visit, Anticoagulation-related fatality           OBJECTIVE    Recent labs: (last 7 days)     08/19/20   INR 3.0*       ASSESSMENT / PLAN  INR assessment THER    Recheck INR In: 2 WEEKS    INR Location Home INR    Billed home INR No      Anticoagulation Summary  As of 8/19/2020    INR goal:   2.0-3.0   TTR:   79.0 % (1 y)   INR used for dosing:   3.0 (8/19/2020)   Warfarin maintenance plan:   7.5 mg (5 mg x 1.5) every day   Full warfarin instructions:   7.5 mg every day   Weekly warfarin total:   52.5 mg   No change documented:   Tanja Wills RN   Plan last modified:   Tanja Wills RN (8/5/2019)   Next INR check:   9/2/2020   Target end date:   Indefinite    Indications    PE (pulmonary embolism) [I26.99]  Long term current use of anticoagulants with INR goal of 2.0-3.0 [Z79.01]             Anticoagulation Episode Summary     INR check location:       Preferred lab:       Send INR reminders to:   [facility] HEART INR NURSE    Comments:         Anticoagulation Care Providers     Provider Role Specialty Phone number    Frederic Isaacs MD [facility] Cardiology [phone]            See the Encounter Report to view Anticoagulation Flowsheet and Dosing Calendar (Go to Encounters tab in chart review, and find the Anticoagulation Therapy Visit)    Home INR 3.0 No change in meds or diet. Denies abnormal bleeding or bruising.  Will continue current dosing of 7.5 mg daily and recheck in 2 weeks.    Tanja Wills RN

## 2020-08-19 NOTE — PROGRESS NOTES
There are no orders from you for this patient for the upcoming lab visit. Please order labs as needed.     Reason for visit: Annual Physical/Wellness Appointment.    Thank you, lab.

## 2020-09-03 ENCOUNTER — TRANSFERRED RECORDS (OUTPATIENT)
Dept: HEALTH INFORMATION MANAGEMENT | Facility: CLINIC | Age: 80
End: 2020-09-03

## 2020-09-03 ENCOUNTER — ANTICOAGULATION THERAPY VISIT (OUTPATIENT)
Dept: CARDIOLOGY | Facility: CLINIC | Age: 80
End: 2020-09-03

## 2020-09-03 DIAGNOSIS — Z79.01 LONG TERM CURRENT USE OF ANTICOAGULANTS WITH INR GOAL OF 2.0-3.0: ICD-10-CM

## 2020-09-03 DIAGNOSIS — I26.99 PULMONARY EMBOLISM (H): ICD-10-CM

## 2020-09-03 LAB — INR PPP: 2.8 (ref 0.9–1.1)

## 2020-09-03 PROCEDURE — 99207 ZZC NO CHARGE NURSE ONLY: CPT

## 2020-09-03 NOTE — PROGRESS NOTES
ANTICOAGULATION FOLLOW-UP CLINIC VISIT    Patient Name:  Yogesh Abreu  Date:  9/3/2020  Contact Type:  [employer]    SUBJECTIVE:         OBJECTIVE    Recent labs: (last 7 days)     20   INR 2.8*       ASSESSMENT / PLAN  INR assessment THER    Recheck INR In: 2 WEEKS    INR Location Home INR    Billed home INR No      Anticoagulation Summary  As of 9/3/2020    INR goal:   2.0-3.0   TTR:   79.0 % (1 y)   INR used for dosin.8 (9/3/2020)   Warfarin maintenance plan:   7.5 mg (5 mg x 1.5) every day   Full warfarin instructions:   7.5 mg every day   Weekly warfarin total:   52.5 mg   No change documented:   Tanja Wills RN   Plan last modified:   Tanja Wills RN (2019)   Next INR check:   2020   Target end date:   Indefinite    Indications    PE (pulmonary embolism) [I26.99]  Long term current use of anticoagulants with INR goal of 2.0-3.0 [Z79.01]             Anticoagulation Episode Summary     INR check location:       Preferred lab:       Send INR reminders to:   [facility] HEART INR NURSE    Comments:         Anticoagulation Care Providers     Provider Role Specialty Phone number    Frederic Isaacs MD Referring Cardiology [phone]            See the Encounter Report to view Anticoagulation Flowsheet and Dosing Calendar (Go to Encounters tab in chart review, and find the Anticoagulation Therapy Visit)    Home INR 2.8 Will continue current dosing of 7.5 mg daily and recheck in 2 weeks. Will call pt after the next INR check. INR clinic referral renewal submitted for signature.  Has the patient previously taken warfarin? yes  If yes, for what indication? PE    Does the patient have any of the following indications for a higher range of 2.5-3.5:    Mitral position mechanical valve? no    Michaela-Shiley, Ball and Cage or Monoleaflet valve (regardless of position) no    Other (if yes, please explain) no   Rickey Wills RN

## 2020-09-16 ENCOUNTER — TRANSFERRED RECORDS (OUTPATIENT)
Dept: HEALTH INFORMATION MANAGEMENT | Facility: CLINIC | Age: 80
End: 2020-09-16

## 2020-09-16 LAB — INR PPP: 2.8 (ref 0.9–1.1)

## 2020-09-17 ENCOUNTER — OFFICE VISIT (OUTPATIENT)
Dept: FAMILY MEDICINE | Facility: CLINIC | Age: 80
End: 2020-09-17
Payer: COMMERCIAL

## 2020-09-17 ENCOUNTER — ANTICOAGULATION THERAPY VISIT (OUTPATIENT)
Dept: CARDIOLOGY | Facility: CLINIC | Age: 80
End: 2020-09-17
Payer: COMMERCIAL

## 2020-09-17 ENCOUNTER — ANCILLARY PROCEDURE (OUTPATIENT)
Dept: GENERAL RADIOLOGY | Facility: CLINIC | Age: 80
End: 2020-09-17
Attending: INTERNAL MEDICINE
Payer: COMMERCIAL

## 2020-09-17 VITALS
DIASTOLIC BLOOD PRESSURE: 88 MMHG | OXYGEN SATURATION: 97 % | HEART RATE: 61 BPM | HEIGHT: 69 IN | TEMPERATURE: 98 F | WEIGHT: 199 LBS | BODY MASS INDEX: 29.47 KG/M2 | SYSTOLIC BLOOD PRESSURE: 148 MMHG

## 2020-09-17 DIAGNOSIS — I26.99 PULMONARY EMBOLISM (H): ICD-10-CM

## 2020-09-17 DIAGNOSIS — M35.3 POLYMYALGIA RHEUMATICA (H): ICD-10-CM

## 2020-09-17 DIAGNOSIS — E78.2 MIXED HYPERLIPIDEMIA: ICD-10-CM

## 2020-09-17 DIAGNOSIS — Z12.11 SCREENING FOR COLON CANCER: ICD-10-CM

## 2020-09-17 DIAGNOSIS — I25.110 CORONARY ARTERY DISEASE INVOLVING NATIVE CORONARY ARTERY OF NATIVE HEART WITH UNSTABLE ANGINA PECTORIS (H): ICD-10-CM

## 2020-09-17 DIAGNOSIS — I10 ESSENTIAL HYPERTENSION: ICD-10-CM

## 2020-09-17 DIAGNOSIS — I26.99 PULMONARY EMBOLISM, OTHER, UNSPECIFIED CHRONICITY, UNSPECIFIED WHETHER ACUTE COR PULMONALE PRESENT (H): ICD-10-CM

## 2020-09-17 DIAGNOSIS — L98.9 SKIN PROBLEM: ICD-10-CM

## 2020-09-17 DIAGNOSIS — D68.61 ANTIPHOSPHOLIPID ANTIBODY SYNDROME (H): ICD-10-CM

## 2020-09-17 DIAGNOSIS — N52.9 ERECTILE DYSFUNCTION, UNSPECIFIED ERECTILE DYSFUNCTION TYPE: ICD-10-CM

## 2020-09-17 DIAGNOSIS — R05.9 COUGH: ICD-10-CM

## 2020-09-17 DIAGNOSIS — Z00.00 ROUTINE GENERAL MEDICAL EXAMINATION AT A HEALTH CARE FACILITY: Primary | ICD-10-CM

## 2020-09-17 DIAGNOSIS — Z79.01 LONG TERM CURRENT USE OF ANTICOAGULANTS WITH INR GOAL OF 2.0-3.0: ICD-10-CM

## 2020-09-17 DIAGNOSIS — J45.40 MODERATE PERSISTENT ASTHMA WITHOUT COMPLICATION: ICD-10-CM

## 2020-09-17 PROBLEM — I50.1 LEFT VENTRICULAR FAILURE (H): Status: RESOLVED | Noted: 2018-08-20 | Resolved: 2020-09-17

## 2020-09-17 LAB
ERYTHROCYTE [DISTWIDTH] IN BLOOD BY AUTOMATED COUNT: 14.2 % (ref 10–15)
HCT VFR BLD AUTO: 49.8 % (ref 40–53)
HGB BLD-MCNC: 16.9 G/DL (ref 13.3–17.7)
MCH RBC QN AUTO: 30.6 PG (ref 26.5–33)
MCHC RBC AUTO-ENTMCNC: 33.9 G/DL (ref 31.5–36.5)
MCV RBC AUTO: 90 FL (ref 78–100)
PLATELET # BLD AUTO: 178 10E9/L (ref 150–450)
RBC # BLD AUTO: 5.53 10E12/L (ref 4.4–5.9)
WBC # BLD AUTO: 4.7 10E9/L (ref 4–11)

## 2020-09-17 PROCEDURE — 36415 COLL VENOUS BLD VENIPUNCTURE: CPT | Performed by: INTERNAL MEDICINE

## 2020-09-17 PROCEDURE — 90471 IMMUNIZATION ADMIN: CPT | Performed by: INTERNAL MEDICINE

## 2020-09-17 PROCEDURE — 99207 ZZC NO CHARGE NURSE ONLY: CPT | Performed by: INTERNAL MEDICINE

## 2020-09-17 PROCEDURE — 71046 X-RAY EXAM CHEST 2 VIEWS: CPT

## 2020-09-17 PROCEDURE — 99397 PER PM REEVAL EST PAT 65+ YR: CPT | Mod: 25 | Performed by: INTERNAL MEDICINE

## 2020-09-17 PROCEDURE — 80061 LIPID PANEL: CPT | Performed by: INTERNAL MEDICINE

## 2020-09-17 PROCEDURE — 80053 COMPREHEN METABOLIC PANEL: CPT | Performed by: INTERNAL MEDICINE

## 2020-09-17 PROCEDURE — 99213 OFFICE O/P EST LOW 20 MIN: CPT | Mod: 25 | Performed by: INTERNAL MEDICINE

## 2020-09-17 PROCEDURE — 85027 COMPLETE CBC AUTOMATED: CPT | Performed by: INTERNAL MEDICINE

## 2020-09-17 PROCEDURE — 90662 IIV NO PRSV INCREASED AG IM: CPT | Performed by: INTERNAL MEDICINE

## 2020-09-17 RX ORDER — LISINOPRIL 5 MG/1
TABLET ORAL
Qty: 90 TABLET | Refills: 3 | Status: SHIPPED | OUTPATIENT
Start: 2020-09-17 | End: 2021-04-12

## 2020-09-17 RX ORDER — VARDENAFIL HYDROCHLORIDE 20 MG/1
10-20 TABLET ORAL DAILY PRN
Qty: 12 TABLET | Refills: 11 | Status: SHIPPED | OUTPATIENT
Start: 2020-09-17

## 2020-09-17 ASSESSMENT — MIFFLIN-ST. JEOR: SCORE: 1605.1

## 2020-09-17 NOTE — PROGRESS NOTES
"SUBJECTIVE:   Yogesh Abreu is a 80 year old male who presents for Preventive Visit.      Patient has been advised of split billing requirements and indicates understanding: Yes   Are you in the first 12 months of your Medicare coverage?  No    Healthy Habits:     In general, how would you rate your overall health?  Very good    Frequency of exercise:  2-3 days/week    Duration of exercise:  45-60 minutes    Do you usually eat at least 4 servings of fruit and vegetables a day, include whole grains    & fiber and avoid regularly eating high fat or \"junk\" foods?  No    Taking medications regularly:  No    Barriers to taking medications:  None    Medication side effects:  None    Home Safety:  No safety concerns identified    Hearing impairment symptoms: wears hearing aids.    In the past 6 months, have you been bothered by leaking of urine?  No    In general, how would you rate your overall mental or emotional health?  Very good      PHQ-2 Total Score: 0    Additional concerns today:  Yes (tremor, chronic persistent cough)    Do you feel safe in your environment? Yes    Have you ever done Advance Care Planning? (For example, a Health Directive, POLST, or a discussion with a medical provider or your loved ones about your wishes): Yes, advance care planning is on file.      Fall risk  Fallen 2 or more times in the past year?: No  Any fall with injury in the past year?: No    Cognitive Screening   1) Repeat 3 items (Leader, Season, Table)    2) Clock draw: NORMAL  3) 3 item recall: Recalls 3 objects  Results: 3 items recalled: COGNITIVE IMPAIRMENT LESS LIKELY    Mini-CogTM Copyright RACHELE Heaton. Licensed by the author for use in [facility]; reprinted with permission (halley@.[email or web address]). All rights reserved.      Do you have sleep apnea, excessive snoring or daytime drowsiness?: yes    Reviewed and updated as needed this visit by clinical staff  Tobacco  Allergies  Meds         Reviewed and updated as needed " this visit by Provider        Social History     Tobacco Use     Smoking status: Former Smoker     Packs/day: 0.50     Years: 5.00     Pack years: 2.50     Types: Cigarettes     Start date:      Last attempt to quit: 1975     Years since quittin.7     Smokeless tobacco: Never Used   Substance Use Topics     Alcohol use: Yes     Comment: 1 drink per day     If you drink alcohol do you typically have >3 drinks per day or >7 drinks per week? No      Changing lisinopril to losartan did not improve cough which is persistent for months now and severe  He only takes advair once daily  He rarely uses his rescue inhaler    Current providers sharing in care for this patient include:   Patient Care Team:  Filipe Goldberg MD as PCP - General (Internal Medicine)  Filipe Goldberg MD as Assigned PCP    The following health maintenance items are reviewed in Epic and correct as of today:  Health Maintenance   Topic Date Due     URINE DRUG SCREEN  1940     ASTHMA ACTION PLAN  1940     ADVANCE CARE PLANNING  1940     ZOSTER IMMUNIZATION (2 of 3) 2011     ASTHMA CONTROL TEST  2020     INFLUENZA VACCINE (1) 2020     FALL RISK ASSESSMENT  10/17/2020     MEDICARE ANNUAL WELLNESS VISIT  2021     DTAP/TDAP/TD IMMUNIZATION (4 - Td) 2023     PHQ-2  Completed     PNEUMOCOCCAL IMMUNIZATION 65+ LOW/MEDIUM RISK  Completed     IPV IMMUNIZATION  Aged Out     MENINGITIS IMMUNIZATION  Aged Out     HEPATITIS B IMMUNIZATION  Aged Out     Patient Active Problem List   Diagnosis     PE (pulmonary embolism)     CAD (coronary artery disease), IMI -  => rescue stent to  RCA     Hypertension     Mixed hyperlipidemia     Polymyalgia rheumatica (H)     ACS (acute coronary syndrome) (H)     Long-term (current) use of anticoagulants [Z79.01]     Antiphospholipid antibody syndrome (H)     Moderate persistent asthma without complication     HL (hearing loss), bilateral     Chronic bilateral low back  pain without sciatica     Long term current use of anticoagulants with INR goal of 2.0-3.0     Past Surgical History:   Procedure Laterality Date     HEART CATH STENT COR W/WO PTCA  10/2015    90% diagonal, 50-60% CFX, 100% prox RCA occlusion - MAL placed in RCA     ORTHOPEDIC SURGERY  2015    right carpal tunnel       Social History     Tobacco Use     Smoking status: Former Smoker     Packs/day: 0.50     Years: 5.00     Pack years: 2.50     Types: Cigarettes     Start date:      Last attempt to quit: 1975     Years since quittin.7     Smokeless tobacco: Never Used   Substance Use Topics     Alcohol use: Yes     Comment: 1 drink per day     Family History   Problem Relation Age of Onset     Hypertension Brother      Hypertension Brother          Current Outpatient Medications   Medication Sig Dispense Refill     albuterol (PROAIR HFA/PROVENTIL HFA/VENTOLIN HFA) 108 (90 Base) MCG/ACT inhaler Inhale 2 puffs into the lungs every 6 hours as needed for shortness of breath / dyspnea or wheezing 1 Inhaler 0     atorvastatin (LIPITOR) 80 MG tablet Take 1 tablet (80 mg) by mouth At Bedtime 90 tablet 2     Calcium Citrate-Vitamin D (CALCIUM CITRATE + D PO) Take 600 mg by mouth 2 times daily       Cholecalciferol (VITAMIN D3 PO) Take 1,000 Units by mouth 2 times daily       fluticasone-salmeterol (ADVAIR DISKUS) 250-50 MCG/DOSE inhaler Inhale 1 puff into the lungs 2 times daily 1 Inhaler 1     ibuprofen (ADVIL/MOTRIN) 200 MG capsule Take 600 mg by mouth daily as needed for fever       lisinopril (ZESTRIL) 5 MG tablet TAKE 1 TABLET(5 MG) BY MOUTH DAILY 90 tablet 3     metoprolol succinate ER (TOPROL-XL) 25 MG 24 hr tablet Take 0.5 tablets (12.5 mg) by mouth daily 90 tablet 3     nitroGLYcerin (NITROSTAT) 0.4 MG sublingual tablet Place 1 tablet (0.4 mg) under the tongue every 5 minutes as needed for chest pain 25 tablet 3     vardenafil (LEVITRA) 20 MG tablet Take 0.5-1 tablets (10-20 mg) by mouth daily as needed  "Never use with nitroglycerin, terazosin or doxazosin. 12 tablet 11     warfarin ANTICOAGULANT (COUMADIN) 5 MG tablet Take 1 1/2 tabs daily or as directed per INR clinic 145 tablet 1     ASPIRIN NOT PRESCRIBED (INTENTIONAL) Please choose reason not prescribed, below 0 each 0     Allergies   Allergen Reactions     Seasonal Allergies      Simvastatin Muscle Pain (Myalgia)      at 20 mg daily.         Review of Systems  Constitutional, HEENT, cardiovascular, pulmonary, gi and gu systems are negative, except as otherwise noted.    OBJECTIVE:   BP (!) 148/88 (BP Location: Right arm, Cuff Size: Adult Regular)   Pulse 61   Temp 98  F (36.7  C) (Temporal)   Ht 1.74 m (5' 8.5\")   Wt 90.3 kg (199 lb)   SpO2 97%   BMI 29.82 kg/m   Estimated body mass index is 29.82 kg/m  as calculated from the following:    Height as of this encounter: 1.74 m (5' 8.5\").    Weight as of this encounter: 90.3 kg (199 lb).  Physical Exam  GENERAL: healthy, alert and no distress  EYES: Eyes grossly normal to inspection, PERRL and conjunctivae and sclerae normal  HENT: we kept the mask on today due to COVID  NECK: no adenopathy, no asymmetry, masses, or scars and thyroid normal to palpation  RESP: lungs clear to auscultation - no rales, rhonchi or wheezes  CV: regular rate and rhythm, normal S1 S2, no S3 or S4, no murmur, click or rub, no peripheral edema and peripheral pulses strong  ABDOMEN: soft, nontender, no hepatosplenomegaly, no masses and bowel sounds normal  RECTAL: normal sphincter tone, no rectal masses, prostate large size, smooth, nontender without nodules or masses  MS: no gross musculoskeletal defects noted, no edema  SKIN: no suspicious lesions or rashes  NEURO: mild stable tremor, normal strength and tone, gait ok  PSYCH: mentation appears normal, affect normal/bright    Labs pending     ASSESSMENT / PLAN:   1. Routine general medical examination at a health care facility      2. Pulmonary embolism, other, unspecified " "chronicity, unspecified whether acute cor pulmonale present (H)  On coumadin    3. 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)  Stable   Switch losartan back to  Lisinopril since losartan did not help cough  - lisinopril (ZESTRIL) 5 MG tablet; TAKE 1 TABLET(5 MG) BY MOUTH DAILY  Dispense: 90 tablet; Refill: 3    4. Antiphospholipid antibody syndrome (H)  On coumadin for this     5. Polymyalgia rheumatica (H)  Stable, off prednisone     6. Cough  Needs chest x-ray to evaluate   Discussed taking advair as directed to help   - XR Chest 2 Views; Future    7. Moderate persistent asthma without complication  See above; otherwise seems well controlled     8. Skin problem  He would like a skin check   - SKIN CARE REFERRAL    9. Essential hypertension  Not as well controlled on losartan as lisinopril, switch back     10. Mixed hyperlipidemia    - Comprehensive metabolic panel  - CBC with platelets  - Lipid panel reflex to direct LDL Fasting    11. Screening for colon cancer    - Fecal colorectal cancer screen FIT; Future    Patient has been advised of split billing requirements and indicates understanding: Yes  COUNSELING:  Reviewed preventive health counseling, as reflected in patient instructions  Special attention given to:       Regular exercise       Healthy diet/nutrition       Immunizations    Flu shot today  ; shingrix recommended            Consider lung cancer screening for ages 55-80 years and 30 pack-year smoking history ; < 30 pack year hx       Colon cancer screening; FIT test today       Estimated body mass index is 29.82 kg/m  as calculated from the following:    Height as of this encounter: 1.74 m (5' 8.5\").    Weight as of this encounter: 90.3 kg (199 lb).    Weight management plan: Discussed healthy diet and exercise guidelines    He reports that he quit smoking about 45 years ago. His smoking use included cigarettes. He started smoking about " 55 years ago. He has a 2.50 pack-year smoking history. He has never used smokeless tobacco.      Appropriate preventive services were discussed with this patient, including applicable screening as appropriate for cardiovascular disease, diabetes, osteopenia/osteoporosis, and glaucoma.  As appropriate for age/gender, discussed screening for colorectal cancer, prostate cancer, breast cancer, and cervical cancer. Checklist reviewing preventive services available has been given to the patient.    Reviewed patients plan of care and provided an AVS. The Basic Care Plan (routine screening as documented in Health Maintenance) for Yogesh meets the Care Plan requirement. This Care Plan has been established and reviewed with the Patient.    Counseling Resources:  ATP IV Guidelines  Pooled Cohorts Equation Calculator  Breast Cancer Risk Calculator  Breast Cancer: Medication to Reduce Risk  FRAX Risk Assessment  ICSI Preventive Guidelines  Dietary Guidelines for Americans, 2010  USDA's MyPlate  ASA Prophylaxis  Lung CA Screening    Filipe Goldberg MD  [facility]    Identified Health Risks:

## 2020-09-17 NOTE — PROGRESS NOTES
ANTICOAGULATION FOLLOW-UP CLINIC VISIT    Patient Name:  Yogesh Abreu  Date:  2020  Contact Type:  Telephone    SUBJECTIVE:  Patient Findings         Clinical Outcomes     Negatives:   Major bleeding event, Thromboembolic event, Anticoagulation-related hospital admission, Anticoagulation-related ED visit, Anticoagulation-related fatality           OBJECTIVE    Recent labs: (last 7 days)     20   INR 2.8*       ASSESSMENT / PLAN  INR assessment THER    Recheck INR In: 2 WEEKS    INR Location Home INR    Billed home INR No      Anticoagulation Summary  As of 2020    INR goal:   2.0-3.0   TTR:   79.0 % (1 y)   INR used for dosin.8 (2020)   Warfarin maintenance plan:   7.5 mg (5 mg x 1.5) every day   Full warfarin instructions:   7.5 mg every day   Weekly warfarin total:   52.5 mg   No change documented:   Tanja Wills RN   Plan last modified:   Tanja Wills RN (2019)   Next INR check:   2020   Target end date:   Indefinite    Indications    PE (pulmonary embolism) [I26.99]  Long term current use of anticoagulants with INR goal of 2.0-3.0 [Z79.01]             Anticoagulation Episode Summary     INR check location:       Preferred lab:       Send INR reminders to:   [facility] HEART INR NURSE    Comments:         Anticoagulation Care Providers     Provider Role Specialty Phone number    Frederic Isaacs MD Referring Cardiology [phone]            See the Encounter Report to view Anticoagulation Flowsheet and Dosing Calendar (Go to Encounters tab in chart review, and find the Anticoagulation Therapy Visit)      20 Home INR 2.8 No change in meds or diet. Denies abnormal bleeding or bruising. Will continue current dosing of 7.5 mg daily with recheck in 2 weeks.   Tanja Wills RN

## 2020-09-17 NOTE — RESULT ENCOUNTER NOTE
The following letter pertains to your most recent diagnostic tests:    Good news! The chest x-ray does not show any dangerous causes for your persistent cough.           Sincerely,    Dr. Goldberg

## 2020-09-17 NOTE — LETTER
September 18, 2020      Yogesh Abreu  [address]   [facility] [address]        Dear ,    The following letter pertains to your most recent diagnostic tests:     -Liver and gallbladder tests are normal for you. (ALT,AST, Alk phos, bilirubin), kidney function is normal for you (Creatinine, GFR), Sodium is normal, Potassium is normal for you, Calcium is normal for you, Glucose (blood sugar) is normal for you.       -Your cholesterol panel looks healthy.     -Your complete blood counts including your hemoglobin returned normal for you.         Bottom line:  These results look healthy and at goal.       Follow up:  Schedule an appointment for a physical examination with fasting blood tests in one year's time, or return sooner if new questions, symptoms or problems arise.     Resulted Orders   Comprehensive metabolic panel   Result Value Ref Range    Sodium 137 133 - 144 mmol/L    Potassium 3.9 3.4 - 5.3 mmol/L    Chloride 105 94 - 109 mmol/L    Carbon Dioxide 25 20 - 32 mmol/L    Anion Gap 7 3 - 14 mmol/L    Glucose 100 (H) 70 - 99 mg/dL    Urea Nitrogen 18 7 - 30 mg/dL    Creatinine 0.84 0.66 - 1.25 mg/dL    GFR Estimate 84 >60 mL/min/[1.73_m2]      Comment:      Non  GFR Calc  Starting 12/18/2018, serum creatinine based estimated GFR (eGFR) will be   calculated using the Chronic Kidney Disease Epidemiology Collaboration   (CKD-EPI) equation.      GFR Estimate If Black >90 >60 mL/min/[1.73_m2]      Comment:       GFR Calc  Starting 12/18/2018, serum creatinine based estimated GFR (eGFR) will be   calculated using the Chronic Kidney Disease Epidemiology Collaboration   (CKD-EPI) equation.      Calcium 9.4 8.5 - 10.1 mg/dL    Bilirubin Total 0.9 0.2 - 1.3 mg/dL    Albumin 4.1 3.4 - 5.0 g/dL    Protein Total 8.2 6.8 - 8.8 g/dL    Alkaline Phosphatase 72 40 - 150 U/L    ALT 32 0 - 70 U/L    AST 33 0 - 45 U/L   CBC with platelets   Result Value Ref Range     WBC 4.7 4.0 - 11.0 10e9/L    RBC Count 5.53 4.4 - 5.9 10e12/L    Hemoglobin 16.9 13.3 - 17.7 g/dL    Hematocrit 49.8 40.0 - 53.0 %    MCV 90 78 - 100 fl    MCH 30.6 26.5 - 33.0 pg    MCHC 33.9 31.5 - 36.5 g/dL    RDW 14.2 10.0 - 15.0 %    Platelet Count 178 150 - 450 10e9/L   Lipid panel reflex to direct LDL Fasting   Result Value Ref Range    Cholesterol 146 <200 mg/dL    Triglycerides 67 <150 mg/dL    HDL Cholesterol 44 >39 mg/dL    LDL Cholesterol Calculated 89 <100 mg/dL      Comment:      Desirable:       <100 mg/dl    Non HDL Cholesterol 102 <130 mg/dL       If you have any questions or concerns, please call the clinic at the number listed above.       Sincerely,     Dr. Goldberg

## 2020-09-17 NOTE — LETTER
September 18, 2020      Yogesh Abreu  [address]   [facility] [address]        Dear ,    The following letter pertains to your most recent diagnostic tests:     Good news! The chest x-ray does not show any dangerous causes for your persistent cough.       Resulted Orders   XR Chest 2 Views    Narrative    CHEST TWO VIEWS 9/17/2020 11:47 AM     HISTORY: Cough.    COMPARISON: October 7, 2019       Impression    IMPRESSION: There are no acute infiltrates. The cardiac silhouette is  not enlarged. Pulmonary vasculature is unremarkable.    BRET EMMANUEL MD       If you have any questions or concerns, please call the clinic at the number listed above.       Sincerely,     Dr. Goldberg/sari

## 2020-09-18 LAB
ALBUMIN SERPL-MCNC: 4.1 G/DL (ref 3.4–5)
ALP SERPL-CCNC: 72 U/L (ref 40–150)
ALT SERPL W P-5'-P-CCNC: 32 U/L (ref 0–70)
ANION GAP SERPL CALCULATED.3IONS-SCNC: 7 MMOL/L (ref 3–14)
AST SERPL W P-5'-P-CCNC: 33 U/L (ref 0–45)
BILIRUB SERPL-MCNC: 0.9 MG/DL (ref 0.2–1.3)
BUN SERPL-MCNC: 18 MG/DL (ref 7–30)
CALCIUM SERPL-MCNC: 9.4 MG/DL (ref 8.5–10.1)
CHLORIDE SERPL-SCNC: 105 MMOL/L (ref 94–109)
CHOLEST SERPL-MCNC: 146 MG/DL
CO2 SERPL-SCNC: 25 MMOL/L (ref 20–32)
CREAT SERPL-MCNC: 0.84 MG/DL (ref 0.66–1.25)
GFR SERPL CREATININE-BSD FRML MDRD: 84 ML/MIN/{1.73_M2}
GLUCOSE SERPL-MCNC: 100 MG/DL (ref 70–99)
HDLC SERPL-MCNC: 44 MG/DL
LDLC SERPL CALC-MCNC: 89 MG/DL
NONHDLC SERPL-MCNC: 102 MG/DL
POTASSIUM SERPL-SCNC: 3.9 MMOL/L (ref 3.4–5.3)
PROT SERPL-MCNC: 8.2 G/DL (ref 6.8–8.8)
SODIUM SERPL-SCNC: 137 MMOL/L (ref 133–144)
TRIGL SERPL-MCNC: 67 MG/DL

## 2020-09-18 ASSESSMENT — ASTHMA QUESTIONNAIRES: ACT_TOTALSCORE: 22

## 2020-09-18 NOTE — RESULT ENCOUNTER NOTE
The following letter pertains to your most recent diagnostic tests:    -Liver and gallbladder tests are normal for you. (ALT,AST, Alk phos, bilirubin), kidney function is normal for you (Creatinine, GFR), Sodium is normal, Potassium is normal for you, Calcium is normal for you, Glucose (blood sugar) is normal for you.      -Your cholesterol panel looks healthy.     -Your complete blood counts including your hemoglobin returned normal for you.         Bottom line:  These results look healthy and at goal.      Follow up:  Schedule an appointment for a physical examination with fasting blood tests in one year's time, or return sooner if new questions, symptoms or problems arise.       Sincerely,    Dr. Goldberg

## 2020-09-30 ENCOUNTER — ANTICOAGULATION THERAPY VISIT (OUTPATIENT)
Dept: CARDIOLOGY | Facility: CLINIC | Age: 80
End: 2020-09-30
Payer: COMMERCIAL

## 2020-09-30 ENCOUNTER — TRANSFERRED RECORDS (OUTPATIENT)
Dept: HEALTH INFORMATION MANAGEMENT | Facility: CLINIC | Age: 80
End: 2020-09-30

## 2020-09-30 DIAGNOSIS — Z79.01 LONG TERM CURRENT USE OF ANTICOAGULANTS WITH INR GOAL OF 2.0-3.0: ICD-10-CM

## 2020-09-30 DIAGNOSIS — I26.99 PULMONARY EMBOLISM (H): ICD-10-CM

## 2020-09-30 LAB — INR PPP: 1.6 (ref 0.9–1.1)

## 2020-09-30 PROCEDURE — 99207 ZZC NO CHARGE NURSE ONLY: CPT

## 2020-09-30 NOTE — PROGRESS NOTES
ANTICOAGULATION FOLLOW-UP CLINIC VISIT    Patient Name:  Yogesh Abreu  Date:  2020  Contact Type:  Telephone    SUBJECTIVE:  Patient Findings     Positives:   Change in diet/appetite (Started low-carb diet, added protein shake with 30mcg Vit K+ 1-3x/day; also eats hari salad 4-5x/wk but wife has been gone so hasn't been eating recently)        Clinical Outcomes     Negatives:   Major bleeding event, Thromboembolic event, Anticoagulation-related hospital admission, Anticoagulation-related ED visit, Anticoagulation-related fatality           OBJECTIVE    Recent labs: (last 7 days)     20   INR 1.6*       ASSESSMENT / PLAN  INR assessment SUB    Recheck INR In: 2 DAYS    INR Location Outside lab      Anticoagulation Summary  As of 2020    INR goal:   2.0-3.0   TTR:   77.8 % (1 y)   INR used for dosin.6! (2020)   Warfarin maintenance plan:   10 mg (5 mg x 2) every Wed; 7.5 mg (5 mg x 1.5) all other days   Full warfarin instructions:   : 10 mg; Otherwise 10 mg every Wed; 7.5 mg all other days   Weekly warfarin total:   55 mg   Plan last modified:   Neli Linares, RN (2020)   Next INR check:   10/2/2020   Target end date:   Indefinite    Indications    PE (pulmonary embolism) [I26.99]  Long term current use of anticoagulants with INR goal of 2.0-3.0 [Z79.01]             Anticoagulation Episode Summary     INR check location:       Preferred lab:       Send INR reminders to:   [facility] HEART INR NURSE    Comments:         Anticoagulation Care Providers     Provider Role Specialty Phone number    Frederic Isaacs MD Referring Cardiology [phone]            See the Encounter Report to view Anticoagulation Flowsheet and Dosing Calendar (Go to Encounters tab in chart review, and find the Anticoagulation Therapy Visit)    Home INR 1.6 today. Spoke to patient, no abnormal bleeding/bruising or signs of clotting. No changes to meds and he does not believe he missed any  doses of warfarin. He states he started a low carbohydrate diet and is also drinking protein shakes 1-3x/day. Pt reports each bottle contains 30mcg of VitK. He usually eats hari salads 4-5x/wk but hasn't been doing so lately as his wife is out of town. He does anticipate resuming this when she returns. Will increase patient's maintenance dosing to 10mg Weds and 7.5mg all other days. Dosage adjustment per protocol. Pt will avoid any more shakes today. Reviewed signs/symptoms of a stroke/clotting and to seek medical attention for these. Pt verbalized understanding of plan.     Spoke to Dr Isaacs about need for bridging (has done so in the past for hip surgery), states is not indicated at this time but would like to recheck INR in 2days. Left message for patient updating him that bridging is not required but to recheck on Friday 10/2. Requested callback to confirm.     Addendum 1550: Pt returned call, verbalized understanding and will recheck INR Friday.     Neli Linares RN

## 2020-10-02 ENCOUNTER — TRANSFERRED RECORDS (OUTPATIENT)
Dept: HEALTH INFORMATION MANAGEMENT | Facility: CLINIC | Age: 80
End: 2020-10-02

## 2020-10-02 ENCOUNTER — ANTICOAGULATION THERAPY VISIT (OUTPATIENT)
Dept: CARDIOLOGY | Facility: CLINIC | Age: 80
End: 2020-10-02

## 2020-10-02 DIAGNOSIS — Z79.01 LONG TERM CURRENT USE OF ANTICOAGULANTS WITH INR GOAL OF 2.0-3.0: ICD-10-CM

## 2020-10-02 DIAGNOSIS — I26.99 PULMONARY EMBOLISM (H): ICD-10-CM

## 2020-10-02 LAB — INR PPP: 1.8 (ref 0.9–1.1)

## 2020-10-02 NOTE — PROGRESS NOTES
ANTICOAGULATION FOLLOW-UP CLINIC VISIT    Patient Name:  Yogesh Abreu  Date:  10/2/2020  Contact Type:  Telephone    SUBJECTIVE:         OBJECTIVE    Recent labs: (last 7 days)     10/02/20   INR 1.8*       ASSESSMENT / PLAN  INR assessment SUB    Recheck INR In: 6 DAYS    INR Location Home INR      Anticoagulation Summary  As of 10/2/2020    INR goal:  2.0-3.0   TTR:  77.2 % (1 y)   INR used for dosin.8 (10/2/2020)   Warfarin maintenance plan:  10 mg (5 mg x 2) every Tue, Fri; 7.5 mg (5 mg x 1.5) all other days   Full warfarin instructions:  10 mg every Tue, Fri; 7.5 mg all other days   Weekly warfarin total:  57.5 mg   Plan last modified:  Tanja Wills RN (10/2/2020)   Next INR check:  10/8/2020   Target end date:  Indefinite    Indications    PE (pulmonary embolism) [I26.99]  Long term current use of anticoagulants with INR goal of 2.0-3.0 [Z79.01]             Anticoagulation Episode Summary     INR check location:      Preferred lab:      Send INR reminders to:  [facility] HEART INR NURSE    Comments:        Anticoagulation Care Providers     Provider Role Specialty Phone number    Frederic Isaacs MD Referring Cardiology [phone]            See the Encounter Report to view Anticoagulation Flowsheet and Dosing Calendar (Go to Encounters tab in chart review, and find the Anticoagulation Therapy Visit)    Home INR 1.8 INR rising with increase in dosing 2 days ago (he had changed his diet - drinking protein shakes with 30 mcg vit K in each). Left message asking pt to call back to review the amount of protein shakes he is drinking and plans to continue to drink. Will ask if any change in other meds and if he has had any bleeding or clotting symptoms.  2 pm Spoke with pt. He thinks that he missed a warfarin dose on Tuesday. Still drinking 1 1/2 protein shakes a day (30 mcg vit K in a serving of shake). He is not eating extra greens at this time. For now, he plans to continue to drink the  protein shakes so will increase maintenance dose to 10 mg TuF and 7.5 mg all other days with recheck in 6 days. Dosage adjustment made based on physician directed care plan.    Tanja Wills RN

## 2020-10-08 ENCOUNTER — TRANSFERRED RECORDS (OUTPATIENT)
Dept: HEALTH INFORMATION MANAGEMENT | Facility: CLINIC | Age: 80
End: 2020-10-08

## 2020-10-08 ENCOUNTER — ANTICOAGULATION THERAPY VISIT (OUTPATIENT)
Dept: CARDIOLOGY | Facility: CLINIC | Age: 80
End: 2020-10-08
Payer: COMMERCIAL

## 2020-10-08 DIAGNOSIS — Z79.01 LONG TERM CURRENT USE OF ANTICOAGULANTS WITH INR GOAL OF 2.0-3.0: ICD-10-CM

## 2020-10-08 DIAGNOSIS — Z12.11 SCREENING FOR COLON CANCER: ICD-10-CM

## 2020-10-08 DIAGNOSIS — I26.99 PULMONARY EMBOLISM (H): ICD-10-CM

## 2020-10-08 LAB — INR PPP: 2.2 (ref 0.9–1.1)

## 2020-10-08 PROCEDURE — 99207 PR NO CHARGE NURSE ONLY: CPT

## 2020-10-08 PROCEDURE — 82274 ASSAY TEST FOR BLOOD FECAL: CPT | Performed by: INTERNAL MEDICINE

## 2020-10-08 NOTE — PROGRESS NOTES
ANTICOAGULATION FOLLOW-UP CLINIC VISIT    Patient Name:  Yogesh Abreu  Date:  10/8/2020  Contact Type:  Telephone    SUBJECTIVE:         OBJECTIVE    Recent labs: (last 7 days)     10/08/20   INR 2.2*       ASSESSMENT / PLAN  INR assessment THER    Recheck INR In: 2 WEEKS    INR Location Home INR    Billed home INR No      Anticoagulation Summary  As of 10/8/2020    INR goal:  2.0-3.0   TTR:  77.6 % (1 y)   INR used for dosin.2 (10/8/2020)   Warfarin maintenance plan:  10 mg (5 mg x 2) every Tue, Fri; 7.5 mg (5 mg x 1.5) all other days   Full warfarin instructions:  10 mg every Tue, Fri; 7.5 mg all other days   Weekly warfarin total:  57.5 mg   No change documented:  Tanja Wills RN   Plan last modified:  Tanja Wills RN (10/2/2020)   Next INR check:  10/22/2020   Target end date:  Indefinite    Indications    PE (pulmonary embolism) [I26.99]  Long term current use of anticoagulants with INR goal of 2.0-3.0 [Z79.01]             Anticoagulation Episode Summary     INR check location:      Preferred lab:      Send INR reminders to:  [facility] HEART INR NURSE    Comments:        Anticoagulation Care Providers     Provider Role Specialty Phone number    Frederic Isaacs MD Referring Cardiology [phone]            See the Encounter Report to view Anticoagulation Flowsheet and Dosing Calendar (Go to Encounters tab in chart review, and find the Anticoagulation Therapy Visit)    Home INR 2.2 INR staying in range. He is drinking protein shakes about every other day along with eating veggies/salad daily. Denies abnormal bleeding or bruising. Will continue current dosing of 10 mg TuF and 7.5 mg all other days and current diet with recheck in 10-14 days.     Tanja Wills RN

## 2020-10-10 LAB — HEMOCCULT STL QL IA: NEGATIVE

## 2020-10-22 ENCOUNTER — ANTICOAGULATION THERAPY VISIT (OUTPATIENT)
Dept: CARDIOLOGY | Facility: CLINIC | Age: 80
End: 2020-10-22
Payer: COMMERCIAL

## 2020-10-22 ENCOUNTER — TRANSFERRED RECORDS (OUTPATIENT)
Dept: HEALTH INFORMATION MANAGEMENT | Facility: CLINIC | Age: 80
End: 2020-10-22

## 2020-10-22 DIAGNOSIS — Z79.01 LONG TERM CURRENT USE OF ANTICOAGULANTS WITH INR GOAL OF 2.0-3.0: ICD-10-CM

## 2020-10-22 DIAGNOSIS — I26.99 PULMONARY EMBOLISM (H): ICD-10-CM

## 2020-10-22 LAB — INR PPP: 2.7 (ref 0.9–1.1)

## 2020-10-22 PROCEDURE — 99207 PR NO CHARGE NURSE ONLY: CPT

## 2020-10-22 NOTE — PROGRESS NOTES
ANTICOAGULATION FOLLOW-UP CLINIC VISIT    Patient Name:  Yogesh Abreu  Date:  10/22/2020  Contact Type:  [employer]    SUBJECTIVE:         OBJECTIVE    Recent labs: (last 7 days)     10/22/20   INR 2.7*       ASSESSMENT / PLAN  INR assessment THER    Recheck INR In: 2 WEEKS    INR Location Home INR    Billed home INR No      Anticoagulation Summary  As of 10/22/2020    INR goal:  2.0-3.0   TTR:  79.9 % (1 y)   INR used for dosin.7 (10/22/2020)   Warfarin maintenance plan:  10 mg (5 mg x 2) every Tue, Fri; 7.5 mg (5 mg x 1.5) all other days   Full warfarin instructions:  10 mg every Tue, Fri; 7.5 mg all other days   Weekly warfarin total:  57.5 mg   No change documented:  Tanja Wills RN   Plan last modified:  Tanja Wills RN (10/2/2020)   Next INR check:  2020   Target end date:  Indefinite    Indications    PE (pulmonary embolism) [I26.99]  Long term current use of anticoagulants with INR goal of 2.0-3.0 [Z79.01]             Anticoagulation Episode Summary     INR check location:      Preferred lab:      Send INR reminders to:  [facility] HEART INR NURSE    Comments:        Anticoagulation Care Providers     Provider Role Specialty Phone number    Frederic Isaacs MD Referring Cardiology [phone]            See the Encounter Report to view Anticoagulation Flowsheet and Dosing Calendar (Go to Encounters tab in chart review, and find the Anticoagulation Therapy Visit)    Home INR 2.7 Will continue current dosing of 10 mg TuF and 7.5 mg all other days with recheck in 2 weeks. Will call pt after the next INR check.    Tanja Wills RN

## 2020-11-09 ENCOUNTER — OFFICE VISIT (OUTPATIENT)
Dept: FAMILY MEDICINE | Facility: CLINIC | Age: 80
End: 2020-11-09
Payer: COMMERCIAL

## 2020-11-09 ENCOUNTER — TELEPHONE (OUTPATIENT)
Dept: CARDIOLOGY | Facility: CLINIC | Age: 80
End: 2020-11-09

## 2020-11-09 VITALS — DIASTOLIC BLOOD PRESSURE: 64 MMHG | SYSTOLIC BLOOD PRESSURE: 120 MMHG

## 2020-11-09 DIAGNOSIS — L82.1 SEBORRHEIC KERATOSIS: ICD-10-CM

## 2020-11-09 DIAGNOSIS — L91.8 INFLAMED SKIN TAG: ICD-10-CM

## 2020-11-09 DIAGNOSIS — L57.0 ACTINIC KERATOSIS: Primary | ICD-10-CM

## 2020-11-09 PROCEDURE — 17000 DESTRUCT PREMALG LESION: CPT | Mod: 59 | Performed by: FAMILY MEDICINE

## 2020-11-09 PROCEDURE — 11200 RMVL SKIN TAGS UP TO&INC 15: CPT | Performed by: FAMILY MEDICINE

## 2020-11-09 PROCEDURE — 99212 OFFICE O/P EST SF 10 MIN: CPT | Mod: 25 | Performed by: FAMILY MEDICINE

## 2020-11-09 NOTE — PROGRESS NOTES
[facility] - PRIMARY CARE SKIN    CC: Lesion(s)  SUBJECTIVE:   Yogesh Abreu is a(n) 80 year old male who presents to clinic today     Issue One: skin tag on neck-.  Issue two : right cheek - irritated spot on the mid cheek. No tenderness or bleeding.    Personal Medical History  Skin cancer: NO  Eczema Psoriasis Lupus   NO NO NO   Other:       Family Medical History  Skin cancer: NO  Eczema Psoriasis Lupus   NO NO NO   Other:     Occupation: pharmcist (outdoor).    Refer to electronic medical record (EMR) for past medical history and medications.      ROS: 14 point review of systems was negative except the symptoms listed above in the HPI.        OBJECTIVE:   GENERAL: healthy, alert and no distress.  HEENT: PERRL. Conjunctiva, sclera clear.  SKIN: [place] Skin Type - III.  Face, back,  examined. The dermatoscope was used to help evaluate pigmented lesions.  Skin Pertinent Findings:  Left upper cheek, left base of neck-           Name : Lesion Removal  Indication : Irritated/inflamed benign irritated skin tags  Location(s) : [facility]  Completed by : Barbara Wylie MD  Note : Prior to treatment, discussed the risk of pain, blistering, infection, scarring, hypopigmentation, hyperpigmentation, and recurrence or need for retreatment. Benefits of treatment and alternative treatments were also discussed.     The lesion(s) were removed or treated with liquid nitrogen via cryactweezers after alcohol prep    Patient tolerated the procedure well and left in good condition.  Tissue sample sent in : No.  Total number of lesions treated : 2.                 Scattered coarse textured brown lesions consistent with seborrheic keratosis                     Right mid cheek -  5 mm                         Name: Liquid nitrogen Cry-Ac cryotherapy  Indication: Pre-malignant actinic keratosis  Completed by: Barbara Wylie MD.  Note : Discussed natural history of lesion and treatment options. Prior to treatment, we discussed  inflammation, tenderness post-procedure, the healing process, and the risks of pain, infection, scarring, blistering, and hypo-/hyperpigmentation after healing. Explained that these lesions may grow back and may need additional treatment or re-evaluation. The patient understood and verbally agreed to proceed with cryotherapy.    Each actinic keratosis was treated using liquid nitrogen Cry-Ac with a two five second bursts with a pause to allow for the area to thaw.    The patient tolerated the procedure well and left in good condition. If this lesion should persist or recur, then it needs to be re-evaluated.  Total number of lesions treated: 1      ASSESSMENT:     Encounter Diagnoses   Name Primary?     Actinic keratosis Yes     Inflamed skin tag      Seborrheic keratosis          PLAN:   Patient Instructions   ACTINIC KERATOSES POST-TREATMENT CARE INSTRUCTIONS  Actinic keratoses are benign, scaly or gritty lesions that appear in sun-exposed areas and may progress to skin cancers. For this reason, it is important to treat them before they become cancerous. Liquid nitrogen is the most commonly used and most effective treatment for actinic keratoses; it is mildly uncomfortable when applied to the skin, but the discomfort rapidly subsides.    Post-Treatment:  You may experience burning and/or stinging immediately following the procedure. The discomfort from the procedure may persist over the next 12-24 hours. The area treated will look pinker and slightly swollen before the healing process begins. You may also notice redness, swelling, tenderness, weeping and crusts or scabs. Healing time is approximately 10-14 days.    Blister - You may or may notice blistering from the freezing. If you develop an uncomfortable blister from today's treatment, you may gently puncture this with a needle that has been cleaned with alcohol. However, do not remove the protective skin layer of the blister.    Scab - After a few days, you may  "notice scaliness or scab formation. Do not pick at the scabs because this may cause slower healing and a permanent scar.    The skin may appear temporarily darker at the treatment site, but this usually fades over a period of months, provided that the area is protected from the sun.    Care of the areas treated:    Wash the area with a mild cleanser.    Gently pat dry.    Do not rub.     Keep protected from the sun during the healing process and for a full year following treatment as the skin continues to remodel during this time.    Apply Vaseline or Aquaphor ointment sparingly to the site for the first 7 days after treatment.    Do not use Neosporin, as many people eventually develop a medication allergy, that can easily be confused with an infection, to Neomycin.    Return if:  There should not be any residual scaling. If there is any concern that the lesion has persisted after 4-6 weeks, make an appointment for a re-check. Healing time does vary depending on your individual healing process and the area of the body treated. Most patients will be healed in one month.    Signs of Infection:  Thankfully this is rare. However if you notice persistent colored drainage, increasing pain, fever or other signs of infection, please call us at: [phone]      SUN PROTECTION INSTRUCTIONS  Sun damage can lead to skin cancer and premature aging of the skin.      The best way to protect from sun damage to your skin is to avoid the sun during peak hours (10 am - 2 pm) even on overcast days.    Never use tanning beds. Tanning beds are associated with much higher risks of skin cancer.    All tanning damages the skin. Aim for ivory skin year round and you will have less trouble with your skin in years to come. There is no merit in getting \"a base tan\" before a warm weather vacation, as any tanning indicates your body's response to sun damage.    Stop smoking. Smokers have higher rates of skin cancer and also have premature " "skin wrinkling.    Use UPF sun-protective clothing, which while more expensive initially provides longer lasting coverage without having to worry about remembering to re-apply.  1. Wear a wide-brimmed hat and sunglasses.   2. Wear sun-protective clothing.  [employer] and other [employer] make sun protective clothing that are stylish, comfortable and cool.   [employer] and other [employer] make UV arm sleeves suitable for golfing, gardening and other activities.    Sunscreen instructions:  1. Use sunscreens with Zinc Oxide, Titanium Dioxide or Avobenzone to protect from UVA rays.  2. Use SPF 30-50+ to protect from UVB rays.  3. Re-apply every 2 hours even if water resistant.  4. Apply on your face every day even when cloudy and even in the winter. UVA \"aging rays\" penetrate window glass and are just as strong in the winter as in the summer.    FYI  You should use about 3 tablespoons of sunscreen to protect your whole body. Thus a typical eight ounce bottle of sunscreen should last 4 applications. Remember, that the SPF rating is compromised if you don t apply enough. Most people only apply 1/2 - 1/3 of the amount they need. Also don t forget areas such as your ears, feet, upper back and harder to reach places. Keep in mind that these amounts should be increased for larger body sizes.    Sunscreens with titanium dioxide and/or zinc oxide in the active ingredients are physical blockers as opposed to chemical blockers. Chemical-free sunscreens should not irritate the skin.    Spray-on sunscreens may be used for touch-up application only, not as a base layer. Also, use with caution around small children due to inhalation risk.    SPF means sun protection factor, which is just the degree to which the sunscreen can protect against UVB rays. There is no rating system for UVA rays. SPF is calculated as the time skin will burn when sunscreen is applied vs. skin without sunscreen.    Water resistant sunscreens should " be re-applied every 1-2 hours.    Product Recommendations:    Consider use of sunscreen sticks with Zinc Oxide and Titanium Dioxide active ingredients such as Neutrogena Pure&Free Baby Sunscreen Stick.    Good examples include: Blue Lizard, EltaMD, Solbar    Good daily moisturizers with SPF: Vanicream, CeraVe.    For sensitive skin, consider : SkinMedica Essential Defense Mineral Shield Broad Spectrum SPF 35    Men: consider use of Neutrogena Triple Protect Facial Lotion    Avoid retinyl palmitate products.  Avoid combination products that include both sunscreen and insect repellant, as sunscreen should be applied every 2 hours, but insect repellant should not be applied as frequently.    For more information:  https://www.skincancer.org/prevention/sun-protection/sunscreen/sunscreens-safe-and-effective          TT: 20 minutes.

## 2020-11-09 NOTE — LETTER
11/9/2020         RE: Yogesh Abreu  [address]  [facility] [place] [facility] [id number]        Dear Colleague,    Thank you for referring your patient, Yogesh Abreu, to the [facility]. Please see a copy of my visit note below.    [facility] - PRIMARY CARE SKIN    CC: Lesion(s)  SUBJECTIVE:   Yogesh Abreu is a(n) 80 year old male who presents to clinic today     Issue One: skin tag on neck-.  Issue two : right cheek - irritated spot on the mid cheek. No tenderness or bleeding.    Personal Medical History  Skin cancer: NO  Eczema Psoriasis Lupus   NO NO NO   Other:       Family Medical History  Skin cancer: NO  Eczema Psoriasis Lupus   NO NO NO   Other:     Occupation: pharmcist (outdoor).    Refer to electronic medical record (EMR) for past medical history and medications.      ROS: 14 point review of systems was negative except the symptoms listed above in the HPI.        OBJECTIVE:   GENERAL: healthy, alert and no distress.  HEENT: PERRL. Conjunctiva, sclera clear.  SKIN: [place] Skin Type - III.  Face, back,  examined. The dermatoscope was used to help evaluate pigmented lesions.  Skin Pertinent Findings:  Left upper cheek, left base of neck-           Name : Lesion Removal  Indication : Irritated/inflamed benign irritated skin tags  Location(s) : [facility]  Completed by : Barbara Wylie MD  Note : Prior to treatment, discussed the risk of pain, blistering, infection, scarring, hypopigmentation, hyperpigmentation, and recurrence or need for retreatment. Benefits of treatment and alternative treatments were also discussed.     The lesion(s) were removed or treated with liquid nitrogen via cryactweezers after alcohol prep    Patient tolerated the procedure well and left in good condition.  Tissue sample sent in : No.  Total number of lesions treated : 2.                 Scattered coarse textured brown lesions consistent with seborrheic keratosis                      Right mid cheek -  5 mm                         Name: Liquid nitrogen Cry-Ac cryotherapy  Indication: Pre-malignant actinic keratosis  Completed by: Barbara Wylie MD.  Note : Discussed natural history of lesion and treatment options. Prior to treatment, we discussed inflammation, tenderness post-procedure, the healing process, and the risks of pain, infection, scarring, blistering, and hypo-/hyperpigmentation after healing. Explained that these lesions may grow back and may need additional treatment or re-evaluation. The patient understood and verbally agreed to proceed with cryotherapy.    Each actinic keratosis was treated using liquid nitrogen Cry-Ac with a two five second bursts with a pause to allow for the area to thaw.    The patient tolerated the procedure well and left in good condition. If this lesion should persist or recur, then it needs to be re-evaluated.  Total number of lesions treated: 1      ASSESSMENT:     Encounter Diagnoses   Name Primary?     Actinic keratosis Yes     Inflamed skin tag      Seborrheic keratosis          PLAN:   Patient Instructions   ACTINIC KERATOSES POST-TREATMENT CARE INSTRUCTIONS  Actinic keratoses are benign, scaly or gritty lesions that appear in sun-exposed areas and may progress to skin cancers. For this reason, it is important to treat them before they become cancerous. Liquid nitrogen is the most commonly used and most effective treatment for actinic keratoses; it is mildly uncomfortable when applied to the skin, but the discomfort rapidly subsides.    Post-Treatment:  You may experience burning and/or stinging immediately following the procedure. The discomfort from the procedure may persist over the next 12-24 hours. The area treated will look pinker and slightly swollen before the healing process begins. You may also notice redness, swelling, tenderness, weeping and crusts or scabs. Healing time is approximately 10-14 days.    Blister - You may or may notice  blistering from the freezing. If you develop an uncomfortable blister from today's treatment, you may gently puncture this with a needle that has been cleaned with alcohol. However, do not remove the protective skin layer of the blister.    Scab - After a few days, you may notice scaliness or scab formation. Do not pick at the scabs because this may cause slower healing and a permanent scar.    The skin may appear temporarily darker at the treatment site, but this usually fades over a period of months, provided that the area is protected from the sun.    Care of the areas treated:    Wash the area with a mild cleanser.    Gently pat dry.    Do not rub.     Keep protected from the sun during the healing process and for a full year following treatment as the skin continues to remodel during this time.    Apply Vaseline or Aquaphor ointment sparingly to the site for the first 7 days after treatment.    Do not use Neosporin, as many people eventually develop a medication allergy, that can easily be confused with an infection, to Neomycin.    Return if:  There should not be any residual scaling. If there is any concern that the lesion has persisted after 4-6 weeks, make an appointment for a re-check. Healing time does vary depending on your individual healing process and the area of the body treated. Most patients will be healed in one month.    Signs of Infection:  Thankfully this is rare. However if you notice persistent colored drainage, increasing pain, fever or other signs of infection, please call us at: [phone]      SUN PROTECTION INSTRUCTIONS  Sun damage can lead to skin cancer and premature aging of the skin.      The best way to protect from sun damage to your skin is to avoid the sun during peak hours (10 am - 2 pm) even on overcast days.    Never use tanning beds. Tanning beds are associated with much higher risks of skin cancer.    All tanning damages the skin. Aim for ivory skin year round and you  "will have less trouble with your skin in years to come. There is no merit in getting \"a base tan\" before a warm weather vacation, as any tanning indicates your body's response to sun damage.    Stop smoking. Smokers have higher rates of skin cancer and also have premature skin wrinkling.    Use UPF sun-protective clothing, which while more expensive initially provides longer lasting coverage without having to worry about remembering to re-apply.  1. Wear a wide-brimmed hat and sunglasses.   2. Wear sun-protective clothing.  [employer] and other [employer] make sun protective clothing that are stylish, comfortable and cool.   [employer] and other [employer] make UV arm sleeves suitable for golfing, gardening and other activities.    Sunscreen instructions:  1. Use sunscreens with Zinc Oxide, Titanium Dioxide or Avobenzone to protect from UVA rays.  2. Use SPF 30-50+ to protect from UVB rays.  3. Re-apply every 2 hours even if water resistant.  4. Apply on your face every day even when cloudy and even in the winter. UVA \"aging rays\" penetrate window glass and are just as strong in the winter as in the summer.    FYI  You should use about 3 tablespoons of sunscreen to protect your whole body. Thus a typical eight ounce bottle of sunscreen should last 4 applications. Remember, that the SPF rating is compromised if you don t apply enough. Most people only apply 1/2 - 1/3 of the amount they need. Also don t forget areas such as your ears, feet, upper back and harder to reach places. Keep in mind that these amounts should be increased for larger body sizes.    Sunscreens with titanium dioxide and/or zinc oxide in the active ingredients are physical blockers as opposed to chemical blockers. Chemical-free sunscreens should not irritate the skin.    Spray-on sunscreens may be used for touch-up application only, not as a base layer. Also, use with caution around small children due to inhalation risk.    SPF means sun " protection factor, which is just the degree to which the sunscreen can protect against UVB rays. There is no rating system for UVA rays. SPF is calculated as the time skin will burn when sunscreen is applied vs. skin without sunscreen.    Water resistant sunscreens should be re-applied every 1-2 hours.    Product Recommendations:    Consider use of sunscreen sticks with Zinc Oxide and Titanium Dioxide active ingredients such as Neutrogena Pure&Free Baby Sunscreen Stick.    Good examples include: Blue Lizard, EltaMD, Solbar    Good daily moisturizers with SPF: Vanicream, CeraVe.    For sensitive skin, consider : SkinMedica Essential Defense Mineral Shield Broad Spectrum SPF 35    Men: consider use of Neutrogena Triple Protect Facial Lotion    Avoid retinyl palmitate products.  Avoid combination products that include both sunscreen and insect repellant, as sunscreen should be applied every 2 hours, but insect repellant should not be applied as frequently.    For more information:  https://www.skincancer.org/prevention/sun-protection/sunscreen/sunscreens-safe-and-effective          TT: 20 minutes.            Again, thank you for allowing me to participate in the care of your patient.        Sincerely,        Eunice Wylie MD

## 2020-11-09 NOTE — TELEPHONE ENCOUNTER
Pt calling to report that he is out of INR test strips and is waiting for his supplier to mail them to him. INR was in range on 10/22 so it is OK to postpone the home INR for another 1-2 weeks if necessary. Rickey

## 2020-11-09 NOTE — PATIENT INSTRUCTIONS
ACTINIC KERATOSES POST-TREATMENT CARE INSTRUCTIONS  Actinic keratoses are benign, scaly or gritty lesions that appear in sun-exposed areas and may progress to skin cancers. For this reason, it is important to treat them before they become cancerous. Liquid nitrogen is the most commonly used and most effective treatment for actinic keratoses; it is mildly uncomfortable when applied to the skin, but the discomfort rapidly subsides.    Post-Treatment:  You may experience burning and/or stinging immediately following the procedure. The discomfort from the procedure may persist over the next 12-24 hours. The area treated will look pinker and slightly swollen before the healing process begins. You may also notice redness, swelling, tenderness, weeping and crusts or scabs. Healing time is approximately 10-14 days.    Blister - You may or may notice blistering from the freezing. If you develop an uncomfortable blister from today's treatment, you may gently puncture this with a needle that has been cleaned with alcohol. However, do not remove the protective skin layer of the blister.    Scab - After a few days, you may notice scaliness or scab formation. Do not pick at the scabs because this may cause slower healing and a permanent scar.    The skin may appear temporarily darker at the treatment site, but this usually fades over a period of months, provided that the area is protected from the sun.    Care of the areas treated:    Wash the area with a mild cleanser.    Gently pat dry.    Do not rub.     Keep protected from the sun during the healing process and for a full year following treatment as the skin continues to remodel during this time.    Apply Vaseline or Aquaphor ointment sparingly to the site for the first 7 days after treatment.    Do not use Neosporin, as many people eventually develop a medication allergy, that can easily be confused with an infection, to Neomycin.    Return if:  There should not be any  "residual scaling. If there is any concern that the lesion has persisted after 4-6 weeks, make an appointment for a re-check. Healing time does vary depending on your individual healing process and the area of the body treated. Most patients will be healed in one month.    Signs of Infection:  Thankfully this is rare. However if you notice persistent colored drainage, increasing pain, fever or other signs of infection, please call us at: [phone]      SUN PROTECTION INSTRUCTIONS  Sun damage can lead to skin cancer and premature aging of the skin.      The best way to protect from sun damage to your skin is to avoid the sun during peak hours (10 am - 2 pm) even on overcast days.    Never use tanning beds. Tanning beds are associated with much higher risks of skin cancer.    All tanning damages the skin. Aim for ivory skin year round and you will have less trouble with your skin in years to come. There is no merit in getting \"a base tan\" before a warm weather vacation, as any tanning indicates your body's response to sun damage.    Stop smoking. Smokers have higher rates of skin cancer and also have premature skin wrinkling.    Use UPF sun-protective clothing, which while more expensive initially provides longer lasting coverage without having to worry about remembering to re-apply.  1. Wear a wide-brimmed hat and sunglasses.   2. Wear sun-protective clothing.  Umba[employer] and other [employer] make sun protective clothing that are stylish, comfortable and cool.   TapBlaze and other [employer] make UV arm sleeves suitable for golfing, gardening and other activities.    Sunscreen instructions:  1. Use sunscreens with Zinc Oxide, Titanium Dioxide or Avobenzone to protect from UVA rays.  2. Use SPF 30-50+ to protect from UVB rays.  3. Re-apply every 2 hours even if water resistant.  4. Apply on your face every day even when cloudy and even in the winter. UVA \"aging rays\" penetrate window glass and are just " as strong in the winter as in the summer.    FYI  You should use about 3 tablespoons of sunscreen to protect your whole body. Thus a typical eight ounce bottle of sunscreen should last 4 applications. Remember, that the SPF rating is compromised if you don t apply enough. Most people only apply 1/2 - 1/3 of the amount they need. Also don t forget areas such as your ears, feet, upper back and harder to reach places. Keep in mind that these amounts should be increased for larger body sizes.    Sunscreens with titanium dioxide and/or zinc oxide in the active ingredients are physical blockers as opposed to chemical blockers. Chemical-free sunscreens should not irritate the skin.    Spray-on sunscreens may be used for touch-up application only, not as a base layer. Also, use with caution around small children due to inhalation risk.    SPF means sun protection factor, which is just the degree to which the sunscreen can protect against UVB rays. There is no rating system for UVA rays. SPF is calculated as the time skin will burn when sunscreen is applied vs. skin without sunscreen.    Water resistant sunscreens should be re-applied every 1-2 hours.    Product Recommendations:    Consider use of sunscreen sticks with Zinc Oxide and Titanium Dioxide active ingredients such as Neutrogena Pure&Free Baby Sunscreen Stick.    Good examples include: Blue Lizard, EltaMD, Solbar    Good daily moisturizers with SPF: Vanicream, CeraVe.    For sensitive skin, consider : Skin[employer]ica Essential Defense Mineral Shield Broad Spectrum SPF 35    Men: consider use of Neutrogena Triple Protect Facial Lotion    Avoid retinyl palmitate products.  Avoid combination products that include both sunscreen and insect repellant, as sunscreen should be applied every 2 hours, but insect repellant should not be applied as frequently.    For more information:  https://www.skincancer.org/prevention/sun-protection/sunscreen/sunscreens-safe-and-effective

## 2020-11-17 ENCOUNTER — TRANSFERRED RECORDS (OUTPATIENT)
Dept: HEALTH INFORMATION MANAGEMENT | Facility: CLINIC | Age: 80
End: 2020-11-17

## 2020-11-17 LAB — INR PPP: 2.5 (ref 0.9–1.1)

## 2020-11-18 ENCOUNTER — ANTICOAGULATION THERAPY VISIT (OUTPATIENT)
Dept: CARDIOLOGY | Facility: CLINIC | Age: 80
End: 2020-11-18
Payer: COMMERCIAL

## 2020-11-18 DIAGNOSIS — I26.99 PULMONARY EMBOLISM (H): ICD-10-CM

## 2020-11-18 DIAGNOSIS — Z79.01 LONG TERM CURRENT USE OF ANTICOAGULANTS WITH INR GOAL OF 2.0-3.0: ICD-10-CM

## 2020-11-18 PROCEDURE — 99207 PR NO CHARGE NURSE ONLY: CPT

## 2020-11-18 NOTE — PROGRESS NOTES
ANTICOAGULATION FOLLOW-UP CLINIC VISIT    Patient Name:  Yogesh Abreu  Date:  2020  Contact Type:  Telephone    SUBJECTIVE:         OBJECTIVE    Recent labs: (last 7 days)     20   INR 2.5*       ASSESSMENT / PLAN  INR assessment THER    Recheck INR In: 2 WEEKS    INR Location Home INR    Billed home INR No      Anticoagulation Summary  As of 2020    INR goal:  2.0-3.0   TTR:  79.9 % (1 y)   INR used for dosin.5 (2020)   Warfarin maintenance plan:  10 mg (5 mg x 2) every Tue, Fri; 7.5 mg (5 mg x 1.5) all other days   Full warfarin instructions:  10 mg every Tue, Fri; 7.5 mg all other days   Weekly warfarin total:  57.5 mg   No change documented:  Tanja Wills RN   Plan last modified:  Tanja Wills RN (10/2/2020)   Next INR check:  2020   Target end date:  Indefinite    Indications    PE (pulmonary embolism) [I26.99]  Long term current use of anticoagulants with INR goal of 2.0-3.0 [Z79.01]             Anticoagulation Episode Summary     INR check location:      Preferred lab:      Send INR reminders to:  [facility] HEART INR NURSE    Comments:        Anticoagulation Care Providers     Provider Role Specialty Phone number    Frederic Isaacs MD Referring Cardiovascular Disease [phone]            See the Encounter Report to view Anticoagulation Flowsheet and Dosing Calendar (Go to Encounters tab in chart review, and find the Anticoagulation Therapy Visit)      Home INR 2.5 INR was delayed because he was waiting for test strips to be mailed from his supplier. No change in meds or diet. Denies abnormal bleeding or bruising. Will continue current dosing of 10 mg TuF and 7.5 mg all other days with recheck in 2 weeks. He isn't sure when he will go to [place] for the winter.  Tanja Wills RN

## 2020-12-02 ENCOUNTER — TRANSFERRED RECORDS (OUTPATIENT)
Dept: HEALTH INFORMATION MANAGEMENT | Facility: CLINIC | Age: 80
End: 2020-12-02

## 2020-12-02 ENCOUNTER — ANTICOAGULATION THERAPY VISIT (OUTPATIENT)
Dept: CARDIOLOGY | Facility: CLINIC | Age: 80
End: 2020-12-02
Payer: COMMERCIAL

## 2020-12-02 DIAGNOSIS — Z79.01 LONG TERM CURRENT USE OF ANTICOAGULANTS WITH INR GOAL OF 2.0-3.0: ICD-10-CM

## 2020-12-02 DIAGNOSIS — I26.99 PULMONARY EMBOLISM (H): ICD-10-CM

## 2020-12-02 LAB — INR PPP: 3.1 (ref 0.9–1.1)

## 2020-12-02 PROCEDURE — 99207 PR NO CHARGE NURSE ONLY: CPT

## 2020-12-02 NOTE — PROGRESS NOTES
ANTICOAGULATION FOLLOW-UP CLINIC VISIT    Patient Name:  Yogesh Abreu  Date:  12/2/2020  Contact Type:  Telephone    SUBJECTIVE:         OBJECTIVE    Recent labs: (last 7 days)     12/02/20   INR 3.1*       ASSESSMENT / PLAN  INR assessment SUPRA    Recheck INR In: 2 WEEKS    INR Location Outside lab      Anticoagulation Summary  As of 12/2/2020    INR goal:  2.0-3.0   TTR:  80.4 % (1 y)   INR used for dosing:  3.1 (12/2/2020)   Warfarin maintenance plan:  10 mg (5 mg x 2) every Tue, Fri; 7.5 mg (5 mg x 1.5) all other days   Full warfarin instructions:  10 mg every Tue, Fri; 7.5 mg all other days   Weekly warfarin total:  57.5 mg   No change documented:  Tanja Wills RN   Plan last modified:  Tanja Wills RN (10/2/2020)   Next INR check:  12/16/2020   Target end date:  Indefinite    Indications    PE (pulmonary embolism) [I26.99]  Long term current use of anticoagulants with INR goal of 2.0-3.0 [Z79.01]             Anticoagulation Episode Summary     INR check location:      Preferred lab:      Send INR reminders to:  [facility] HEART INR NURSE    Comments:        Anticoagulation Care Providers     Provider Role Specialty Phone number    Frederic Isaacs MD Referring Cardiovascular Disease [phone]            See the Encounter Report to view Anticoagulation Flowsheet and Dosing Calendar (Go to Encounters tab in chart review, and find the Anticoagulation Therapy Visit)    Home INR 3.1 Left message asking pt to call back if he has had any change in meds or diet or bleeding issues. Left detailed instructions to eat an extra serving of greens today then resume usual diet and continue current dosing of 10 mg TuF and 7.5 mg all other days with recheck in 2 weeks.    Tanja Wills RN

## 2020-12-16 ENCOUNTER — ANTICOAGULATION THERAPY VISIT (OUTPATIENT)
Dept: CARDIOLOGY | Facility: CLINIC | Age: 80
End: 2020-12-16
Payer: COMMERCIAL

## 2020-12-16 ENCOUNTER — TRANSFERRED RECORDS (OUTPATIENT)
Dept: HEALTH INFORMATION MANAGEMENT | Facility: CLINIC | Age: 80
End: 2020-12-16

## 2020-12-16 DIAGNOSIS — I26.99 PULMONARY EMBOLISM (H): ICD-10-CM

## 2020-12-16 DIAGNOSIS — Z79.01 LONG TERM CURRENT USE OF ANTICOAGULANTS WITH INR GOAL OF 2.0-3.0: ICD-10-CM

## 2020-12-16 LAB — INR PPP: 3.3 (ref 0.9–1.1)

## 2020-12-16 PROCEDURE — 99207 PR NO CHARGE NURSE ONLY: CPT

## 2020-12-16 NOTE — PROGRESS NOTES
ANTICOAGULATION FOLLOW-UP CLINIC VISIT    Patient Name:  Yogesh Abreu  Date:  12/16/2020  Contact Type:  Telephone    SUBJECTIVE:         OBJECTIVE    Recent labs: (last 7 days)     12/16/20   INR 3.3*       ASSESSMENT / PLAN  INR assessment SUPRA    Recheck INR In: 2 WEEKS    INR Location Home INR    Billed home INR No      Anticoagulation Summary  As of 12/16/2020    INR goal:  2.0-3.0   TTR:  80.1 % (1 y)   INR used for dosing:  3.3 (12/16/2020)   Warfarin maintenance plan:  10 mg (5 mg x 2) every Tue, Fri; 7.5 mg (5 mg x 1.5) all other days   Full warfarin instructions:  10 mg every Tue, Fri; 7.5 mg all other days   Weekly warfarin total:  57.5 mg   Plan last modified:  Tanja Wills RN (10/2/2020)   Next INR check:     Target end date:  Indefinite    Indications    PE (pulmonary embolism) [I26.99]  Long term current use of anticoagulants with INR goal of 2.0-3.0 [Z79.01]             Anticoagulation Episode Summary     INR check location:      Preferred lab:      Send INR reminders to:  [facility] HEART INR NURSE    Comments:        Anticoagulation Care Providers     Provider Role Specialty Phone number    Frederic Isaacs MD Referring Cardiovascular Disease [phone]            See the Encounter Report to view Anticoagulation Flowsheet and Dosing Calendar (Go to Encounters tab in chart review, and find the Anticoagulation Therapy Visit)    Home INR 3.3 Left message asking pt to call back to review results and ask if any change in meds, diet or bleeding issues. 4:15pm Spoke with pt. INR is trending higher. He is taking Augmentin for a UTI (started 3-4 days ago).  No change in other meds or diet. Denies bleeding issues. Will decrease dosing to 5 mg today then resume usual dosing of 10 mg TuF and 7.5 mg all other days with recheck in 2 weeks. Dosage adjustment made based on physician directed care plan.    Tanja Wills RN

## 2020-12-20 NOTE — TELEPHONE ENCOUNTER
Pt calling to report that his insurance changed from [employer] to [employer]. [employer] does not cover [employer] home INR monitoring but they do cover home INR monitoring through [employer]. Our clinic does not use [employer] monitoring services. Asked pt to check with his insurance to see if they would cover [facility] home INR monitoring. If not, then his option would be to ask PMD office if they use [employer] home inr monitoring (they would need to take over INR management) otherwise pt would need to come back to the clinic for INR management. He will call us with his INR result next week and let us know what he has found out. Rickey   Severe protein-calorie malnutrition Anemia

## 2020-12-30 ENCOUNTER — ANTICOAGULATION THERAPY VISIT (OUTPATIENT)
Dept: CARDIOLOGY | Facility: CLINIC | Age: 80
End: 2020-12-30
Payer: COMMERCIAL

## 2020-12-30 ENCOUNTER — TRANSFERRED RECORDS (OUTPATIENT)
Dept: HEALTH INFORMATION MANAGEMENT | Facility: CLINIC | Age: 80
End: 2020-12-30

## 2020-12-30 DIAGNOSIS — Z79.01 LONG TERM CURRENT USE OF ANTICOAGULANTS WITH INR GOAL OF 2.0-3.0: ICD-10-CM

## 2020-12-30 DIAGNOSIS — I26.99 PULMONARY EMBOLISM (H): ICD-10-CM

## 2020-12-30 LAB — INR PPP: 3.1 (ref 0.9–1.1)

## 2020-12-30 PROCEDURE — 99207 PR NO CHARGE NURSE ONLY: CPT | Performed by: INTERNAL MEDICINE

## 2020-12-30 NOTE — PROGRESS NOTES
ANTICOAGULATION FOLLOW-UP CLINIC VISIT    Patient Name:  Yogesh Abreu  Date:  12/30/2020  Contact Type:  Telephone    SUBJECTIVE:  Patient Findings     Positives:  Change in medications (finished Augmentin but still has UTI so now taking Cipro)        Clinical Outcomes     Negatives:  Major bleeding event, Thromboembolic event, Anticoagulation-related hospital admission, Anticoagulation-related ED visit, Anticoagulation-related fatality           OBJECTIVE    Recent labs: (last 7 days)     12/30/20   INR 3.1*       ASSESSMENT / PLAN  INR assessment SUPRA    Recheck INR In: 2 WEEKS    INR Location Home INR      Anticoagulation Summary  As of 12/30/2020    INR goal:  2.0-3.0   TTR:  77.0 % (1 y)   INR used for dosing:  3.1 (12/30/2020)   Warfarin maintenance plan:  10 mg (5 mg x 2) every Tue, Fri; 7.5 mg (5 mg x 1.5) all other days   Full warfarin instructions:  12/30: 5 mg; 1/1: 7.5 mg; Otherwise 10 mg every Tue, Fri; 7.5 mg all other days   Weekly warfarin total:  57.5 mg   Plan last modified:  Tanja Wills RN (10/2/2020)   Next INR check:  1/13/2021   Target end date:  Indefinite    Indications    PE (pulmonary embolism) [I26.99]  Long term current use of anticoagulants with INR goal of 2.0-3.0 [Z79.01]             Anticoagulation Episode Summary     INR check location:      Preferred lab:      Send INR reminders to:  [facility] HEART INR NURSE    Comments:        Anticoagulation Care Providers     Provider Role Specialty Phone number    Frederic Isaacs MD Referring Cardiovascular Disease [phone]            See the Encounter Report to view Anticoagulation Flowsheet and Dosing Calendar (Go to Encounters tab in chart review, and find the Anticoagulation Therapy Visit)    Home INR 3.1 He still has UTI after completing Augmentin so is now taking Cipro. No change in other meds or diet. Denies bleeding issues. Will decrease dosing to 5 mg today then 7.5 mg WTh then resume usual 10 mg TuF and 7.5 mg  all other days with recheck in 2 weeks. Dosage adjustment made based on physician directed care plan.    Tanja Wills RN

## 2021-01-13 ENCOUNTER — TRANSFERRED RECORDS (OUTPATIENT)
Dept: HEALTH INFORMATION MANAGEMENT | Facility: CLINIC | Age: 81
End: 2021-01-13

## 2021-01-13 LAB — INR PPP: 2.8 (ref 0.9–1.1)

## 2021-01-14 ENCOUNTER — ANTICOAGULATION THERAPY VISIT (OUTPATIENT)
Dept: CARDIOLOGY | Facility: CLINIC | Age: 81
End: 2021-01-14
Payer: COMMERCIAL

## 2021-01-14 DIAGNOSIS — Z79.01 LONG TERM CURRENT USE OF ANTICOAGULANTS WITH INR GOAL OF 2.0-3.0: ICD-10-CM

## 2021-01-14 DIAGNOSIS — I26.99 PULMONARY EMBOLISM (H): ICD-10-CM

## 2021-01-14 PROCEDURE — 99207 PR NO CHARGE NURSE ONLY: CPT | Performed by: INTERNAL MEDICINE

## 2021-01-14 NOTE — PROGRESS NOTES
ANTICOAGULATION FOLLOW-UP CLINIC VISIT    Patient Name:  Yogesh Abreu  Date:  2021  Contact Type:  [employer]    SUBJECTIVE:         OBJECTIVE    Recent labs: (last 7 days)     21   INR 2.8*       ASSESSMENT / PLAN  INR assessment THER    Recheck INR In: 2 WEEKS    INR Location Home INR    Billed home INR No      Anticoagulation Summary  As of 2021    INR goal:  2.0-3.0   TTR:  78.1 % (1 y)   INR used for dosin.8 (2021)   Warfarin maintenance plan:  10 mg (5 mg x 2) every Tue, Fri; 7.5 mg (5 mg x 1.5) all other days   Full warfarin instructions:  10 mg every Tue, Fri; 7.5 mg all other days   Weekly warfarin total:  57.5 mg   No change documented:  Tanja Wills RN   Plan last modified:  Tanja Wills RN (10/2/2020)   Next INR check:  2021   Target end date:  Indefinite    Indications    PE (pulmonary embolism) [I26.99]  Long term current use of anticoagulants with INR goal of 2.0-3.0 [Z79.01]             Anticoagulation Episode Summary     INR check location:      Preferred lab:      Send INR reminders to:  [facility] HEART INR NURSE    Comments:        Anticoagulation Care Providers     Provider Role Specialty Phone number    Frederic Isaacs MD Referring Cardiovascular Disease [phone]            See the Encounter Report to view Anticoagulation Flowsheet and Dosing Calendar (Go to Encounters tab in chart review, and find the Anticoagulation Therapy Visit)    21 Home INR 2.8 Will continue current dosing of 10 mg TuF and 7.5 mg all other days with recheck in 2 weeks. Will call pt after the next INR check.  Tanja Wills RN

## 2021-01-27 ENCOUNTER — TRANSFERRED RECORDS (OUTPATIENT)
Dept: HEALTH INFORMATION MANAGEMENT | Facility: CLINIC | Age: 81
End: 2021-01-27

## 2021-01-27 ENCOUNTER — ANTICOAGULATION THERAPY VISIT (OUTPATIENT)
Dept: CARDIOLOGY | Facility: CLINIC | Age: 81
End: 2021-01-27
Payer: COMMERCIAL

## 2021-01-27 DIAGNOSIS — Z79.01 LONG TERM CURRENT USE OF ANTICOAGULANTS WITH INR GOAL OF 2.0-3.0: ICD-10-CM

## 2021-01-27 DIAGNOSIS — I26.99 PULMONARY EMBOLISM (H): ICD-10-CM

## 2021-01-27 LAB — INR PPP: 3.2 (ref 0.9–1.1)

## 2021-01-27 PROCEDURE — 99207 PR NO CHARGE NURSE ONLY: CPT | Performed by: INTERNAL MEDICINE

## 2021-01-27 NOTE — PROGRESS NOTES
ANTICOAGULATION FOLLOW-UP CLINIC VISIT    Patient Name:  Yogesh Abreu  Date:  1/27/2021  Contact Type:  Telephone    SUBJECTIVE:  Patient Findings     Positives:  Change in diet/appetite (no greens for 10 days)        Clinical Outcomes     Negatives:  Major bleeding event, Thromboembolic event, Anticoagulation-related hospital admission, Anticoagulation-related ED visit, Anticoagulation-related fatality           OBJECTIVE    Recent labs: (last 7 days)     01/27/21   INR 3.2*       ASSESSMENT / PLAN  INR assessment SUPRA    Recheck INR In: 2 WEEKS    INR Location Home INR    Billed home INR No      Anticoagulation Summary  As of 1/27/2021    INR goal:  2.0-3.0   TTR:  76.2 % (1 y)   INR used for dosing:  3.2 (1/27/2021)   Warfarin maintenance plan:  10 mg (5 mg x 2) every Tue, Fri; 7.5 mg (5 mg x 1.5) all other days   Full warfarin instructions:  10 mg every Tue, Fri; 7.5 mg all other days   Weekly warfarin total:  57.5 mg   No change documented:  Tanja Wills RN   Plan last modified:  Tanja Wills RN (10/2/2020)   Next INR check:  2/10/2021   Target end date:  Indefinite    Indications    PE (pulmonary embolism) [I26.99]  Long term current use of anticoagulants with INR goal of 2.0-3.0 [Z79.01]             Anticoagulation Episode Summary     INR check location:      Preferred lab:      Send INR reminders to:  [facility] HEART INR NURSE    Comments:        Anticoagulation Care Providers     Provider Role Specialty Phone number    Frederic Isaacs MD Referring Cardiovascular Disease [phone]            See the Encounter Report to view Anticoagulation Flowsheet and Dosing Calendar (Go to Encounters tab in chart review, and find the Anticoagulation Therapy Visit)    Home INR 3.2 Left message asking pt to call back to ask if he has had any change in meds, diet or etoh, infections or diarrhea or bleeding issues? This was the 4th INR out of 5 INR's that has been slightly elevated recently. He had a  UTI and had taken 2 rounds of antibiotics in December. Considering decreasing maintenance dose but need to speak with pt before determining ongoing dosing.   4pm Spoke with pt. He has recovered from the UTI. No change in meds. He has not eaten greens for 10 days (usually has them 4x/wk). He wants to resume his usual diet of greens 4x/wk so will continue current dosing of 10 mg TuF and 7.5 mg all other days with recheck in 2 weeks.   Tanja Wills RN

## 2021-02-10 ENCOUNTER — ANTICOAGULATION THERAPY VISIT (OUTPATIENT)
Dept: CARDIOLOGY | Facility: CLINIC | Age: 81
End: 2021-02-10
Payer: COMMERCIAL

## 2021-02-10 ENCOUNTER — TRANSFERRED RECORDS (OUTPATIENT)
Dept: HEALTH INFORMATION MANAGEMENT | Facility: CLINIC | Age: 81
End: 2021-02-10

## 2021-02-10 DIAGNOSIS — I26.99 PULMONARY EMBOLISM (H): ICD-10-CM

## 2021-02-10 DIAGNOSIS — Z79.01 LONG TERM CURRENT USE OF ANTICOAGULANTS WITH INR GOAL OF 2.0-3.0: ICD-10-CM

## 2021-02-10 LAB — INR PPP: 3 (ref 0.9–1.1)

## 2021-02-10 NOTE — PROGRESS NOTES
ANTICOAGULATION FOLLOW-UP CLINIC VISIT    Patient Name:  Yogesh Abreu  Date:  2/10/2021  Contact Type:  [employer] website    SUBJECTIVE:         OBJECTIVE    Recent labs: (last 7 days)     02/10/21   INR 3.0*       ASSESSMENT / PLAN  INR assessment THER    Recheck INR In: 2 WEEKS    INR Location Outside lab      Anticoagulation Summary  As of 2/10/2021    INR goal:  2.0-3.0   TTR:  74.1 % (1 y)   INR used for dosing:  3.0 (2/10/2021)   Warfarin maintenance plan:  10 mg (5 mg x 2) every Tue, Fri; 7.5 mg (5 mg x 1.5) all other days   Full warfarin instructions:  10 mg every Tue, Fri; 7.5 mg all other days   Weekly warfarin total:  57.5 mg   No change documented:  Tanja Wills RN   Plan last modified:  Tanja Wills RN (10/2/2020)   Next INR check:  2/24/2021   Target end date:  Indefinite    Indications    PE (pulmonary embolism) [I26.99]  Long term current use of anticoagulants with INR goal of 2.0-3.0 [Z79.01]             Anticoagulation Episode Summary     INR check location:      Preferred lab:      Send INR reminders to:  [facility] HEART INR NURSE    Comments:        Anticoagulation Care Providers     Provider Role Specialty Phone number    Frederic Isaacs MD Referring Cardiovascular Disease [phone]            See the Encounter Report to view Anticoagulation Flowsheet and Dosing Calendar (Go to Encounters tab in chart review, and find the Anticoagulation Therapy Visit)    Home INR 3.0 INR back in range after he was resuming usual diet. Will continue current dosing of 10 mg TuF and 7.5 mg all other days with recheck in 2 weeks. Will call pt after the next INR check.  Tanja Wills RN

## 2021-02-25 ENCOUNTER — TRANSFERRED RECORDS (OUTPATIENT)
Dept: HEALTH INFORMATION MANAGEMENT | Facility: CLINIC | Age: 81
End: 2021-02-25

## 2021-02-26 ENCOUNTER — ANTICOAGULATION THERAPY VISIT (OUTPATIENT)
Dept: CARDIOLOGY | Facility: CLINIC | Age: 81
End: 2021-02-26
Payer: COMMERCIAL

## 2021-02-26 DIAGNOSIS — Z79.01 LONG TERM CURRENT USE OF ANTICOAGULANTS WITH INR GOAL OF 2.0-3.0: ICD-10-CM

## 2021-02-26 LAB — INR PPP: 3.9 (ref 0.9–1.1)

## 2021-02-26 PROCEDURE — 99207 PR NO CHARGE NURSE ONLY: CPT

## 2021-02-26 NOTE — PROGRESS NOTES
ANTICOAGULATION FOLLOW-UP CLINIC VISIT    Patient Name:  Yogesh Abreu  Date:  2/26/2021  Contact Type:  Telephone    SUBJECTIVE:  Patient Findings     Positives:  Missed doses        Clinical Outcomes     Negatives:  Major bleeding event, Thromboembolic event, Anticoagulation-related hospital admission, Anticoagulation-related ED visit, Anticoagulation-related fatality           OBJECTIVE    Recent labs: (last 7 days)     02/25/21   INR 3.9*       ASSESSMENT / PLAN  INR assessment SUPRA    Recheck INR In: 2 WEEKS    INR Location Home INR      Anticoagulation Summary  As of 2/26/2021    INR goal:  2.0-3.0   TTR:  69.9 % (1 y)   INR used for dosing:  3.9 (2/25/2021)   Warfarin maintenance plan:  10 mg (5 mg x 2) every Tue; 7.5 mg (5 mg x 1.5) all other days   Full warfarin instructions:  2/26: 10 mg; Otherwise 10 mg every Tue; 7.5 mg all other days   Weekly warfarin total:  55 mg   Plan last modified:  Mandy Ferguson RN (2/26/2021)   Next INR check:  3/11/2021   Target end date:  Indefinite    Indications    PE (pulmonary embolism) [I26.99]  Long term current use of anticoagulants with INR goal of 2.0-3.0 [Z79.01]             Anticoagulation Episode Summary     INR check location:      Preferred lab:      Send INR reminders to:  [facility] HEART INR NURSE    Comments:        Anticoagulation Care Providers     Provider Role Specialty Phone number    RobertFrederic Mauro MD Referring Cardiovascular Disease [phone]            See the Encounter Report to view Anticoagulation Flowsheet and Dosing Calendar (Go to Encounters tab in chart review, and find the Anticoagulation Therapy Visit)    INR 3.9 home INR done 2/25 but results received on 2/26.  Called and spoke to the patient.  He held his dose last night.  He said he stopped taking ibuprofen 4 days ago and still takes some tylenol PRN.  Not taking any antibiotics.  INRs have been 3.0 or higher several times recently.  Since he held 7.5mg last night we  will keep the 10mg dose today then next week decrease dosing by 2.5mg overall so he will take 10mg tues and 7.5mg ROW and recheck at home on 3/11 in 2 weeks.  Canelo Ferguson RN

## 2021-03-11 ENCOUNTER — ANTICOAGULATION THERAPY VISIT (OUTPATIENT)
Dept: CARDIOLOGY | Facility: CLINIC | Age: 81
End: 2021-03-11
Payer: COMMERCIAL

## 2021-03-11 ENCOUNTER — TRANSFERRED RECORDS (OUTPATIENT)
Dept: HEALTH INFORMATION MANAGEMENT | Facility: CLINIC | Age: 81
End: 2021-03-11

## 2021-03-11 DIAGNOSIS — I26.99 PULMONARY EMBOLISM (H): ICD-10-CM

## 2021-03-11 DIAGNOSIS — Z79.01 LONG TERM CURRENT USE OF ANTICOAGULANTS WITH INR GOAL OF 2.0-3.0: ICD-10-CM

## 2021-03-11 LAB — INR PPP: 3.5 (ref 0.9–1.1)

## 2021-03-11 PROCEDURE — 99207 PR NO CHARGE NURSE ONLY: CPT | Performed by: INTERNAL MEDICINE

## 2021-03-11 NOTE — PROGRESS NOTES
ANTICOAGULATION FOLLOW-UP CLINIC VISIT    Patient Name:  Yogesh Abreu  Date:  3/11/2021  Contact Type:  Telephone    SUBJECTIVE:  Patient Findings     Positives:  Change in alcohol use (drank ETOH the last 2 evenings), Change in medications (no Tylenol)        Clinical Outcomes     Negatives:  Major bleeding event, Thromboembolic event, Anticoagulation-related hospital admission, Anticoagulation-related ED visit, Anticoagulation-related fatality           OBJECTIVE    Recent labs: (last 7 days)     03/11/21   INR 3.5*       ASSESSMENT / PLAN  INR assessment SUPRA    Recheck INR In: 2 WEEKS    INR Location Home INR    Billed home INR No      Anticoagulation Summary  As of 3/11/2021    INR goal:  2.0-3.0   TTR:  66.2 % (1 y)   INR used for dosing:  3.5 (3/11/2021)   Warfarin maintenance plan:  7.5 mg (5 mg x 1.5) every day   Full warfarin instructions:  3/11: 5 mg; Otherwise 7.5 mg every day   Weekly warfarin total:  52.5 mg   Plan last modified:  Tanja Wills RN (3/11/2021)   Next INR check:  3/25/2021   Target end date:  Indefinite    Indications    PE (pulmonary embolism) [I26.99]  Long term current use of anticoagulants with INR goal of 2.0-3.0 [Z79.01]             Anticoagulation Episode Summary     INR check location:      Preferred lab:      Send INR reminders to:  [facility] HEART INR NURSE    Comments:        Anticoagulation Care Providers     Provider Role Specialty Phone number    Frederic Isaacs MD Referring Cardiovascular Disease [phone]            See the Encounter Report to view Anticoagulation Flowsheet and Dosing Calendar (Go to Encounters tab in chart review, and find the Anticoagulation Therapy Visit)    Home INR 3.5 INR still elevated after decrease in dosing 2 weeks ago. No longer taking Tylenol. No change in diet. He drank ETOH the last 2 evenings. Denies abnormal bleeding or bruising. Will decrease dosing to 5 mg today then change maintenance dose to 7.5 mg daily with recheck  in 2 weeks. Dosage adjustment made based on physician directed care plan.    Tanja Wills RN

## 2021-03-25 ENCOUNTER — TRANSFERRED RECORDS (OUTPATIENT)
Dept: HEALTH INFORMATION MANAGEMENT | Facility: CLINIC | Age: 81
End: 2021-03-25

## 2021-03-25 ENCOUNTER — ANTICOAGULATION THERAPY VISIT (OUTPATIENT)
Dept: CARDIOLOGY | Facility: CLINIC | Age: 81
End: 2021-03-25
Payer: COMMERCIAL

## 2021-03-25 DIAGNOSIS — Z79.01 LONG TERM CURRENT USE OF ANTICOAGULANTS WITH INR GOAL OF 2.0-3.0: ICD-10-CM

## 2021-03-25 DIAGNOSIS — I26.99 PULMONARY EMBOLISM (H): ICD-10-CM

## 2021-03-25 LAB — INR PPP: 3.3 (ref 0.9–1.1)

## 2021-03-25 PROCEDURE — 99207 PR NO CHARGE NURSE ONLY: CPT

## 2021-03-25 NOTE — PROGRESS NOTES
ANTICOAGULATION FOLLOW-UP CLINIC VISIT    Patient Name:  Yogesh Abreu  Date:  3/25/2021  Contact Type:  Telephone    SUBJECTIVE:  Patient Findings     Positives:  Change in medications (7 day course of Cipro for UTI)        Clinical Outcomes     Negatives:  Major bleeding event, Thromboembolic event, Anticoagulation-related hospital admission, Anticoagulation-related ED visit, Anticoagulation-related fatality           OBJECTIVE    Recent labs: (last 7 days)     03/25/21   INR 3.3*       ASSESSMENT / PLAN  INR assessment SUPRA    Recheck INR In: 10 DAYS    INR Location Outside lab      Anticoagulation Summary  As of 3/25/2021    INR goal:  2.0-3.0   TTR:  62.4 % (1 y)   INR used for dosing:  3.3 (3/25/2021)   Warfarin maintenance plan:  7.5 mg (5 mg x 1.5) every day   Full warfarin instructions:  3/25: 2.5 mg; Otherwise 7.5 mg every day   Weekly warfarin total:  52.5 mg   Plan last modified:  Tanja Wills RN (3/11/2021)   Next INR check:  4/6/2021   Target end date:  Indefinite    Indications    PE (pulmonary embolism) [I26.99]  Long term current use of anticoagulants with INR goal of 2.0-3.0 [Z79.01]             Anticoagulation Episode Summary     INR check location:      Preferred lab:      Send INR reminders to:  [facility] HEART INR NURSE    Comments:        Anticoagulation Care Providers     Provider Role Specialty Phone number    Frederic Isaacs MD Referring Cardiovascular Disease [phone]            See the Encounter Report to view Anticoagulation Flowsheet and Dosing Calendar (Go to Encounters tab in chart review, and find the Anticoagulation Therapy Visit)    Home INR 3.3 He was started on a 7 days course of Cipro (on day 2) for recurrent UTI. No other changes. Denies abnormal bleeding issues. Will decrease today's dose to 2.5 mg then resume 7.5 mg daily and recheck in 10 days. They will be returning from [place] on 4/18. Dosage adjustment made based on physician directed care  plan.    Tanja Wills RN

## 2021-04-07 ENCOUNTER — ANTICOAGULATION THERAPY VISIT (OUTPATIENT)
Dept: CARDIOLOGY | Facility: CLINIC | Age: 81
End: 2021-04-07
Payer: COMMERCIAL

## 2021-04-07 ENCOUNTER — TRANSFERRED RECORDS (OUTPATIENT)
Dept: HEALTH INFORMATION MANAGEMENT | Facility: CLINIC | Age: 81
End: 2021-04-07

## 2021-04-07 DIAGNOSIS — Z79.01 LONG TERM CURRENT USE OF ANTICOAGULANTS WITH INR GOAL OF 2.0-3.0: ICD-10-CM

## 2021-04-07 DIAGNOSIS — I26.99 PULMONARY EMBOLISM (H): ICD-10-CM

## 2021-04-07 LAB — INR PPP: 2.8 (ref 0.9–1.1)

## 2021-04-07 PROCEDURE — 99207 PR NO CHARGE NURSE ONLY: CPT | Performed by: INTERNAL MEDICINE

## 2021-04-07 NOTE — PROGRESS NOTES
ANTICOAGULATION FOLLOW-UP CLINIC VISIT    Patient Name:  Yogesh Abreu  Date:  2021  Contact Type:  [employer] website    SUBJECTIVE:         OBJECTIVE    Recent labs: (last 7 days)     21   INR 2.8*       ASSESSMENT / PLAN  INR assessment THER    Recheck INR In: 2 WEEKS    INR Location Home INR    Billed home INR No      Anticoagulation Summary  As of 2021    INR goal:  2.0-3.0   TTR:  60.2 % (1 y)   INR used for dosin.8 (2021)   Warfarin maintenance plan:  7.5 mg (5 mg x 1.5) every day   Full warfarin instructions:  7.5 mg every day   Weekly warfarin total:  52.5 mg   No change documented:  Tanja Wills RN   Plan last modified:  Tanja Wills RN (3/11/2021)   Next INR check:  2021   Target end date:  Indefinite    Indications    PE (pulmonary embolism) [I26.99]  Long term current use of anticoagulants with INR goal of 2.0-3.0 [Z79.01]             Anticoagulation Episode Summary     INR check location:      Preferred lab:      Send INR reminders to:  [facility] HEART INR NURSE    Comments:        Anticoagulation Care Providers     Provider Role Specialty Phone number    Frederic Isaacs MD Referring Cardiovascular Disease [phone]            See the Encounter Report to view Anticoagulation Flowsheet and Dosing Calendar (Go to Encounters tab in chart review, and find the Anticoagulation Therapy Visit)    Home INR 2.8 INR back in range after a one time decrease in dosing (was taking an antibiotic for UTI). Will continue current dosing of 7.5 mg daily with recheck in 2 weeks. Will call pt after the next INR check.  Tanja Wills RN

## 2021-04-12 DIAGNOSIS — I25.110 CORONARY ARTERY DISEASE INVOLVING NATIVE CORONARY ARTERY OF NATIVE HEART WITH UNSTABLE ANGINA PECTORIS (H): ICD-10-CM

## 2021-04-12 RX ORDER — LISINOPRIL 5 MG/1
TABLET ORAL
Qty: 90 TABLET | Refills: 0 | Status: SHIPPED | OUTPATIENT
Start: 2021-04-12 | End: 2021-06-28

## 2021-04-12 NOTE — TELEPHONE ENCOUNTER
Pharmacy in [place] seeking refill of patient's lisinopril; new Rx is needed.    Pended for 90 days.    LOV 9-  for routine physical    RT Rhea (R)

## 2021-04-14 DIAGNOSIS — I25.110 CORONARY ARTERY DISEASE INVOLVING NATIVE CORONARY ARTERY OF NATIVE HEART WITH UNSTABLE ANGINA PECTORIS (H): ICD-10-CM

## 2021-04-14 RX ORDER — ATORVASTATIN CALCIUM 80 MG/1
80 TABLET, FILM COATED ORAL AT BEDTIME
Qty: 90 TABLET | Refills: 0 | Status: SHIPPED | OUTPATIENT
Start: 2021-04-14 | End: 2021-06-28

## 2021-04-22 ENCOUNTER — ANTICOAGULATION THERAPY VISIT (OUTPATIENT)
Dept: CARDIOLOGY | Facility: CLINIC | Age: 81
End: 2021-04-22
Payer: COMMERCIAL

## 2021-04-22 ENCOUNTER — TRANSFERRED RECORDS (OUTPATIENT)
Dept: HEALTH INFORMATION MANAGEMENT | Facility: CLINIC | Age: 81
End: 2021-04-22

## 2021-04-22 DIAGNOSIS — Z79.01 LONG TERM CURRENT USE OF ANTICOAGULANTS WITH INR GOAL OF 2.0-3.0: ICD-10-CM

## 2021-04-22 DIAGNOSIS — I26.99 PULMONARY EMBOLISM (H): ICD-10-CM

## 2021-04-22 LAB — INR PPP: 2.6 (ref 0.9–1.1)

## 2021-04-22 PROCEDURE — 99207 PR NO CHARGE NURSE ONLY: CPT | Performed by: INTERNAL MEDICINE

## 2021-04-22 NOTE — PROGRESS NOTES
ANTICOAGULATION FOLLOW-UP CLINIC VISIT    Patient Name:  Yogesh Abreu  Date:  2021  Contact Type:  Telephone    SUBJECTIVE:  Patient Findings         Clinical Outcomes     Negatives:  Major bleeding event, Thromboembolic event, Anticoagulation-related hospital admission, Anticoagulation-related ED visit, Anticoagulation-related fatality           OBJECTIVE    Recent labs: (last 7 days)     21   INR 2.6*       ASSESSMENT / PLAN  INR assessment THER    Recheck INR In: 2 WEEKS    INR Location Home INR    Billed home INR No      Anticoagulation Summary  As of 2021    INR goal:  2.0-3.0   TTR:  60.2 % (1 y)   INR used for dosin.6 (2021)   Warfarin maintenance plan:  7.5 mg (5 mg x 1.5) every day   Full warfarin instructions:  7.5 mg every day   Weekly warfarin total:  52.5 mg   No change documented:  Tanja Wills RN   Plan last modified:  Tanja Wills RN (3/11/2021)   Next INR check:  2021   Target end date:  Indefinite    Indications    PE (pulmonary embolism) [I26.99]  Long term current use of anticoagulants with INR goal of 2.0-3.0 [Z79.01]             Anticoagulation Episode Summary     INR check location:      Preferred lab:      Send INR reminders to:  [facility] HEART INR NURSE    Comments:        Anticoagulation Care Providers     Provider Role Specialty Phone number    Frederic Isaacs MD Referring Cardiovascular Disease [phone]            See the Encounter Report to view Anticoagulation Flowsheet and Dosing Calendar (Go to Encounters tab in chart review, and find the Anticoagulation Therapy Visit)    Home INR 2.6 No change in meds or diet. Denies abnormal bleeding or bruising. Will continue current dosing of 7.5 mg daily and recheck in 2 weeks.  Tanja Wills RN

## 2021-05-05 ENCOUNTER — TRANSFERRED RECORDS (OUTPATIENT)
Dept: HEALTH INFORMATION MANAGEMENT | Facility: CLINIC | Age: 81
End: 2021-05-05

## 2021-05-05 ENCOUNTER — ANTICOAGULATION THERAPY VISIT (OUTPATIENT)
Dept: CARDIOLOGY | Facility: CLINIC | Age: 81
End: 2021-05-05
Payer: COMMERCIAL

## 2021-05-05 DIAGNOSIS — I26.99 PULMONARY EMBOLISM (H): ICD-10-CM

## 2021-05-05 DIAGNOSIS — Z79.01 LONG TERM CURRENT USE OF ANTICOAGULANTS WITH INR GOAL OF 2.0-3.0: ICD-10-CM

## 2021-05-05 LAB — INR PPP: 2.7 (ref 0.9–1.1)

## 2021-05-05 PROCEDURE — 99207 PR NO CHARGE NURSE ONLY: CPT | Performed by: INTERNAL MEDICINE

## 2021-05-05 NOTE — PROGRESS NOTES
ANTICOAGULATION FOLLOW-UP CLINIC VISIT    Patient Name:  Yogesh Abreu  Date:  2021  Contact Type:  Telephone    SUBJECTIVE:         OBJECTIVE    Recent labs: (last 7 days)     21   INR 2.7*       ASSESSMENT / PLAN  INR assessment THER    Recheck INR In: 2 WEEKS    INR Location Home INR      Anticoagulation Summary  As of 2021    INR goal:  2.0-3.0   TTR:  60.2 % (1 y)   INR used for dosin.7 (2021)   Warfarin maintenance plan:  7.5 mg (5 mg x 1.5) every day   Full warfarin instructions:  7.5 mg every day   Weekly warfarin total:  52.5 mg   No change documented:  Neli Linares RN   Plan last modified:  Tanja Wills RN (3/11/2021)   Next INR check:  2021   Target end date:  Indefinite    Indications    PE (pulmonary embolism) [I26.99]  Long term current use of anticoagulants with INR goal of 2.0-3.0 [Z79.01]             Anticoagulation Episode Summary     INR check location:      Preferred lab:      Send INR reminders to:  [facility] HEART INR NURSE    Comments:        Anticoagulation Care Providers     Provider Role Specialty Phone number    Frederic Isaacs MD Referring Cardiovascular Disease [phone]            See the Encounter Report to view Anticoagulation Flowsheet and Dosing Calendar (Go to Encounters tab in chart review, and find the Anticoagulation Therapy Visit)    Home INR 2.7 today. Will continue on current warfarin maintenance dosing of 7.5mg daily and recheck in 2wks. Will call patient after next INR check.     Neli Linares RN

## 2021-05-06 ENCOUNTER — TRANSFERRED RECORDS (OUTPATIENT)
Dept: HEALTH INFORMATION MANAGEMENT | Facility: CLINIC | Age: 81
End: 2021-05-06

## 2021-05-19 ENCOUNTER — ANTICOAGULATION THERAPY VISIT (OUTPATIENT)
Dept: CARDIOLOGY | Facility: CLINIC | Age: 81
End: 2021-05-19
Payer: COMMERCIAL

## 2021-05-19 DIAGNOSIS — Z79.01 LONG TERM CURRENT USE OF ANTICOAGULANTS WITH INR GOAL OF 2.0-3.0: ICD-10-CM

## 2021-05-19 DIAGNOSIS — I26.99 PULMONARY EMBOLISM (H): ICD-10-CM

## 2021-05-19 LAB — INR PPP: 2.3 (ref 0.9–1.1)

## 2021-05-19 PROCEDURE — 99207 PR NO CHARGE NURSE ONLY: CPT

## 2021-06-02 LAB — INR PPP: 3 (ref 0.9–1.1)

## 2021-06-03 ENCOUNTER — TRANSFERRED RECORDS (OUTPATIENT)
Dept: HEALTH INFORMATION MANAGEMENT | Facility: CLINIC | Age: 81
End: 2021-06-03

## 2021-06-03 ENCOUNTER — ANTICOAGULATION THERAPY VISIT (OUTPATIENT)
Dept: CARDIOLOGY | Facility: CLINIC | Age: 81
End: 2021-06-03
Payer: COMMERCIAL

## 2021-06-03 DIAGNOSIS — I26.99 PULMONARY EMBOLISM (H): ICD-10-CM

## 2021-06-03 DIAGNOSIS — Z79.01 LONG TERM CURRENT USE OF ANTICOAGULANTS WITH INR GOAL OF 2.0-3.0: ICD-10-CM

## 2021-06-03 PROCEDURE — 99207 PR NO CHARGE NURSE ONLY: CPT | Performed by: INTERNAL MEDICINE

## 2021-06-03 NOTE — PROGRESS NOTES
ANTICOAGULATION FOLLOW-UP CLINIC VISIT    Patient Name:  Yogesh Abreu  Date:  6/3/2021  Contact Type:  [employer] website    SUBJECTIVE:         OBJECTIVE    Recent labs: (last 7 days)     06/02/21   INR 3.0*       ASSESSMENT / PLAN  INR assessment THER    Recheck INR In: 2 WEEKS    INR Location Home INR    Billed home INR No      Anticoagulation Summary  As of 6/3/2021    INR goal:  2.0-3.0   TTR:  64.3 % (1 y)   INR used for dosing:  3.0 (6/2/2021)   Warfarin maintenance plan:  7.5 mg (5 mg x 1.5) every day   Full warfarin instructions:  7.5 mg every day   Weekly warfarin total:  52.5 mg   No change documented:  Tanja Wills RN   Plan last modified:  Tanja Wills RN (3/11/2021)   Next INR check:  6/16/2021   Target end date:  Indefinite    Indications    PE (pulmonary embolism) [I26.99]  Long term current use of anticoagulants with INR goal of 2.0-3.0 [Z79.01]             Anticoagulation Episode Summary     INR check location:      Preferred lab:      Send INR reminders to:  [facility] HEART INR NURSE    Comments:        Anticoagulation Care Providers     Provider Role Specialty Phone number    Frederic Isaacs MD Referring Cardiovascular Disease [phone]            See the Encounter Report to view Anticoagulation Flowsheet and Dosing Calendar (Go to Encounters tab in chart review, and find the Anticoagulation Therapy Visit)    6/2/21 Home INR 3.0 Will continue current dosing of 7.5 mg daily and recheck INR in 2 weeks. Will call pt after the next INR check.     Tanja Wills RN

## 2021-06-15 DIAGNOSIS — I25.110 CORONARY ARTERY DISEASE INVOLVING NATIVE CORONARY ARTERY OF NATIVE HEART WITH UNSTABLE ANGINA PECTORIS (H): Primary | ICD-10-CM

## 2021-06-15 DIAGNOSIS — E78.2 MIXED HYPERLIPIDEMIA: ICD-10-CM

## 2021-06-15 DIAGNOSIS — I10 ESSENTIAL HYPERTENSION: ICD-10-CM

## 2021-06-16 ENCOUNTER — ANTICOAGULATION THERAPY VISIT (OUTPATIENT)
Dept: CARDIOLOGY | Facility: CLINIC | Age: 81
End: 2021-06-16
Payer: COMMERCIAL

## 2021-06-16 ENCOUNTER — TRANSFERRED RECORDS (OUTPATIENT)
Dept: HEALTH INFORMATION MANAGEMENT | Facility: CLINIC | Age: 81
End: 2021-06-16

## 2021-06-16 DIAGNOSIS — I26.99 PULMONARY EMBOLISM (H): ICD-10-CM

## 2021-06-16 DIAGNOSIS — Z79.01 LONG TERM CURRENT USE OF ANTICOAGULANTS WITH INR GOAL OF 2.0-3.0: ICD-10-CM

## 2021-06-16 LAB — INR PPP: 2.5 (ref 0.9–1.1)

## 2021-06-16 PROCEDURE — 99207 PR NO CHARGE NURSE ONLY: CPT | Performed by: INTERNAL MEDICINE

## 2021-06-16 NOTE — PROGRESS NOTES
ANTICOAGULATION FOLLOW-UP CLINIC VISIT    Patient Name:  Yogesh Abreu  Date:  2021  Contact Type:  Telephone    SUBJECTIVE:         OBJECTIVE    Recent labs: (last 7 days)     21   INR 2.5*       ASSESSMENT / PLAN  INR assessment THER    Recheck INR In: 2 WEEKS    INR Location Home INR    Billed home INR No      Anticoagulation Summary  As of 2021    INR goal:  2.0-3.0   TTR:  65.4 % (1 y)   INR used for dosin.5 (2021)   Warfarin maintenance plan:  7.5 mg (5 mg x 1.5) every day   Full warfarin instructions:  7.5 mg every day   Weekly warfarin total:  52.5 mg   No change documented:  Tanja Wills RN   Plan last modified:  Tanja Wills RN (3/11/2021)   Next INR check:  2021   Target end date:  Indefinite    Indications    PE (pulmonary embolism) [I26.99]  Long term current use of anticoagulants with INR goal of 2.0-3.0 [Z79.01]             Anticoagulation Episode Summary     INR check location:      Preferred lab:      Send INR reminders to:  [facility] INR NURSE    Comments:        Anticoagulation Care Providers     Provider Role Specialty Phone number    SharanRupinderFrederic Mauro MD Referring Cardiovascular Disease [phone]            See the Encounter Report to view Anticoagulation Flowsheet and Dosing Calendar (Go to Encounters tab in chart review, and find the Anticoagulation Therapy Visit)    Home INR 2.5 No change in meds or diet. Denies abnormal bleeding or bruising. Will continue current dosing of 7.5 mg daily and recheck in 2 weeks.  Tanja Wills RN

## 2021-06-17 DIAGNOSIS — I10 ESSENTIAL HYPERTENSION: ICD-10-CM

## 2021-06-17 DIAGNOSIS — I25.110 CORONARY ARTERY DISEASE INVOLVING NATIVE CORONARY ARTERY OF NATIVE HEART WITH UNSTABLE ANGINA PECTORIS (H): ICD-10-CM

## 2021-06-17 DIAGNOSIS — E78.2 MIXED HYPERLIPIDEMIA: ICD-10-CM

## 2021-06-17 LAB
ALT SERPL W P-5'-P-CCNC: 34 U/L (ref 0–70)
ANION GAP SERPL CALCULATED.3IONS-SCNC: 5 MMOL/L (ref 3–14)
BUN SERPL-MCNC: 23 MG/DL (ref 7–30)
CALCIUM SERPL-MCNC: 9.4 MG/DL (ref 8.5–10.1)
CHLORIDE SERPL-SCNC: 108 MMOL/L (ref 94–109)
CHOLEST SERPL-MCNC: 138 MG/DL
CO2 SERPL-SCNC: 27 MMOL/L (ref 20–32)
CREAT SERPL-MCNC: 0.81 MG/DL (ref 0.66–1.25)
GFR SERPL CREATININE-BSD FRML MDRD: 85 ML/MIN/{1.73_M2}
GLUCOSE SERPL-MCNC: 97 MG/DL (ref 70–99)
HDLC SERPL-MCNC: 41 MG/DL
LDLC SERPL CALC-MCNC: 83 MG/DL
NONHDLC SERPL-MCNC: 97 MG/DL
POTASSIUM SERPL-SCNC: 4 MMOL/L (ref 3.4–5.3)
SODIUM SERPL-SCNC: 140 MMOL/L (ref 133–144)
TRIGL SERPL-MCNC: 69 MG/DL

## 2021-06-17 PROCEDURE — 36415 COLL VENOUS BLD VENIPUNCTURE: CPT | Performed by: INTERNAL MEDICINE

## 2021-06-17 PROCEDURE — 80048 BASIC METABOLIC PNL TOTAL CA: CPT | Performed by: INTERNAL MEDICINE

## 2021-06-17 PROCEDURE — 80061 LIPID PANEL: CPT | Performed by: INTERNAL MEDICINE

## 2021-06-17 PROCEDURE — 84460 ALANINE AMINO (ALT) (SGPT): CPT | Performed by: INTERNAL MEDICINE

## 2021-06-23 ENCOUNTER — OFFICE VISIT (OUTPATIENT)
Dept: CARDIOLOGY | Facility: CLINIC | Age: 81
End: 2021-06-23
Payer: COMMERCIAL

## 2021-06-23 VITALS
HEIGHT: 69 IN | DIASTOLIC BLOOD PRESSURE: 80 MMHG | HEART RATE: 67 BPM | BODY MASS INDEX: 29.27 KG/M2 | WEIGHT: 197.6 LBS | SYSTOLIC BLOOD PRESSURE: 140 MMHG

## 2021-06-23 DIAGNOSIS — I25.110 CORONARY ARTERY DISEASE INVOLVING NATIVE CORONARY ARTERY OF NATIVE HEART WITH UNSTABLE ANGINA PECTORIS (H): ICD-10-CM

## 2021-06-23 DIAGNOSIS — Z79.01 LONG TERM CURRENT USE OF ANTICOAGULANTS WITH INR GOAL OF 2.0-3.0: Primary | ICD-10-CM

## 2021-06-23 DIAGNOSIS — I10 ESSENTIAL HYPERTENSION: ICD-10-CM

## 2021-06-23 DIAGNOSIS — I25.2 OLD MYOCARDIAL INFARCTION: ICD-10-CM

## 2021-06-23 DIAGNOSIS — E78.2 MIXED HYPERLIPIDEMIA: ICD-10-CM

## 2021-06-23 DIAGNOSIS — I26.99 OTHER ACUTE PULMONARY EMBOLISM, UNSPECIFIED WHETHER ACUTE COR PULMONALE PRESENT (H): ICD-10-CM

## 2021-06-23 DIAGNOSIS — I25.10 CORONARY ARTERY DISEASE INVOLVING NATIVE CORONARY ARTERY OF NATIVE HEART WITHOUT ANGINA PECTORIS: ICD-10-CM

## 2021-06-23 DIAGNOSIS — D68.61 ANTIPHOSPHOLIPID ANTIBODY SYNDROME (H): ICD-10-CM

## 2021-06-23 DIAGNOSIS — I26.99 PULMONARY EMBOLISM WITHOUT ACUTE COR PULMONALE, UNSPECIFIED CHRONICITY, UNSPECIFIED PULMONARY EMBOLISM TYPE (H): ICD-10-CM

## 2021-06-23 PROCEDURE — 99214 OFFICE O/P EST MOD 30 MIN: CPT | Performed by: INTERNAL MEDICINE

## 2021-06-23 RX ORDER — EZETIMIBE 10 MG/1
10 TABLET ORAL DAILY
Qty: 90 TABLET | Refills: 3 | Status: SHIPPED | OUTPATIENT
Start: 2021-06-23 | End: 2022-08-10

## 2021-06-23 ASSESSMENT — MIFFLIN-ST. JEOR: SCORE: 1606.69

## 2021-06-23 NOTE — PROGRESS NOTES
HPI and Plan:   See dictation    Orders Placed This Encounter   Procedures     Lipid Profile     ALT     Basic metabolic panel     Lipid Profile     ALT     Follow-Up with Cardiologist       Orders Placed This Encounter   Medications     ezetimibe (ZETIA) 10 MG tablet     Sig: Take 1 tablet (10 mg) by mouth daily     Dispense:  90 tablet     Refill:  3       There are no discontinued medications.      Encounter Diagnoses   Name Primary?     Long term current use of anticoagulants with INR goal of 2.0-3.0 Yes     Other acute pulmonary embolism, unspecified whether acute cor pulmonale present (H)      Essential hypertension      Mixed hyperlipidemia      Coronary artery disease involving native coronary artery of native heart without angina pectoris      Antiphospholipid antibody syndrome (H)      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)      Old myocardial infarction      Pulmonary embolism without acute cor pulmonale, unspecified chronicity, unspecified pulmonary embolism type (H)        CURRENT MEDICATIONS:  Current Outpatient Medications   Medication Sig Dispense Refill     albuterol (PROAIR HFA/PROVENTIL HFA/VENTOLIN HFA) 108 (90 Base) MCG/ACT inhaler Inhale 2 puffs into the lungs every 6 hours as needed for shortness of breath / dyspnea or wheezing 1 Inhaler 0     atorvastatin (LIPITOR) 80 MG tablet Take 1 tablet (80 mg) by mouth At Bedtime 90 tablet 0     Calcium Citrate-Vitamin D (CALCIUM CITRATE + D PO) Take 600 mg by mouth 2 times daily       ezetimibe (ZETIA) 10 MG tablet Take 1 tablet (10 mg) by mouth daily 90 tablet 3     ibuprofen (ADVIL/MOTRIN) 200 MG capsule Take 600 mg by mouth daily as needed for fever       lisinopril (ZESTRIL) 5 MG tablet TAKE 1 TABLET(5 MG) BY MOUTH DAILY 90 tablet 0     metoprolol succinate ER (TOPROL-XL) 25 MG 24 hr tablet Take 0.5 tablets (12.5 mg) by mouth daily 90 tablet 3     nitroGLYcerin (NITROSTAT) 0.4 MG  sublingual tablet Place 1 tablet (0.4 mg) under the tongue every 5 minutes as needed for chest pain 25 tablet 3     vardenafil (LEVITRA) 20 MG tablet Take 0.5-1 tablets (10-20 mg) by mouth daily as needed Never use with nitroglycerin, terazosin or doxazosin. 12 tablet 11     warfarin ANTICOAGULANT (COUMADIN) 5 MG tablet Take 1 1/2 tabs daily or as directed per INR clinic 145 tablet 1     ASPIRIN NOT PRESCRIBED (INTENTIONAL) Please choose reason not prescribed, below 0 each 0     Cholecalciferol (VITAMIN D3 PO) Take 1,000 Units by mouth 2 times daily       fluticasone-salmeterol (ADVAIR DISKUS) 250-50 MCG/DOSE inhaler Inhale 1 puff into the lungs 2 times daily 1 Inhaler 1       ALLERGIES     Allergies   Allergen Reactions     Seasonal Allergies      Simvastatin Muscle Pain (Myalgia)      at 20 mg daily.       PAST MEDICAL HISTORY:  Past Medical History:   Diagnosis Date     Antiphospholipid antibody syndrome (H) 5/25/2017     CAD (coronary artery disease), native coronary artery 10/2015    9/2015 Heart cath - 90% diagonal, 50-60% CFX, 100% prox RCA occlusion - MAL placed in RCA, 10/2015 EF 35-40% by Echo     DVT (deep venous thrombosis) (H) 9/2015 9/2015 Occlusive DVT extending from left common femoral to mid left popliteal vein     Hypercholesteraemia      Hypertension      PE (pulmonary embolism) 9/2015     PMR (polymyalgia rheumatica) (H)      Polymyalgia rheumatica (H)      STEMI (ST elevation myocardial infarction) (H) 10/2015    10/2015 Inferior STEMI - 100% RCA occlusion with MAL placed       PAST SURGICAL HISTORY:  Past Surgical History:   Procedure Laterality Date     HEART CATH STENT COR W/WO PTCA  10/2015    90% diagonal, 50-60% CFX, 100% prox RCA occlusion - MAL placed in RCA     ORTHOPEDIC SURGERY  08/2015    right carpal tunnel       FAMILY HISTORY:  Family History   Problem Relation Age of Onset     Hypertension Brother      Hypertension Brother        SOCIAL HISTORY:  Social History      Socioeconomic History     Marital status:      Spouse name: None     Number of children: None     Years of education: None     Highest education level: None   Occupational History     None   Social Needs     Financial resource strain: None     Food insecurity     Worry: None     Inability: None     Transportation needs     Medical: None     Non-medical: None   Tobacco Use     Smoking status: Former Smoker     Packs/day: 0.50     Years: 5.00     Pack years: 2.50     Types: Cigarettes     Start date:      Quit date:      Years since quittin.5     Smokeless tobacco: Never Used   Substance and Sexual Activity     Alcohol use: Yes     Comment: 1 drink per day     Drug use: No     Sexual activity: Yes     Partners: Female   Lifestyle     Physical activity     Days per week: None     Minutes per session: None     Stress: None   Relationships     Social connections     Talks on phone: None     Gets together: None     Attends Latter day service: None     Active member of club or organization: None     Attends meetings of clubs or organizations: None     Relationship status: None     Intimate partner violence     Fear of current or ex partner: None     Emotionally abused: None     Physically abused: None     Forced sexual activity: None   Other Topics Concern      Service Not Asked     Blood Transfusions Not Asked     Caffeine Concern Yes     Comment: 1 cup coffee daily     Occupational Exposure Not Asked     Hobby Hazards Not Asked     Sleep Concern No     Stress Concern Yes     Comment: related to relationship     Weight Concern No     Special Diet Yes     Comment: mediterranian diet     Back Care Not Asked     Exercise Yes     Comment: walking, cardiac rehab starting     Bike Helmet Not Asked     Seat Belt Not Asked     Self-Exams Not Asked     Parent/sibling w/ CABG, MI or angioplasty before 65F 55M? Not Asked   Social History Narrative     None       Review of Systems:  Skin:  Negative      "  Eyes:  Positive for glasses    ENT:  Negative hoarseness;nasal congestion;postnasal drainage    Respiratory:  Positive for cough;wheezing     Cardiovascular:    Positive for;chest pain 1 episode of chest pain two months ago  Gastroenterology: Negative heartburn occ.  Genitourinary:  Negative urinary frequency    Musculoskeletal:  Positive for arthritis;joint pain left hip pain  Neurologic:  Negative (above eyes occasionally, something new)    Psychiatric:  Negative excessive stress    Heme/Lymph/Imm:  Positive for allergies seasonal  Endocrine:  Negative        Physical Exam:  Vitals: BP (!) 140/80   Pulse 67   Ht 1.753 m (5' 9\")   Wt 89.6 kg (197 lb 9.6 oz)   BMI 29.18 kg/m      Constitutional:  in no acute distress;cooperative;well developed;well nourished        Skin:  no apparent skin lesions or masses noted          Head:  normocephalic, no masses or lesions        Eyes:  pupils equal and round        Lymph:No Cervical lymphadenopathy present     ENT:  no pallor or cyanosis        Neck:  JVP normal;no carotid bruit;carotid pulses are full and equal bilaterally        Respiratory:  clear to auscultation;normal symmetry         Cardiac: regular rhythm, normal S1/S2, no S3 or S4, apical impulse not displaced, no murmurs, gallops or rubs                pulses full and equal     1+             1+                    GI:  not assessed this visit        Extremities and Muscular Skeletal:  no edema;no deformities, clubbing, cyanosis, erythema observed              Neurological:  affect appropriate;no gross motor deficits        Psych:  Alert and Oriented x 3        CC  No referring provider defined for this encounter.              "

## 2021-06-23 NOTE — PROGRESS NOTES
Service Date: 2021    Filipe Goldberg MD  [address], [address]  [place], [address]    RE:  Yogesh Abreu   MRN:  [id number]   :  1940          Dear Dr. Goldberg:  It was my pleasure to see your patient, Yogesh Abreu, who is a very pleasant 80-year-old patient who suffered an acute inferior myocardial infarct with stenting of the right coronary artery in the past.  This patient also has a history of antiphospholipid antibody with a deep venous thrombosis and pulmonary embolism and is chronically anticoagulated with warfarin.  At present, he is feeling well.  He did have one short episode of an electric feeling lasting for a few seconds going across his chest.  That has not recurred.  He does work out on his treadmill and has no difficulty with that.  He occasionally notices that when he is working out on the treadmill that his heart rate will go from 157 to 57 based upon the heart rate detector across his chest but he does not feel any way unwell or short of breath when that happens and it is almost certainly a hardware issue with the cardiac detector.  He has noticed that sometimes when he is walking with his friends that he is short of breath, but he has also noticed that the more he walks the less short of breath he gets so I suspect that it was initially deconditioning and his breathing has improved as he has become fitter.  Initially, his blood pressure was raised when he came into see use at 152/89, but on recheck at the end of our visit it had dropped to 140/80.  He tells me that he does check his blood pressure at home and he is a former pharmacist.  His blood pressures are typically in the 120s.  The patient's lipid profile 1 week ago showed an LDL of 83, HDL of 41 and triglycerides of 69 with a total cholesterol of 138.  While this is a good cholesterol we would like to see the LDL cholesterol lower than that given that he has had a prior myocardial  infarct.  The patient is taking lisinopril, so we do recheck his basic metabolic profile every year and his basic metabolic profile on 06/17/2021 was normal with sodium 140, potassium 4.0, BUN of 23 and a creatinine of 0.81 with a GFR of 85.  Also, his liver function tests are normal with an ALT of 34, indicating no liver toxicity from his statin therapy.    IMPRESSION:       1. Coronary artery disease and status post inferior myocardial infarct.  The patient is asymptomatic with respect to coronary artery disease with no symptoms suggestive of angina pectoris.     2. 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  Echocardiography in 2018 actually had his resting ejection fraction higher at 55% with mild inferior wall hypokinesis.     3. Good lipid profile, but would like to see the LDL cholesterol lower.       4. Most likely normotensive based upon his blood pressure measurements at home and marked improvement in his blood pressure from the beginning of the visit to the end of the visit.     5. Normal basic metabolic profile on lisinopril.     6. No evidence of hepatic toxicity on high dose atorvastatin.    PLAN:  We will add Zetia 10 mg to his atorvastatin medication to try and improve his LDL cholesterol.  The addition of Zetia should reduce his LDL cholesterol by a further 30%.  We will recheck his lipids in 6 weeks' time.  We will also check an ALT at that time.  I will see the patient back again in 1 year's time, but as always, he has been told to contact me if he has any questions or any concerns.  I would probably repeat his echocardiogram in 2023, which will be 5 years after his previous echo just to follow his wall motion abnormality from his prior inferior myocardial infarct.    Many thanks again for allowing me to be involved in the  care of this very nice patient.  As always, he has been told to contact me if he has any questions or any concerns.    Sincerely,     Frederic Mauro MD, [facility]        D: 2021   T: 2021   MT: kathy    Name:     RUIZ LAIRD  MRN:      [id number]        Account:      [id number]   :      1940           Service Date: 2021       Document: [id number]

## 2021-06-23 NOTE — LETTER
6/23/2021    Filipe Goldberg MD  [address]  [place] [address]    RE: Yogesh Abreu       Dear Colleague,    I had the pleasure of seeing Yogesh Abreu in the [facility].    HPI and Plan:   See dictation    Orders Placed This Encounter   Procedures     Lipid Profile     ALT     Basic metabolic panel     Lipid Profile     ALT     Follow-Up with Cardiologist       Orders Placed This Encounter   Medications     ezetimibe (ZETIA) 10 MG tablet     Sig: Take 1 tablet (10 mg) by mouth daily     Dispense:  90 tablet     Refill:  3       There are no discontinued medications.      Encounter Diagnoses   Name Primary?     Long term current use of anticoagulants with INR goal of 2.0-3.0 Yes     Other acute pulmonary embolism, unspecified whether acute cor pulmonale present (H)      Essential hypertension      Mixed hyperlipidemia      Coronary artery disease involving native coronary artery of native heart without angina pectoris      Antiphospholipid antibody syndrome (H)      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)      Old myocardial infarction      Pulmonary embolism without acute cor pulmonale, unspecified chronicity, unspecified pulmonary embolism type (H)        CURRENT MEDICATIONS:  Current Outpatient Medications   Medication Sig Dispense Refill     albuterol (PROAIR HFA/PROVENTIL HFA/VENTOLIN HFA) 108 (90 Base) MCG/ACT inhaler Inhale 2 puffs into the lungs every 6 hours as needed for shortness of breath / dyspnea or wheezing 1 Inhaler 0     atorvastatin (LIPITOR) 80 MG tablet Take 1 tablet (80 mg) by mouth At Bedtime 90 tablet 0     Calcium Citrate-Vitamin D (CALCIUM CITRATE + D PO) Take 600 mg by mouth 2 times daily       ezetimibe (ZETIA) 10 MG tablet Take 1 tablet (10 mg) by mouth daily 90 tablet 3     ibuprofen (ADVIL/MOTRIN) 200 MG capsule Take 600 mg by mouth daily as  needed for fever       lisinopril (ZESTRIL) 5 MG tablet TAKE 1 TABLET(5 MG) BY MOUTH DAILY 90 tablet 0     metoprolol succinate ER (TOPROL-XL) 25 MG 24 hr tablet Take 0.5 tablets (12.5 mg) by mouth daily 90 tablet 3     nitroGLYcerin (NITROSTAT) 0.4 MG sublingual tablet Place 1 tablet (0.4 mg) under the tongue every 5 minutes as needed for chest pain 25 tablet 3     vardenafil (LEVITRA) 20 MG tablet Take 0.5-1 tablets (10-20 mg) by mouth daily as needed Never use with nitroglycerin, terazosin or doxazosin. 12 tablet 11     warfarin ANTICOAGULANT (COUMADIN) 5 MG tablet Take 1 1/2 tabs daily or as directed per INR clinic 145 tablet 1     ASPIRIN NOT PRESCRIBED (INTENTIONAL) Please choose reason not prescribed, below 0 each 0     Cholecalciferol (VITAMIN D3 PO) Take 1,000 Units by mouth 2 times daily       fluticasone-salmeterol (ADVAIR DISKUS) 250-50 MCG/DOSE inhaler Inhale 1 puff into the lungs 2 times daily 1 Inhaler 1       ALLERGIES     Allergies   Allergen Reactions     Seasonal Allergies      Simvastatin Muscle Pain (Myalgia)      at 20 mg daily.       PAST MEDICAL HISTORY:  Past Medical History:   Diagnosis Date     Antiphospholipid antibody syndrome (H) 5/25/2017     CAD (coronary artery disease), native coronary artery 10/2015    9/2015 Heart cath - 90% diagonal, 50-60% CFX, 100% prox RCA occlusion - MAL placed in RCA, 10/2015 EF 35-40% by Echo     DVT (deep venous thrombosis) (H) 9/2015 9/2015 Occlusive DVT extending from left common femoral to mid left popliteal vein     Hypercholesteraemia      Hypertension      PE (pulmonary embolism) 9/2015     PMR (polymyalgia rheumatica) (H)      Polymyalgia rheumatica (H)      STEMI (ST elevation myocardial infarction) (H) 10/2015    10/2015 Inferior STEMI - 100% RCA occlusion with MAL placed       PAST SURGICAL HISTORY:  Past Surgical History:   Procedure Laterality Date     HEART CATH STENT COR W/WO PTCA  10/2015    90% diagonal, 50-60% CFX, 100% prox RCA  occlusion - MAL placed in RCA     ORTHOPEDIC SURGERY  2015    right carpal tunnel       FAMILY HISTORY:  Family History   Problem Relation Age of Onset     Hypertension Brother      Hypertension Brother        SOCIAL HISTORY:  Social History     Socioeconomic History     Marital status:      Spouse name: None     Number of children: None     Years of education: None     Highest education level: None   Occupational History     None   Social Needs     Financial resource strain: None     Food insecurity     Worry: None     Inability: None     Transportation needs     Medical: None     Non-medical: None   Tobacco Use     Smoking status: Former Smoker     Packs/day: 0.50     Years: 5.00     Pack years: 2.50     Types: Cigarettes     Start date:      Quit date:      Years since quittin.5     Smokeless tobacco: Never Used   Substance and Sexual Activity     Alcohol use: Yes     Comment: 1 drink per day     Drug use: No     Sexual activity: Yes     Partners: Female   Lifestyle     Physical activity     Days per week: None     Minutes per session: None     Stress: None   Relationships     Social connections     Talks on phone: None     Gets together: None     Attends Zoroastrian service: None     Active member of club or organization: None     Attends meetings of clubs or organizations: None     Relationship status: None     Intimate partner violence     Fear of current or ex partner: None     Emotionally abused: None     Physically abused: None     Forced sexual activity: None   Other Topics Concern      Service Not Asked     Blood Transfusions Not Asked     Caffeine Concern Yes     Comment: 1 cup coffee daily     Occupational Exposure Not Asked     Hobby Hazards Not Asked     Sleep Concern No     Stress Concern Yes     Comment: related to relationship     Weight Concern No     Special Diet Yes     Comment: mediterranian diet     Back Care Not Asked     Exercise Yes     Comment: walking, cardiac  "rehab starting     Bike Helmet Not Asked     Seat Belt Not Asked     Self-Exams Not Asked     Parent/sibling w/ CABG, MI or angioplasty before 65F 55M? Not Asked   Social History Narrative     None       Review of Systems:  Skin:  Negative       Eyes:  Positive for glasses    ENT:  Negative hoarseness;nasal congestion;postnasal drainage    Respiratory:  Positive for cough;wheezing     Cardiovascular:    Positive for;chest pain 1 episode of chest pain two months ago  Gastroenterology: Negative heartburn occ.  Genitourinary:  Negative urinary frequency    Musculoskeletal:  Positive for arthritis;joint pain left hip pain  Neurologic:  Negative (above eyes occasionally, something new)    Psychiatric:  Negative excessive stress    Heme/Lymph/Imm:  Positive for allergies seasonal  Endocrine:  Negative        Physical Exam:  Vitals: BP (!) 140/80   Pulse 67   Ht 1.753 m (5' 9\")   Wt 89.6 kg (197 lb 9.6 oz)   BMI 29.18 kg/m      Constitutional:  in no acute distress;cooperative;well developed;well nourished        Skin:  no apparent skin lesions or masses noted          Head:  normocephalic, no masses or lesions        Eyes:  pupils equal and round        Lymph:No Cervical lymphadenopathy present     ENT:  no pallor or cyanosis        Neck:  JVP normal;no carotid bruit;carotid pulses are full and equal bilaterally        Respiratory:  clear to auscultation;normal symmetry         Cardiac: regular rhythm, normal S1/S2, no S3 or S4, apical impulse not displaced, no murmurs, gallops or rubs                pulses full and equal     1+             1+                    GI:  not assessed this visit        Extremities and Muscular Skeletal:  no edema;no deformities, clubbing, cyanosis, erythema observed              Neurological:  affect appropriate;no gross motor deficits        Psych:  Alert and Oriented x 3      Thank you for allowing me to participate in the care of your patient.      Sincerely,     Frederic Collado" MD Ferny, MD     [facility]    cc:   No referring provider defined for this encounter.

## 2021-06-27 ENCOUNTER — MYC MEDICAL ADVICE (OUTPATIENT)
Dept: FAMILY MEDICINE | Facility: CLINIC | Age: 81
End: 2021-06-27

## 2021-06-27 DIAGNOSIS — I25.110 CORONARY ARTERY DISEASE INVOLVING NATIVE CORONARY ARTERY OF NATIVE HEART WITH UNSTABLE ANGINA PECTORIS (H): ICD-10-CM

## 2021-06-28 DIAGNOSIS — I25.110 CORONARY ARTERY DISEASE INVOLVING NATIVE CORONARY ARTERY OF NATIVE HEART WITH UNSTABLE ANGINA PECTORIS (H): ICD-10-CM

## 2021-06-28 DIAGNOSIS — Z79.01 LONG TERM CURRENT USE OF ANTICOAGULANT THERAPY: ICD-10-CM

## 2021-06-28 DIAGNOSIS — Z86.711 HISTORY OF PULMONARY EMBOLISM: ICD-10-CM

## 2021-06-28 RX ORDER — ATORVASTATIN CALCIUM 80 MG/1
80 TABLET, FILM COATED ORAL AT BEDTIME
Qty: 90 TABLET | Refills: 3 | Status: SHIPPED | OUTPATIENT
Start: 2021-06-28 | End: 2022-09-29

## 2021-06-28 RX ORDER — LISINOPRIL 5 MG/1
TABLET ORAL
Qty: 90 TABLET | Refills: 0 | Status: SHIPPED | OUTPATIENT
Start: 2021-06-28 | End: 2021-12-06

## 2021-06-28 RX ORDER — WARFARIN SODIUM 5 MG/1
TABLET ORAL
Qty: 145 TABLET | Refills: 1 | Status: SHIPPED | OUTPATIENT
Start: 2021-06-28 | End: 2021-12-06

## 2021-06-28 NOTE — TELEPHONE ENCOUNTER
BP Readings from Last 3 Encounters:   06/23/21 (!) 140/80   11/09/20 120/64   09/17/20 (!) 148/88     Please refill as appropriate.  Amanda Watters RN  [facility]

## 2021-06-30 ENCOUNTER — TRANSFERRED RECORDS (OUTPATIENT)
Dept: HEALTH INFORMATION MANAGEMENT | Facility: CLINIC | Age: 81
End: 2021-06-30

## 2021-06-30 ENCOUNTER — ANTICOAGULATION THERAPY VISIT (OUTPATIENT)
Dept: CARDIOLOGY | Facility: CLINIC | Age: 81
End: 2021-06-30
Payer: COMMERCIAL

## 2021-06-30 DIAGNOSIS — I26.99 OTHER ACUTE PULMONARY EMBOLISM, UNSPECIFIED WHETHER ACUTE COR PULMONALE PRESENT (H): ICD-10-CM

## 2021-06-30 DIAGNOSIS — Z79.01 LONG TERM CURRENT USE OF ANTICOAGULANTS WITH INR GOAL OF 2.0-3.0: ICD-10-CM

## 2021-06-30 LAB — INR PPP: 2.1 (ref 0.9–1.1)

## 2021-06-30 NOTE — PROGRESS NOTES
ANTICOAGULATION FOLLOW-UP CLINIC VISIT    Patient Name:  Yogesh Abreu  Date:  2021  Contact Type:  [employer] website    SUBJECTIVE:         OBJECTIVE    Recent labs: (last 7 days)     21   INR 2.1*       ASSESSMENT / PLAN  INR assessment THER    Recheck INR In: 2 WEEKS    INR Location Home INR    Billed home INR No      Anticoagulation Summary  As of 2021    INR goal:  2.0-3.0   TTR:  68.0 % (1 y)   INR used for dosin.1 (2021)   Warfarin maintenance plan:  7.5 mg (5 mg x 1.5) every day   Full warfarin instructions:  7.5 mg every day   Weekly warfarin total:  52.5 mg   No change documented:  Tajna Wills RN   Plan last modified:  Tanja Wills RN (3/11/2021)   Next INR check:  2021   Target end date:  Indefinite    Indications    PE (pulmonary embolism) [I26.99]  Long term current use of anticoagulants with INR goal of 2.0-3.0 [Z79.01]             Anticoagulation Episode Summary     INR check location:      Preferred lab:      Send INR reminders to:  [facility] HEART INR NURSE    Comments:        Anticoagulation Care Providers     Provider Role Specialty Phone number    Frederic Isaacs MD Referring Cardiovascular Disease [phone]            See the Encounter Report to view Anticoagulation Flowsheet and Dosing Calendar (Go to Encounters tab in chart review, and find the Anticoagulation Therapy Visit)    Home INR 2.1 Will continue current dosing of 7.5 mg daily and recheck in 2 weeks. Will call pt after the next INR check.     Tanja Wills RN

## 2021-07-07 ENCOUNTER — TELEPHONE (OUTPATIENT)
Dept: CARDIOLOGY | Facility: CLINIC | Age: 81
End: 2021-07-07

## 2021-07-07 NOTE — TELEPHONE ENCOUNTER
Pt started taking Zetia 1 week ago. Pt also takes Warfarin. Zetia can increase bleeding risk when taken with warfarin.  He is due for an Home INR on 7/14 which would be about 2 weeks after starting the new med so he will check the INR as planned. Rickey

## 2021-07-14 LAB — INR PPP: 2.7

## 2021-07-15 ENCOUNTER — ANTICOAGULATION THERAPY VISIT (OUTPATIENT)
Dept: CARDIOLOGY | Facility: CLINIC | Age: 81
End: 2021-07-15
Payer: COMMERCIAL

## 2021-07-15 DIAGNOSIS — Z79.01 LONG TERM CURRENT USE OF ANTICOAGULANTS WITH INR GOAL OF 2.0-3.0: ICD-10-CM

## 2021-07-15 DIAGNOSIS — I26.99 PULMONARY EMBOLISM (H): Primary | ICD-10-CM

## 2021-07-15 PROCEDURE — 99207 PR NO CHARGE NURSE ONLY: CPT

## 2021-07-15 NOTE — PROGRESS NOTES
ANTICOAGULATION FOLLOW-UP CLINIC VISIT    Patient Name:  Yogesh Abreu  Date:  7/15/2021  Contact Type:  Telephone    SUBJECTIVE:  Patient Findings     Positives:  Change in medications (Zetia started 2 weeks ago)        Clinical Outcomes     Negatives:  Major bleeding event, Thromboembolic event, Anticoagulation-related hospital admission, Anticoagulation-related ED visit, Anticoagulation-related fatality           OBJECTIVE    Recent labs: (last 7 days)     21  0000   INR 2.7       ASSESSMENT / PLAN  INR assessment THER    Recheck INR In: 2 WEEKS    INR Location Home INR    Billed home INR No      Anticoagulation Summary  As of 7/15/2021    INR goal:  2.0-3.0   TTR:  68.0 % (1 y)   INR used for dosin.7 (2021)   Warfarin maintenance plan:  7.5 mg (5 mg x 1.5) every day   Full warfarin instructions:  7.5 mg every day   Weekly warfarin total:  52.5 mg   No change documented:  Tanja Wills RN   Plan last modified:  Tanja Wills RN (3/11/2021)   Next INR check:  2021   Target end date:  Indefinite    Indications    PE (pulmonary embolism) [I26.99]  Long term current use of anticoagulants with INR goal of 2.0-3.0 [Z79.01]             Anticoagulation Episode Summary     INR check location:      Preferred lab:      Send INR reminders to:  [facility] HEART INR NURSE    Comments:        Anticoagulation Care Providers     Provider Role Specialty Phone number    FernyFrederic MD Referring Cardiovascular Disease [phone]            See the Encounter Report to view Anticoagulation Flowsheet and Dosing Calendar (Go to Encounters tab in chart review, and find the Anticoagulation Therapy Visit)    21 Home INR 2.7 Started taking Zetia 2 weeks ago (per micromedex, zetia interacts with warfarin - can increase bleeding risk). No other med or diet changes. Denies abnormal bleeding or bruising. Will continue current dosing of 7.5 mg daily and recheck in 2 weeks.   Tanja Wills RN

## 2021-07-28 ENCOUNTER — TRANSFERRED RECORDS (OUTPATIENT)
Dept: HEALTH INFORMATION MANAGEMENT | Facility: CLINIC | Age: 81
End: 2021-07-28

## 2021-07-29 ENCOUNTER — ANTICOAGULATION THERAPY VISIT (OUTPATIENT)
Dept: CARDIOLOGY | Facility: CLINIC | Age: 81
End: 2021-07-29
Payer: COMMERCIAL

## 2021-07-29 DIAGNOSIS — Z79.01 LONG TERM CURRENT USE OF ANTICOAGULANTS WITH INR GOAL OF 2.0-3.0: ICD-10-CM

## 2021-07-29 DIAGNOSIS — I26.99 PULMONARY EMBOLISM (H): Primary | ICD-10-CM

## 2021-07-29 LAB — INR (EXTERNAL): 3.1 (ref 0.9–1.1)

## 2021-07-29 PROCEDURE — 99207 PR NO CHARGE NURSE ONLY: CPT | Performed by: INTERNAL MEDICINE

## 2021-07-29 NOTE — PROGRESS NOTES
ANTICOAGULATION FOLLOW-UP CLINIC VISIT    Patient Name:  Yogesh Abreu  Date:  7/29/2021  Contact Type:  Telephone    SUBJECTIVE:  Patient Findings     Positives:  Change in medications (taking Zetia for 3 weeks), Change in diet/appetite (ate less greens this week)        Clinical Outcomes     Negatives:  Major bleeding event, Thromboembolic event, Anticoagulation-related hospital admission, Anticoagulation-related ED visit, Anticoagulation-related fatality           OBJECTIVE    No results for input(s): INR, [id number], [id number], 2AGN, F2 in the last 168 hours.    ASSESSMENT / PLAN  No question data found.  Anticoagulation Summary  As of 7/29/2021    INR goal:  2.0-3.0   TTR:  67.0 % (1 y)   INR used for dosing:  3.1 (7/28/2021)   Warfarin maintenance plan:  5 mg (5 mg x 1) every Thu; 7.5 mg (5 mg x 1.5) all other days   Full warfarin instructions:  5 mg every Thu; 7.5 mg all other days   Weekly warfarin total:  50 mg   Plan last modified:  Tanja Wills RN (7/29/2021)   Next INR check:  8/11/2021   Target end date:  Indefinite    Indications    PE (pulmonary embolism) [I26.99]  Long term current use of anticoagulants with INR goal of 2.0-3.0 [Z79.01]             Anticoagulation Episode Summary     INR check location:      Preferred lab:      Send INR reminders to:  [facility] HEART INR NURSE    Comments:        Anticoagulation Care Providers     Provider Role Specialty Phone number    Frederic Isaacs MD Referring Cardiovascular Disease [phone]            See the Encounter Report to view Anticoagulation Flowsheet and Dosing Calendar (Go to Encounters tab in chart review, and find the Anticoagulation Therapy Visit)    7/28 Home INR 3.1 He started taking Zetia 3 weeks ago (per micromedex, Zetia can increase INR when taken with warfarin). No change in other meds. Ate a little less greens this week. Denies abnormal bleeding or bruising. Will decrease dosing to 5 mg Thurs and 7.5 mg all other days  with recheck in 2 weeks. Dosage adjustment made based on physician directed care plan.  INR clinic referral renewal submitted for signature.   Has the patient previously taken warfarin? yes  If yes, for what indication? PE    Does the patient have any of the following indications for a higher range of 2.5-3.5:    Mitral position mechanical valve? no    Michaela-Shiley, Ball and Cage or Monoleaflet valve (regardless of position) no    Other (if yes, please explain) no    Tanja Wills RN

## 2021-08-03 DIAGNOSIS — I25.10 CORONARY ARTERY DISEASE INVOLVING NATIVE CORONARY ARTERY OF NATIVE HEART WITHOUT ANGINA PECTORIS: ICD-10-CM

## 2021-08-03 DIAGNOSIS — R00.0 TACHYCARDIA: ICD-10-CM

## 2021-08-03 DIAGNOSIS — I10 ESSENTIAL HYPERTENSION: ICD-10-CM

## 2021-08-03 RX ORDER — METOPROLOL SUCCINATE 25 MG/1
12.5 TABLET, EXTENDED RELEASE ORAL DAILY
Qty: 45 TABLET | Refills: 3 | Status: SHIPPED | OUTPATIENT
Start: 2021-08-03 | End: 2022-06-01

## 2021-08-09 ENCOUNTER — TELEPHONE (OUTPATIENT)
Dept: CARDIOLOGY | Facility: CLINIC | Age: 81
End: 2021-08-09

## 2021-08-09 ENCOUNTER — LAB (OUTPATIENT)
Dept: LAB | Facility: CLINIC | Age: 81
End: 2021-08-09
Payer: COMMERCIAL

## 2021-08-09 DIAGNOSIS — E78.2 MIXED HYPERLIPIDEMIA: ICD-10-CM

## 2021-08-09 DIAGNOSIS — I25.110 CORONARY ARTERY DISEASE INVOLVING NATIVE CORONARY ARTERY OF NATIVE HEART WITH UNSTABLE ANGINA PECTORIS (H): ICD-10-CM

## 2021-08-09 LAB
ALT SERPL W P-5'-P-CCNC: 35 U/L (ref 0–70)
CHOLEST SERPL-MCNC: 128 MG/DL
FASTING STATUS PATIENT QL REPORTED: YES
HDLC SERPL-MCNC: 42 MG/DL
LDLC SERPL CALC-MCNC: 67 MG/DL
NONHDLC SERPL-MCNC: 86 MG/DL
TRIGL SERPL-MCNC: 97 MG/DL

## 2021-08-09 PROCEDURE — 36415 COLL VENOUS BLD VENIPUNCTURE: CPT | Performed by: INTERNAL MEDICINE

## 2021-08-09 PROCEDURE — 80061 LIPID PANEL: CPT | Performed by: INTERNAL MEDICINE

## 2021-08-09 PROCEDURE — 84460 ALANINE AMINO (ALT) (SGPT): CPT | Performed by: INTERNAL MEDICINE

## 2021-08-09 NOTE — TELEPHONE ENCOUNTER
FLP/ALT labs received. LDL now 67. RN will send to Dr. Isaacs for review to inquire if any further recommendations.         Component      Latest Ref Rng & Units 8/9/2021   Cholesterol      <200 mg/dL 128   Triglycerides      <150 mg/dL 97   HDL Cholesterol      >=40 mg/dL 42   LDL Cholesterol Calculated      <=100 mg/dL 67   Non HDL Cholesterol      <130 mg/dL 86   Patient Fasting > 8hrs?       Yes   ALT      0 - 70 U/L 35     Component      Latest Ref Rng & Units 6/17/2021   Cholesterol      <200 mg/dL 138   Triglycerides      <150 mg/dL 69   HDL Cholesterol      >39 mg/dL 41   LDL Cholesterol Calculated      <100 mg/dL 83   Non HDL Cholesterol      <130 mg/dL 97                 6/23/21 visit Dr. Isaacs  IMPRESSION:       1. Coronary artery disease and status post inferior myocardial infarct.  The patient is asymptomatic with respect to coronary artery disease with no symptoms suggestive of angina pectoris.     2. 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  Echocardiography in 2018 actually had his resting ejection fraction higher at 55% with mild inferior wall hypokinesis.     3. Good lipid profile, but would like to see the LDL cholesterol lower.       4. Most likely normotensive based upon his blood pressure measurements at home and marked improvement in his blood pressure from the beginning of the visit to the end of the visit.     5. Normal basic metabolic profile on lisinopril.     6. No evidence of hepatic toxicity on high dose atorvastatin.     PLAN:  We will add Zetia 10 mg to his atorvastatin medication to try and improve his LDL cholesterol.  The addition of Zetia should reduce his LDL cholesterol by a further 30%.  We will recheck his lipids in 6 weeks' time.  We will also check an ALT at that time.  I will see the  patient back again in 1 year's time, but as always, he has been told to contact me if he has any questions or any concerns.  I would probably repeat his echocardiogram in 2023, which will be 5 years after his previous echo just to follow his wall motion abnormality from his prior inferior myocardial infarct.     Many thanks again for allowing me to be involved in the care of this very nice patient.  As always, he has been told to contact me if he has any questions or any concerns.     Sincerely,      Frederic Mauro MD, FACC

## 2021-08-11 ENCOUNTER — TRANSFERRED RECORDS (OUTPATIENT)
Dept: HEALTH INFORMATION MANAGEMENT | Facility: CLINIC | Age: 81
End: 2021-08-11

## 2021-08-12 ENCOUNTER — ANTICOAGULATION THERAPY VISIT (OUTPATIENT)
Dept: CARDIOLOGY | Facility: CLINIC | Age: 81
End: 2021-08-12
Payer: COMMERCIAL

## 2021-08-12 DIAGNOSIS — Z79.01 LONG TERM CURRENT USE OF ANTICOAGULANTS WITH INR GOAL OF 2.0-3.0: Primary | ICD-10-CM

## 2021-08-12 LAB — INR (EXTERNAL): 2.9 (ref 0.9–1.1)

## 2021-08-12 PROCEDURE — 99207 PR NO CHARGE NURSE ONLY: CPT

## 2021-08-12 NOTE — PROGRESS NOTES
ANTICOAGULATION FOLLOW-UP CLINIC VISIT    Patient Name:  Yogesh Abreu  Date:  2021  Contact Type:  Telephone    SUBJECTIVE:  Patient Findings         Clinical Outcomes     Negatives:  Major bleeding event, Thromboembolic event, Anticoagulation-related hospital admission, Anticoagulation-related ED visit, Anticoagulation-related fatality           OBJECTIVE    Recent labs: (last 7 days)     21   INR 2.9*       ASSESSMENT / PLAN  INR assessment THER    Recheck INR In: 2 WEEKS    INR Location Home INR      Anticoagulation Summary  As of 2021    INR goal:  2.0-3.0   TTR:  65.1 % (1 y)   INR used for dosin.9 (2021)   Warfarin maintenance plan:  5 mg (5 mg x 1) every Thu; 7.5 mg (5 mg x 1.5) all other days   Full warfarin instructions:  5 mg every Thu; 7.5 mg all other days   Weekly warfarin total:  50 mg   No change documented:  Mandy Ferguson RN   Plan last modified:  Tanja Wills RN (2021)   Next INR check:  2021   Target end date:  Indefinite    Indications    PE (pulmonary embolism) [I26.99]  Long term current use of anticoagulants with INR goal of 2.0-3.0 [Z79.01]             Anticoagulation Episode Summary     INR check location:      Preferred lab:      Send INR reminders to:  [facility] HEART INR NURSE    Comments:        Anticoagulation Care Providers     Provider Role Specialty Phone number    RobertFrederic Mauro MD Referring Cardiovascular Disease [phone]            See the Encounter Report to view Anticoagulation Flowsheet and Dosing Calendar (Go to Encounters tab in chart review, and find the Anticoagulation Therapy Visit)    Home INR 2.9 done late yesterday and reviewed this morning.  Called and spoke to the patient.  Continue the decreased dosing schedule and recheck at home in 2 weeks.  Canelo Ferguson, ENZO

## 2021-08-25 ENCOUNTER — TRANSFERRED RECORDS (OUTPATIENT)
Dept: HEALTH INFORMATION MANAGEMENT | Facility: CLINIC | Age: 81
End: 2021-08-25

## 2021-08-25 ENCOUNTER — ANTICOAGULATION THERAPY VISIT (OUTPATIENT)
Dept: CARDIOLOGY | Facility: CLINIC | Age: 81
End: 2021-08-25
Payer: COMMERCIAL

## 2021-08-25 DIAGNOSIS — I26.99 PULMONARY EMBOLISM (H): Primary | ICD-10-CM

## 2021-08-25 DIAGNOSIS — Z79.01 LONG TERM CURRENT USE OF ANTICOAGULANTS WITH INR GOAL OF 2.0-3.0: ICD-10-CM

## 2021-08-25 LAB — INR (EXTERNAL): 2.5 (ref 0.9–1.1)

## 2021-08-25 PROCEDURE — 99207 PR NO CHARGE NURSE ONLY: CPT

## 2021-09-08 ENCOUNTER — ANTICOAGULATION THERAPY VISIT (OUTPATIENT)
Dept: CARDIOLOGY | Facility: CLINIC | Age: 81
End: 2021-09-08
Payer: COMMERCIAL

## 2021-09-08 ENCOUNTER — TRANSFERRED RECORDS (OUTPATIENT)
Dept: HEALTH INFORMATION MANAGEMENT | Facility: CLINIC | Age: 81
End: 2021-09-08

## 2021-09-08 DIAGNOSIS — I26.99 PULMONARY EMBOLISM (H): Primary | ICD-10-CM

## 2021-09-08 DIAGNOSIS — Z79.01 LONG TERM CURRENT USE OF ANTICOAGULANTS WITH INR GOAL OF 2.0-3.0: ICD-10-CM

## 2021-09-08 LAB — INR (EXTERNAL): 2.3 (ref 0.9–1.1)

## 2021-09-08 PROCEDURE — 99207 PR NO CHARGE NURSE ONLY: CPT

## 2021-09-08 NOTE — PROGRESS NOTES
ANTICOAGULATION FOLLOW-UP CLINIC VISIT    Patient Name:  Yogesh Abreu  Date:  2021  Contact Type:  Telephone    SUBJECTIVE:         OBJECTIVE    Recent labs: (last 7 days)     21  1316   INR 2.3*       ASSESSMENT / PLAN  No question data found.  Anticoagulation Summary  As of 2021    INR goal:  2.0-3.0   TTR:  65.1 % (1 y)   INR used for dosin.3 (2021)   Warfarin maintenance plan:  5 mg (5 mg x 1) every Thu; 7.5 mg (5 mg x 1.5) all other days   Full warfarin instructions:  5 mg every Thu; 7.5 mg all other days   Weekly warfarin total:  50 mg   No change documented:  Tanja Wills RN   Plan last modified:  Tanja Wills RN (2021)   Next INR check:     Target end date:  Indefinite    Indications    PE (pulmonary embolism) [I26.99]  Long term current use of anticoagulants with INR goal of 2.0-3.0 [Z79.01]             Anticoagulation Episode Summary     INR check location:      Preferred lab:      Send INR reminders to:  [facility] HEART INR NURSE    Comments:        Anticoagulation Care Providers     Provider Role Specialty Phone number    RobertFrederic Mauro MD Referring Cardiovascular Disease [phone]            See the Encounter Report to view Anticoagulation Flowsheet and Dosing Calendar (Go to Encounters tab in chart review, and find the Anticoagulation Therapy Visit)    Home INR 2.3 He had symptoms of bloating, constipation and feeling very tired so he held zetia for 2 weeks with improvement in symptoms. He is considering retrying Zetia to see if the symptoms return and if they do, he will review with his PMD at an appt on . No change in other meds or diet. Denies abnormal bleeding or bruising. Will continue current dosing of 5 mg Th and 7.5 mg all other days with recheck in 2 weeks.   Tanja Wills RN

## 2021-09-16 ENCOUNTER — LAB (OUTPATIENT)
Dept: URGENT CARE | Facility: URGENT CARE | Age: 81
End: 2021-09-16
Attending: INTERNAL MEDICINE
Payer: COMMERCIAL

## 2021-09-16 DIAGNOSIS — J06.9 UPPER RESPIRATORY TRACT INFECTION, UNSPECIFIED TYPE: ICD-10-CM

## 2021-09-16 PROCEDURE — U0005 INFEC AGEN DETEC AMPLI PROBE: HCPCS

## 2021-09-16 PROCEDURE — U0003 INFECTIOUS AGENT DETECTION BY NUCLEIC ACID (DNA OR RNA); SEVERE ACUTE RESPIRATORY SYNDROME CORONAVIRUS 2 (SARS-COV-2) (CORONAVIRUS DISEASE [COVID-19]), AMPLIFIED PROBE TECHNIQUE, MAKING USE OF HIGH THROUGHPUT TECHNOLOGIES AS DESCRIBED BY CMS-2020-01-R: HCPCS

## 2021-09-16 ASSESSMENT — ENCOUNTER SYMPTOMS
PALPITATIONS: 0
HEMATOCHEZIA: 0
FREQUENCY: 1
FEVER: 0
HEMATURIA: 0
NERVOUS/ANXIOUS: 0
JOINT SWELLING: 0
SHORTNESS OF BREATH: 0
DIZZINESS: 0
NAUSEA: 0
DIARRHEA: 0
EYE PAIN: 0
SORE THROAT: 0
HEADACHES: 0
DYSURIA: 0
CONSTIPATION: 0
ABDOMINAL PAIN: 0
PARESTHESIAS: 0
CHILLS: 0
COUGH: 1
ARTHRALGIAS: 1
MYALGIAS: 0
HEARTBURN: 0

## 2021-09-16 ASSESSMENT — ACTIVITIES OF DAILY LIVING (ADL): CURRENT_FUNCTION: NO ASSISTANCE NEEDED

## 2021-09-17 LAB — SARS-COV-2 RNA RESP QL NAA+PROBE: NEGATIVE

## 2021-09-21 ENCOUNTER — OFFICE VISIT (OUTPATIENT)
Dept: FAMILY MEDICINE | Facility: CLINIC | Age: 81
End: 2021-09-21
Payer: COMMERCIAL

## 2021-09-21 VITALS
HEIGHT: 69 IN | DIASTOLIC BLOOD PRESSURE: 82 MMHG | TEMPERATURE: 97.3 F | HEART RATE: 68 BPM | WEIGHT: 200.4 LBS | SYSTOLIC BLOOD PRESSURE: 127 MMHG | RESPIRATION RATE: 16 BRPM | BODY MASS INDEX: 29.68 KG/M2 | OXYGEN SATURATION: 98 %

## 2021-09-21 DIAGNOSIS — M35.3 POLYMYALGIA RHEUMATICA (H): ICD-10-CM

## 2021-09-21 DIAGNOSIS — R53.83 OTHER FATIGUE: ICD-10-CM

## 2021-09-21 DIAGNOSIS — I26.99 OTHER ACUTE PULMONARY EMBOLISM, UNSPECIFIED WHETHER ACUTE COR PULMONALE PRESENT (H): ICD-10-CM

## 2021-09-21 DIAGNOSIS — D68.61 ANTIPHOSPHOLIPID ANTIBODY SYNDROME (H): ICD-10-CM

## 2021-09-21 DIAGNOSIS — M17.12 PRIMARY OSTEOARTHRITIS OF LEFT KNEE: ICD-10-CM

## 2021-09-21 DIAGNOSIS — E78.2 MIXED HYPERLIPIDEMIA: ICD-10-CM

## 2021-09-21 DIAGNOSIS — Z23 NEED FOR PROPHYLACTIC VACCINATION AND INOCULATION AGAINST INFLUENZA: ICD-10-CM

## 2021-09-21 DIAGNOSIS — J45.40 MODERATE PERSISTENT ASTHMA WITHOUT COMPLICATION: ICD-10-CM

## 2021-09-21 DIAGNOSIS — Z00.00 ROUTINE GENERAL MEDICAL EXAMINATION AT A HEALTH CARE FACILITY: Primary | ICD-10-CM

## 2021-09-21 DIAGNOSIS — I10 ESSENTIAL HYPERTENSION: ICD-10-CM

## 2021-09-21 DIAGNOSIS — I25.10 CORONARY ARTERY DISEASE INVOLVING NATIVE CORONARY ARTERY OF NATIVE HEART WITHOUT ANGINA PECTORIS: ICD-10-CM

## 2021-09-21 DIAGNOSIS — Z11.59 NEED FOR HEPATITIS C SCREENING TEST: ICD-10-CM

## 2021-09-21 DIAGNOSIS — R09.89 RUNNY NOSE: ICD-10-CM

## 2021-09-21 LAB
ERYTHROCYTE [DISTWIDTH] IN BLOOD BY AUTOMATED COUNT: 13.8 % (ref 10–15)
HCT VFR BLD AUTO: 47.7 % (ref 40–53)
HCV AB SERPL QL IA: NONREACTIVE
HGB BLD-MCNC: 16.1 G/DL (ref 13.3–17.7)
MCH RBC QN AUTO: 31.3 PG (ref 26.5–33)
MCHC RBC AUTO-ENTMCNC: 33.8 G/DL (ref 31.5–36.5)
MCV RBC AUTO: 93 FL (ref 78–100)
PLATELET # BLD AUTO: 176 10E3/UL (ref 150–450)
RBC # BLD AUTO: 5.14 10E6/UL (ref 4.4–5.9)
TSH SERPL DL<=0.005 MIU/L-ACNC: 1.29 MU/L (ref 0.4–4)
VIT B12 SERPL-MCNC: 541 PG/ML (ref 193–986)
WBC # BLD AUTO: 4.9 10E3/UL (ref 4–11)

## 2021-09-21 PROCEDURE — 85027 COMPLETE CBC AUTOMATED: CPT | Performed by: INTERNAL MEDICINE

## 2021-09-21 PROCEDURE — 36415 COLL VENOUS BLD VENIPUNCTURE: CPT | Performed by: INTERNAL MEDICINE

## 2021-09-21 PROCEDURE — 99397 PER PM REEVAL EST PAT 65+ YR: CPT | Mod: 25 | Performed by: INTERNAL MEDICINE

## 2021-09-21 PROCEDURE — 99213 OFFICE O/P EST LOW 20 MIN: CPT | Mod: 25 | Performed by: INTERNAL MEDICINE

## 2021-09-21 PROCEDURE — 82607 VITAMIN B-12: CPT | Performed by: INTERNAL MEDICINE

## 2021-09-21 PROCEDURE — 84443 ASSAY THYROID STIM HORMONE: CPT | Performed by: INTERNAL MEDICINE

## 2021-09-21 PROCEDURE — G0008 ADMIN INFLUENZA VIRUS VAC: HCPCS | Performed by: INTERNAL MEDICINE

## 2021-09-21 PROCEDURE — 90662 IIV NO PRSV INCREASED AG IM: CPT | Performed by: INTERNAL MEDICINE

## 2021-09-21 PROCEDURE — 86803 HEPATITIS C AB TEST: CPT | Performed by: INTERNAL MEDICINE

## 2021-09-21 RX ORDER — AZELASTINE 1 MG/ML
1 SPRAY, METERED NASAL 2 TIMES DAILY
COMMUNITY

## 2021-09-21 RX ORDER — IPRATROPIUM BROMIDE 42 UG/1
2 SPRAY, METERED NASAL 4 TIMES DAILY PRN
Qty: 15 ML | Refills: 11 | Status: SHIPPED | OUTPATIENT
Start: 2021-09-21 | End: 2023-09-25

## 2021-09-21 RX ORDER — FLUTICASONE PROPIONATE AND SALMETEROL XINAFOATE 230; 21 UG/1; UG/1
2 AEROSOL, METERED RESPIRATORY (INHALATION) 2 TIMES DAILY
Qty: 12 G | Refills: 11 | Status: SHIPPED | OUTPATIENT
Start: 2021-09-21

## 2021-09-21 ASSESSMENT — ENCOUNTER SYMPTOMS
NAUSEA: 0
HEMATURIA: 0
HEADACHES: 0
DIZZINESS: 0
DYSURIA: 0
CONSTIPATION: 0
MYALGIAS: 0
HEMATOCHEZIA: 0
ARTHRALGIAS: 1
DIARRHEA: 0
COUGH: 1
SHORTNESS OF BREATH: 0
EYE PAIN: 0
FREQUENCY: 1
PALPITATIONS: 0
CHILLS: 0
HEARTBURN: 0
PARESTHESIAS: 0
ABDOMINAL PAIN: 0
SORE THROAT: 0
NERVOUS/ANXIOUS: 0
FEVER: 0
JOINT SWELLING: 0

## 2021-09-21 ASSESSMENT — ACTIVITIES OF DAILY LIVING (ADL): CURRENT_FUNCTION: NO ASSISTANCE NEEDED

## 2021-09-21 ASSESSMENT — MIFFLIN-ST. JEOR: SCORE: 1614.39

## 2021-09-21 NOTE — PROGRESS NOTES
"SUBJECTIVE:   Yogesh Abreu is a 81 year old male who presents for Preventive Visit.      Patient has been advised of split billing requirements and indicates understanding: Yes   Are you in the first 12 months of your Medicare coverage?  No    Healthy Habits:     In general, how would you rate your overall health?  Good    Frequency of exercise:  2-3 days/week    Duration of exercise:  30-45 minutes    Do you usually eat at least 4 servings of fruit and vegetables a day, include whole grains    & fiber and avoid regularly eating high fat or \"junk\" foods?  No    Taking medications regularly:  No    Medication side effects:  Other    Ability to successfully perform activities of daily living:  No assistance needed    Home Safety:  No safety concerns identified    Hearing Impairment:  No hearing concerns    In the past 6 months, have you been bothered by leaking of urine?  No    In general, how would you rate your overall mental or emotional health?  Good      PHQ-2 Total Score: 0    Additional concerns today:  Yes    Do you feel safe in your environment? Yes    Have you ever done Advance Care Planning? (For example, a Health Directive, POLST, or a discussion with a medical provider or your loved ones about your wishes): Yes, patient states has an Advance Care Planning document and will bring a copy to the clinic.       Fall risk  Fallen 2 or more times in the past year?: Yes  Any fall with injury in the past year?: No    Cognitive Screening   1) Repeat 3 items (Leader, Season, Table)    2) Clock draw: NORMAL  3) 3 item recall: Recalls 3 objects  Results: 3 items recalled: COGNITIVE IMPAIRMENT LESS LIKELY    Mini-CogTM Copyright RACHELE Heaton. Licensed by the author for use in [facility]; reprinted with permission (halley@.[email or web address]). All rights reserved.      Do you have sleep apnea, excessive snoring or daytime drowsiness?: no    Reviewed and updated as needed this visit by clinical staff  Tobacco  Allergies "  Meds              Reviewed and updated as needed this visit by Provider                Social History     Tobacco Use     Smoking status: Former Smoker     Packs/day: 0.50     Years: 5.00     Pack years: 2.50     Types: Cigarettes     Start date:      Quit date:      Years since quittin.7     Smokeless tobacco: Never Used   Substance Use Topics     Alcohol use: Yes     Comment: 1 drink per day     If you drink alcohol do you typically have >3 drinks per day or >7 drinks per week? No    Alcohol Use 2021   Prescreen: >3 drinks/day or >7 drinks/week? -   Prescreen: >3 drinks/day or >7 drinks/week? No         Social History     Tobacco Use     Smoking status: Former Smoker     Packs/day: 0.50     Years: 5.00     Pack years: 2.50     Types: Cigarettes     Start date:      Quit date:      Years since quittin.7     Smokeless tobacco: Never Used   Substance Use Topics     Alcohol use: Yes     Comment: 1 drink per day        Current providers sharing in care for this patient include:   Patient Care Team:  Filipe Goldberg MD as PCP - General (Internal Medicine)  Filipe Goldberg MD as Assigned PCP  Frederic Isaacs MD as Assigned Heart and Vascular Provider    The following health maintenance items are reviewed in Epic and correct as of today:  Health Maintenance Due   Topic Date Due     URINE DRUG SCREEN  Never done     ANNUAL REVIEW OF HM ORDERS  Never done     ASTHMA ACTION PLAN  Never done     COVID-19 Vaccine (1) Never done     HEPATITIS C SCREENING  Never done     ZOSTER IMMUNIZATION (2 of 3) 2011     ASTHMA CONTROL TEST  2021     INFLUENZA VACCINE (1) 2021     FALL RISK ASSESSMENT  2021     Patient Active Problem List   Diagnosis     PE (pulmonary embolism)     CAD (coronary artery disease), IMI -  => rescue stent to  RCA     Hypertension     Mixed hyperlipidemia     Polymyalgia rheumatica (H)     ACS (acute coronary syndrome) (H)      Long-term (current) use of anticoagulants [Z79.01]     Antiphospholipid antibody syndrome (H)     Moderate persistent asthma without complication     HL (hearing loss), bilateral     Chronic bilateral low back pain without sciatica     Long term current use of anticoagulants with INR goal of 2.0-3.0     Past Surgical History:   Procedure Laterality Date     HEART CATH STENT COR W/WO PTCA  10/2015    90% diagonal, 50-60% CFX, 100% prox RCA occlusion - MAL placed in RCA     ORTHOPEDIC SURGERY  2015    right carpal tunnel       Social History     Tobacco Use     Smoking status: Former Smoker     Packs/day: 0.50     Years: 5.00     Pack years: 2.50     Types: Cigarettes     Start date:      Quit date:      Years since quittin.7     Smokeless tobacco: Never Used   Substance Use Topics     Alcohol use: Yes     Comment: 1 drink per day     Family History   Problem Relation Age of Onset     Hypertension Brother      Hypertension Brother          Current Outpatient Medications   Medication Sig Dispense Refill     albuterol (PROAIR HFA/PROVENTIL HFA/VENTOLIN HFA) 108 (90 Base) MCG/ACT inhaler Inhale 2 puffs into the lungs every 6 hours as needed for shortness of breath / dyspnea or wheezing 1 Inhaler 0     atorvastatin (LIPITOR) 80 MG tablet Take 1 tablet (80 mg) by mouth At Bedtime 90 tablet 3     azelastine (ASTELIN) 0.1 % nasal spray Spray 1 spray into both nostrils 2 times daily       Calcium Citrate-Vitamin D (CALCIUM CITRATE + D PO) Take 600 mg by mouth 2 times daily       Cholecalciferol (VITAMIN D3 PO) Take 1,000 Units by mouth 2 times daily       ezetimibe (ZETIA) 10 MG tablet Take 1 tablet (10 mg) by mouth daily 90 tablet 3     fluticasone-salmeterol (ADVAIR-HFA) 230-21 MCG/ACT inhaler Inhale 2 puffs into the lungs 2 times daily 12 g 11     ibuprofen (ADVIL/MOTRIN) 200 MG capsule Take 600 mg by mouth daily as needed for fever       ipratropium (ATROVENT) 0.06 % nasal spray Spray 2 sprays into both  nostrils 4 times daily as needed (nasal drainage) 15 mL 11     lisinopril (ZESTRIL) 5 MG tablet TAKE 1 TABLET(5 MG) BY MOUTH DAILY 90 tablet 0     metoprolol succinate ER (TOPROL-XL) 25 MG 24 hr tablet Take 0.5 tablets (12.5 mg) by mouth daily 45 tablet 3     nitroGLYcerin (NITROSTAT) 0.4 MG sublingual tablet Place 1 tablet (0.4 mg) under the tongue every 5 minutes as needed for chest pain 25 tablet 3     vardenafil (LEVITRA) 20 MG tablet Take 0.5-1 tablets (10-20 mg) by mouth daily as needed Never use with nitroglycerin, terazosin or doxazosin. 12 tablet 11     warfarin ANTICOAGULANT (COUMADIN) 5 MG tablet Take 1 1/2 tabs daily or as directed per INR clinic 145 tablet 1     Allergies   Allergen Reactions     Seasonal Allergies      Simvastatin Muscle Pain (Myalgia)      at 20 mg daily.       Review of Systems   Constitutional: Negative for chills and fever.   HENT: Positive for congestion. Negative for ear pain, hearing loss and sore throat.    Eyes: Negative for pain and visual disturbance.   Respiratory: Positive for cough. Negative for shortness of breath.    Cardiovascular: Negative for chest pain, palpitations and peripheral edema.   Gastrointestinal: Negative for abdominal pain, constipation, diarrhea, heartburn, hematochezia and nausea.   Genitourinary: Positive for frequency, impotence and urgency. Negative for discharge, dysuria, genital sores and hematuria.   Musculoskeletal: Positive for arthralgias. Negative for joint swelling and myalgias.   Skin: Negative for rash.   Neurological: Negative for dizziness, headaches and paresthesias.   Psychiatric/Behavioral: Negative for mood changes. The patient is not nervous/anxious.      He ran out of advair in May  No fevers or dyspnea  Too much nasal drainage   He does not want to take meds for LUTS, he will think about urology referral  His knee hurts   He had x-ray in [place], showed mild OA  He takes ibuprofen and APAP  Has not tried topical NSAIDS or turmeric  "  Does not want to see urology quite yet for erections   Intermittent non specific fatigue too  OBJECTIVE:   /82 (BP Location: Left arm, Cuff Size: Adult Large)   Pulse 68   Temp 97.3  F (36.3  C) (Tympanic)   Resp 16   Ht 1.753 m (5' 9\")   Wt 90.9 kg (200 lb 6.4 oz)   SpO2 98%   BMI 29.59 kg/m   Estimated body mass index is 29.59 kg/m  as calculated from the following:    Height as of this encounter: 1.753 m (5' 9\").    Weight as of this encounter: 90.9 kg (200 lb 6.4 oz).  Physical Exam  GENERAL: healthy, alert and no distress  EYES: Eyes grossly normal to inspection, PERRL and conjunctivae and sclerae normal  HENT: ear canals and TM's normal  NECK: no adenopathy, no asymmetry, masses, or scars and thyroid normal to palpation  RESP: lungs clear to auscultation - no rales, rhonchi or wheezes  CV: regular rate and rhythm, normal S1 S2, no S3 or S4, no murmur, click or rub, no peripheral edema and peripheral pulses strong  ABDOMEN: soft, nontender, no hepatosplenomegaly, no masses and bowel sounds normal  RECTAL: normal sphincter tone, no rectal masses, prostate normal size, smooth, nontender without nodules or masses  MS: no gross musculoskeletal defects noted, no edema; ;left knee without effusion, full ROM, ligament and meniscus testing intact, gait OK  SKIN: no suspicious lesions or rashes  NEURO: Normal strength and tone, mentation intact and speech normal  PSYCH: mentation appears normal, affect normal/bright    Labs pending     ASSESSMENT / PLAN:   (Z00.00) Routine general medical examination at a health care facility  (primary encounter diagnosis)  Comment:   Plan:     (J45.40) Moderate persistent asthma without complication  Comment: restart advair  Plan: fluticasone-salmeterol (ADVAIR-HFA) 230-21         MCG/ACT inhaler            (R09.89) Runny nose  Comment: try below  Plan: ipratropium (ATROVENT) 0.06 % nasal spray            (M35.3) Polymyalgia rheumatica (H)  Comment: stable off " "prednisone  Plan:     (I26.99) Other acute pulmonary embolism, unspecified whether acute cor pulmonale present (H)  Comment: on coumadin   Plan:     (D68.61) Antiphospholipid antibody syndrome (H)  Comment:   Plan:     (I25.10) Coronary artery disease involving native coronary artery of native heart without angina pectoris  Comment: stable  Plan:     (I10) Essential hypertension  Comment: well controlled  Plan:     (E78.2) Mixed hyperlipidemia  Comment: on statin therapy  Plan: Lipid panel reflex to direct LDL Fasting, CBC         with platelets, Comprehensive metabolic panel            (Z11.59) Need for hepatitis C screening test  Comment:   Plan: Hepatitis C Screen Reflex to HCV RNA Quant and         Genotype            (M17.12) Primary osteoarthritis of left knee  Comment: trial of PT  Plan: [facility] Ref[facility]          Fatigue check labs for reversible causes    Patient has been advised of split billing requirements and indicates understanding: Yes  COUNSELING:  Reviewed preventive health counseling, as reflected in patient instructions  Special attention given to:       Regular exercise       Healthy diet/nutrition       Immunizations    He had COVID vaccines in [place], he was reminded to get shingrix and we will give flu shot today              Hepatitis C screening       Consider lung cancer screening for ages 55-80 years and 30 pack-year smoking history ; he quit 46 years ago        Colon cancer screening; FIT today       Estimated body mass index is 29.59 kg/m  as calculated from the following:    Height as of this encounter: 1.753 m (5' 9\").    Weight as of this encounter: 90.9 kg (200 lb 6.4 oz).        He reports that he quit smoking about 46 years ago. His smoking use included cigarettes. He started smoking about 56 years ago. He has a 2.50 pack-year smoking history. He has never used smokeless tobacco.      Appropriate preventive services were discussed with this patient, including applicable " screening as appropriate for cardiovascular disease, diabetes, osteopenia/osteoporosis, and glaucoma.  As appropriate for age/gender, discussed screening for colorectal cancer, prostate cancer, breast cancer, and cervical cancer. Checklist reviewing preventive services available has been given to the patient.    Reviewed patients plan of care and provided an AVS. The Basic Care Plan (routine screening as documented in Health Maintenance) for Yogesh meets the Care Plan requirement. This Care Plan has been established and reviewed with the Patient.    Counseling Resources:  ATP IV Guidelines  Pooled Cohorts Equation Calculator  Breast Cancer Risk Calculator  Breast Cancer: Medication to Reduce Risk  FRAX Risk Assessment  ICSI Preventive Guidelines  Dietary Guidelines for Americans, 2010  USDA's MyPlate  ASA Prophylaxis  Lung CA Screening    Filipe Goldberg MD  [facility]    Identified Health Risks:

## 2021-09-21 NOTE — RESULT ENCOUNTER NOTE
The following letter pertains to your most recent diagnostic tests:    -Your hepatitis C screening test is negative.  You do not have hepatitis C.     -Your vitamin B12 level is normal.     -TSH (thyroid stimulating hormone) level is normal which indicates normal circulating thyroid hormone levels.      -Your complete blood counts including your hemoglobin returned normal for you.           Bottom line:  These results look healthy.  No obvious smoking guns for fatigue identified on these lab tests.            Sincerely,    Dr. Goldberg

## 2021-09-21 NOTE — LETTER
September 22, 2021      Yogesh Abreu  [address]   [facility] [address]        Dear ,    We are writing to inform you of your test results.    The following letter pertains to your most recent diagnostic tests:     -Your hepatitis C screening test is negative.  You do not have hepatitis C.     -Your vitamin B12 level is normal.     -TSH (thyroid stimulating hormone) level is normal which indicates normal circulating thyroid hormone levels.       -Your complete blood counts including your hemoglobin returned normal for you.             Bottom line:  These results look healthy.  No obvious smoking guns for fatigue identified on these lab tests.         Resulted Orders   CBC with platelets   Result Value Ref Range    WBC Count 4.9 4.0 - 11.0 10e3/uL    RBC Count 5.14 4.40 - 5.90 10e6/uL    Hemoglobin 16.1 13.3 - 17.7 g/dL    Hematocrit 47.7 40.0 - 53.0 %    MCV 93 78 - 100 fL    MCH 31.3 26.5 - 33.0 pg    MCHC 33.8 31.5 - 36.5 g/dL    RDW 13.8 10.0 - 15.0 %    Platelet Count 176 150 - 450 10e3/uL   Hepatitis C Screen Reflex to HCV RNA Quant and Genotype   Result Value Ref Range    Hepatitis C Antibody Nonreactive Nonreactive    Narrative    Assay performance characteristics have not been established for newborns, infants, and children.   TSH   Result Value Ref Range    TSH 1.29 0.40 - 4.00 mU/L   Vitamin B12   Result Value Ref Range    Vitamin B12 541 193 - 986 pg/mL       If you have any questions or concerns, please call the clinic at the number listed above.       Sincerely,      Filipe Goldberg MD

## 2021-09-22 ASSESSMENT — ASTHMA QUESTIONNAIRES: ACT_TOTALSCORE: 15

## 2021-09-23 ENCOUNTER — ANTICOAGULATION THERAPY VISIT (OUTPATIENT)
Dept: CARDIOLOGY | Facility: CLINIC | Age: 81
End: 2021-09-23
Payer: COMMERCIAL

## 2021-09-23 DIAGNOSIS — Z79.01 LONG TERM CURRENT USE OF ANTICOAGULANTS WITH INR GOAL OF 2.0-3.0: Primary | ICD-10-CM

## 2021-09-23 LAB — INR (EXTERNAL): 3.2 (ref 0.9–1.1)

## 2021-09-23 PROCEDURE — 99207 PR NO CHARGE NURSE ONLY: CPT

## 2021-09-23 NOTE — PROGRESS NOTES
ANTICOAGULATION FOLLOW-UP CLINIC VISIT    Patient Name:  Yogesh Abreu  Date:  9/23/2021  Contact Type:  Telephone    SUBJECTIVE:  Patient Findings         Clinical Outcomes     Negatives:  Major bleeding event, Thromboembolic event, Anticoagulation-related hospital admission, Anticoagulation-related ED visit, Anticoagulation-related fatality           OBJECTIVE    Recent labs: (last 7 days)     09/23/21  0830   INR 3.2*       ASSESSMENT / PLAN  INR assessment SUPRA    Recheck INR In: 2 WEEKS    INR Location Home INR      Anticoagulation Summary  As of 9/23/2021    INR goal:  2.0-3.0   TTR:  64.2 % (1 y)   INR used for dosing:  3.2 (9/23/2021)   Warfarin maintenance plan:  5 mg (5 mg x 1) every Thu; 7.5 mg (5 mg x 1.5) all other days   Full warfarin instructions:  5 mg every Thu; 7.5 mg all other days   Weekly warfarin total:  50 mg   No change documented:  Mandy Ferguson RN   Plan last modified:  Tanja Wills RN (7/29/2021)   Next INR check:  10/6/2021   Target end date:  Indefinite    Indications    PE (pulmonary embolism) [I26.99]  Long term current use of anticoagulants with INR goal of 2.0-3.0 [Z79.01]             Anticoagulation Episode Summary     INR check location:      Preferred lab:      Send INR reminders to:  [facility] HEART INR NURSE    Comments:        Anticoagulation Care Providers     Provider Role Specialty Phone number    RobertFrederic Mauro MD Referring Cardiovascular Disease [phone]            See the Encounter Report to view Anticoagulation Flowsheet and Dosing Calendar (Go to Encounters tab in chart review, and find the Anticoagulation Therapy Visit)    INR 3.2.  Called and spoke to the patient.  He has eaten less greens recently and maybe smaller portions.  NO bleeding.  He is due for smaller dose today.  Continue same schedule and recheck at home in 2 weeks.  We discussed decreasing dosing next time if INR was still above 3.  Canelo ENZO Ferguson, ENZO

## 2021-10-04 ENCOUNTER — THERAPY VISIT (OUTPATIENT)
Dept: PHYSICAL THERAPY | Facility: CLINIC | Age: 81
End: 2021-10-04
Attending: INTERNAL MEDICINE
Payer: COMMERCIAL

## 2021-10-04 DIAGNOSIS — M17.12 PRIMARY OSTEOARTHRITIS OF LEFT KNEE: ICD-10-CM

## 2021-10-04 DIAGNOSIS — M25.562 ACUTE PAIN OF LEFT KNEE: ICD-10-CM

## 2021-10-04 PROCEDURE — 97161 PT EVAL LOW COMPLEX 20 MIN: CPT | Mod: GP | Performed by: PHYSICAL THERAPIST

## 2021-10-04 PROCEDURE — 97110 THERAPEUTIC EXERCISES: CPT | Mod: GP | Performed by: PHYSICAL THERAPIST

## 2021-10-04 ASSESSMENT — ACTIVITIES OF DAILY LIVING (ADL)
STAND: ACTIVITY IS SOMEWHAT DIFFICULT
AS_A_RESULT_OF_YOUR_KNEE_INJURY,_HOW_WOULD_YOU_RATE_YOUR_CURRENT_LEVEL_OF_DAILY_ACTIVITY?: NEARLY NORMAL
RISE FROM A CHAIR: ACTIVITY IS MINIMALLY DIFFICULT
GO UP STAIRS: ACTIVITY IS MINIMALLY DIFFICULT
KNEE_ACTIVITY_OF_DAILY_LIVING_SUM: 39
STIFFNESS: THE SYMPTOM AFFECTS MY ACTIVITY SEVERELY
RAW_SCORE: 39
LIMPING: THE SYMPTOM AFFECTS MY ACTIVITY MODERATELY
SIT WITH YOUR KNEE BENT: ACTIVITY IS MINIMALLY DIFFICULT
SQUAT: ACTIVITY IS FAIRLY DIFFICULT
HOW_WOULD_YOU_RATE_THE_OVERALL_FUNCTION_OF_YOUR_KNEE_DURING_YOUR_USUAL_DAILY_ACTIVITIES?: ABNORMAL
WALK: ACTIVITY IS MINIMALLY DIFFICULT
KNEEL ON THE FRONT OF YOUR KNEE: ACTIVITY IS FAIRLY DIFFICULT
WEAKNESS: THE SYMPTOM AFFECTS MY ACTIVITY MODERATELY
PAIN: THE SYMPTOM AFFECTS MY ACTIVITY MODERATELY
SWELLING: I DO NOT HAVE THE SYMPTOM
GO DOWN STAIRS: ACTIVITY IS FAIRLY DIFFICULT
KNEE_ACTIVITY_OF_DAILY_LIVING_SCORE: 55.71
GIVING WAY, BUCKLING OR SHIFTING OF KNEE: THE SYMPTOM AFFECTS MY ACTIVITY MODERATELY

## 2021-10-04 NOTE — PROGRESS NOTES
"Physical Therapy Initial Evaluation  Subjective:  The history is provided by the patient. No  was used.   Patient Health History  Yogesh Abreu being seen for left knee pain.     Problem began: 6/4/2021 (\"four months ago\").   Problem occurred: unknown reasons   Pain is reported as 4/10 on pain scale.  General health as reported by patient is good.  Pertinent medical history includes: heart problems, high blood pressure and implanted device.   Red flags:  None as reported by patient.  Medical allergies: none.   Surgeries include:  Orthopedic surgery. Other surgery history details: JIMMY 2019; knee arthroscopic surgery 25 years ago, pt can't recall which side.    Current medications:  High blood pressure medication.    Current occupation is retired.   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                  Therapist Generated HPI Evaluation  Problem details: Onset of left posterolateral knee pain four months ago for unknown reasons. Notes the pain with sitting and especially descending stairs. He states he has been having mild occasional left knee pain with descending stairs for the past couple years but now more consistent. Had sciatica 10 years ago into left leg, the back pain never fully resolved. He noted that when he started to not put his feet up in the recliner the knee pain was better. He had a L JIMMY in 2019. One year later he noted his gait changing; \"I walk like I'm half drunk. I drag my feet.\" Had an x-ray of hip which was negative. He works out in the gym and does treadmill where he notes he drags his feet, bike, leg press and upper body machines. Has a familial tremor for 10 years. .         Type of problem:  Left knee.    This is a new condition.  Condition occurred with:  Insidious onset.  Where condition occurred: for unknown reasons.  Patient reports pain:  Lateral and posterior.  Pain is described as aching and stabbing and is intermittent.  Pain is " "worse during the day.  Since onset symptoms are gradually improving.  Symptoms are exacerbated by descending stairs, walking and standing  and relieved by rest.      Barriers include:  None as reported by patient.                        Objective:    Gait:  Short steps with occasional listing to the left and scissoring gait, mild LOB  Assistive Devices:  None                 Lumbar/SI Evaluation  ROM:    AROM Lumbar:   Flexion:          Hands to mid lower leg (LBP)  Ext:                    10% (LBP)   Side Bend:        Left:     Right:   Rotation:           Left:     Right:   Side Glide:        Left:  20% (sharp L LBP on return, better with reps)    Right:  10%                                                                   Knee Evaluation:  ROM:  Strength:  Normal    PROM      Extension: Left: 0    Right:  0  Flexion: Left: 132    Right:  138  Pain: ERP L knee flexion  Endfeel: firm end range knee flexion    Ligament Testing:  Normal                  Palpation:  Normal      Edema:  Normal              Juan A Lumbar Evaluation    Posture:  Sitting: poor  Standing: fair  Lordosis: Reduced  Lateral Shift: no  Correction of Posture: better      Test Movements:    EIS: During: no effect    Repeat EIS: During: no effect  After: better  Mechanical Response: IncROM                                                     ROS    Concordant sign is lateral left knee pain with  7\" step down.   After NADIA he had decreased pain with step down and states \"my walking is better.\"    Assessment/Plan:    Patient is a 81 year old male with left side knee complaints.  He has history of left sided sciatica. He notes less knee pain with corrected posture. He had knee pain with step down that was nearly resolved with NADIA. Pt gives verbal consent to trial NADIA and posture correction to assess effect on knee pain. He has a gait pattern that appears to be neurologic.  Patient has the following significant findings with corresponding " treatment plan.                Diagnosis 1:  L knee pain  Pain -  manual therapy, self management, education, directional preference exercise and home program  Decreased ROM/flexibility - manual therapy and therapeutic exercise  Impaired gait - gait training  Decreased function - therapeutic activities  Impaired posture - neuro re-education    Therapy Evaluation Codes:   Cumulative Therapy Evaluation is: Low complexity.    Previous and current functional limitations:  (See Goal Flow Sheet for this information)    Short term and Long term goals: (See Goal Flow Sheet for this information)     Communication ability:  Patient appears to be able to clearly communicate and understand verbal and written communication and follow directions correctly.  Treatment Explanation - The following has been discussed with the patient:   RX ordered/plan of care  Anticipated outcomes  Possible risks and side effects  This patient would benefit from PT intervention to resume normal activities.   Rehab potential is good.    Frequency:  1 X week, once daily  Duration:  for 6 weeks  Discharge Plan:  Achieve all LTG.  Independent in home treatment program.  Reach maximal therapeutic benefit.    Please refer to the daily flowsheet for treatment today, total treatment time and time spent performing 1:1 timed codes.

## 2021-10-06 LAB — INR (EXTERNAL): 2.7 (ref 0.9–1.1)

## 2021-10-07 ENCOUNTER — ANTICOAGULATION THERAPY VISIT (OUTPATIENT)
Dept: CARDIOLOGY | Facility: CLINIC | Age: 81
End: 2021-10-07
Payer: COMMERCIAL

## 2021-10-07 DIAGNOSIS — I26.99 PULMONARY EMBOLISM (H): Primary | ICD-10-CM

## 2021-10-07 DIAGNOSIS — Z79.01 LONG TERM CURRENT USE OF ANTICOAGULANTS WITH INR GOAL OF 2.0-3.0: ICD-10-CM

## 2021-10-07 PROCEDURE — 99207 PR NO CHARGE NURSE ONLY: CPT

## 2021-10-07 NOTE — PROGRESS NOTES
ANTICOAGULATION FOLLOW-UP CLINIC VISIT    Patient Name:  Yogesh Abreu  Date:  10/7/2021  Contact Type:  [employer] website    SUBJECTIVE:         OBJECTIVE    Recent labs: (last 7 days)     10/06/21  1921   INR 2.7*       ASSESSMENT / PLAN  INR assessment THER    Recheck INR In: 2 WEEKS    INR Location Home INR    Billed home INR No      Anticoagulation Summary  As of 10/7/2021    INR goal:  2.0-3.0   TTR:  65.3 % (1 y)   INR used for dosin.7 (10/6/2021)   Warfarin maintenance plan:  5 mg (5 mg x 1) every Thu; 7.5 mg (5 mg x 1.5) all other days   Full warfarin instructions:  5 mg every Thu; 7.5 mg all other days   Weekly warfarin total:  50 mg   No change documented:  Tanja Wills RN   Plan last modified:  Tanja Wills RN (2021)   Next INR check:  10/20/2021   Target end date:  Indefinite    Indications    PE (pulmonary embolism) [I26.99]  Long term current use of anticoagulants with INR goal of 2.0-3.0 [Z79.01]             Anticoagulation Episode Summary     INR check location:      Preferred lab:      Send INR reminders to:  [facility] HEART INR NURSE    Comments:        Anticoagulation Care Providers     Provider Role Specialty Phone number    Frederic Isaacs MD Referring Cardiovascular Disease [phone]            See the Encounter Report to view Anticoagulation Flowsheet and Dosing Calendar (Go to Encounters tab in chart review, and find the Anticoagulation Therapy Visit)    10/6/21 Home INR 2.7 (called in after clinic hours). Will continue current dosing of 5 mg Th and 7.5 mg all other days with recheck in 2 weeks. Will call pt after the next INR check.  Tanja Wills RN

## 2021-10-11 ENCOUNTER — THERAPY VISIT (OUTPATIENT)
Dept: PHYSICAL THERAPY | Facility: CLINIC | Age: 81
End: 2021-10-11
Payer: COMMERCIAL

## 2021-10-11 DIAGNOSIS — M25.562 ACUTE PAIN OF LEFT KNEE: ICD-10-CM

## 2021-10-11 PROCEDURE — 97112 NEUROMUSCULAR REEDUCATION: CPT | Mod: GP | Performed by: PHYSICAL THERAPIST

## 2021-10-11 PROCEDURE — 97110 THERAPEUTIC EXERCISES: CPT | Mod: GP | Performed by: PHYSICAL THERAPIST

## 2021-10-18 ENCOUNTER — THERAPY VISIT (OUTPATIENT)
Dept: PHYSICAL THERAPY | Facility: CLINIC | Age: 81
End: 2021-10-18
Payer: COMMERCIAL

## 2021-10-18 DIAGNOSIS — M25.562 ACUTE PAIN OF LEFT KNEE: ICD-10-CM

## 2021-10-18 PROCEDURE — 97530 THERAPEUTIC ACTIVITIES: CPT | Mod: GP | Performed by: PHYSICAL THERAPIST

## 2021-10-18 PROCEDURE — 97110 THERAPEUTIC EXERCISES: CPT | Mod: GP | Performed by: PHYSICAL THERAPIST

## 2021-10-18 ASSESSMENT — ACTIVITIES OF DAILY LIVING (ADL)
STAND: ACTIVITY IS NOT DIFFICULT
HOW_WOULD_YOU_RATE_THE_OVERALL_FUNCTION_OF_YOUR_KNEE_DURING_YOUR_USUAL_DAILY_ACTIVITIES?: NORMAL
WEAKNESS: I DO NOT HAVE THE SYMPTOM
SWELLING: I DO NOT HAVE THE SYMPTOM
AS_A_RESULT_OF_YOUR_KNEE_INJURY,_HOW_WOULD_YOU_RATE_YOUR_CURRENT_LEVEL_OF_DAILY_ACTIVITY?: NORMAL
SIT WITH YOUR KNEE BENT: ACTIVITY IS NOT DIFFICULT
PAIN: I DO NOT HAVE THE SYMPTOM
KNEE_ACTIVITY_OF_DAILY_LIVING_SCORE: 100
RISE FROM A CHAIR: ACTIVITY IS NOT DIFFICULT
GO DOWN STAIRS: ACTIVITY IS NOT DIFFICULT
LIMPING: I DO NOT HAVE THE SYMPTOM
KNEE_ACTIVITY_OF_DAILY_LIVING_SUM: 70
GO UP STAIRS: ACTIVITY IS NOT DIFFICULT
STIFFNESS: I DO NOT HAVE THE SYMPTOM
SQUAT: ACTIVITY IS NOT DIFFICULT
WALK: ACTIVITY IS NOT DIFFICULT
RAW_SCORE: 70
GIVING WAY, BUCKLING OR SHIFTING OF KNEE: I DO NOT HAVE THE SYMPTOM
KNEEL ON THE FRONT OF YOUR KNEE: ACTIVITY IS NOT DIFFICULT

## 2021-10-18 NOTE — PROGRESS NOTES
"Subjective:  HPI  Physical Exam       Knee Activity of Daily Living Score: 100            Objective:  System    Physical Exam    General     ROS    Assessment/Plan:    DISCHARGE REPORT    Progress reporting period is from 10-4-21 to 10-18-21.       SUBJECTIVE  Subjective changes noted by patient:  Left knee continues to improve. \"It's much better.\" Less pain on stairs. No pain with sitting now. Last week noted R knee pain possibly from golfing. Notes sharp medial knee pain with walking and pivoting. Leaving for [place] for 6 months.    Current pain level is 0/10  .     Previous pain level was  4/10  .   Changes in function:  Yes (See Goal flowsheet attached for changes in current functional level)  Adverse reaction to treatment or activity: None    OBJECTIVE  Changes noted in objective findings:  Yes,   Objective: LAROM: flex=hands to mid lower leg, ext=20%, RSG=10% (mild pain), LSG=20% (no pain) NADIA abolished pain with step ups.      ASSESSMENT/PLAN  Updated problem list and treatment plan: Diagnosis 1:  L knee pain  Pain -  self management, education, directional preference exercise and home program  Decreased function - therapeutic activities  STG/LTGs have been met or progress has been made towards goals:  Yes (See Goal flow sheet completed today.)  Assessment of Progress: The patient's condition is improving.  Patient is meeting short term goals and is progressing towards long term goals.  Self Management Plans:  Patient has been instructed in a home treatment program.  Patient  has been instructed in self management of symptoms.  I have re-evaluated this patient and find that the nature, scope, duration and intensity of the therapy is appropriate for the medical condition of the patient.  Yogesh continues to require the following intervention to meet STG and LTG's:  PT intervention is no longer required to meet STG/LTG.    Recommendations:  This patient is ready to be discharged from therapy and continue their " home treatment program.    Please refer to the daily flowsheet for treatment today, total treatment time and time spent performing 1:1 timed codes.

## 2021-10-18 NOTE — LETTER
"[facility]  [address] [id number] [phone]    2021    Re: Yogesh Abreu   :   1940  MRN:  [id number]   REFERRING PHYSICIAN:   Filipe ALEX [facility]    Date of Initial Evaluation:  10/4/2021  Visits:  Rxs Used: 3  Reason for Referral:  Acute pain of left knee    DISCHARGE REPORT  Progress reporting period is from 10-4-21 to 10-18-21.       SUBJECTIVE  Subjective changes noted by patient:  Left knee continues to improve. \"It's much better.\" Less pain on stairs. No pain with sitting now. Last week noted R knee pain possibly from golfing. Notes sharp medial knee pain with walking and pivoting. Leaving for [place] for 6 months.    Current pain level is 0/10  .     Previous pain level was  4/10  .   Changes in function:  Yes (See Goal flowsheet attached for changes in current functional level)  Adverse reaction to treatment or activity: None    OBJECTIVE  Changes noted in objective findings:  Yes,   Objective: LAROM: flex=hands to mid lower leg, ext=20%, RSG=10% (mild pain), LSG=20% (no pain) NADIA abolished pain with step ups.      ASSESSMENT/PLAN  Updated problem list and treatment plan: Diagnosis 1:  L knee pain  Pain -  self management, education, directional preference exercise and home program  Decreased function - therapeutic activities  STG/LTGs have been met or progress has been made towards goals:  Yes (See Goal flow sheet completed today.)  Assessment of Progress: The patient's condition is improving.  Patient is meeting short term goals and is progressing towards long term goals.  Self Management Plans:  Patient has been instructed in a home treatment program.  Patient  has been instructed in self management of symptoms.  I have re-evaluated this patient and find that the nature, scope, duration and intensity of the therapy is appropriate for the medical " condition of the patient.  Yogesh continues to require the following intervention to meet STG and LTG's:  PT intervention is no longer required to meet STG/LTG.        Recommendations:  This patient is ready to be discharged from therapy and continue their home treatment program.    Thank you for your referral.    INQUIRIES  Therapist: Carmen Adam PT   [address] [address]  Phone: [phone]  Fax: [phone]

## 2021-10-20 ENCOUNTER — ANTICOAGULATION THERAPY VISIT (OUTPATIENT)
Dept: CARDIOLOGY | Facility: CLINIC | Age: 81
End: 2021-10-20
Payer: COMMERCIAL

## 2021-10-20 DIAGNOSIS — I26.99 PULMONARY EMBOLISM (H): Primary | ICD-10-CM

## 2021-10-20 DIAGNOSIS — Z79.01 LONG TERM CURRENT USE OF ANTICOAGULANTS WITH INR GOAL OF 2.0-3.0: ICD-10-CM

## 2021-10-20 LAB — INR (EXTERNAL): 2.7 (ref 0.9–1.1)

## 2021-10-20 PROCEDURE — 99207 PR NO CHARGE NURSE ONLY: CPT

## 2021-10-20 NOTE — PROGRESS NOTES
ANTICOAGULATION FOLLOW-UP CLINIC VISIT    Patient Name:  Yogesh Abreu  Date:  10/20/2021  Contact Type:  Telephone    SUBJECTIVE:  Patient Findings         Clinical Outcomes     Negatives:  Major bleeding event, Thromboembolic event, Anticoagulation-related hospital admission, Anticoagulation-related ED visit, Anticoagulation-related fatality           OBJECTIVE    Recent labs: (last 7 days)     10/20/21  1338   INR 2.7*       ASSESSMENT / PLAN  INR assessment THER    Recheck INR In: 2 WEEKS    INR Location Home INR    Billed home INR No      Anticoagulation Summary  As of 10/20/2021    INR goal:  2.0-3.0   TTR:  65.4 % (1 y)   INR used for dosin.7 (10/20/2021)   Warfarin maintenance plan:  5 mg (5 mg x 1) every Thu; 7.5 mg (5 mg x 1.5) all other days   Full warfarin instructions:  5 mg every Thu; 7.5 mg all other days   Weekly warfarin total:  50 mg   No change documented:  Tanja Wills RN   Plan last modified:  Tanja Wills RN (2021)   Next INR check:  11/3/2021   Target end date:  Indefinite    Indications    PE (pulmonary embolism) [I26.99]  Long term current use of anticoagulants with INR goal of 2.0-3.0 [Z79.01]             Anticoagulation Episode Summary     INR check location:      Preferred lab:      Send INR reminders to:  [facility] HEART INR NURSE    Comments:        Anticoagulation Care Providers     Provider Role Specialty Phone number    SharanRupinderFrederic Mauro MD Referring Cardiovascular Disease [phone]            See the Encounter Report to view Anticoagulation Flowsheet and Dosing Calendar (Go to Encounters tab in chart review, and find the Anticoagulation Therapy Visit)    Home INR 2.7 No change in meds or diet. Denies abnormal bleeding or bruising.  Will continue current dosing of 5 mg Th and 7.5 mg all other days with recheck 2 weeks. He will be leaving for [place] on . He will check his next home INR after he arrives in [place].   Tanja Wills RN

## 2021-11-04 ENCOUNTER — TRANSFERRED RECORDS (OUTPATIENT)
Dept: HEALTH INFORMATION MANAGEMENT | Facility: CLINIC | Age: 81
End: 2021-11-04
Payer: COMMERCIAL

## 2021-11-04 LAB — INR (EXTERNAL): 2.4 (ref 0.9–1.1)

## 2021-11-05 ENCOUNTER — ANTICOAGULATION THERAPY VISIT (OUTPATIENT)
Dept: CARDIOLOGY | Facility: CLINIC | Age: 81
End: 2021-11-05
Payer: COMMERCIAL

## 2021-11-05 DIAGNOSIS — I26.99 PULMONARY EMBOLISM (H): Primary | ICD-10-CM

## 2021-11-05 DIAGNOSIS — Z79.01 LONG TERM CURRENT USE OF ANTICOAGULANTS WITH INR GOAL OF 2.0-3.0: ICD-10-CM

## 2021-11-05 PROCEDURE — 99207 PR NO CHARGE NURSE ONLY: CPT

## 2021-11-05 NOTE — PROGRESS NOTES
ANTICOAGULATION FOLLOW-UP CLINIC VISIT    Patient Name:  Yogesh Abreu  Date:  2021  Contact Type:  [email or web address] website    SUBJECTIVE:         OBJECTIVE    Recent labs: (last 7 days)     21  1842   INR 2.4*       ASSESSMENT / PLAN  No question data found.  Anticoagulation Summary  As of 2021    INR goal:  2.0-3.0   TTR:  65.3 % (1 y)   INR used for dosin.4 (2021)   Warfarin maintenance plan:  5 mg (5 mg x 1) every Thu; 7.5 mg (5 mg x 1.5) all other days   Full warfarin instructions:  5 mg every Thu; 7.5 mg all other days   Weekly warfarin total:  50 mg   No change documented:  Tanja Wills RN   Plan last modified:  Tanja Wills RN (2021)   Next INR check:  2021   Target end date:  Indefinite    Indications    PE (pulmonary embolism) [I26.99]  Long term current use of anticoagulants with INR goal of 2.0-3.0 [Z79.01]             Anticoagulation Episode Summary     INR check location:      Preferred lab:      Send INR reminders to:  [facility] HEART INR NURSE    Comments:        Anticoagulation Care Providers     Provider Role Specialty Phone number    Frederic Isaacs MD Referring Cardiovascular Disease [phone]            See the Encounter Report to view Anticoagulation Flowsheet and Dosing Calendar (Go to Encounters tab in chart review, and find the Anticoagulation Therapy Visit)    21 Home INR 2.4 (called in after clinic hours). Will continue current dosing of 5 mg Th and 7.5 mg all other days with recheck 2 weeks. Will call pt after the next INR check.  Tanja Wills RN

## 2021-11-17 ENCOUNTER — ANTICOAGULATION THERAPY VISIT (OUTPATIENT)
Dept: CARDIOLOGY | Facility: CLINIC | Age: 81
End: 2021-11-17
Payer: COMMERCIAL

## 2021-11-17 DIAGNOSIS — Z79.01 LONG TERM CURRENT USE OF ANTICOAGULANTS WITH INR GOAL OF 2.0-3.0: ICD-10-CM

## 2021-11-17 DIAGNOSIS — I26.99 PULMONARY EMBOLISM (H): Primary | ICD-10-CM

## 2021-11-17 LAB — INR (EXTERNAL): 2.5 (ref 0.9–1.1)

## 2021-11-17 PROCEDURE — 99207 PR NO CHARGE NURSE ONLY: CPT

## 2021-11-17 NOTE — PROGRESS NOTES
ANTICOAGULATION FOLLOW-UP CLINIC VISIT    Patient Name:  Yogesh Abreu  Date:  2021  Contact Type:  Telephone    SUBJECTIVE:  Patient Findings         Clinical Outcomes     Negatives:  Major bleeding event, Thromboembolic event, Anticoagulation-related hospital admission, Anticoagulation-related ED visit, Anticoagulation-related fatality           OBJECTIVE    Recent labs: (last 7 days)     21  1057   INR 2.5*       ASSESSMENT / PLAN  No question data found.  Anticoagulation Summary  As of 2021    INR goal:  2.0-3.0   TTR:  65.4 % (1 y)   INR used for dosin.5 (2021)   Warfarin maintenance plan:  5 mg (5 mg x 1) every Thu; 7.5 mg (5 mg x 1.5) all other days   Full warfarin instructions:  5 mg every Thu; 7.5 mg all other days   Weekly warfarin total:  50 mg   No change documented:  Tanja Wills RN   Plan last modified:  Tanja Wills RN (2021)   Next INR check:  2021   Target end date:  Indefinite    Indications    PE (pulmonary embolism) [I26.99]  Long term current use of anticoagulants with INR goal of 2.0-3.0 [Z79.01]             Anticoagulation Episode Summary     INR check location:      Preferred lab:      Send INR reminders to:  [facility] HEART INR NURSE    Comments:        Anticoagulation Care Providers     Provider Role Specialty Phone number    Frederic Isaacs MD Referring Cardiovascular Disease [phone]            See the Encounter Report to view Anticoagulation Flowsheet and Dosing Calendar (Go to Encounters tab in chart review, and find the Anticoagulation Therapy Visit)    Home INR 2.5 He is in [place] for the winter. No change in meds or diet. Occasional bruising. Will continue current dosing of 5 mg Th and 7.5 mg all other days with recheck in 2 weeks.  Tanja Wills RN

## 2021-12-01 ENCOUNTER — ANTICOAGULATION THERAPY VISIT (OUTPATIENT)
Dept: CARDIOLOGY | Facility: CLINIC | Age: 81
End: 2021-12-01
Payer: COMMERCIAL

## 2021-12-01 DIAGNOSIS — Z79.01 LONG TERM CURRENT USE OF ANTICOAGULANTS WITH INR GOAL OF 2.0-3.0: ICD-10-CM

## 2021-12-01 DIAGNOSIS — I26.99 PULMONARY EMBOLISM (H): Primary | ICD-10-CM

## 2021-12-01 LAB — INR (EXTERNAL): 3 (ref 0.9–1.1)

## 2021-12-01 PROCEDURE — 99207 PR NO CHARGE NURSE ONLY: CPT | Performed by: INTERNAL MEDICINE

## 2021-12-01 NOTE — PROGRESS NOTES
ANTICOAGULATION FOLLOW-UP CLINIC VISIT    Patient Name:  Yogesh Abreu  Date:  12/1/2021  Contact Type:  [employer] website    SUBJECTIVE:         OBJECTIVE    Recent labs: (last 7 days)     12/01/21  1450   INR 3.0*       ASSESSMENT / PLAN  INR assessment THER    Recheck INR In: 2 WEEKS    INR Location Home INR    Billed home INR No      Anticoagulation Summary  As of 12/1/2021    INR goal:  2.0-3.0   TTR:  65.8 % (1 y)   INR used for dosing:  3.0 (12/1/2021)   Warfarin maintenance plan:  5 mg (5 mg x 1) every Thu; 7.5 mg (5 mg x 1.5) all other days   Full warfarin instructions:  5 mg every Thu; 7.5 mg all other days   Weekly warfarin total:  50 mg   No change documented:  Tanja Wills RN   Plan last modified:  Tanja Wills RN (7/29/2021)   Next INR check:  12/15/2021   Target end date:  Indefinite    Indications    PE (pulmonary embolism) [I26.99]  Long term current use of anticoagulants with INR goal of 2.0-3.0 [Z79.01]             Anticoagulation Episode Summary     INR check location:      Preferred lab:      Send INR reminders to:  [facility] HEART INR NURSE    Comments:        Anticoagulation Care Providers     Provider Role Specialty Phone number    Frederic Isaacs MD Referring Cardiovascular Disease [phone]            See the Encounter Report to view Anticoagulation Flowsheet and Dosing Calendar (Go to Encounters tab in chart review, and find the Anticoagulation Therapy Visit)    Home INR 3.0 Will continue current dosing of 5 mg Th and 7.5 mg all other days with recheck in 2 weeks. Will call pt after the next INR check.  Tanja Wills RN

## 2021-12-06 DIAGNOSIS — Z86.711 HISTORY OF PULMONARY EMBOLISM: ICD-10-CM

## 2021-12-06 DIAGNOSIS — Z79.01 LONG TERM CURRENT USE OF ANTICOAGULANT THERAPY: ICD-10-CM

## 2021-12-06 DIAGNOSIS — I25.110 CORONARY ARTERY DISEASE INVOLVING NATIVE CORONARY ARTERY OF NATIVE HEART WITH UNSTABLE ANGINA PECTORIS (H): ICD-10-CM

## 2021-12-06 RX ORDER — NITROGLYCERIN 0.4 MG/1
0.4 TABLET SUBLINGUAL EVERY 5 MIN PRN
Qty: 25 TABLET | Refills: 1 | Status: SHIPPED | OUTPATIENT
Start: 2021-12-06 | End: 2022-09-29

## 2021-12-06 RX ORDER — WARFARIN SODIUM 5 MG/1
TABLET ORAL
Qty: 145 TABLET | Refills: 1 | Status: SHIPPED | OUTPATIENT
Start: 2021-12-06 | End: 2022-06-29

## 2021-12-06 RX ORDER — LISINOPRIL 5 MG/1
TABLET ORAL
Qty: 90 TABLET | Refills: 1 | Status: SHIPPED | OUTPATIENT
Start: 2021-12-06 | End: 2022-09-29

## 2021-12-06 NOTE — TELEPHONE ENCOUNTER
BP Readings from Last 3 Encounters:   09/21/21 127/82   06/23/21 (!) 140/80   11/09/20 120/64     Potassium   Date Value Ref Range Status   06/17/2021 4.0 3.4 - 5.3 mmol/L Final     Creatinine   Date Value Ref Range Status   06/17/2021 0.81 0.66 - 1.25 mg/dL Final

## 2021-12-08 ENCOUNTER — TELEPHONE (OUTPATIENT)
Dept: CARDIOLOGY | Facility: CLINIC | Age: 81
End: 2021-12-08
Payer: COMMERCIAL

## 2021-12-08 DIAGNOSIS — I26.99 PULMONARY EMBOLISM (H): Primary | ICD-10-CM

## 2021-12-08 DIAGNOSIS — Z79.01 LONG TERM CURRENT USE OF ANTICOAGULANTS WITH INR GOAL OF 2.0-3.0: ICD-10-CM

## 2021-12-08 NOTE — TELEPHONE ENCOUNTER
Pt left message that [place] is going out of the [place] 11 days so he won't check his home INR next week while he is gone. Left message advising pt to check the home INR after he [place] to the [place] and call results to [facility] so that the INR clinic can access the results. Rickey

## 2021-12-22 ENCOUNTER — ANTICOAGULATION THERAPY VISIT (OUTPATIENT)
Dept: CARDIOLOGY | Facility: CLINIC | Age: 81
End: 2021-12-22
Payer: COMMERCIAL

## 2021-12-22 DIAGNOSIS — Z79.01 LONG TERM CURRENT USE OF ANTICOAGULANTS WITH INR GOAL OF 2.0-3.0: ICD-10-CM

## 2021-12-22 DIAGNOSIS — I26.99 PULMONARY EMBOLISM (H): Primary | ICD-10-CM

## 2021-12-22 LAB
INR (EXTERNAL): 3.4 (ref 0.9–1.1)
INR HOME MONITORING: 3.4 (ref 2–3)
INR HOME MONITORING: 3.4 (ref 2–3)

## 2021-12-22 NOTE — PROGRESS NOTES
ANTICOAGULATION FOLLOW-UP CLINIC VISIT    Patient Name:  Yogesh Abreu  Date:  12/22/2021  Contact Type:  Telephone    SUBJECTIVE:  Patient Findings     Positives:  Change in alcohol use (drank more etoh while travelling), Change in diet/appetite (diet was variable while travelling)        Clinical Outcomes     Negatives:  Major bleeding event, Thromboembolic event, Anticoagulation-related hospital admission, Anticoagulation-related ED visit, Anticoagulation-related fatality           OBJECTIVE    Recent labs: (last 7 days)     12/22/21  1122   INR 3.4*       ASSESSMENT / PLAN  No question data found.  Anticoagulation Summary  As of 12/22/2021    INR goal:  2.0-3.0   TTR:  65.9 % (1 y)   INR used for dosing:  3.4 (12/22/2021)   Warfarin maintenance plan:  5 mg (5 mg x 1) every Thu; 7.5 mg (5 mg x 1.5) all other days   Full warfarin instructions:  12/22: 5 mg; Otherwise 5 mg every Thu; 7.5 mg all other days   Weekly warfarin total:  50 mg   Plan last modified:  Tanja Wills RN (7/29/2021)   Next INR check:  1/5/2022   Target end date:  Indefinite    Indications    PE (pulmonary embolism) [I26.99]  Long term current use of anticoagulants with INR goal of 2.0-3.0 [Z79.01]             Anticoagulation Episode Summary     INR check location:      Preferred lab:      Send INR reminders to:  [facility] HEART INR NURSE    Comments:        Anticoagulation Care Providers     Provider Role Specialty Phone number    Frederic Isaacs MD Referring Cardiovascular Disease [phone]            See the Encounter Report to view Anticoagulation Flowsheet and Dosing Calendar (Go to Encounters tab in chart review, and find the Anticoagulation Therapy Visit)    Home INR 3.4 Left message asking pt to call back to review his current status (change in meds, diet or bleeding issues?). Pt was travelling recently so his diet was most likely variable. Left detailed instructions to decrease today's dose to 5 mg then resume usual  dosing of 5 mg Th and 7.5 mg all other days with recheck in 2 weeks. Advised to eat greens today then resume usual diet. Asked that he call back to confirm that he received the instructions and to update INR clinic on his status. Dosage adjustment made based on physician directed care plan.  2:25pm Pt left message that he was travelling so diet was variable (less greens and more etoh). No change in meds. Denies bleeding issues. He stated that he received the instructions and will recheck in 2 weeks.  Tanja Wills RN

## 2022-01-05 ENCOUNTER — TRANSFERRED RECORDS (OUTPATIENT)
Dept: HEALTH INFORMATION MANAGEMENT | Facility: CLINIC | Age: 82
End: 2022-01-05
Payer: COMMERCIAL

## 2022-01-05 ENCOUNTER — ANTICOAGULATION THERAPY VISIT (OUTPATIENT)
Dept: FAMILY MEDICINE | Facility: CLINIC | Age: 82
End: 2022-01-05
Payer: COMMERCIAL

## 2022-01-05 DIAGNOSIS — Z79.01 LONG TERM CURRENT USE OF ANTICOAGULANTS WITH INR GOAL OF 2.0-3.0: Primary | ICD-10-CM

## 2022-01-05 DIAGNOSIS — I26.99 OTHER ACUTE PULMONARY EMBOLISM, UNSPECIFIED WHETHER ACUTE COR PULMONALE PRESENT (H): Primary | ICD-10-CM

## 2022-01-05 DIAGNOSIS — I26.99 PULMONARY EMBOLISM (H): ICD-10-CM

## 2022-01-05 LAB — INR HOME MONITORING: 2.4 (ref 2–3)

## 2022-01-05 NOTE — PROGRESS NOTES
ANTICOAGULATION MANAGEMENT     Yogesh Abreu 81 year old male is on warfarin with therapeutic INR result. (Goal INR 2.0-3.0)    Recent labs: (last 7 days)     01/05/22  0000   INR 2.40       ASSESSMENT     Source(s): Chart Review and Patient/Caregiver Call       Warfarin doses taken: Warfarin taken as instructed    Diet: No new diet changes identified    New illness, injury, or hospitalization: No    Medication/supplement changes: None noted    Signs or symptoms of bleeding or clotting: No    Previous INR: Supratherapeutic    Additional findings: None     PLAN     Recommended plan for no diet, medication or health factor changes affecting INR     Dosing Instructions: Continue your current warfarin dose with next INR in 2 weeks       Summary  As of 1/5/2022    Full warfarin instructions:  5 mg every Thu; 7.5 mg all other days   Next INR check:  1/19/2022             Telephone call with Yogesh who verbalizes understanding and agrees to plan    Patient to recheck with home meter    Education provided: patient was educated on change from [facility] heart INR clinic to [facility], given [employer] phone number, and answered question about the merge.    Plan made per [facility] anticoagulation protocol    Bonnie Pleitez RN  Anticoagulation Clinic  1/5/2022    _______________________________________________________________________     Anticoagulation Episode Summary     Current INR goal:  2.0-3.0   TTR:  67.9 % (1 y)   Target end date:  Indefinite   Send INR reminders to:  [facility] TONY    Indications    PE (pulmonary embolism) [I26.99]  Long term current use of anticoagulants with INR goal of 2.0-3.0 [Z79.01]           Comments:           Anticoagulation Care Providers     Provider Role Specialty Phone number    Frederic Isaacs MD Referring Cardiovascular Disease [phone]        Anticoagulation Management    Unable to reach Vincenzo today.    Today's INR result of 2.4 is therapeutic (goal INR of 2.0-3.0).  Result  received from: Home Monitor    Follow up required to confirm warfarin dose taken and assess for changes    LMTCB      Anticoagulation clinic to follow up    Bonnie Pleitez RN

## 2022-01-19 ENCOUNTER — ANTICOAGULATION THERAPY VISIT (OUTPATIENT)
Dept: FAMILY MEDICINE | Facility: CLINIC | Age: 82
End: 2022-01-19
Payer: COMMERCIAL

## 2022-01-19 DIAGNOSIS — I26.99 PULMONARY EMBOLISM (H): Primary | ICD-10-CM

## 2022-01-19 DIAGNOSIS — Z79.01 LONG TERM CURRENT USE OF ANTICOAGULANTS WITH INR GOAL OF 2.0-3.0: ICD-10-CM

## 2022-01-19 LAB — INR HOME MONITORING: 2.4 (ref 2–3)

## 2022-01-19 NOTE — PROGRESS NOTES
ANTICOAGULATION MANAGEMENT     Yogesh Abreu 81 year old male is on warfarin with therapeutic INR result. (Goal INR 2.0-3.0)    Recent labs: (last 7 days)     01/19/22  0000   INR 2.40       ASSESSMENT     Source(s): Chart Review and Patient/Caregiver Call       Warfarin doses taken: Warfarin taken as instructed    Diet: No new diet changes identified    New illness, injury, or hospitalization: No    Medication/supplement changes: None noted    Signs or symptoms of bleeding or clotting: No    Previous INR: Therapeutic last visit; previously outside of goal range    Additional findings: None     PLAN     Recommended plan for no diet, medication or health factor changes affecting INR     Dosing Instructions: Continue your current warfarin dose with next INR in 2 weeks       Summary  As of 1/19/2022    Full warfarin instructions:  5 mg every Thu; 7.5 mg all other days   Next INR check:  2/2/2022             Telephone call with Yogesh who verbalizes understanding and agrees to plan    Patient to recheck with home meter    Education provided: Please call back if any changes to your diet, medications or how you've been taking warfarin    Plan made per [facility] anticoagulation protocol    Bonnie Pleitez RN  Anticoagulation Clinic  1/19/2022    _______________________________________________________________________     Anticoagulation Episode Summary     Current INR goal:  2.0-3.0   TTR:  67.9 % (1 y)   Target end date:  Indefinite   Send INR reminders to:  JERRI JIMENEZ    Indications    PE (pulmonary embolism) [I26.99]  Long term current use of anticoagulants with INR goal of 2.0-3.0 [Z79.01]           Comments:           Anticoagulation Care Providers     Provider Role Specialty Phone number    Frederic Isaacs MD Referring Cardiovascular Disease [phone]        Anticoagulation Management    Unable to reach Vincenzo today.    Today's INR result of 2.4 is therapeutic (goal INR of 2.0-3.0).  Result received from:  Home Monitor    Follow up required to confirm warfarin dose taken and assess for changes    Left message to continue current dose of warfarin 7.5 mg tonight. Request call back for assessment.      Anticoagulation clinic to follow up    Bonnie Pleitez RN

## 2022-02-02 ENCOUNTER — ANTICOAGULATION THERAPY VISIT (OUTPATIENT)
Dept: FAMILY MEDICINE | Facility: CLINIC | Age: 82
End: 2022-02-02
Payer: COMMERCIAL

## 2022-02-02 DIAGNOSIS — I26.99 PULMONARY EMBOLISM (H): Primary | ICD-10-CM

## 2022-02-02 DIAGNOSIS — Z79.01 LONG TERM CURRENT USE OF ANTICOAGULANTS WITH INR GOAL OF 2.0-3.0: ICD-10-CM

## 2022-02-02 LAB — INR HOME MONITORING: 2.1 (ref 2–3)

## 2022-02-02 NOTE — PROGRESS NOTES
ANTICOAGULATION MANAGEMENT     Yogesh Abreu 81 year old male is on warfarin with therapeutic INR result. (Goal INR 2.0-3.0)    Recent labs: (last 7 days)     02/02/22  0000   INR 2.10       ASSESSMENT     Source(s): Chart Review and Patient/Caregiver Call       Warfarin doses taken: Warfarin taken as instructed    Diet: No new diet changes identified    New illness, injury, or hospitalization: No    Medication/supplement changes: None noted    Signs or symptoms of bleeding or clotting: No    Previous INR: Therapeutic last 2(+) visits    Additional findings: None     PLAN     Recommended plan for no diet, medication or health factor changes affecting INR     Dosing Instructions: Continue your current warfarin dose with next INR in 2 weeks       Summary  As of 2/2/2022    Full warfarin instructions:  5 mg every Thu; 7.5 mg all other days   Next INR check:  2/16/2022             Telephone call with Yogesh who verbalizes understanding and agrees to plan    Patient to recheck with home meter    Education provided: Please call back if any changes to your diet, medications or how you've been taking warfarin    Plan made per [facility] anticoagulation protocol    Bonnie Pleitez, RN  Anticoagulation Clinic  2/2/2022    _______________________________________________________________________     Anticoagulation Episode Summary     Current INR goal:  2.0-3.0   TTR:  71.4 % (1 y)   Target end date:  Indefinite   Send INR reminders to:  JERRI JIMENEZ    Indications    PE (pulmonary embolism) [I26.99]  Long term current use of anticoagulants with INR goal of 2.0-3.0 [Z79.01]           Comments:           Anticoagulation Care Providers     Provider Role Specialty Phone number    Frederic Isaacs MD Referring Cardiovascular Disease [phone]

## 2022-02-16 ENCOUNTER — ANTICOAGULATION THERAPY VISIT (OUTPATIENT)
Dept: FAMILY MEDICINE | Facility: CLINIC | Age: 82
End: 2022-02-16
Payer: COMMERCIAL

## 2022-02-16 DIAGNOSIS — I26.99 PULMONARY EMBOLISM (H): Primary | ICD-10-CM

## 2022-02-16 DIAGNOSIS — Z79.01 LONG TERM CURRENT USE OF ANTICOAGULANTS WITH INR GOAL OF 2.0-3.0: ICD-10-CM

## 2022-02-16 LAB — INR HOME MONITORING: 2.6 (ref 2–3)

## 2022-02-16 NOTE — PROGRESS NOTES
ANTICOAGULATION MANAGEMENT     Yogesh Abreu 81 year old male is on warfarin with therapeutic INR result. (Goal INR 2.0-3.0)    Recent labs: (last 7 days)     02/16/22  0000   INR 2.60       ASSESSMENT     Source(s): Chart Review and Patient/Caregiver Call       Warfarin doses taken: Warfarin taken as instructed    Diet: No new diet changes identified    New illness, injury, or hospitalization: No    Medication/supplement changes: None noted    Signs or symptoms of bleeding or clotting: No    Previous INR: Therapeutic last 2(+) visits    Additional findings: None     PLAN     Recommended plan for no diet, medication or health factor changes affecting INR     Dosing Instructions: Continue your current warfarin dose with next INR in 2 weeks       Summary  As of 2/16/2022    Full warfarin instructions:  5 mg every Thu; 7.5 mg all other days   Next INR check:  3/2/2022             Telephone call with Yogesh who verbalizes understanding and agrees to plan    Patient to recheck with home meter    Education provided: Please call back if any changes to your diet, medications or how you've been taking warfarin    Plan made per [facility] anticoagulation protocol    Bonnie Pleitez RN  Anticoagulation Clinic  2/16/2022    _______________________________________________________________________     Anticoagulation Episode Summary     Current INR goal:  2.0-3.0   TTR:  75.3 % (1 y)   Target end date:  Indefinite   Send INR reminders to:  JERRI JIMENEZ    Indications    PE (pulmonary embolism) [I26.99]  Long term current use of anticoagulants with INR goal of 2.0-3.0 [Z79.01]           Comments:           Anticoagulation Care Providers     Provider Role Specialty Phone number    Frederic Isaacs MD Referring Cardiovascular Disease [phone]

## 2022-03-02 ENCOUNTER — ANTICOAGULATION THERAPY VISIT (OUTPATIENT)
Dept: ANTICOAGULATION | Facility: CLINIC | Age: 82
End: 2022-03-02
Payer: COMMERCIAL

## 2022-03-02 DIAGNOSIS — I26.99 PULMONARY EMBOLISM (H): Primary | ICD-10-CM

## 2022-03-02 DIAGNOSIS — Z79.01 LONG TERM CURRENT USE OF ANTICOAGULANTS WITH INR GOAL OF 2.0-3.0: ICD-10-CM

## 2022-03-02 LAB — INR HOME MONITORING: 2.7 (ref 2–3)

## 2022-03-02 NOTE — PROGRESS NOTES
ANTICOAGULATION MANAGEMENT     Yogesh Abreu 81 year old male is on warfarin with therapeutic INR result. (Goal INR 2.0-3.0)    Recent labs: (last 7 days)     03/02/22  0000   INR 2.70       ASSESSMENT       Source(s): Chart Review    Previous INR was Therapeutic last 2(+) visits    Medication, diet, health changes since last INR chart reviewed; none identified           PLAN     Unable to reach Vincenzo today.    [facility]. Instructed to take 7.5mg tonight. Need to review manage by exception.    Follow up required to confirm warfarin dose taken and assess for changes    Johnnie De La O RN  Anticoagulation Clinic  3/2/2022

## 2022-03-03 NOTE — PROGRESS NOTES
ANTICOAGULATION MANAGEMENT     Yogesh Abreu 81 year old male is on warfarin with therapeutic INR result. (Goal INR 2.0-3.0)    Recent labs: (last 7 days)     03/02/22  0000   INR 2.70       ASSESSMENT       Source(s): Chart Review and Patient/Caregiver Call       Warfarin doses taken: Warfarin taken as instructed    Diet: No new diet changes identified    New illness, injury, or hospitalization: No    Medication/supplement changes: None noted    Signs or symptoms of bleeding or clotting: No    Previous INR: Therapeutic last 2(+) visits    Additional findings: None       PLAN     Recommended plan for no diet, medication or health factor changes affecting INR     Dosing Instructions: Continue your current warfarin dose with next INR in 2 weeks       Summary  As of 3/2/2022    Full warfarin instructions:  5 mg every Thu; 7.5 mg all other days   Next INR check:  3/16/2022             Telephone call with Yogesh who verbalizes understanding and agrees to plan    Patient to recheck with home meter    Education provided: Goal range and significance of current result    Plan made per [facility] anticoagulation protocol    Johnnie De La O RN  Anticoagulation Clinic  3/3/2022    _______________________________________________________________________     Anticoagulation Episode Summary     Current INR goal:  2.0-3.0   TTR:  79.3 % (1 y)   Target end date:  Indefinite   Send INR reminders to:  JERRI JIMENEZ    Indications    PE (pulmonary embolism) [I26.99]  Long term current use of anticoagulants with INR goal of 2.0-3.0 [Z79.01]           Comments:  [employer] home meter. Manage by exception.         Anticoagulation Care Providers     Provider Role Specialty Phone number    Frederic Isaacs MD Referring Cardiovascular Disease [phone]

## 2022-03-18 ENCOUNTER — ANTICOAGULATION THERAPY VISIT (OUTPATIENT)
Dept: ANTICOAGULATION | Facility: CLINIC | Age: 82
End: 2022-03-18
Payer: COMMERCIAL

## 2022-03-18 DIAGNOSIS — Z79.01 LONG TERM CURRENT USE OF ANTICOAGULANTS WITH INR GOAL OF 2.0-3.0: ICD-10-CM

## 2022-03-18 DIAGNOSIS — I26.99 PULMONARY EMBOLISM (H): Primary | ICD-10-CM

## 2022-03-18 LAB — INR HOME MONITORING: 2.6 (ref 2–3)

## 2022-03-18 NOTE — PROGRESS NOTES
ANTICOAGULATION MANAGEMENT     Yogesh Abreu 81 year old male is on warfarin with therapeutic INR result. (Goal INR 2.0-3.0)    Recent labs: (last 7 days)     03/18/22  0000   INR 2.60       ASSESSMENT       Source(s): Chart Review    Previous INR was Therapeutic last 2(+) visits    Medication, diet, health changes since last INR chart reviewed; none identified           PLAN     Recommended plan for no diet, medication or health factor changes affecting INR     Dosing Instructions: Continue your current warfarin dose with next INR in 2 weeks       Summary  As of 3/18/2022    Full warfarin instructions:  5 mg every Thu; 7.5 mg all other days   Next INR check:  4/1/2022             Detailed voice message left for Yogesh with dosing instructions and follow up date.     Patient to recheck with home meter    Education provided: Please call back if any changes to your diet, medications or how you've been taking warfarin    Plan made per [facility] anticoagulation protocol    Macie Rodriguez RN  Anticoagulation Clinic  3/18/2022    _______________________________________________________________________     Anticoagulation Episode Summary     Current INR goal:  2.0-3.0   TTR:  83.5 % (1 y)   Target end date:  Indefinite   Send INR reminders to:  JERRI JIMENEZ    Indications    PE (pulmonary embolism) [I26.99]  Long term current use of anticoagulants with INR goal of 2.0-3.0 [Z79.01]           Comments:  [employer] home meter. Manage by exception.         Anticoagulation Care Providers     Provider Role Specialty Phone number    Frederic Isaacs MD Referring Cardiovascular Disease [phone]

## 2022-03-30 ENCOUNTER — DOCUMENTATION ONLY (OUTPATIENT)
Dept: ANTICOAGULATION | Facility: CLINIC | Age: 82
End: 2022-03-30
Payer: COMMERCIAL

## 2022-03-30 DIAGNOSIS — I26.99 PULMONARY EMBOLISM (H): Primary | ICD-10-CM

## 2022-03-30 DIAGNOSIS — Z79.01 LONG TERM CURRENT USE OF ANTICOAGULANTS WITH INR GOAL OF 2.0-3.0: ICD-10-CM

## 2022-03-30 LAB — INR HOME MONITORING: 3 (ref 2–3)

## 2022-03-30 NOTE — PROGRESS NOTES
ANTICOAGULATION  MANAGEMENT-Home Monitor Managed by Exception    Yogesh Abreu 81 year old male is on warfarin with therapeutic INR result. (Goal INR 2.0-3.0)    Recent labs: (last 7 days)     03/30/22  0000   INR 3.00         Previous INR was Therapeutic    Medication, diet, health changes since last INR:chart reviewed; none identified    Contacted within the last 12 weeks by phone on 3/18/22      BRIELLE Zuñiga was NOT contacted regarding therapeutic result today per home monitoring policy manage by exception agreement.   Current warfarin dose is to be continued:     Summary  As of 3/30/2022    Full warfarin instructions:  5 mg every Thu; 7.5 mg all other days   Next INR check:  4/13/2022           ?   Bonnie Pleitez RN  Anticoagulation Clinic  3/30/2022    _______________________________________________________________________     Anticoagulation Episode Summary     Current INR goal:  2.0-3.0   TTR:  86.8 % (1 y)   Target end date:  Indefinite   Send INR reminders to:  JERRI JIMENEZ    Indications    PE (pulmonary embolism) [I26.99]  Long term current use of anticoagulants with INR goal of 2.0-3.0 [Z79.01]           Comments:  [employer] home meter. Manage by exception.         Anticoagulation Care Providers     Provider Role Specialty Phone number    Frederic Isaacs MD Referring Cardiovascular Disease [phone]           42 yo female status post excision of lipoma on right side of her back presents today for post op care  Patient denied any signs of infection, fever, or chills  She stated her wounds are healing well  There is still some rib pain occasionally, but this could be from costochondritis  On exam, no erythema, swelling, or discharge present  Wound is healing very well  A/P:  Wound is healing well    F/u prn

## 2022-04-08 ENCOUNTER — DOCUMENTATION ONLY (OUTPATIENT)
Dept: ANTICOAGULATION | Facility: CLINIC | Age: 82
End: 2022-04-08
Payer: COMMERCIAL

## 2022-04-08 DIAGNOSIS — Z79.01 LONG TERM CURRENT USE OF ANTICOAGULANTS WITH INR GOAL OF 2.0-3.0: ICD-10-CM

## 2022-04-08 DIAGNOSIS — I26.99 PULMONARY EMBOLISM (H): Primary | ICD-10-CM

## 2022-04-08 LAB — INR HOME MONITORING: 2.6 (ref 2–3)

## 2022-04-08 NOTE — PROGRESS NOTES
ANTICOAGULATION  MANAGEMENT-Home Monitor Managed by Exception    Yogesh Abreu 81 year old male is on warfarin with therapeutic INR result. (Goal INR 2.0-3.0)    Recent labs: (last 7 days)     04/07/22  0000   INR 2.60         Previous INR was Therapeutic    Medication, diet, health changes since last INR:chart reviewed; none identified    Contacted within the last 12 weeks by phone on 3/18/2022      BRIELLE Zuñiga was NOT contacted regarding therapeutic result today per home monitoring policy manage by exception agreement.   Current warfarin dose is to be continued:     Summary  As of 4/8/2022    Full warfarin instructions:  5 mg every Thu; 7.5 mg all other days   Next INR check:  4/22/2022           ?   Jackelyn Schultz, RN  Anticoagulation Clinic  4/8/2022    _______________________________________________________________________     Anticoagulation Episode Summary     Current INR goal:  2.0-3.0   TTR:  87.5 % (1 y)   Target end date:  Indefinite   Send INR reminders to:  JERRI JIMENEZ    Indications    PE (pulmonary embolism) [I26.99]  Long term current use of anticoagulants with INR goal of 2.0-3.0 [Z79.01]           Comments:  [employer] home meter. Manage by exception.         Anticoagulation Care Providers     Provider Role Specialty Phone number    Frederic Isaacs MD Referring Cardiovascular Disease [phone]

## 2022-04-20 ENCOUNTER — DOCUMENTATION ONLY (OUTPATIENT)
Dept: ANTICOAGULATION | Facility: CLINIC | Age: 82
End: 2022-04-20
Payer: COMMERCIAL

## 2022-04-20 DIAGNOSIS — Z79.01 LONG TERM CURRENT USE OF ANTICOAGULANTS WITH INR GOAL OF 2.0-3.0: ICD-10-CM

## 2022-04-20 DIAGNOSIS — I26.99 PULMONARY EMBOLISM (H): Primary | ICD-10-CM

## 2022-04-20 LAB — INR HOME MONITORING: 2.5 (ref 2–3)

## 2022-04-20 NOTE — PROGRESS NOTES
ANTICOAGULATION  MANAGEMENT-Home Monitor Managed by Exception    Yogesh Abreu 81 year old male is on warfarin with therapeutic INR result. (Goal INR 2.0-3.0)    Recent labs: (last 7 days)     04/20/22  0000   INR 2.50         Previous INR was Therapeutic    Medication, diet, health changes since last INR:chart reviewed; none identified    Contacted within the last 12 weeks by phone on 3/18/22      BRIELLE Zuñiga was NOT contacted regarding therapeutic result today per home monitoring policy manage by exception agreement.   Current warfarin dose is to be continued:     Summary  As of 4/20/2022    Full warfarin instructions:  5 mg every Thu; 7.5 mg all other days   Next INR check:  5/4/2022           ?   Carolina Benitez RN  Anticoagulation Clinic  4/20/2022    _______________________________________________________________________     Anticoagulation Episode Summary     Current INR goal:  2.0-3.0   TTR:  87.6 % (1 y)   Target end date:  Indefinite   Send INR reminders to:  ALFAAG KASOTA    Indications    PE (pulmonary embolism) [I26.99]  Long term current use of anticoagulants with INR goal of 2.0-3.0 [Z79.01]           Comments:  [employer] home meter. Manage by exception.         Anticoagulation Care Providers     Provider Role Specialty Phone number    Frederic Isaacs MD Referring Cardiovascular Disease [phone]          Carolina Guardado RN, BSN, PHN  Anticoagulation Nurse  [phone]

## 2022-05-04 ENCOUNTER — DOCUMENTATION ONLY (OUTPATIENT)
Dept: ANTICOAGULATION | Facility: CLINIC | Age: 82
End: 2022-05-04
Payer: COMMERCIAL

## 2022-05-04 DIAGNOSIS — Z79.01 LONG TERM CURRENT USE OF ANTICOAGULANTS WITH INR GOAL OF 2.0-3.0: ICD-10-CM

## 2022-05-04 DIAGNOSIS — I26.99 PULMONARY EMBOLISM (H): Primary | ICD-10-CM

## 2022-05-04 LAB — INR HOME MONITORING: 2.9 (ref 2–3)

## 2022-05-04 NOTE — PROGRESS NOTES
ANTICOAGULATION  MANAGEMENT-Home Monitor Managed by Exception    Yogesh Abreu 81 year old male is on warfarin with therapeutic INR result. (Goal INR 2.0-3.0)    Recent labs: (last 7 days)     05/04/22  0000   INR 2.90         Previous INR was Therapeutic    Medication, diet, health changes since last INR:chart reviewed; none identified    Contacted within the last 12 weeks by phone on 3/18/22          BRIELLE Zuñiga was NOT contacted regarding therapeutic result today per home monitoring policy manage by exception agreement.   Current warfarin dose is to be continued:     Summary  As of 5/4/2022    Full warfarin instructions:  5 mg every Thu; 7.5 mg all other days   Next INR check:  5/18/2022           ?   Bonnie Pleitez RN  Anticoagulation Clinic  5/4/2022    _______________________________________________________________________     Anticoagulation Episode Summary     Current INR goal:  2.0-3.0   TTR:  87.6 % (1 y)   Target end date:  Indefinite   Send INR reminders to:  JERRI JIMENEZ    Indications    PE (pulmonary embolism) [I26.99]  Long term current use of anticoagulants with INR goal of 2.0-3.0 [Z79.01]           Comments:  [employer] home meter. Manage by exception.         Anticoagulation Care Providers     Provider Role Specialty Phone number    Frederic Isaacs MD Referring Cardiovascular Disease [phone]

## 2022-05-18 ENCOUNTER — DOCUMENTATION ONLY (OUTPATIENT)
Dept: ANTICOAGULATION | Facility: CLINIC | Age: 82
End: 2022-05-18
Payer: COMMERCIAL

## 2022-05-18 DIAGNOSIS — Z79.01 LONG TERM CURRENT USE OF ANTICOAGULANTS WITH INR GOAL OF 2.0-3.0: ICD-10-CM

## 2022-05-18 DIAGNOSIS — I26.99 PULMONARY EMBOLISM (H): Primary | ICD-10-CM

## 2022-05-18 LAB — INR HOME MONITORING: 2.5 (ref 2–3)

## 2022-05-18 NOTE — PROGRESS NOTES
ANTICOAGULATION  MANAGEMENT-Home Monitor Managed by Exception    Yogesh Abreu 81 year old male is on warfarin with therapeutic INR result. (Goal INR 2.0-3.0)    Recent labs: (last 7 days)     05/18/22  0000   INR 2.50         Previous INR was Therapeutic    Medication, diet, health changes since last INR:chart reviewed; none identified    Contacted within the last 12 weeks by phone on 3/18/22          BRIELLE Zuñiga was NOT contacted regarding therapeutic result today per home monitoring policy manage by exception agreement.   Current warfarin dose is to be continued:     Summary  As of 5/18/2022    Full warfarin instructions:  5 mg every Thu; 7.5 mg all other days   Next INR check:  6/1/2022           ?   Bonnie Pleitez RN  Anticoagulation Clinic  5/18/2022    _______________________________________________________________________     Anticoagulation Episode Summary     Current INR goal:  2.0-3.0   TTR:  87.6 % (1 y)   Target end date:  Indefinite   Send INR reminders to:  JERRI JIMENEZ    Indications    PE (pulmonary embolism) [I26.99]  Long term current use of anticoagulants with INR goal of 2.0-3.0 [Z79.01]           Comments:  [employer] home meter. Manage by exception.         Anticoagulation Care Providers     Provider Role Specialty Phone number    Frederic Isaacs MD Referring Cardiovascular Disease [phone]

## 2022-06-01 DIAGNOSIS — I25.10 CORONARY ARTERY DISEASE INVOLVING NATIVE CORONARY ARTERY OF NATIVE HEART WITHOUT ANGINA PECTORIS: ICD-10-CM

## 2022-06-01 DIAGNOSIS — R00.0 TACHYCARDIA: ICD-10-CM

## 2022-06-01 DIAGNOSIS — I10 ESSENTIAL HYPERTENSION: ICD-10-CM

## 2022-06-01 LAB — INR HOME MONITORING: 2.1 (ref 2–3)

## 2022-06-01 RX ORDER — METOPROLOL SUCCINATE 25 MG/1
12.5 TABLET, EXTENDED RELEASE ORAL DAILY
Qty: 45 TABLET | Refills: 0 | Status: SHIPPED | OUTPATIENT
Start: 2022-06-01 | End: 2022-09-29

## 2022-06-02 ENCOUNTER — DOCUMENTATION ONLY (OUTPATIENT)
Dept: ANTICOAGULATION | Facility: CLINIC | Age: 82
End: 2022-06-02
Payer: COMMERCIAL

## 2022-06-02 DIAGNOSIS — I26.99 PULMONARY EMBOLISM (H): Primary | ICD-10-CM

## 2022-06-02 DIAGNOSIS — Z79.01 LONG TERM CURRENT USE OF ANTICOAGULANTS WITH INR GOAL OF 2.0-3.0: ICD-10-CM

## 2022-06-02 NOTE — PROGRESS NOTES
ANTICOAGULATION  MANAGEMENT-Home Monitor Managed by Exception    Yogesh Abreu 81 year old male is on warfarin with therapeutic INR result. (Goal INR 2.0-3.0)    Recent labs: (last 7 days)     06/01/22  0000   INR 2.10         Previous INR was Therapeutic    Medication, diet, health changes since last INR:chart reviewed; none identified    Contacted within the last 12 weeks by phone on 03/18/2022      BRIELLE Zuñiga was NOT contacted regarding therapeutic result today per home monitoring policy manage by exception agreement.   Current warfarin dose is to be continued:     Summary  As of 6/2/2022    Full warfarin instructions:  5 mg every Thu; 7.5 mg all other days   Next INR check:  6/15/2022           ?   Jackelyn Schultz, RN  Anticoagulation Clinic  6/2/2022    _______________________________________________________________________     Anticoagulation Episode Summary     Current INR goal:  2.0-3.0   TTR:  87.5 % (1 y)   Target end date:  Indefinite   Send INR reminders to:  JERRI JIMENEZ    Indications    PE (pulmonary embolism) [I26.99]  Long term current use of anticoagulants with INR goal of 2.0-3.0 [Z79.01]           Comments:  [employer] home meter. Manage by exception.         Anticoagulation Care Providers     Provider Role Specialty Phone number    Frederic Isaacs MD Referring Cardiovascular Disease [phone]

## 2022-06-15 ENCOUNTER — DOCUMENTATION ONLY (OUTPATIENT)
Dept: ANTICOAGULATION | Facility: CLINIC | Age: 82
End: 2022-06-15
Payer: COMMERCIAL

## 2022-06-15 DIAGNOSIS — Z79.01 LONG TERM CURRENT USE OF ANTICOAGULANTS WITH INR GOAL OF 2.0-3.0: Primary | ICD-10-CM

## 2022-06-15 LAB — INR HOME MONITORING: 2.2 (ref 2–3)

## 2022-06-15 NOTE — PROGRESS NOTES
ANTICOAGULATION  MANAGEMENT-Home Monitor Managed by Exception    Yogesh Abreu 81 year old male is on warfarin with therapeutic INR result. (Goal INR 2.0-3.0)    Recent labs: (last 7 days)     06/15/22  0000   INR 2.2         Previous INR was Therapeutic    Medication, diet, health changes since last INR:chart reviewed; none identified    Contacted within the last 12 weeks by phone on 3/18/22      BRIELLE Zuñiga was NOT contacted regarding therapeutic result today per home monitoring policy manage by exception agreement.   Current warfarin dose is to be continued:     Summary  As of 6/15/2022    Full warfarin instructions:  5 mg every Thu; 7.5 mg all other days   Next INR check:  6/29/2022           ?   Maria Fernanda Epperson, RN  Anticoagulation Clinic  6/15/2022    _______________________________________________________________________     Anticoagulation Episode Summary     Current INR goal:  2.0-3.0   TTR:  87.6 % (1 y)   Target end date:  Indefinite   Send INR reminders to:  ALFAAG KASOTA    Indications    PE (pulmonary embolism) [I26.99]  Long term current use of anticoagulants with INR goal of 2.0-3.0 [Z79.01]           Comments:  [employer] home meter. Manage by exception.         Anticoagulation Care Providers     Provider Role Specialty Phone number    Frederic Isaacs MD Referring Cardiovascular Disease [phone]

## 2022-06-20 ENCOUNTER — TELEPHONE (OUTPATIENT)
Dept: CARDIOLOGY | Facility: CLINIC | Age: 82
End: 2022-06-20
Payer: COMMERCIAL

## 2022-06-20 DIAGNOSIS — I25.10 CORONARY ARTERY DISEASE INVOLVING NATIVE CORONARY ARTERY OF NATIVE HEART WITHOUT ANGINA PECTORIS: ICD-10-CM

## 2022-06-20 DIAGNOSIS — I10 ESSENTIAL HYPERTENSION: Primary | ICD-10-CM

## 2022-06-20 DIAGNOSIS — E78.2 MIXED HYPERLIPIDEMIA: ICD-10-CM

## 2022-06-20 DIAGNOSIS — I25.110 CORONARY ARTERY DISEASE INVOLVING NATIVE CORONARY ARTERY OF NATIVE HEART WITH UNSTABLE ANGINA PECTORIS (H): ICD-10-CM

## 2022-06-20 NOTE — TELEPHONE ENCOUNTER
[facility] Call Center    Phone Message    May a detailed message be left on voicemail: yes     Reason for Call: Other: Pt would like all his orders updated so he can schedule his labs and appts together, Please reach out to discuss     Action Taken: Message routed to:  [facility] ([facility]): Cardio    Travel Screening: Not Applicable

## 2022-06-29 ENCOUNTER — TELEPHONE (OUTPATIENT)
Dept: CARDIOLOGY | Facility: CLINIC | Age: 82
End: 2022-06-29

## 2022-06-29 DIAGNOSIS — Z86.711 HISTORY OF PULMONARY EMBOLISM: ICD-10-CM

## 2022-06-29 DIAGNOSIS — Z79.01 LONG TERM CURRENT USE OF ANTICOAGULANT THERAPY: ICD-10-CM

## 2022-06-29 RX ORDER — WARFARIN SODIUM 5 MG/1
TABLET ORAL
Qty: 145 TABLET | Refills: 1 | Status: SHIPPED | OUTPATIENT
Start: 2022-06-29 | End: 2022-09-29

## 2022-06-29 NOTE — TELEPHONE ENCOUNTER
Fax received from [facility] in [place] requesting a refill of warfarin 5 mg tablets.  Pharmacy is requesting 145 tablets.    FERNANDO MunguiaN, RN  Anticoagulation Clinic

## 2022-06-29 NOTE — TELEPHONE ENCOUNTER
ANTICOAGULATION MANAGEMENT:  Medication Refill    Anticoagulation Summary  As of 6/15/2022    Warfarin maintenance plan:  5 mg (5 mg x 1) every Thu; 7.5 mg (5 mg x 1.5) all other days   Next INR check:  6/29/2022   Target end date:  Indefinite    Indications    PE (pulmonary embolism) [I26.99]  Long term current use of anticoagulants with INR goal of 2.0-3.0 [Z79.01]             Anticoagulation Care Providers     Provider Role Specialty Phone number    Frederic Isaacs MD Referring Cardiovascular Disease [phone]          Visit with referring provider/group within last year: Yes    [facility] referral signed within last year: Yes    Yogesh meets all criteria for refill (current [facility] referral, office visit with referring provider/group in last year, lab monitoring up to date or not exceeding 2 weeks overdue). Rx instructions and quantity supplied updated to match patient's current dosing plan. Warfarin 90 day supply with 1 refill granted per [facility] protocol     Mandy Ferguson RN  Anticoagulation Clinic

## 2022-07-06 ENCOUNTER — DOCUMENTATION ONLY (OUTPATIENT)
Dept: ANTICOAGULATION | Facility: CLINIC | Age: 82
End: 2022-07-06

## 2022-07-06 LAB — INR HOME MONITORING: 2.6 (ref 2–3)

## 2022-07-06 NOTE — PROGRESS NOTES
ANTICOAGULATION     Yogesh Abreu is overdue for INR check.      Mychart sent    Bonnie Pleitez RN

## 2022-07-07 ENCOUNTER — DOCUMENTATION ONLY (OUTPATIENT)
Dept: ANTICOAGULATION | Facility: CLINIC | Age: 82
End: 2022-07-07

## 2022-07-07 DIAGNOSIS — I26.99 PULMONARY EMBOLISM (H): Primary | ICD-10-CM

## 2022-07-07 DIAGNOSIS — Z79.01 LONG TERM CURRENT USE OF ANTICOAGULANTS WITH INR GOAL OF 2.0-3.0: ICD-10-CM

## 2022-07-07 NOTE — PROGRESS NOTES
ANTICOAGULATION  MANAGEMENT-Home Monitor Managed by Exception    Yogesh Abreu 81 year old male is on warfarin with therapeutic INR result. (Goal INR 2.0-3.0)    Recent labs: (last 7 days)     07/06/22  0000   INR 2.6         Previous INR was Therapeutic    Medication, diet, health changes since last INR:chart reviewed; none identified    Contacted within the last 12 weeks by phone on 07/07/2022 message left for check in and to call if any changes or upcoming procedures.      BRIELLE Zuñiga was NOT contacted regarding therapeutic result today per home monitoring policy manage by exception agreement.   Current warfarin dose is to be continued:     Summary  As of 7/7/2022    Full warfarin instructions:  5 mg every Thu; 7.5 mg all other days   Next INR check:  7/20/2022           ?   Jackelyn Schultz RN  Anticoagulation Clinic  7/7/2022    _______________________________________________________________________     Anticoagulation Episode Summary     Current INR goal:  2.0-3.0   TTR:  87.5 % (1 y)   Target end date:  Indefinite   Send INR reminders to:  JERRI JIMENEZ    Indications    PE (pulmonary embolism) [I26.99]  Long term current use of anticoagulants with INR goal of 2.0-3.0 [Z79.01]           Comments:  [employer] home meter. Manage by exception.         Anticoagulation Care Providers     Provider Role Specialty Phone number    Frederic Isaacs MD Referring Cardiovascular Disease [phone]

## 2022-07-11 ENCOUNTER — MYC MEDICAL ADVICE (OUTPATIENT)
Dept: CARDIOLOGY | Facility: CLINIC | Age: 82
End: 2022-07-11

## 2022-07-12 NOTE — TELEPHONE ENCOUNTER
Called pt, he denies feeling lightheaded or dizzy, denies chest pain or increased shortness of breath. Pt states he is unable to say how frequently this occurs, does not occur every week & unable to say it occurs every 2 weeks. He states it does occur more frequently after he has missed a few workouts. He will try to track how often it occurs & call or message with an update. Shabbir GIRALDO

## 2022-07-14 ENCOUNTER — DOCUMENTATION ONLY (OUTPATIENT)
Dept: ANTICOAGULATION | Facility: CLINIC | Age: 82
End: 2022-07-14

## 2022-07-14 DIAGNOSIS — Z79.01 LONG TERM CURRENT USE OF ANTICOAGULANTS WITH INR GOAL OF 2.0-3.0: ICD-10-CM

## 2022-07-14 DIAGNOSIS — I26.99 PULMONARY EMBOLISM (H): Primary | ICD-10-CM

## 2022-07-14 LAB — INR HOME MONITORING: 2.7 (ref 2–3)

## 2022-07-14 NOTE — PROGRESS NOTES
ANTICOAGULATION  MANAGEMENT-Home Monitor Managed by Exception    Yogesh Abreu 81 year old male is on warfarin with therapeutic INR result. (Goal INR 2.0-3.0)    Recent labs: (last 7 days)     07/14/22  0000   INR 2.7         Previous INR was Therapeutic    Medication, diet, health changes since last INR:chart reviewed; none identified    Contacted within the last 12 weeks by phone on  07/07/2022           BRIELLE Zuñiga was NOT contacted regarding therapeutic result today per home monitoring policy manage by exception agreement.   Current warfarin dose is to be continued:     Summary  As of 7/14/2022    Full warfarin instructions:  5 mg every Thu; 7.5 mg all other days   Next INR check:  7/28/2022           ?   Bonnie Pleitez RN  Anticoagulation Clinic  7/14/2022    _______________________________________________________________________     Anticoagulation Episode Summary     Current INR goal:  2.0-3.0   TTR:  87.6 % (1 y)   Target end date:  Indefinite   Send INR reminders to:  JERRI JIMENEZ    Indications    PE (pulmonary embolism) [I26.99]  Long term current use of anticoagulants with INR goal of 2.0-3.0 [Z79.01]           Comments:  [employer] home meter. Manage by exception.         Anticoagulation Care Providers     Provider Role Specialty Phone number    Frederic Isaacs MD Referring Cardiovascular Disease [phone]

## 2022-07-20 ENCOUNTER — HOSPITAL ENCOUNTER (OUTPATIENT)
Facility: CLINIC | Age: 82
End: 2022-07-20
Attending: INTERNAL MEDICINE | Admitting: INTERNAL MEDICINE
Payer: COMMERCIAL

## 2022-07-27 ENCOUNTER — DOCUMENTATION ONLY (OUTPATIENT)
Dept: ANTICOAGULATION | Facility: CLINIC | Age: 82
End: 2022-07-27

## 2022-07-27 DIAGNOSIS — Z79.01 LONG TERM CURRENT USE OF ANTICOAGULANTS WITH INR GOAL OF 2.0-3.0: ICD-10-CM

## 2022-07-27 DIAGNOSIS — I26.99 PULMONARY EMBOLISM (H): Primary | ICD-10-CM

## 2022-07-27 LAB — INR HOME MONITORING: 2.5 (ref 2–3)

## 2022-07-27 NOTE — PROGRESS NOTES
ANTICOAGULATION  MANAGEMENT-Home Monitor Managed by Exception    Yogesh Abreu 82 year old male is on warfarin with therapeutic INR result. (Goal INR 2.0-3.0)    Recent labs: (last 7 days)     07/27/22  0000   INR 2.5         Previous INR was Therapeutic    Medication, diet, health changes since last INR:chart reviewed; none identified    Contacted within the last 12 weeks by phone on 07/07/2022          BRIELLE Zuñiga was NOT contacted regarding therapeutic result today per home monitoring policy manage by exception agreement.   Current warfarin dose is to be continued:     Summary  As of 7/27/2022    Full warfarin instructions:  5 mg every Thu; 7.5 mg all other days   Next INR check:  8/10/2022           ?   Bonnie Pleitez RN  Anticoagulation Clinic  7/27/2022    _______________________________________________________________________     Anticoagulation Episode Summary     Current INR goal:  2.0-3.0   TTR:  88.1 % (1 y)   Target end date:  Indefinite   Send INR reminders to:  JERRI JIMENEZ    Indications    PE (pulmonary embolism) [I26.99]  Long term current use of anticoagulants with INR goal of 2.0-3.0 [Z79.01]           Comments:  [employer] home meter. Manage by exception.         Anticoagulation Care Providers     Provider Role Specialty Phone number    Frederic Isaacs MD Referring Cardiovascular Disease [phone]

## 2022-08-01 ENCOUNTER — DOCUMENTATION ONLY (OUTPATIENT)
Dept: ANTICOAGULATION | Facility: CLINIC | Age: 82
End: 2022-08-01

## 2022-08-01 DIAGNOSIS — I26.99 OTHER ACUTE PULMONARY EMBOLISM, UNSPECIFIED WHETHER ACUTE COR PULMONALE PRESENT (H): Primary | ICD-10-CM

## 2022-08-01 DIAGNOSIS — D68.61 ANTIPHOSPHOLIPID ANTIBODY SYNDROME (H): ICD-10-CM

## 2022-08-01 NOTE — PROGRESS NOTES
ANTICOAGULATION CLINIC REFERRAL RENEWAL REQUEST       An annual renewal order is required for all patients referred to [facility] Anticoagulation [facility].?  Please review and sign the pended referral order for Yogesh Abreu.       ANTICOAGULATION SUMMARY      Warfarin indication(s)   PE    Mechanical heart valve present?  NO       Current goal range   INR: 2.0-3.0     Goal appropriate for indication? Goal INR 2-3, standard for indication(s) above     Time in Therapeutic Range (TTR)  (Goal > 60%) 88.1%       Office visit with referring provider's group within last year yes on 9/21/21       Bonnie Pleitez RN  [facility] Anticoagulation Clinic

## 2022-08-10 ENCOUNTER — DOCUMENTATION ONLY (OUTPATIENT)
Dept: ANTICOAGULATION | Facility: CLINIC | Age: 82
End: 2022-08-10

## 2022-08-10 ENCOUNTER — TELEPHONE (OUTPATIENT)
Dept: ANTICOAGULATION | Facility: CLINIC | Age: 82
End: 2022-08-10

## 2022-08-10 DIAGNOSIS — D68.61 ANTIPHOSPHOLIPID ANTIBODY SYNDROME (H): ICD-10-CM

## 2022-08-10 DIAGNOSIS — I26.99 PULMONARY EMBOLISM (H): Primary | ICD-10-CM

## 2022-08-10 DIAGNOSIS — I26.99 PULMONARY EMBOLISM (H): ICD-10-CM

## 2022-08-10 DIAGNOSIS — Z79.01 LONG TERM CURRENT USE OF ANTICOAGULANTS WITH INR GOAL OF 2.0-3.0: ICD-10-CM

## 2022-08-10 DIAGNOSIS — Z79.01 LONG TERM CURRENT USE OF ANTICOAGULANT THERAPY: Primary | ICD-10-CM

## 2022-08-10 DIAGNOSIS — I25.110 CORONARY ARTERY DISEASE INVOLVING NATIVE CORONARY ARTERY OF NATIVE HEART WITH UNSTABLE ANGINA PECTORIS (H): ICD-10-CM

## 2022-08-10 DIAGNOSIS — I26.99 OTHER ACUTE PULMONARY EMBOLISM, UNSPECIFIED WHETHER ACUTE COR PULMONALE PRESENT (H): ICD-10-CM

## 2022-08-10 LAB — INR HOME MONITORING: 2.9 (ref 2–3)

## 2022-08-10 RX ORDER — EZETIMIBE 10 MG/1
10 TABLET ORAL DAILY
Qty: 90 TABLET | Refills: 0 | Status: SHIPPED | OUTPATIENT
Start: 2022-08-10 | End: 2022-09-29

## 2022-08-10 NOTE — TELEPHONE ENCOUNTER
Pt is scheduled for a colonoscopy on 09/29/2022 at [facility] with Dr. Jcarlos Orourke. Routing to [facility] for hold/bridge assessment.

## 2022-08-10 NOTE — PROGRESS NOTES
ANTICOAGULATION  MANAGEMENT-Home Monitor Managed by Exception    Yogesh Abreu 82 year old male is on warfarin with therapeutic INR result. (Goal INR 2.0-3.0)    Recent labs: (last 7 days)     08/10/22  0000   INR 2.9         Previous INR was Therapeutic    Medication, diet, health changes since last INR:chart reviewed; none identified    Contacted within the last 12 weeks by phone on 07/07/2022      BRIELLE Zuñiga was NOT contacted regarding therapeutic result today per home monitoring policy manage by exception agreement.   Current warfarin dose is to be continued:     Summary  As of 8/10/2022    Full warfarin instructions:  5 mg every Thu; 7.5 mg all other days   Next INR check:  8/24/2022           ?   Johnnie De La O RN  Anticoagulation Clinic  8/10/2022    _______________________________________________________________________     Anticoagulation Episode Summary     Current INR goal:  2.0-3.0   TTR:  90.5 % (1 y)   Target end date:  Indefinite   Send INR reminders to:  [facility]    Indications    PE (pulmonary embolism) [I26.99]  Long term current use of anticoagulants with INR goal of 2.0-3.0 [Z79.01]  Antiphospholipid antibody syndrome (H) [D68.61]  Other acute pulmonary embolism  unspecified whether acute cor pulmonale present (H) [I26.99]           Comments:  [employer] home meter. Manage by exception.         Anticoagulation Care Providers     Provider Role Specialty Phone number    Frederic Isaacs MD Referring Cardiovascular Disease [phone]    Filipe Goldberg MD Referring Internal Medicine [phone]

## 2022-08-16 NOTE — TELEPHONE ENCOUNTER
"TBABY-PROCEDURAL ANTICOAGULATION  MANAGEMENT    ASSESSMENT     Warfarin interruption plan for colonoscopy on 2022.    Indication for Anticoagulation: DVT and PE      DVT & PE 2015     Repeated cardiolipin antibody + 10/2015 & 2016 at [facility]      [facility]-Procedure Risk stratification for thromboembolism: high (2018 American Society of Hematology guideline)    VTE: 2018 American Society of Hematology recommends against bridging in low and moderate risk VTE patients holding warfarin     RECOMMENDATION       Pre-Procedure:  o Hold warfarin for 5 days, until after procedure startin2022   o Enoxaparin (Lovenox) 90 mg subq Q 12 hrs (1 mg/kg Q 12 hrs for CrCl >= 60 ml/min and BMI <= 40 kg/m2)   - Start enoxaparin: 2022  - Last dose of enoxaparin prior to procedure: 2022 AM  (~24 hours prior to procedure)      Post-Procedure:  o Resume warfarin dose if okay with provider doing procedure on night of procedure, 2022 PM: 10mg  o Resume enoxaparin (Lovenox) ~ 24-48 hrs post procedure when okay with provider doing procedure. Continue until INR >= 2.3 or INR >= 2.0 x 2 ([facility] protocol goal INR 2-3 new start)  o Recheck INR ~5 days after resuming warfarin   ?       Plan routed to referring provider for approval  ?   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    Weight as of 21: 90.9 kg (200 lb 6.4 oz).    Lab Results   Component Value Date    INR 2.9 08/10/2022    INR 2.5 2022    INR 2.7 2022     Lab Results   Component Value Date    HGB 16.1 2021    B 16.9 " 09/17/2020    HCT 47.7 09/21/2021    HCT 49.8 09/17/2020     09/21/2021     09/17/2020     Lab Results   Component Value Date    CR 0.81 06/17/2021    CR 0.84 09/17/2020    CR 0.84 09/20/2019   Calc CrCl 81ml/min, SCr 0.81 06/2022 @[facility]

## 2022-08-24 ENCOUNTER — TELEPHONE (OUTPATIENT)
Dept: FAMILY MEDICINE | Facility: CLINIC | Age: 82
End: 2022-08-24

## 2022-08-24 ENCOUNTER — DOCUMENTATION ONLY (OUTPATIENT)
Dept: ANTICOAGULATION | Facility: CLINIC | Age: 82
End: 2022-08-24

## 2022-08-24 ENCOUNTER — ANTICOAGULATION THERAPY VISIT (OUTPATIENT)
Dept: ANTICOAGULATION | Facility: CLINIC | Age: 82
End: 2022-08-24

## 2022-08-24 DIAGNOSIS — D68.61 ANTIPHOSPHOLIPID ANTIBODY SYNDROME (H): ICD-10-CM

## 2022-08-24 DIAGNOSIS — Z79.01 LONG TERM CURRENT USE OF ANTICOAGULANTS WITH INR GOAL OF 2.0-3.0: ICD-10-CM

## 2022-08-24 DIAGNOSIS — I26.99 OTHER ACUTE PULMONARY EMBOLISM, UNSPECIFIED WHETHER ACUTE COR PULMONALE PRESENT (H): ICD-10-CM

## 2022-08-24 DIAGNOSIS — I26.99 PULMONARY EMBOLISM (H): Primary | ICD-10-CM

## 2022-08-24 DIAGNOSIS — Z79.01 LONG TERM CURRENT USE OF ANTICOAGULANTS WITH INR GOAL OF 2.0-3.0: Primary | ICD-10-CM

## 2022-08-24 LAB — INR HOME MONITORING: 3.5 (ref 2–3)

## 2022-08-24 RX ORDER — ENOXAPARIN SODIUM 100 MG/ML
1 INJECTION SUBCUTANEOUS 2 TIMES DAILY
Qty: 20 ML | Refills: 1 | Status: SHIPPED | OUTPATIENT
Start: 2022-08-24 | End: 2023-04-24

## 2022-08-24 NOTE — PROGRESS NOTES
Patient has a cataract surgery on 9/12. Please advise on any hold.    Patient INR to be as close to 2.0 as possible. Please advise any additional procedure plan information.    Thanks! Marissa De La Cruz RN

## 2022-08-24 NOTE — PROGRESS NOTES
ANTICOAGULATION MANAGEMENT     Yogesh Abreu 82 year old male is on warfarin with supratherapeutic INR result. (Goal INR 2.0-3.0)    Recent labs: (last 7 days)     08/24/22  0000   INR 3.5*       ASSESSMENT       Source(s): Chart Review and Patient/Caregiver Call       Warfarin doses taken: Warfarin taken as instructed    Diet: Decreased greens/vitamin K in diet; plans to resume previous intake    New illness, injury, or hospitalization: No    Medication/supplement changes: None noted    Signs or symptoms of bleeding or clotting: No    Previous INR: Therapeutic last 2(+) visits    Additional findings: Upcoming surgery/procedure 9/12-cataract surgery-INR needs to be at 2.0; discussed procedure plan for 9/29 colonoscopy-Mychart sent with instructions as well       PLAN     Recommended plan for temporary change(s) affecting INR     Dosing Instructions: partial hold then continue your current warfarin dose with next INR in 2 weeks       Summary  As of 8/24/2022    Full warfarin instructions:  8/24: 2.5 mg; Otherwise 5 mg every Thu; 7.5 mg all other days   Next INR check:  9/7/2022             Telephone call with Vincenzo who verbalizes understanding and agrees to plan and who agrees to plan and repeated back plan correctly    Patient to recheck with home meter    Education provided: Please call back if any changes to your diet, medications or how you've been taking warfarin and Contact [phone]  with any changes, questions or concerns.     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 Manage by exception.         Anticoagulation Care Providers     Provider Role Specialty Phone number    Frederic Isaacs MD Referring Cardiovascular Disease [phone]    Filipe Goldberg MD Referring Internal Medicine [phone]

## 2022-08-24 NOTE — PROGRESS NOTES
Noted. Discussed upcoming plan with patient with follow-uo mychart. INR team will anticipate INR needing to be at 2.0 for upcoming cataract procedure on 9/12. Updated in nursing noted in patient chart-      Thanks! Marissa De La Cruz RN

## 2022-08-24 NOTE — TELEPHONE ENCOUNTER
Patient received a call about 4 days ago about changing his INR dosage due to having a colonoscopy.  He said it was Michelle.  Please call him back   [phone]

## 2022-08-24 NOTE — PROGRESS NOTES
See TE 08/10/2022 for full procedure plan for upcoming colonoscopy for risk assessment.     Patient is high risk for thromboembolism when therapy is interrupted, therefore advise INR 09/07/2022 as planned, and [facility] consult for adjustment leading into procedure.    Mariajose Queen, PharmD BCACP  Anticoagulation Clinical Pharmacist

## 2022-08-25 ENCOUNTER — HOSPITAL ENCOUNTER (OUTPATIENT)
Facility: CLINIC | Age: 82
End: 2022-08-25
Attending: INTERNAL MEDICINE | Admitting: INTERNAL MEDICINE
Payer: COMMERCIAL

## 2022-09-07 ENCOUNTER — ANTICOAGULATION THERAPY VISIT (OUTPATIENT)
Dept: ANTICOAGULATION | Facility: CLINIC | Age: 82
End: 2022-09-07

## 2022-09-07 DIAGNOSIS — I26.99 PULMONARY EMBOLISM (H): Primary | ICD-10-CM

## 2022-09-07 DIAGNOSIS — Z79.01 LONG TERM CURRENT USE OF ANTICOAGULANTS WITH INR GOAL OF 2.0-3.0: ICD-10-CM

## 2022-09-07 DIAGNOSIS — D68.61 ANTIPHOSPHOLIPID ANTIBODY SYNDROME (H): ICD-10-CM

## 2022-09-07 DIAGNOSIS — I26.99 OTHER ACUTE PULMONARY EMBOLISM, UNSPECIFIED WHETHER ACUTE COR PULMONALE PRESENT (H): ICD-10-CM

## 2022-09-07 LAB — INR HOME MONITORING: 2.4 (ref 2–3)

## 2022-09-07 NOTE — PROGRESS NOTES
ANTICOAGULATION MANAGEMENT     Yogesh Abreu 82 year old male is on warfarin with therapeutic INR result. (Goal INR 2.0-3.0)    Recent labs: (last 7 days)     09/07/22  0000   INR 2.4       ASSESSMENT       Source(s): Chart Review and Patient/Caregiver Call       Warfarin doses taken: Warfarin taken as instructed    Diet: No new diet changes identified    New illness, injury, or hospitalization: No    Medication/supplement changes: None noted    Signs or symptoms of bleeding or clotting: No    Previous INR: Supratherapeutic    Additional findings: Upcoming surgery/procedure 9/12/22, needs to be at 2.0 for cataracts, colonscopy on 11/18/22, procedure plan was gone through last [facility] encounter. Cataracts rescheduled for 9/14/22  Cataract clinic- [phone]     PLAN     Recommended plan for no diet, medication or health factor changes affecting INR     Dosing Instructions: Continue your current warfarin dose with next INR in 6 days       Summary  As of 9/7/2022    Full warfarin instructions:  5 mg every Thu; 7.5 mg all other days   Next INR check:  9/9/2022               Telephone call with Vincenzo who verbalizes understanding and agrees to plan    Patient to recheck with home meter    Education provided: Please call back if any changes to your diet, medications or how you've been taking warfarin    Plan made with [facility] Pharmacist Mariajose Pleitez RN  Anticoagulation Clinic  9/7/2022    _______________________________________________________________________     Anticoagulation Episode Summary     Current INR goal:  2.0-3.0   TTR:  85.6 % (1 y)   Target end date:  Indefinite   Send INR reminders to:  [facility]    Indications    PE (pulmonary embolism) [I26.99]  Long term current use of anticoagulants with INR goal of 2.0-3.0 [Z79.01]  Antiphospholipid antibody syndrome (H) [D68.61]  Other acute pulmonary embolism  unspecified whether acute cor pulmonale present (H) [I26.99]           Comments:   [employer] home meter. Manage by exception.         Anticoagulation Care Providers     Provider Role Specialty Phone number    Frederic Isaacs MD Referring Cardiovascular Disease [phone]    Filipe Goldberg MD Referring Internal Medicine [phone]

## 2022-09-11 ENCOUNTER — HEALTH MAINTENANCE LETTER (OUTPATIENT)
Age: 82
End: 2022-09-11

## 2022-09-12 ENCOUNTER — DOCUMENTATION ONLY (OUTPATIENT)
Dept: ANTICOAGULATION | Facility: CLINIC | Age: 82
End: 2022-09-12

## 2022-09-12 DIAGNOSIS — D68.61 ANTIPHOSPHOLIPID ANTIBODY SYNDROME (H): ICD-10-CM

## 2022-09-12 DIAGNOSIS — Z79.01 LONG TERM CURRENT USE OF ANTICOAGULANTS WITH INR GOAL OF 2.0-3.0: ICD-10-CM

## 2022-09-12 DIAGNOSIS — I26.99 OTHER ACUTE PULMONARY EMBOLISM, UNSPECIFIED WHETHER ACUTE COR PULMONALE PRESENT (H): ICD-10-CM

## 2022-09-12 DIAGNOSIS — I26.99 PULMONARY EMBOLISM (H): Primary | ICD-10-CM

## 2022-09-12 LAB — INR HOME MONITORING: 2 (ref 2–3)

## 2022-09-12 NOTE — PROGRESS NOTES
ANTICOAGULATION  MANAGEMENT-Home Monitor Managed by Exception    Yogesh Abreu 82 year old male is on warfarin with therapeutic INR result. (Goal INR 2.0-3.0)    Recent labs: (last 7 days)     09/12/22  0000   INR 2.0         Previous INR was Therapeutic    Medication, diet, health changes since last INR:chart reviewed; none identified    Contacted within the last 12 weeks by phone on 9/7/22      BRIELLE Zuñiga was NOT contacted regarding therapeutic result today per home monitoring policy manage by exception agreement.   Current warfarin dose is to be continued:     Summary  As of 9/12/2022    Full warfarin instructions:  11/13: Hold; 11/14: Hold; 11/15: Hold; 11/16: Hold; 11/17: Hold; 11/18: 10 mg; Otherwise 5 mg every Thu; 7.5 mg all other days   Next INR check:  9/26/2022           ?   Bonnie Pleitez RN  Anticoagulation Clinic  9/12/2022    _______________________________________________________________________     Anticoagulation Episode Summary     Current INR goal:  2.0-3.0   TTR:  85.6 % (1 y)   Target end date:  Indefinite   Send INR reminders to:  JERIR JIMENEZ    Indications    PE (pulmonary embolism) [I26.99]  Long term current use of anticoagulants with INR goal of 2.0-3.0 [Z79.01]  Antiphospholipid antibody syndrome (H) [D68.61]  Other acute pulmonary embolism  unspecified whether acute cor pulmonale present (H) [I26.99]           Comments:  [employer] home meter. Manage by exception.         Anticoagulation Care Providers     Provider Role Specialty Phone number    Frederic Isaacs MD Referring Cardiovascular Disease [phone]    Filipe Goldberg MD Referring Internal Medicine [phone]

## 2022-09-21 ENCOUNTER — DOCUMENTATION ONLY (OUTPATIENT)
Dept: ANTICOAGULATION | Facility: CLINIC | Age: 82
End: 2022-09-21

## 2022-09-21 DIAGNOSIS — I26.99 OTHER ACUTE PULMONARY EMBOLISM, UNSPECIFIED WHETHER ACUTE COR PULMONALE PRESENT (H): ICD-10-CM

## 2022-09-21 DIAGNOSIS — I26.99 PULMONARY EMBOLISM (H): Primary | ICD-10-CM

## 2022-09-21 DIAGNOSIS — D68.61 ANTIPHOSPHOLIPID ANTIBODY SYNDROME (H): ICD-10-CM

## 2022-09-21 DIAGNOSIS — Z79.01 LONG TERM CURRENT USE OF ANTICOAGULANTS WITH INR GOAL OF 2.0-3.0: ICD-10-CM

## 2022-09-21 LAB
INR HOME MONITORING: 2.1 (ref 2–3)
INR HOME MONITORING: 2.1 (ref 2–3)

## 2022-09-21 NOTE — PROGRESS NOTES
ANTICOAGULATION  MANAGEMENT-Home Monitor Managed by Exception    Yogesh Abreu 82 year old male is on warfarin with therapeutic INR result. (Goal INR 2.0-3.0)    Recent labs: (last 7 days)     09/21/22  0000   INR 2.1         Previous INR was Therapeutic    Medication, diet, health changes since last INR: Yes. per chart review - s/p cataract surgery with no interruption of his warfarin therapy, and no anticipated effect on INR    - S/p cataract surgery on 9/12/22.   - also noted, Colonoscopy scheduled on 9/29/22 was CANCELLED.    Contacted within the last 12 weeks by phone on 9/7/22      BRIELLE Zuñiga was NOT contacted regarding therapeutic result today per home monitoring policy manage by exception agreement.   Current warfarin dose is to be continued:     Summary  As of 9/21/2022    Full warfarin instructions:  11/13: Hold; 11/14: Hold; 11/15: Hold; 11/16: Hold; 11/17: Hold; 11/18: 10 mg; Otherwise 5 mg every Thu; 7.5 mg all other days   Next INR check:  10/5/2022           ?   Michelle Tineo RN  Anticoagulation Clinic  9/21/2022    _______________________________________________________________________     Anticoagulation Episode Summary     Current INR goal:  2.0-3.0   TTR:  85.8 % (1 y)   Target end date:  Indefinite   Send INR reminders to:  [facility]    Indications    PE (pulmonary embolism) [I26.99]  Long term current use of anticoagulants with INR goal of 2.0-3.0 [Z79.01]  Antiphospholipid antibody syndrome (H) [D68.61]  Other acute pulmonary embolism  unspecified whether acute cor pulmonale present (H) [I26.99]           Comments:  [employer] home meter. Manage by exception.         Anticoagulation Care Providers     Provider Role Specialty Phone number    Frederic Isaacs MD Referring Cardiovascular Disease [phone]    Filipe Goldberg MD Referring Internal Medicine [phone]

## 2022-09-29 ENCOUNTER — LAB (OUTPATIENT)
Dept: LAB | Facility: CLINIC | Age: 82
End: 2022-09-29
Payer: COMMERCIAL

## 2022-09-29 ENCOUNTER — OFFICE VISIT (OUTPATIENT)
Dept: CARDIOLOGY | Facility: CLINIC | Age: 82
End: 2022-09-29

## 2022-09-29 VITALS
HEIGHT: 69 IN | WEIGHT: 193.5 LBS | SYSTOLIC BLOOD PRESSURE: 118 MMHG | BODY MASS INDEX: 28.66 KG/M2 | HEART RATE: 56 BPM | DIASTOLIC BLOOD PRESSURE: 70 MMHG | OXYGEN SATURATION: 95 %

## 2022-09-29 DIAGNOSIS — E78.2 MIXED HYPERLIPIDEMIA: ICD-10-CM

## 2022-09-29 DIAGNOSIS — I25.2 HISTORY OF ACUTE INFERIOR WALL MYOCARDIAL INFARCTION: ICD-10-CM

## 2022-09-29 DIAGNOSIS — I25.110 CORONARY ARTERY DISEASE INVOLVING NATIVE CORONARY ARTERY OF NATIVE HEART WITH UNSTABLE ANGINA PECTORIS (H): Primary | ICD-10-CM

## 2022-09-29 DIAGNOSIS — I10 ESSENTIAL HYPERTENSION: ICD-10-CM

## 2022-09-29 DIAGNOSIS — Z79.01 LONG TERM CURRENT USE OF ANTICOAGULANT THERAPY: ICD-10-CM

## 2022-09-29 DIAGNOSIS — R00.1 BRADYCARDIA: ICD-10-CM

## 2022-09-29 DIAGNOSIS — R00.0 TACHYCARDIA: ICD-10-CM

## 2022-09-29 DIAGNOSIS — I25.110 CORONARY ARTERY DISEASE INVOLVING NATIVE CORONARY ARTERY OF NATIVE HEART WITH UNSTABLE ANGINA PECTORIS (H): ICD-10-CM

## 2022-09-29 DIAGNOSIS — I25.10 CORONARY ARTERY DISEASE INVOLVING NATIVE CORONARY ARTERY OF NATIVE HEART WITHOUT ANGINA PECTORIS: ICD-10-CM

## 2022-09-29 DIAGNOSIS — Z86.711 HISTORY OF PULMONARY EMBOLISM: ICD-10-CM

## 2022-09-29 LAB
ALT SERPL W P-5'-P-CCNC: 36 U/L (ref 0–70)
ANION GAP SERPL CALCULATED.3IONS-SCNC: 6 MMOL/L (ref 3–14)
BUN SERPL-MCNC: 26 MG/DL (ref 7–30)
CALCIUM SERPL-MCNC: 9.7 MG/DL (ref 8.5–10.1)
CHLORIDE BLD-SCNC: 105 MMOL/L (ref 94–109)
CHOLEST SERPL-MCNC: 109 MG/DL
CO2 SERPL-SCNC: 26 MMOL/L (ref 20–32)
CREAT SERPL-MCNC: 0.82 MG/DL (ref 0.66–1.25)
FASTING STATUS PATIENT QL REPORTED: NO
GFR SERPL CREATININE-BSD FRML MDRD: 89 ML/MIN/1.73M2
GLUCOSE BLD-MCNC: 102 MG/DL (ref 70–99)
HDLC SERPL-MCNC: 41 MG/DL
LDLC SERPL CALC-MCNC: 52 MG/DL
NONHDLC SERPL-MCNC: 68 MG/DL
POTASSIUM BLD-SCNC: 4 MMOL/L (ref 3.4–5.3)
SODIUM SERPL-SCNC: 137 MMOL/L (ref 133–144)
TRIGL SERPL-MCNC: 80 MG/DL

## 2022-09-29 PROCEDURE — 99214 OFFICE O/P EST MOD 30 MIN: CPT | Performed by: INTERNAL MEDICINE

## 2022-09-29 PROCEDURE — 80061 LIPID PANEL: CPT

## 2022-09-29 PROCEDURE — 80048 BASIC METABOLIC PNL TOTAL CA: CPT

## 2022-09-29 PROCEDURE — 84460 ALANINE AMINO (ALT) (SGPT): CPT

## 2022-09-29 PROCEDURE — 36415 COLL VENOUS BLD VENIPUNCTURE: CPT

## 2022-09-29 RX ORDER — METOPROLOL SUCCINATE 25 MG/1
12.5 TABLET, EXTENDED RELEASE ORAL DAILY
Qty: 45 TABLET | Refills: 3 | Status: SHIPPED | OUTPATIENT
Start: 2022-09-29 | End: 2023-06-06

## 2022-09-29 RX ORDER — LISINOPRIL 5 MG/1
TABLET ORAL
Qty: 90 TABLET | Refills: 3 | Status: SHIPPED | OUTPATIENT
Start: 2022-09-29

## 2022-09-29 RX ORDER — EZETIMIBE 10 MG/1
10 TABLET ORAL DAILY
Qty: 90 TABLET | Refills: 3 | Status: SHIPPED | OUTPATIENT
Start: 2022-09-29

## 2022-09-29 RX ORDER — NITROGLYCERIN 0.4 MG/1
0.4 TABLET SUBLINGUAL EVERY 5 MIN PRN
Qty: 25 TABLET | Refills: 3 | Status: SHIPPED | OUTPATIENT
Start: 2022-09-29

## 2022-09-29 RX ORDER — WARFARIN SODIUM 5 MG/1
TABLET ORAL
Qty: 145 TABLET | Refills: 3 | Status: SHIPPED | OUTPATIENT
Start: 2022-09-29

## 2022-09-29 RX ORDER — ATORVASTATIN CALCIUM 80 MG/1
80 TABLET, FILM COATED ORAL AT BEDTIME
Qty: 90 TABLET | Refills: 3 | Status: SHIPPED | OUTPATIENT
Start: 2022-09-29

## 2022-09-29 NOTE — LETTER
9/29/2022    Brandan Clinton MD  [facility] [address]  [facility] [address]    RE: Yogesh Abreu       Dear Colleague,     I had the pleasure of seeing Yogesh Abreu in the [facility].  HPI and Plan:   See dictation        Orders Placed This Encounter   Procedures     Lipid Profile     ALT     Lipid Profile     ALT     Basic metabolic panel     Follow-Up with Cardiology     Leadless EKG Monitor 8 to 14 Days     Echocardiogram Complete       Orders Placed This Encounter   Medications     atorvastatin (LIPITOR) 80 MG tablet     Sig: Take 1 tablet (80 mg) by mouth At Bedtime     Dispense:  90 tablet     Refill:  3     ezetimibe (ZETIA) 10 MG tablet     Sig: Take 1 tablet (10 mg) by mouth daily     Dispense:  90 tablet     Refill:  3     lisinopril (ZESTRIL) 5 MG tablet     Sig: TAKE 1 TABLET(5 MG) BY MOUTH DAILY     Dispense:  90 tablet     Refill:  3     metoprolol succinate ER (TOPROL XL) 25 MG 24 hr tablet     Sig: Take 0.5 tablets (12.5 mg) by mouth daily Appt needed for refills; please call [phone] to schedule     Dispense:  45 tablet     Refill:  3     nitroGLYcerin (NITROSTAT) 0.4 MG sublingual tablet     Sig: Place 1 tablet (0.4 mg) under the tongue every 5 minutes as needed for chest pain     Dispense:  25 tablet     Refill:  3     warfarin ANTICOAGULANT (COUMADIN) 5 MG tablet     Sig: Take 1 tab on Thursdays and take 1 1/2 tabs on all other days or as directed per INR clinic     Dispense:  145 tablet     Refill:  3       Medications Discontinued During This Encounter   Medication Reason     atorvastatin (LIPITOR) 80 MG tablet Reorder     lisinopril (ZESTRIL) 5 MG tablet Reorder     nitroGLYcerin (NITROSTAT) 0.4 MG sublingual tablet Reorder     metoprolol succinate ER (TOPROL XL) 25 MG 24 hr tablet Reorder     warfarin ANTICOAGULANT (COUMADIN) 5 MG tablet Reorder     ezetimibe (ZETIA) 10 MG tablet Reorder         Encounter Diagnoses   Name Primary?      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) Yes     Essential hypertension      Mixed hyperlipidemia      Tachycardia      Coronary artery disease involving native coronary artery of native heart without angina pectoris      Bradycardia      Long term current use of anticoagulant therapy      History of pulmonary embolism      History of acute inferior wall myocardial infarction        CURRENT MEDICATIONS:  Current Outpatient Medications   Medication Sig Dispense Refill     albuterol (PROAIR HFA/PROVENTIL HFA/VENTOLIN HFA) 108 (90 Base) MCG/ACT inhaler Inhale 2 puffs into the lungs every 6 hours as needed for shortness of breath / dyspnea or wheezing 1 Inhaler 0     atorvastatin (LIPITOR) 80 MG tablet Take 1 tablet (80 mg) by mouth At Bedtime 90 tablet 3     azelastine (ASTELIN) 0.1 % nasal spray Spray 1 spray into both nostrils 2 times daily       Calcium Citrate-Vitamin D (CALCIUM CITRATE + D PO) Take 600 mg by mouth 2 times daily       Cholecalciferol (VITAMIN D3 PO) Take 1,000 Units by mouth 2 times daily       enoxaparin ANTICOAGULANT (LOVENOX) 100 MG/ML syringe Inject 0.9 mLs (90 mg) Subcutaneous 2 times daily 20 mL 1     ezetimibe (ZETIA) 10 MG tablet Take 1 tablet (10 mg) by mouth daily 90 tablet 3     fluticasone-salmeterol (ADVAIR-HFA) 230-21 MCG/ACT inhaler Inhale 2 puffs into the lungs 2 times daily 12 g 11     ibuprofen (ADVIL/MOTRIN) 200 MG capsule Take 600 mg by mouth daily as needed for fever       ipratropium (ATROVENT) 0.06 % nasal spray Spray 2 sprays into both nostrils 4 times daily as needed (nasal drainage) 15 mL 11     lisinopril (ZESTRIL) 5 MG tablet TAKE 1 TABLET(5 MG) BY MOUTH DAILY 90 tablet 3     metoprolol succinate ER (TOPROL XL) 25 MG 24 hr tablet Take 0.5 tablets (12.5 mg) by mouth daily Appt needed for refills; please call [phone] to schedule 45 tablet 3     nitroGLYcerin (NITROSTAT) 0.4 MG sublingual tablet Place 1  tablet (0.4 mg) under the tongue every 5 minutes as needed for chest pain 25 tablet 3     vardenafil (LEVITRA) 20 MG tablet Take 0.5-1 tablets (10-20 mg) by mouth daily as needed Never use with nitroglycerin, terazosin or doxazosin. 12 tablet 11     warfarin ANTICOAGULANT (COUMADIN) 5 MG tablet Take 1 tab on Thursdays and take 1 1/2 tabs on all other days or as directed per INR clinic 145 tablet 3       ALLERGIES     Allergies   Allergen Reactions     Seasonal Allergies      Simvastatin Muscle Pain (Myalgia)      at 20 mg daily.       PAST MEDICAL HISTORY:  Past Medical History:   Diagnosis Date     Antiphospholipid antibody syndrome (H) 5/25/2017     CAD (coronary artery disease), native coronary artery 10/2015    9/2015 Heart cath - 90% diagonal, 50-60% CFX, 100% prox RCA occlusion - MAL placed in RCA, 10/2015 EF 35-40% by Echo     DVT (deep venous thrombosis) (H) 9/2015 9/2015 Occlusive DVT extending from left common femoral to mid left popliteal vein     Hypercholesteraemia      Hypertension      PE (pulmonary embolism) 9/2015     PMR (polymyalgia rheumatica) (H)      Polymyalgia rheumatica (H)      STEMI (ST elevation myocardial infarction) (H) 10/2015    10/2015 Inferior STEMI - 100% RCA occlusion with MAL placed       PAST SURGICAL HISTORY:  Past Surgical History:   Procedure Laterality Date     HEART CATH STENT COR W/WO PTCA  10/2015    90% diagonal, 50-60% CFX, 100% prox RCA occlusion - MAL placed in RCA     ORTHOPEDIC SURGERY  08/2015    right carpal tunnel       FAMILY HISTORY:  Family History   Problem Relation Age of Onset     Hypertension Brother      Hypertension Brother        SOCIAL HISTORY:  Social History     Socioeconomic History     Marital status:      Spouse name: None     Number of children: None     Years of education: None     Highest education level: None   Tobacco Use     Smoking status: Former Smoker     Packs/day: 0.50     Years: 5.00     Pack years: 2.50     Types: Cigarettes  "    Start date:      Quit date:      Years since quittin.7     Smokeless tobacco: Never Used   Substance and Sexual Activity     Alcohol use: Yes     Comment: 1 drink per day     Drug use: No     Sexual activity: Yes     Partners: Female   Other Topics Concern     Caffeine Concern Yes     Comment: 1 cup coffee daily     Sleep Concern No     Stress Concern Yes     Comment: related to relationship     Weight Concern No     Special Diet Yes     Comment: mediterranian diet     Exercise Yes     Comment: walking, cardiac rehab starting       Review of Systems:  Skin:          Eyes:         ENT:         Respiratory:          Cardiovascular:         Gastroenterology:        Genitourinary:         Musculoskeletal:         Neurologic:         Psychiatric:         Heme/Lymph/Imm:         Endocrine:           Physical Exam:  Vitals: /70   Pulse 56   Ht 1.753 m (5' 9\")   Wt 87.8 kg (193 lb 8 oz)   SpO2 95%   BMI 28.57 kg/m      Constitutional:  in no acute distress;cooperative;well developed;well nourished        Skin:  no apparent skin lesions or masses noted          Head:  normocephalic, no masses or lesions        Eyes:  pupils equal and round        Lymph:No Cervical lymphadenopathy present     ENT:  no pallor or cyanosis        Neck:  JVP normal;no carotid bruit;carotid pulses are full and equal bilaterally        Respiratory:  clear to auscultation;normal symmetry         Cardiac: regular rhythm, normal S1/S2, no S3 or S4, apical impulse not displaced, no murmurs, gallops or rubs                pulses full and equal     1+             1+                    GI:  not assessed this visit        Extremities and Muscular Skeletal:  no edema;no deformities, clubbing, cyanosis, erythema observed              Neurological:  affect appropriate;no gross motor deficits        Psych:  Alert and Oriented x 3        CC  Referred Self, MD  No address on file                Service Date: 2022    CARDIOLOGY " FOLLOWUP VISIT    REFERRING PHYSICIAN:  Dr. Brandan Clinton    HISTORY OF PRESENT ILLNESS:  It is my pleasure to see your patient, Yogesh Abreu.  As you know, this is a patient who suffered an acute inferior myocardial infarct and underwent stenting of the right coronary artery.  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  This patient also has a past history of essential hypertension.  He has mild ischemic cardiomyopathy with mild-to-moderate inferolateral wall hypokinesis on echocardiography in 2015 on the resting imaging on a stress test in 2018.  Again, there were the basal inferior and inferoseptal walls that were felt to be mildly hypokinetic but overall, ejection fraction was felt to be preserved.  Nuclear stress testing in 2019 showed a fixed perfusion defect in the inferior and inferolateral walls consistent with transmural infarct with minimal brian-infarct ischemia.  The ejection fraction was called lower on the nuclear stress test which is not uncommon.  The echo is probably more reliable.    With that background in mind, the patient is feeling well.  He has no chest pains, no chest pressure and no unusual shortness of breath.  Dr. Clinton, I believe, ordered a 24-hour blood pressure monitor to determine whether his blood pressure was well controlled or not and I was able to see this on Care Everywhere.  It was performed on 07/12/2022 and showed that his blood pressure was clearly well controlled with nighttime blood pressure dropping quite low at 87/58 but awake blood pressure 116/77.  Supposedly, he was supposed to get a 14-day Zio Patch monitor because he was complaining that his heart rate sometimes when he is on the treadmill will all of a sudden drop down into the high 50s.  He wears a band around his chest when he is exercising which measures his heart rate.  He does  not feel unwell when the electrically determined heart rate drops into the 50s.  He does not feel short of breath.  He does not feel syncopal or near syncopal which makes me think that possibly this is a problem with electronics picking up his heart rate rather than the actual heart rate dropping.  Yet, we do need to take this seriously.  He is not complaining of any symptoms of congestive heart failure.  When I spoke to him today, I was under the impression that he did not have his blood checked but in actual fact, I think he was confused because he did have his blood checked today and it was excellent with an LDL of 52, HDL of 41 and triglycerides of 80 with a total cholesterol of 109 and so, he is well within secondary prevention guidelines and his basic metabolic profile was also normal with a sodium of 137, potassium of 4.0, BUN of 26 and a creatinine of 0.82 with a GFR of 89.  These lab results were not available to me at the time that I saw the patient and only became available at the time of dictation.  His liver function tests were normal with an ALT of 36.    IMPRESSION:    1.  Coronary artery disease and status post prior inferior myocardial infarct.  The patient is asymptomatic with respect to coronary artery disease with no symptoms suggestive of angina pectoris.  2.  Ejection fraction in the normal range on echocardiography but nuclear stress testing suggests mild ischemic cardiomyopathy.  3.  Excellent lipid profile.  As mentioned above, when I was speaking to the patient, I was under the impression that he did not have the blood drawn today because he was not fasting but that does not appear to be the case.  Excellent lipid profile with normal liver function tests.  4.  Normal basic metabolic profile, on lisinopril.  5.  Normotensive.  6.  Episodes of where the exercise  electronic heart rate monitor rate tells him that his heart rates have dropped into the 50s occasionally when he is exercising.  I  have a strong suspicion that this is a problem with the monitoring equipment rather than his actual heart rate dropping into the 50s since, if he was exercising rigorously and his heart rate dropped into the 50s, he would almost certainly become symptomatic.    PLAN:    1.  We will obtain a 14-day Zio Patch monitor, as was supposed to be performed at [facility] but for some reason did not get carried out, to determine if there are any episodes of high-grade conduction system disease.  I am going to get an echocardiogram performed next year since we have not had a full echocardiogram performed since  (we did have partial assessment prior to the stress echos in 2019).  2.  We will cancel the lipid profile in 4 weeks' time because he did have his cholesterol checked today.    We will see the patient back again in 1 year.  It is my pleasure to be involved the care of this nice patient.    Frederic Mauro MD, FACC    cc:  Brandan Clinton MD   [facility]  [address] [place] [address], [address]  [place] [address]    Frederic Mauro MD, FACC        D: 2022   T: 2022   MT: sara    Name:     RUIZ LAIRD  MRN:      [id number]        Account:      [id number]   :      1940           Service Date: 2022       Document: [id number]    Thank you for allowing me to participate in the care of your patient.      Sincerely,     Frederic Isaacs MD, MD     [facility]

## 2022-09-29 NOTE — PROGRESS NOTES
HPI and Plan:   See dictation        Orders Placed This Encounter   Procedures     Lipid Profile     ALT     Lipid Profile     ALT     Basic metabolic panel     Follow-Up with Cardiology     Leadless EKG Monitor 8 to 14 Days     Echocardiogram Complete       Orders Placed This Encounter   Medications     atorvastatin (LIPITOR) 80 MG tablet     Sig: Take 1 tablet (80 mg) by mouth At Bedtime     Dispense:  90 tablet     Refill:  3     ezetimibe (ZETIA) 10 MG tablet     Sig: Take 1 tablet (10 mg) by mouth daily     Dispense:  90 tablet     Refill:  3     lisinopril (ZESTRIL) 5 MG tablet     Sig: TAKE 1 TABLET(5 MG) BY MOUTH DAILY     Dispense:  90 tablet     Refill:  3     metoprolol succinate ER (TOPROL XL) 25 MG 24 hr tablet     Sig: Take 0.5 tablets (12.5 mg) by mouth daily Appt needed for refills; please call [phone] to schedule     Dispense:  45 tablet     Refill:  3     nitroGLYcerin (NITROSTAT) 0.4 MG sublingual tablet     Sig: Place 1 tablet (0.4 mg) under the tongue every 5 minutes as needed for chest pain     Dispense:  25 tablet     Refill:  3     warfarin ANTICOAGULANT (COUMADIN) 5 MG tablet     Sig: Take 1 tab on Thursdays and take 1 1/2 tabs on all other days or as directed per INR clinic     Dispense:  145 tablet     Refill:  3       Medications Discontinued During This Encounter   Medication Reason     atorvastatin (LIPITOR) 80 MG tablet Reorder     lisinopril (ZESTRIL) 5 MG tablet Reorder     nitroGLYcerin (NITROSTAT) 0.4 MG sublingual tablet Reorder     metoprolol succinate ER (TOPROL XL) 25 MG 24 hr tablet Reorder     warfarin ANTICOAGULANT (COUMADIN) 5 MG tablet Reorder     ezetimibe (ZETIA) 10 MG tablet Reorder         Encounter Diagnoses   Name Primary?     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) Yes     Essential hypertension      Mixed hyperlipidemia      Tachycardia      Coronary artery disease involving native coronary  artery of native heart without angina pectoris      Bradycardia      Long term current use of anticoagulant therapy      History of pulmonary embolism      History of acute inferior wall myocardial infarction        CURRENT MEDICATIONS:  Current Outpatient Medications   Medication Sig Dispense Refill     albuterol (PROAIR HFA/PROVENTIL HFA/VENTOLIN HFA) 108 (90 Base) MCG/ACT inhaler Inhale 2 puffs into the lungs every 6 hours as needed for shortness of breath / dyspnea or wheezing 1 Inhaler 0     atorvastatin (LIPITOR) 80 MG tablet Take 1 tablet (80 mg) by mouth At Bedtime 90 tablet 3     azelastine (ASTELIN) 0.1 % nasal spray Spray 1 spray into both nostrils 2 times daily       Calcium Citrate-Vitamin D (CALCIUM CITRATE + D PO) Take 600 mg by mouth 2 times daily       Cholecalciferol (VITAMIN D3 PO) Take 1,000 Units by mouth 2 times daily       enoxaparin ANTICOAGULANT (LOVENOX) 100 MG/ML syringe Inject 0.9 mLs (90 mg) Subcutaneous 2 times daily 20 mL 1     ezetimibe (ZETIA) 10 MG tablet Take 1 tablet (10 mg) by mouth daily 90 tablet 3     fluticasone-salmeterol (ADVAIR-HFA) 230-21 MCG/ACT inhaler Inhale 2 puffs into the lungs 2 times daily 12 g 11     ibuprofen (ADVIL/MOTRIN) 200 MG capsule Take 600 mg by mouth daily as needed for fever       ipratropium (ATROVENT) 0.06 % nasal spray Spray 2 sprays into both nostrils 4 times daily as needed (nasal drainage) 15 mL 11     lisinopril (ZESTRIL) 5 MG tablet TAKE 1 TABLET(5 MG) BY MOUTH DAILY 90 tablet 3     metoprolol succinate ER (TOPROL XL) 25 MG 24 hr tablet Take 0.5 tablets (12.5 mg) by mouth daily Appt needed for refills; please call [phone] to schedule 45 tablet 3     nitroGLYcerin (NITROSTAT) 0.4 MG sublingual tablet Place 1 tablet (0.4 mg) under the tongue every 5 minutes as needed for chest pain 25 tablet 3     vardenafil (LEVITRA) 20 MG tablet Take 0.5-1 tablets (10-20 mg) by mouth daily as needed Never use with nitroglycerin, terazosin or doxazosin. 12  tablet 11     warfarin ANTICOAGULANT (COUMADIN) 5 MG tablet Take 1 tab on  and take 1 1/2 tabs on all other days or as directed per INR clinic 145 tablet 3       ALLERGIES     Allergies   Allergen Reactions     Seasonal Allergies      Simvastatin Muscle Pain (Myalgia)      at 20 mg daily.       PAST MEDICAL HISTORY:  Past Medical History:   Diagnosis Date     Antiphospholipid antibody syndrome (H) 2017     CAD (coronary artery disease), native coronary artery 10/2015    2015 Heart cath - 90% diagonal, 50-60% CFX, 100% prox RCA occlusion - AML placed in RCA, 10/2015 EF 35-40% by Echo     DVT (deep venous thrombosis) (H) 2015 Occlusive DVT extending from left common femoral to mid left popliteal vein     Hypercholesteraemia      Hypertension      PE (pulmonary embolism) 2015     PMR (polymyalgia rheumatica) (H)      Polymyalgia rheumatica (H)      STEMI (ST elevation myocardial infarction) (H) 10/2015    10/2015 Inferior STEMI - 100% RCA occlusion with MAL placed       PAST SURGICAL HISTORY:  Past Surgical History:   Procedure Laterality Date     HEART CATH STENT COR W/WO PTCA  10/2015    90% diagonal, 50-60% CFX, 100% prox RCA occlusion - MAL placed in RCA     ORTHOPEDIC SURGERY  2015    right carpal tunnel       FAMILY HISTORY:  Family History   Problem Relation Age of Onset     Hypertension Brother      Hypertension Brother        SOCIAL HISTORY:  Social History     Socioeconomic History     Marital status:      Spouse name: None     Number of children: None     Years of education: None     Highest education level: None   Tobacco Use     Smoking status: Former Smoker     Packs/day: 0.50     Years: 5.00     Pack years: 2.50     Types: Cigarettes     Start date:      Quit date: 1975     Years since quittin.7     Smokeless tobacco: Never Used   Substance and Sexual Activity     Alcohol use: Yes     Comment: 1 drink per day     Drug use: No     Sexual activity: Yes      "Partners: Female   Other Topics Concern     Caffeine Concern Yes     Comment: 1 cup coffee daily     Sleep Concern No     Stress Concern Yes     Comment: related to relationship     Weight Concern No     Special Diet Yes     Comment: mediterranian diet     Exercise Yes     Comment: walking, cardiac rehab starting       Review of Systems:  Skin:          Eyes:         ENT:         Respiratory:          Cardiovascular:         Gastroenterology:        Genitourinary:         Musculoskeletal:         Neurologic:         Psychiatric:         Heme/Lymph/Imm:         Endocrine:           Physical Exam:  Vitals: /70   Pulse 56   Ht 1.753 m (5' 9\")   Wt 87.8 kg (193 lb 8 oz)   SpO2 95%   BMI 28.57 kg/m      Constitutional:  in no acute distress;cooperative;well developed;well nourished        Skin:  no apparent skin lesions or masses noted          Head:  normocephalic, no masses or lesions        Eyes:  pupils equal and round        Lymph:No Cervical lymphadenopathy present     ENT:  no pallor or cyanosis        Neck:  JVP normal;no carotid bruit;carotid pulses are full and equal bilaterally        Respiratory:  clear to auscultation;normal symmetry         Cardiac: regular rhythm, normal S1/S2, no S3 or S4, apical impulse not displaced, no murmurs, gallops or rubs                pulses full and equal     1+             1+                    GI:  not assessed this visit        Extremities and Muscular Skeletal:  no edema;no deformities, clubbing, cyanosis, erythema observed              Neurological:  affect appropriate;no gross motor deficits        Psych:  Alert and Oriented x 3        CC  Referred Self, MD  No address on file              "

## 2022-09-29 NOTE — PROGRESS NOTES
Service Date: 09/29/2022    CARDIOLOGY FOLLOWUP VISIT    REFERRING PHYSICIAN:  Dr. Brandan Clinton    HISTORY OF PRESENT ILLNESS:  It is my pleasure to see your patient, Yogesh Abreu.  As you know, this is a patient who suffered an acute inferior myocardial infarct and underwent stenting of the right coronary artery.  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  This patient also has a past history of essential hypertension.  He has mild ischemic cardiomyopathy with mild-to-moderate inferolateral wall hypokinesis on echocardiography in 2015 on the resting imaging on a stress test in 2018.  Again, there were the basal inferior and inferoseptal walls that were felt to be mildly hypokinetic but overall, ejection fraction was felt to be preserved.  Nuclear stress testing in 2019 showed a fixed perfusion defect in the inferior and inferolateral walls consistent with transmural infarct with minimal brian-infarct ischemia.  The ejection fraction was called lower on the nuclear stress test which is not uncommon.  The echo is probably more reliable.    With that background in mind, the patient is feeling well.  He has no chest pains, no chest pressure and no unusual shortness of breath.  Dr. Clinton, I believe, ordered a 24-hour blood pressure monitor to determine whether his blood pressure was well controlled or not and I was able to see this on Care Everywhere.  It was performed on 07/12/2022 and showed that his blood pressure was clearly well controlled with nighttime blood pressure dropping quite low at 87/58 but awake blood pressure 116/77.  Supposedly, he was supposed to get a 14-day Zio Patch monitor because he was complaining that his heart rate sometimes when he is on the treadmill will all of a sudden drop down into the high 50s.  He wears a band around his chest when he is exercising  which measures his heart rate.  He does not feel unwell when the electrically determined heart rate drops into the 50s.  He does not feel short of breath.  He does not feel syncopal or near syncopal which makes me think that possibly this is a problem with electronics picking up his heart rate rather than the actual heart rate dropping.  Yet, we do need to take this seriously.  He is not complaining of any symptoms of congestive heart failure.  When I spoke to him today, I was under the impression that he did not have his blood checked but in actual fact, I think he was confused because he did have his blood checked today and it was excellent with an LDL of 52, HDL of 41 and triglycerides of 80 with a total cholesterol of 109 and so, he is well within secondary prevention guidelines and his basic metabolic profile was also normal with a sodium of 137, potassium of 4.0, BUN of 26 and a creatinine of 0.82 with a GFR of 89.  These lab results were not available to me at the time that I saw the patient and only became available at the time of dictation.  His liver function tests were normal with an ALT of 36.    IMPRESSION:    1.  Coronary artery disease and status post prior inferior myocardial infarct.  The patient is asymptomatic with respect to coronary artery disease with no symptoms suggestive of angina pectoris.  2.  Ejection fraction in the normal range on echocardiography but nuclear stress testing suggests mild ischemic cardiomyopathy.  3.  Excellent lipid profile.  As mentioned above, when I was speaking to the patient, I was under the impression that he did not have the blood drawn today because he was not fasting but that does not appear to be the case.  Excellent lipid profile with normal liver function tests.  4.  Normal basic metabolic profile, on lisinopril.  5.  Normotensive.  6.  Episodes of where the exercise  electronic heart rate monitor rate tells him that his heart rates have dropped into the 50s  occasionally when he is exercising.  I have a strong suspicion that this is a problem with the monitoring equipment rather than his actual heart rate dropping into the 50s since, if he was exercising rigorously and his heart rate dropped into the 50s, he would almost certainly become symptomatic.    PLAN:    1.  We will obtain a 14-day Zio Patch monitor, as was supposed to be performed at [facility] but for some reason did not get carried out, to determine if there are any episodes of high-grade conduction system disease.  I am going to get an echocardiogram performed next year since we have not had a full echocardiogram performed since  (we did have partial assessment prior to the stress echos in 2019).  2.  We will cancel the lipid profile in 4 weeks' time because he did have his cholesterol checked today.    We will see the patient back again in 1 year.  It is my pleasure to be involved the care of this nice patient.    Frederic Mauro MD, FACC    cc:  Brandan Clinton MD   [facility]  [address] [place] [address], [address]  [place] [address]    Frederic Mauro MD, FACC        D: 2022   T: 2022   MT: sara    Name:     RUIZ LAIRD  MRN:      [id number]        Account:      [id number]   :      1940           Service Date: 2022       Document: [id number]

## 2022-10-05 ENCOUNTER — ANTICOAGULATION THERAPY VISIT (OUTPATIENT)
Dept: ANTICOAGULATION | Facility: CLINIC | Age: 82
End: 2022-10-05

## 2022-10-05 DIAGNOSIS — Z79.01 LONG TERM CURRENT USE OF ANTICOAGULANTS WITH INR GOAL OF 2.0-3.0: ICD-10-CM

## 2022-10-05 DIAGNOSIS — I26.99 PULMONARY EMBOLISM (H): Primary | ICD-10-CM

## 2022-10-05 DIAGNOSIS — I26.99 OTHER ACUTE PULMONARY EMBOLISM, UNSPECIFIED WHETHER ACUTE COR PULMONALE PRESENT (H): ICD-10-CM

## 2022-10-05 DIAGNOSIS — D68.61 ANTIPHOSPHOLIPID ANTIBODY SYNDROME (H): ICD-10-CM

## 2022-10-05 LAB — INR HOME MONITORING: 3.3 (ref 2–3)

## 2022-10-05 NOTE — PROGRESS NOTES
ANTICOAGULATION MANAGEMENT     Yogesh Abreu 82 year old male is on warfarin with supratherapeutic INR result. (Goal INR 2.0-3.0)    Recent labs: (last 7 days)     10/05/22  0000   INR 3.3*       ASSESSMENT       Source(s): Chart Review and Patient/Caregiver Call       Warfarin doses taken: Warfarin taken as instructed    Diet: Decreased greens/vitamin K in diet; plans to resume previous intake    New illness, injury, or hospitalization: No    Medication/supplement changes: None noted    Signs or symptoms of bleeding or clotting: No    Previous INR: Therapeutic last 2(+) visits    Additional findings: None       PLAN     Recommended plan for temporary change(s) affecting INR     Dosing Instructions: partial hold then continue your current warfarin dose with next INR in 2 weeks       Summary  As of 10/5/2022    Full warfarin instructions:  10/5: 5 mg; 11/13: Hold; 11/14: Hold; 11/15: Hold; 11/16: Hold; 11/17: Hold; 11/18: 10 mg; Otherwise 5 mg every Thu; 7.5 mg all other days   Next INR check:  10/19/2022             Telephone call with Vincenzo who agrees to plan and repeated back plan correctly    Patient to recheck with home meter    Education provided: Importance of consistent vitamin K intake and Impact of vitamin K foods on INR    Plan made per [facility] anticoagulation protocol    Macie Rodriguez, RN  Anticoagulation Clinic  10/5/2022    _______________________________________________________________________     Anticoagulation Episode Summary     Current INR goal:  2.0-3.0   TTR:  87.0 % (1 y)   Target end date:  Indefinite   Send INR reminders to:  JERRI JIMENEZ    Indications    PE (pulmonary embolism) [I26.99]  Long term current use of anticoagulants with INR goal of 2.0-3.0 [Z79.01]  Antiphospholipid antibody syndrome (H) [D68.61]  Other acute pulmonary embolism  unspecified whether acute cor pulmonale present (H) [I26.99]           Comments:  [employer] home meter. Manage by exception.         Anticoagulation  Care Providers     Provider Role Specialty Phone number    Frederic Isaacs MD Referring Cardiovascular Disease [phone]    Filipe Goldberg MD Referring Internal Medicine [phone]

## 2022-10-10 ENCOUNTER — TELEPHONE (OUTPATIENT)
Dept: CARDIOLOGY | Facility: CLINIC | Age: 82
End: 2022-10-10

## 2022-10-10 ENCOUNTER — DOCUMENTATION ONLY (OUTPATIENT)
Dept: ANTICOAGULATION | Facility: CLINIC | Age: 82
End: 2022-10-10

## 2022-10-10 DIAGNOSIS — I26.99 OTHER ACUTE PULMONARY EMBOLISM, UNSPECIFIED WHETHER ACUTE COR PULMONALE PRESENT (H): Primary | ICD-10-CM

## 2022-10-10 DIAGNOSIS — Z79.01 LONG TERM CURRENT USE OF ANTICOAGULANTS WITH INR GOAL OF 2.0-3.0: ICD-10-CM

## 2022-10-10 DIAGNOSIS — D68.61 ANTIPHOSPHOLIPID ANTIBODY SYNDROME (H): ICD-10-CM

## 2022-10-10 NOTE — TELEPHONE ENCOUNTER
[facility] Call Center    Phone Message    May a detailed message be left on voicemail: yes     Reason for Call: Medication Question or concern regarding medication   Prescription Clarification  Name of Medication: warfarin ANTICOAGULANT (COUMADIN) 5 MG tablet  Prescribing Provider: Ferny   Pharmacy: [id number] - [facility] [address] [facility] [address]   What on the order needs clarification? [facility] is requesting a 4 day hold for this prescription. Patient has a colonoscopy on the 17th. Please call [facility] back to further discuss, thank you.    Action Taken: Message routed to:  Other: Cardiology    Travel Screening: Not Applicable     Thank you!  [facility]

## 2022-10-10 NOTE — TELEPHONE ENCOUNTER
Routing to provider to advise if ok to hold warfarin for 4 days per request of [facility] for colonoscopy.  Marjan Roper RN on 10/10/2022 at 12:46 PM

## 2022-10-10 NOTE — TELEPHONE ENCOUNTER
[facility] called requesting orders for colonoscopy 10/17.    Per chart review, patient's primary care is now through [facility] so Dr. Goldberg/[facility] cannot provide hold/bridge orders.    Called patient to notify him that [facility] can no longer follow him if he is seeing an [facility] provider. Pt verbalized understanding.    Writer called Dr. Clinton's office through [employer] and updated them that patient will need to have warfarin managed by them. They confirmed that they work with [employer], so pt can continue to use his meter.     Called and left DVM for GENE stating that patient is going to be managed by Dr. Clinton and that hold/bridge orders will need to come from him.     ANTICOAGULATION  MANAGEMENT    Yogesh Abreu is being discharged from the [facility] ([facility]).    Reason for discharge: care has been transferred to Dr. Brandan Clinton    Anticoagulation episode resolved, [facility] referral closed and Standing order discontinued    If patient needs warfarin management in the future, please send a new referral    Johnnie De La O RN

## 2022-10-10 NOTE — TELEPHONE ENCOUNTER
This patient is on warfarin for non cardiac reasons (antiphopholipid antobody , DVT and PE). PMD or others will have to address this. Marix

## 2022-10-10 NOTE — TELEPHONE ENCOUNTER
JENNIFER GUO called, discussed that warfarin is ordered for non cardiac reasons, therefore PCP should address the hold.  Marjan Roper RN on 10/10/2022 at 1:04 PM

## 2022-10-13 ENCOUNTER — LAB (OUTPATIENT)
Dept: LAB | Facility: CLINIC | Age: 82
End: 2022-10-13
Payer: COMMERCIAL

## 2022-10-13 ENCOUNTER — HOSPITAL ENCOUNTER (OUTPATIENT)
Dept: CARDIOLOGY | Facility: CLINIC | Age: 82
Discharge: HOME | End: 2022-10-13
Attending: INTERNAL MEDICINE | Admitting: INTERNAL MEDICINE
Payer: COMMERCIAL

## 2022-10-13 DIAGNOSIS — R00.1 BRADYCARDIA: ICD-10-CM

## 2022-10-13 DIAGNOSIS — I25.110 CORONARY ARTERY DISEASE INVOLVING NATIVE CORONARY ARTERY OF NATIVE HEART WITH UNSTABLE ANGINA PECTORIS (H): ICD-10-CM

## 2022-10-13 LAB
ALT SERPL W P-5'-P-CCNC: 34 U/L (ref 0–70)
CHOLEST SERPL-MCNC: 108 MG/DL
FASTING STATUS PATIENT QL REPORTED: YES
HDLC SERPL-MCNC: 45 MG/DL
LDLC SERPL CALC-MCNC: 51 MG/DL
NONHDLC SERPL-MCNC: 63 MG/DL
TRIGL SERPL-MCNC: 59 MG/DL

## 2022-10-13 PROCEDURE — 84460 ALANINE AMINO (ALT) (SGPT): CPT

## 2022-10-13 PROCEDURE — 93246 EXT ECG>7D<15D RECORDING: CPT

## 2022-10-13 PROCEDURE — 36415 COLL VENOUS BLD VENIPUNCTURE: CPT

## 2022-10-13 PROCEDURE — 80061 LIPID PANEL: CPT

## 2022-10-13 PROCEDURE — 93248 EXT ECG>7D<15D REV&INTERPJ: CPT | Performed by: INTERNAL MEDICINE

## 2022-10-15 ENCOUNTER — ANESTHESIA EVENT (OUTPATIENT)
Dept: SURGERY | Facility: CLINIC | Age: 82
End: 2022-10-15
Payer: COMMERCIAL

## 2022-10-17 ENCOUNTER — ANESTHESIA (OUTPATIENT)
Dept: SURGERY | Facility: CLINIC | Age: 82
End: 2022-10-17
Payer: COMMERCIAL

## 2022-10-17 ENCOUNTER — HOSPITAL ENCOUNTER (OUTPATIENT)
Facility: CLINIC | Age: 82
Discharge: HOME | End: 2022-10-17
Attending: INTERNAL MEDICINE | Admitting: INTERNAL MEDICINE
Payer: COMMERCIAL

## 2022-10-17 VITALS
RESPIRATION RATE: 18 BRPM | OXYGEN SATURATION: 95 % | DIASTOLIC BLOOD PRESSURE: 71 MMHG | TEMPERATURE: 98.9 F | HEART RATE: 60 BPM | SYSTOLIC BLOOD PRESSURE: 117 MMHG

## 2022-10-17 LAB
COLONOSCOPY: NORMAL
INR PPP: 1.18 (ref 0.85–1.15)

## 2022-10-17 PROCEDURE — 272N000001 HC OR GENERAL SUPPLY STERILE: Performed by: INTERNAL MEDICINE

## 2022-10-17 PROCEDURE — 370N000017 HC ANESTHESIA TECHNICAL FEE, PER MIN: Performed by: INTERNAL MEDICINE

## 2022-10-17 PROCEDURE — 250N000011 HC RX IP 250 OP 636: Performed by: ANESTHESIOLOGY

## 2022-10-17 PROCEDURE — 88305 TISSUE EXAM BY PATHOLOGIST: CPT | Mod: TC | Performed by: INTERNAL MEDICINE

## 2022-10-17 PROCEDURE — 250N000009 HC RX 250: Performed by: ANESTHESIOLOGY

## 2022-10-17 PROCEDURE — 710N000012 HC RECOVERY PHASE 2, PER MINUTE: Performed by: INTERNAL MEDICINE

## 2022-10-17 PROCEDURE — 258N000003 HC RX IP 258 OP 636: Performed by: ANESTHESIOLOGY

## 2022-10-17 PROCEDURE — 36415 COLL VENOUS BLD VENIPUNCTURE: CPT | Performed by: INTERNAL MEDICINE

## 2022-10-17 PROCEDURE — 360N000075 HC SURGERY LEVEL 2, PER MIN: Performed by: INTERNAL MEDICINE

## 2022-10-17 PROCEDURE — 999N000141 HC STATISTIC PRE-PROCEDURE NURSING ASSESSMENT: Performed by: INTERNAL MEDICINE

## 2022-10-17 PROCEDURE — 85610 PROTHROMBIN TIME: CPT | Performed by: INTERNAL MEDICINE

## 2022-10-17 RX ORDER — PROCHLORPERAZINE MALEATE 5 MG
5 TABLET ORAL EVERY 6 HOURS PRN
Status: CANCELLED | OUTPATIENT
Start: 2022-10-17

## 2022-10-17 RX ORDER — HYDROMORPHONE HCL IN WATER/PF 6 MG/30 ML
0.2 PATIENT CONTROLLED ANALGESIA SYRINGE INTRAVENOUS EVERY 5 MIN PRN
Status: DISCONTINUED | OUTPATIENT
Start: 2022-10-17 | End: 2022-10-17 | Stop reason: HOSPADM

## 2022-10-17 RX ORDER — FLUMAZENIL 0.1 MG/ML
0.2 INJECTION, SOLUTION INTRAVENOUS
Status: CANCELLED | OUTPATIENT
Start: 2022-10-17 | End: 2022-10-17

## 2022-10-17 RX ORDER — PROPOFOL 10 MG/ML
INJECTION, EMULSION INTRAVENOUS PRN
Status: DISCONTINUED | OUTPATIENT
Start: 2022-10-17 | End: 2022-10-17

## 2022-10-17 RX ORDER — FENTANYL CITRATE 50 UG/ML
25 INJECTION, SOLUTION INTRAMUSCULAR; INTRAVENOUS
Status: CANCELLED | OUTPATIENT
Start: 2022-10-17

## 2022-10-17 RX ORDER — OXYCODONE HYDROCHLORIDE 5 MG/1
5 TABLET ORAL EVERY 4 HOURS PRN
Status: DISCONTINUED | OUTPATIENT
Start: 2022-10-17 | End: 2022-10-17 | Stop reason: HOSPADM

## 2022-10-17 RX ORDER — MEPERIDINE HYDROCHLORIDE 25 MG/ML
12.5 INJECTION INTRAMUSCULAR; INTRAVENOUS; SUBCUTANEOUS
Status: DISCONTINUED | OUTPATIENT
Start: 2022-10-17 | End: 2022-10-17 | Stop reason: HOSPADM

## 2022-10-17 RX ORDER — FENTANYL CITRATE 50 UG/ML
25 INJECTION, SOLUTION INTRAMUSCULAR; INTRAVENOUS EVERY 5 MIN PRN
Status: DISCONTINUED | OUTPATIENT
Start: 2022-10-17 | End: 2022-10-17 | Stop reason: HOSPADM

## 2022-10-17 RX ORDER — LIDOCAINE 40 MG/G
CREAM TOPICAL
Status: DISCONTINUED | OUTPATIENT
Start: 2022-10-17 | End: 2022-10-17 | Stop reason: HOSPADM

## 2022-10-17 RX ORDER — PROPOFOL 10 MG/ML
INJECTION, EMULSION INTRAVENOUS CONTINUOUS PRN
Status: DISCONTINUED | OUTPATIENT
Start: 2022-10-17 | End: 2022-10-17

## 2022-10-17 RX ORDER — ONDANSETRON 2 MG/ML
4 INJECTION INTRAMUSCULAR; INTRAVENOUS EVERY 6 HOURS PRN
Status: CANCELLED | OUTPATIENT
Start: 2022-10-17

## 2022-10-17 RX ORDER — NALOXONE HYDROCHLORIDE 0.4 MG/ML
0.2 INJECTION, SOLUTION INTRAMUSCULAR; INTRAVENOUS; SUBCUTANEOUS
Status: CANCELLED | OUTPATIENT
Start: 2022-10-17

## 2022-10-17 RX ORDER — SODIUM CHLORIDE, SODIUM LACTATE, POTASSIUM CHLORIDE, CALCIUM CHLORIDE 600; 310; 30; 20 MG/100ML; MG/100ML; MG/100ML; MG/100ML
INJECTION, SOLUTION INTRAVENOUS CONTINUOUS
Status: DISCONTINUED | OUTPATIENT
Start: 2022-10-17 | End: 2022-10-17 | Stop reason: HOSPADM

## 2022-10-17 RX ORDER — ONDANSETRON 4 MG/1
4 TABLET, ORALLY DISINTEGRATING ORAL EVERY 30 MIN PRN
Status: DISCONTINUED | OUTPATIENT
Start: 2022-10-17 | End: 2022-10-17 | Stop reason: HOSPADM

## 2022-10-17 RX ORDER — SODIUM CHLORIDE, SODIUM LACTATE, POTASSIUM CHLORIDE, CALCIUM CHLORIDE 600; 310; 30; 20 MG/100ML; MG/100ML; MG/100ML; MG/100ML
INJECTION, SOLUTION INTRAVENOUS CONTINUOUS PRN
Status: DISCONTINUED | OUTPATIENT
Start: 2022-10-17 | End: 2022-10-17

## 2022-10-17 RX ORDER — LIDOCAINE HYDROCHLORIDE 10 MG/ML
INJECTION, SOLUTION INFILTRATION; PERINEURAL PRN
Status: DISCONTINUED | OUTPATIENT
Start: 2022-10-17 | End: 2022-10-17

## 2022-10-17 RX ORDER — NALOXONE HYDROCHLORIDE 0.4 MG/ML
0.4 INJECTION, SOLUTION INTRAMUSCULAR; INTRAVENOUS; SUBCUTANEOUS
Status: CANCELLED | OUTPATIENT
Start: 2022-10-17

## 2022-10-17 RX ORDER — ONDANSETRON 2 MG/ML
4 INJECTION INTRAMUSCULAR; INTRAVENOUS EVERY 30 MIN PRN
Status: DISCONTINUED | OUTPATIENT
Start: 2022-10-17 | End: 2022-10-17 | Stop reason: HOSPADM

## 2022-10-17 RX ORDER — ONDANSETRON 4 MG/1
4 TABLET, ORALLY DISINTEGRATING ORAL EVERY 6 HOURS PRN
Status: CANCELLED | OUTPATIENT
Start: 2022-10-17

## 2022-10-17 RX ADMIN — SODIUM CHLORIDE, SODIUM LACTATE, POTASSIUM CHLORIDE, CALCIUM CHLORIDE: 600; 310; 30; 20 INJECTION, SOLUTION INTRAVENOUS at 07:21

## 2022-10-17 RX ADMIN — PROPOFOL 30 MG: 10 INJECTION, EMULSION INTRAVENOUS at 07:24

## 2022-10-17 RX ADMIN — PHENYLEPHRINE HYDROCHLORIDE 50 MCG: 10 INJECTION INTRAVENOUS at 07:26

## 2022-10-17 RX ADMIN — PROPOFOL 125 MCG/KG/MIN: 10 INJECTION, EMULSION INTRAVENOUS at 07:24

## 2022-10-17 RX ADMIN — PHENYLEPHRINE HYDROCHLORIDE 50 MCG: 10 INJECTION INTRAVENOUS at 07:28

## 2022-10-17 RX ADMIN — LIDOCAINE HYDROCHLORIDE 20 MG: 10 INJECTION, SOLUTION INFILTRATION; PERINEURAL at 07:24

## 2022-10-17 RX ADMIN — PHENYLEPHRINE HYDROCHLORIDE 50 MCG: 10 INJECTION INTRAVENOUS at 07:31

## 2022-10-17 ASSESSMENT — ACTIVITIES OF DAILY LIVING (ADL): ADLS_ACUITY_SCORE: 35

## 2022-10-17 ASSESSMENT — ENCOUNTER SYMPTOMS: SEIZURES: 0

## 2022-10-17 NOTE — ANESTHESIA POSTPROCEDURE EVALUATION
Patient: Yogesh Abreu    Procedure: Procedure(s):  COLONOSCOPY WITH POLYPECTOMY       Anesthesia Type:  MAC    Note:  Disposition: Outpatient   Postop Pain Control: Uneventful            Sign Out: Well controlled pain   PONV: No   Neuro/Psych: Uneventful            Sign Out: Acceptable/Baseline neuro status   Airway/Respiratory: Uneventful            Sign Out: Acceptable/Baseline resp. status   CV/Hemodynamics: Uneventful            Sign Out: Acceptable CV status; No obvious hypovolemia; No obvious fluid overload   Other NRE: NONE   DID A NON-ROUTINE EVENT OCCUR? No           Last vitals:  Vitals Value Taken Time   /71 10/17/22 0800   Temp 37.2  C (98.9  F) 10/17/22 0750   Pulse 60 10/17/22 0805   Resp     SpO2 95 % 10/17/22 0805   Vitals shown include unvalidated device data.    Electronically Signed By: Kwaku Tay MD  October 17, 2022  8:08 AM

## 2022-10-17 NOTE — ANESTHESIA CARE TRANSFER NOTE
Patient: Yogesh Abreu    Procedure: Procedure(s):  COLONOSCOPY WITH POLYPECTOMY       Diagnosis: Screening for colon cancer [Z12.11]  Diagnosis Additional Information: No value filed.    Anesthesia Type:   MAC     Note:    Oropharynx: oropharynx clear of all foreign objects  Level of Consciousness: drowsy  Oxygen Supplementation: face mask  Level of Supplemental Oxygen (L/min / FiO2): 6  Independent Airway: airway patency satisfactory and stable  Dentition: dentition unchanged  Vital Signs Stable: post-procedure vital signs reviewed and stable  Report to RN Given: handoff report given  Patient transferred to: Phase II    Handoff Report: Identifed the Patient, Identified the Reponsible Provider, Reviewed the pertinent medical history, Discussed the surgical course, Reviewed Intra-OP anesthesia mangement and issues during anesthesia, Set expectations for post-procedure period and Allowed opportunity for questions and acknowledgement of understanding      Vitals:  Vitals Value Taken Time   BP 98/58 10/17/22 0749   Temp 36.2  C (97.1  F) 10/17/22 0749   Pulse 57 10/17/22 0750   Resp 14    SpO2 100 % 10/17/22 0750   Vitals shown include unvalidated device data.    Electronically Signed By: MELL Higginbotham CRNA  October 17, 2022  7:51 AM

## 2022-10-17 NOTE — ANESTHESIA PREPROCEDURE EVALUATION
Anesthesia Pre-Procedure Evaluation    Patient: Yogesh Abreu   MRN: [id number] : 1940        Procedure : Procedure(s):  COLONOSCOPY          Past Medical History:   Diagnosis Date     Antiphospholipid antibody syndrome (H) 2017     CAD (coronary artery disease), native coronary artery 10/2015    2015 Heart cath - 90% diagonal, 50-60% CFX, 100% prox RCA occlusion - MAL placed in RCA, 10/2015 EF 35-40% by Echo     DVT (deep venous thrombosis) (H) 2015 Occlusive DVT extending from left common femoral to mid left popliteal vein     Hypercholesteraemia      Hypertension      PE (pulmonary embolism) 2015     PMR (polymyalgia rheumatica) (H)      Polymyalgia rheumatica (H)      STEMI (ST elevation myocardial infarction) (H) 10/2015    10/2015 Inferior STEMI - 100% RCA occlusion with MAL placed      Past Surgical History:   Procedure Laterality Date     HEART CATH STENT COR W/WO PTCA  10/2015    90% diagonal, 50-60% CFX, 100% prox RCA occlusion - MAL placed in RCA     ORTHOPEDIC SURGERY  2015    right carpal tunnel      Allergies   Allergen Reactions     Seasonal Allergies      Simvastatin Muscle Pain (Myalgia)      at 20 mg daily.      Social History     Tobacco Use     Smoking status: Former     Packs/day: 0.50     Years: 5.00     Pack years: 2.50     Types: Cigarettes     Start date:      Quit date:      Years since quittin.8     Smokeless tobacco: Never   Substance Use Topics     Alcohol use: Yes     Comment: 1 drink per day      Wt Readings from Last 1 Encounters:   22 87.8 kg (193 lb 8 oz)        Anesthesia Evaluation            ROS/MED HX  ENT/Pulmonary:     (+) asthma Treatment: Inhaler prn,   (-) sleep apnea   Neurologic: Comment: Polymyalgia rheumatica   (-) no seizures and no CVA   Cardiovascular: Comment:  PCI    (+) hypertension--CAD --stent- (-) angina and angina   METS/Exercise Tolerance:     Hematologic:     (+) History of blood clots, pt is  anticoagulated,     Musculoskeletal:  - neg musculoskeletal ROS     GI/Hepatic:  - neg GI/hepatic ROS     Renal/Genitourinary:  - neg Renal ROS     Endo:  - neg endo ROS     Psychiatric/Substance Use:  - neg psychiatric ROS     Infectious Disease:  - neg infectious disease ROS     Malignancy:       Other:            Physical Exam    Airway  airway exam normal      Mallampati: II   TM distance: > 3 FB   Neck ROM: full   Mouth opening: > 3 cm    Respiratory Devices and Support         Dental  no notable dental history         Cardiovascular   cardiovascular exam normal       Rhythm and rate: regular and normal     Pulmonary   pulmonary exam normal        breath sounds clear to auscultation           OUTSIDE LABS:  CBC:   Lab Results   Component Value Date    WBC 4.9 09/21/2021    WBC 4.7 09/17/2020    HGB 16.1 09/21/2021    HGB 16.9 09/17/2020    HCT 47.7 09/21/2021    HCT 49.8 09/17/2020     09/21/2021     09/17/2020     BMP:   Lab Results   Component Value Date     09/29/2022     06/17/2021    POTASSIUM 4.0 09/29/2022    POTASSIUM 4.0 06/17/2021    CHLORIDE 105 09/29/2022    CHLORIDE 108 06/17/2021    CO2 26 09/29/2022    CO2 27 06/17/2021    BUN 26 09/29/2022    BUN 23 06/17/2021    CR 0.82 09/29/2022    CR 0.81 06/17/2021     (H) 09/29/2022    GLC 97 06/17/2021     COAGS:   Lab Results   Component Value Date    INR 3.3 (H) 10/05/2022     POC: No results found for: BGM, HCG, HCGS  HEPATIC:   Lab Results   Component Value Date    ALBUMIN 4.1 09/17/2020    PROTTOTAL 8.2 09/17/2020    ALT 34 10/13/2022    AST 33 09/17/2020    ALKPHOS 72 09/17/2020    BILITOTAL 0.9 09/17/2020     OTHER:   Lab Results   Component Value Date    A1C 5.6 09/20/2019    RONNY 9.7 09/29/2022    LIPASE 84 09/07/2015    TSH 1.29 09/21/2021       Anesthesia Plan    ASA Status:  3      Anesthesia Type: MAC.              Consents    Anesthesia Plan(s) and associated risks, benefits, and realistic alternatives  discussed. Questions answered and patient/representative(s) expressed understanding.    - Discussed:     - Discussed with:  Patient         Postoperative Care            Comments:                Kwaku Tay MD

## 2022-10-17 NOTE — H&P
GENERAL PRE-PROCEDURE:   Procedure:  Colonoscopy  Date/Time:  10/17/2022 7:10 AM    Verbal consent obtained?: Yes    Written consent obtained?: Yes    Risks and benefits: Risks, benefits and alternatives were discussed    Consent given by:  Patient  Patient states understanding of procedure being performed: Yes    Patient's understanding of procedure matches consent: Yes    Procedure consent matches procedure scheduled: Yes    Expected level of sedation:  Deep  Appropriately NPO:  Yes  ASA Class:  3  Mallampati  :  Grade 2- soft palate, base of uvula, tonsillar pillars, and portion of posterior pharyngeal wall visible  Lungs:  Lungs clear with good breath sounds bilaterally  Heart:  Normal heart sounds and rate  History & Physical reviewed:  History and physical reviewed and no updates needed  Statement of review:  I have reviewed the lab findings, diagnostic data, medications, and the plan for sedation

## 2022-10-19 LAB
PATH REPORT.COMMENTS IMP SPEC: NORMAL
PATH REPORT.FINAL DX SPEC: NORMAL
PATH REPORT.GROSS SPEC: NORMAL
PATH REPORT.MICROSCOPIC SPEC OTHER STN: NORMAL
PATH REPORT.RELEVANT HX SPEC: NORMAL
PHOTO IMAGE: NORMAL

## 2022-10-19 PROCEDURE — 88305 TISSUE EXAM BY PATHOLOGIST: CPT | Mod: 26 | Performed by: PATHOLOGY

## 2022-11-04 ENCOUNTER — TELEPHONE (OUTPATIENT)
Dept: CARDIOLOGY | Facility: CLINIC | Age: 82
End: 2022-11-04

## 2022-11-04 DIAGNOSIS — I47.29 NSVT (NONSUSTAINED VENTRICULAR TACHYCARDIA) (H): Primary | ICD-10-CM

## 2022-11-04 NOTE — TELEPHONE ENCOUNTER
No evidence of slow rhythms. Short episodes of non sustained VT. Needs to get an echo before visit and need to check BMP and Mg. Thx

## 2022-11-04 NOTE — TELEPHONE ENCOUNTER
Called pt with Ziopatch results. Pt states he is in [place] now through April so asking if this needs to be done before that or if they can be done there. Will message Dr. Isaacs to review. Shabbir GIRALDO

## 2022-11-04 NOTE — TELEPHONE ENCOUNTER
"Reviewed Ziopatch showing       Per office note dated 9/29/22, Dr. Isaacs recommended, \"We will obtain a 14-day Zio Patch monitor, as was supposed to be performed at [facility] but for some reason did not get carried out, to determine if there are any episodes of high-grade conduction system disease.  I am going to get an echocardiogram performed next year since we have not had a full echocardiogram performed since 2015 (we did have partial assessment prior to the stress echos in 2019).\"     Will message Dr. Isaacs to review. Shabbir GIRALDO   "

## 2022-11-07 NOTE — TELEPHONE ENCOUNTER
"Not sure of note 3 days ago (\"called with results of nuc and echo \"). These have not been done.. With VT needs to get lexican and echo and BMP/ Mg done in [place]. Thx  "

## 2022-11-08 NOTE — TELEPHONE ENCOUNTER
Called pt with recommendations from Dr. Isaacs. Pt will find a healthcare organization there that can do these tests & advised that I can fax an order for these to be done there. Pt will call or message back with a fax number within the week.Shabbir RN

## 2022-11-15 NOTE — TELEPHONE ENCOUNTER
Called pt, states he has not followed through on getting a location where he can get the labs, echo & lexiscan in [place]. Pt states he will look into this tomorrow & call back. Shabbir GIRALDO

## 2022-11-16 ENCOUNTER — TELEPHONE (OUTPATIENT)
Dept: CARDIOLOGY | Facility: CLINIC | Age: 82
End: 2022-11-16

## 2022-11-16 NOTE — TELEPHONE ENCOUNTER
Returned call to Vincenzo and discussed that ENZO Berenice is out of office today and will return tomorrow.  Vincenzo wanting to discuss details regarding faxing of orders.  Vincenzo states he is ok with waiting until Berenice returns.  Does state he will be unavailable from 9-10 on 11/17.    Marjan Roper RN on 11/16/2022 at 3:57 PM

## 2022-11-16 NOTE — TELEPHONE ENCOUNTER
[facility] Call Center    Phone Message    May a detailed message be left on voicemail: yes     Reason for Call: Other:     Vincenzo is requesting a call back to discuss tests to be completed in [place].  Please call before faxing orders.    Action Taken: Other: cardio    Travel Screening: Not Applicable     Thank you!  [facility]

## 2022-11-17 NOTE — TELEPHONE ENCOUNTER
Pt called back, states he will send a My Chart message where he can get these test done. Shabbir GIRALDO

## 2022-11-18 ENCOUNTER — MYC MEDICAL ADVICE (OUTPATIENT)
Dept: CARDIOLOGY | Facility: CLINIC | Age: 82
End: 2022-11-18

## 2022-11-22 PROBLEM — I47.29 NSVT (NONSUSTAINED VENTRICULAR TACHYCARDIA) (H): Status: ACTIVE | Noted: 2022-11-22

## 2022-11-22 NOTE — TELEPHONE ENCOUNTER
Orders for echo, lexiscan, BMP & lexiscan faxed to [facility] at [phone]. Messaged pt with an update. Shabbir GIRALDO

## 2022-12-06 ENCOUNTER — TELEPHONE (OUTPATIENT)
Dept: CARDIOLOGY | Facility: CLINIC | Age: 82
End: 2022-12-06

## 2022-12-06 NOTE — TELEPHONE ENCOUNTER
[facility] Call Center    Phone Message    May a detailed message be left on voicemail: yes     Reason for Call: Other: [facility] would like the referral faxed to them as pt is trying to make an appt for testing in [place] , Fax number is [phone] other fax number is [phone] APURVA Jarvis     Action Taken: Message routed to:  [facility] Cardio    Travel Screening: Not Applicable

## 2022-12-27 NOTE — TELEPHONE ENCOUNTER
Received call from [facility] [phone] requesting last 2 office notes, as well as any recent testing faxed to [phone]. Records faxed as requested. Shabbir GIRALDO

## 2023-01-17 ENCOUNTER — TELEPHONE (OUTPATIENT)
Dept: CARDIOLOGY | Facility: CLINIC | Age: 83
End: 2023-01-17
Payer: COMMERCIAL

## 2023-01-17 NOTE — TELEPHONE ENCOUNTER
----- Message from Berenice Fierro RN sent at 1/10/2023  4:11 PM CST -----  Regarding: fax orders & records to [facility] [phone] evangelist Garcia

## 2023-01-18 NOTE — TELEPHONE ENCOUNTER
Orders for BMP/mag, echo & lexiscan and recent testing & notes faxed to [facility] at [phone] as requested. Shabbir GIRALDO

## 2023-01-22 ENCOUNTER — HEALTH MAINTENANCE LETTER (OUTPATIENT)
Age: 83
End: 2023-01-22

## 2023-02-08 ENCOUNTER — TRANSFERRED RECORDS (OUTPATIENT)
Dept: HEALTH INFORMATION MANAGEMENT | Facility: CLINIC | Age: 83
End: 2023-02-08

## 2023-02-23 ENCOUNTER — TELEPHONE (OUTPATIENT)
Dept: CARDIOLOGY | Facility: CLINIC | Age: 83
End: 2023-02-23
Payer: COMMERCIAL

## 2023-02-23 NOTE — TELEPHONE ENCOUNTER
Called patient to review, patient states his cardiologist in [place] is recommending he get a heart cath and states he did have some testing done which is why they are ordering the heart cath.  Patient advised that as we have not seen him in 5 months and have not seen the testing that we cannot make recommendations regarding his heart cath so patient advised to follow recommendations from his cardiologist in [place].  Patient also asking if we can manage his INR while he is in [place] and patient advised we cannot, patient advised he should follow-up with his providers in [place] to manage his care while living in [place].  Patient verbalized understanding.  YVETTE Fierro RN

## 2023-02-23 NOTE — TELEPHONE ENCOUNTER
Received voicemail message from patient stating that his cardiologist in [place] wants to do a heart cath and asking for Dr. Sharan Mauro's input.  Attempted to return patient's call, left message for patient to call back.  YVETTE Fierro RN

## 2023-04-21 ENCOUNTER — MYC MEDICAL ADVICE (OUTPATIENT)
Dept: CARDIOLOGY | Facility: CLINIC | Age: 83
End: 2023-04-21
Payer: COMMERCIAL

## 2023-04-21 NOTE — TELEPHONE ENCOUNTER
Called patient, advised him that it would probably be best to have him seen in clinic to follow-up with a heart cath done 3/01/23 in [place].  Patient advised he can discuss cardiac rehab with Denisa when he sees her on Monday.  Patient states he can come Monday 8:10 AM to see Denisa Castro NP.  States scheduling to add to her schedule.  Patient aware of appointment date time and location.  Patient states he had all of his records copied before he left [place] and will bring these records with him on Monday.  YVETTE Fierro RN

## 2023-04-24 ENCOUNTER — OFFICE VISIT (OUTPATIENT)
Dept: CARDIOLOGY | Facility: CLINIC | Age: 83
End: 2023-04-24
Payer: COMMERCIAL

## 2023-04-24 VITALS
OXYGEN SATURATION: 97 % | HEART RATE: 64 BPM | DIASTOLIC BLOOD PRESSURE: 72 MMHG | BODY MASS INDEX: 28.78 KG/M2 | WEIGHT: 194.3 LBS | SYSTOLIC BLOOD PRESSURE: 116 MMHG | HEIGHT: 69 IN

## 2023-04-24 DIAGNOSIS — E78.2 MIXED HYPERLIPIDEMIA: ICD-10-CM

## 2023-04-24 DIAGNOSIS — I10 ESSENTIAL HYPERTENSION: ICD-10-CM

## 2023-04-24 DIAGNOSIS — D68.61 ANTIPHOSPHOLIPID ANTIBODY SYNDROME (H): ICD-10-CM

## 2023-04-24 DIAGNOSIS — I25.110 CORONARY ARTERY DISEASE INVOLVING NATIVE CORONARY ARTERY OF NATIVE HEART WITH UNSTABLE ANGINA PECTORIS (H): Primary | ICD-10-CM

## 2023-04-24 PROCEDURE — 99214 OFFICE O/P EST MOD 30 MIN: CPT | Performed by: NURSE PRACTITIONER

## 2023-04-24 PROCEDURE — 93000 ELECTROCARDIOGRAM COMPLETE: CPT | Performed by: NURSE PRACTITIONER

## 2023-04-24 RX ORDER — ASPIRIN 81 MG/1
81 TABLET ORAL DAILY
COMMUNITY
End: 2023-04-24

## 2023-04-24 RX ORDER — TICAGRELOR 90 MG/1
1 TABLET ORAL
COMMUNITY
Start: 2023-03-27 | End: 2023-06-19 | Stop reason: ALTCHOICE

## 2023-04-24 NOTE — PROGRESS NOTES
Cardiology Clinic Progress Note  Yogesh Abreu MRN# [id number]   YOB: 1940 Age: 82 year old     Primary cardiologist: Dr. Isaacs     Reason for visit: follow-up     History of presenting illness:    Yogesh Abreu is a pleasant 82 year old patient who follows closely with Dr. Isaacs.     He has a past medical history significant for antiphospholipid antibody syndrome on Coumadin with history of PE and DVT, coronary disease s/p PCI to RCA (2015), hyperlipidemia, and hypertension, who is here today for follow-up.     The patient ulloa in [place] and was seen by his cardiologist there in January of this year with complaints of shortness of breath.  He was experiencing some exertional shortness of breath, although he remained quite active.  Per report, EKG at that visit showed sinus rhythm with PACs.  Subsequent nuclear stress test showed inferior ischemia. He underwent coronary angiogram on 3/1/2023 in [place], [place] that demonstrated significant mid LAD disease that was successfully treated with a single drug-eluting stent.  Due to the ectopy noted on his EKG, a 24-hour Holter monitor was also recommended.    Today the patient reports doing well from a cardiovascular standpoint.  He has no cardiopulmonary complaints.  He specifically denies any chest discomfort, shortness of breath, syncope near syncope, or palpitations.  He remains active by walking and going to the gym almost daily.  He never started cardiac rehab since he was in [place].  He also was not placed on a 24-hour Holter monitor prior to returning to [place].  He brought in all his records from [place] including Cath Lab report, echocardiogram, nuclear stress test, and EKG, which I reviewed. EKG today shows sinus rhythm.  His blood pressure is well controlled.    Other cardiac work-up that I reviewed, includes a 10-day Zio patch that was placed in October 2022 that demonstrated predominantly sinus rhythm.  There  were 2 runs of ventricular tachycardia, the longest lasting 13 beats, 90 runs of SVT up to 17 seconds.  His most recent lipid panel from 10/13/2022 shows total cholesterol 108, HDL 45, LDL 51, triglycerides of 59.           Assessment and Plan:     ASSESSMENT:    1. CAD s/p PCI to RCA (2015) and mid LAD (3/1/2023).  Recovering well without any signs of ischemia.  He has no exertional symptoms.  He is currently on triple therapy with Coumadin, aspirin, and Brilinta.  Additionally, he is on beta-blocker, lisinopril, high intensity statin, and ezetimibe.  2. Antiphospholipid antibody syndrome with history of DVT and PE.  On chronic Coumadin.  3. Hyperlipidemia with goal LDL < 70.  At goal on atorvastatin 80 mg daily and ezetimibe 10 mg daily.  4. NSVT and SVT noted on Zio patch from October 2022.  EKG today shows sinus rhythm without ectopy.  He denies any palpitations.  On blocker therapy.  5. Hypertension.  Well-controlled on lisinopril 5 mg daily, Toprol-XL 12.5 mg daily.      PLAN:     1. Okay to stop aspirin 81 mg, continue Brilinta 90 mg twice daily in addition to Coumadin.  2. Outpatient cardiac rehab referral placed.  3. Given the patient is completely asymptomatic and EKG today shows normal sinus rhythm, I think it is reasonable to hold off on Holter monitor at this time.  4. I will have patient's [place] records uploaded to his chart.   5. Follow-up with Dr. Sharan Mauro with repeat lipid panel in 6 months, sooner if needed.         Denisa Carranza, STEVE, APRN, CNP  Page: [phone] (8a-5p M-F)    Orders this Visit:  Orders Placed This Encounter   Procedures     Lipid Profile     ALT     Follow-Up with Cardiology     Cardiac Rehab Referral     EKG 12-lead complete w/read - Clinics (performed today)     Orders Placed This Encounter   Medications     BRILINTA 90 MG tablet     Sig: Take 1 tablet by mouth 2 times daily     DISCONTD: aspirin 81 MG EC tablet     Sig: Take 81 mg by mouth daily     Medications  "Discontinued During This Encounter   Medication Reason     enoxaparin ANTICOAGULANT (LOVENOX) 100 MG/ML syringe Therapy completed (No AVS)     aspirin 81 MG EC tablet        Today's clinic visit entailed:  Review of the result(s) of each unique test - echo, labs, lexiscan, cath  Ordering of each unique test  Prescription drug management  35 minutes spent by me on the date of the encounter doing chart review, review of outside records, review of test results, patient visit and documentation   Provider  Link to [facility] Help Grid     The level of medical decision making during this visit was of moderate complexity.           Review of Systems:     Review of Systems:  Skin:  not assessed     Eyes:  not assessed    ENT:  not assessed    Respiratory:  Positive for dyspnea on exertion  Cardiovascular:  Negative    Gastroenterology: not assessed    Genitourinary:  not assessed    Musculoskeletal:  not assessed    Neurologic:  not assessed    Psychiatric:  not assessed    Heme/Lymph/Imm:  not assessed    Endocrine:  not assessed              Physical Exam:   Vitals: /72   Pulse 64   Ht 1.753 m (5' 9\")   Wt 88.1 kg (194 lb 4.8 oz)   SpO2 97%   BMI 28.69 kg/m    Constitutional:  cooperative        Skin:  warm and dry to the touch        Head:  normocephalic        Eyes:  pupils equal and round        ENT:           Neck:  JVP normal        Chest:  normal breath sounds, clear to auscultation, normal A-P diameter, normal symmetry, normal respiratory excursion, no use of accessory muscles        Cardiac: regular rhythm;normal S1 and S2;no murmurs, gallops or rubs detected                  Abdomen:  abdomen soft        Vascular: pulses full and equal                                      Extremities and Back:  no edema        Neurological:  no gross motor deficits;affect appropriate             Medications:     Current Outpatient Medications   Medication Sig Dispense Refill     albuterol (PROAIR HFA/PROVENTIL HFA/VENTOLIN " HFA) 108 (90 Base) MCG/ACT inhaler Inhale 2 puffs into the lungs every 6 hours as needed for shortness of breath / dyspnea or wheezing 1 Inhaler 0     atorvastatin (LIPITOR) 80 MG tablet Take 1 tablet (80 mg) by mouth At Bedtime 90 tablet 3     azelastine (ASTELIN) 0.1 % nasal spray Spray 1 spray into both nostrils 2 times daily       BRILINTA 90 MG tablet Take 1 tablet by mouth 2 times daily       Calcium Citrate-Vitamin D (CALCIUM CITRATE + D PO) Take 600 mg by mouth 2 times daily       Cholecalciferol (VITAMIN D3 PO) Take 1,000 Units by mouth 2 times daily       ezetimibe (ZETIA) 10 MG tablet Take 1 tablet (10 mg) by mouth daily 90 tablet 3     fluticasone-salmeterol (ADVAIR-HFA) 230-21 MCG/ACT inhaler Inhale 2 puffs into the lungs 2 times daily 12 g 11     ibuprofen (ADVIL/MOTRIN) 200 MG capsule Take 600 mg by mouth daily as needed for fever       ipratropium (ATROVENT) 0.06 % nasal spray Spray 2 sprays into both nostrils 4 times daily as needed (nasal drainage) 15 mL 11     lisinopril (ZESTRIL) 5 MG tablet TAKE 1 TABLET(5 MG) BY MOUTH DAILY 90 tablet 3     metoprolol succinate ER (TOPROL XL) 25 MG 24 hr tablet Take 0.5 tablets (12.5 mg) by mouth daily Appt needed for refills; please call [phone] to schedule 45 tablet 3     nitroGLYcerin (NITROSTAT) 0.4 MG sublingual tablet Place 1 tablet (0.4 mg) under the tongue every 5 minutes as needed for chest pain 25 tablet 3     vardenafil (LEVITRA) 20 MG tablet Take 0.5-1 tablets (10-20 mg) by mouth daily as needed Never use with nitroglycerin, terazosin or doxazosin. 12 tablet 11     warfarin ANTICOAGULANT (COUMADIN) 5 MG tablet Take 1 tab on Thursdays and take 1 1/2 tabs on all other days or as directed per INR clinic 145 tablet 3       Family History   Problem Relation Age of Onset     Hypertension Brother      Hypertension Brother        Social History     Socioeconomic History     Marital status:      Spouse name: Not on file     Number of children: Not  on file     Years of education: Not on file     Highest education level: Not on file   Occupational History     Not on file   Tobacco Use     Smoking status: Former     Packs/day: 0.50     Years: 5.00     Pack years: 2.50     Types: Cigarettes     Start date:      Quit date:      Years since quittin.3     Smokeless tobacco: Never   Vaping Use     Vaping status: Not on file   Substance and Sexual Activity     Alcohol use: Yes     Comment: 1 drink per day     Drug use: No     Sexual activity: Yes     Partners: Female   Other Topics Concern      Service Not Asked     Blood Transfusions Not Asked     Caffeine Concern Yes     Comment: 1 cup coffee daily     Occupational Exposure Not Asked     Hobby Hazards Not Asked     Sleep Concern No     Stress Concern Yes     Comment: related to relationship     Weight Concern No     Special Diet Yes     Comment: mediterranian diet     Back Care Not Asked     Exercise Yes     Comment: walking, cardiac rehab starting     Bike Helmet Not Asked     Seat Belt Not Asked     Self-Exams Not Asked     Parent/sibling w/ CABG, MI or angioplasty before 65F 55M? Not Asked   Social History Narrative     Not on file     Social Determinants of Health     Financial Resource Strain: Not on file   Food Insecurity: Not on file   Transportation Needs: Not on file   Physical Activity: Not on file   Stress: Not on file   Social Connections: Not on file   Intimate Partner Violence: Not on file   Housing Stability: Not on file            Past Medical History:     Past Medical History:   Diagnosis Date     Antiphospholipid antibody syndrome (H) 2017     CAD (coronary artery disease), native coronary artery 10/2015    2015 Heart cath - 90% diagonal, 50-60% CFX, 100% prox RCA occlusion - AML placed in RCA, 10/2015 EF 35-40% by Echo     DVT (deep venous thrombosis) (H) 2015 Occlusive DVT extending from left common femoral to mid left popliteal vein      Hypercholesteraemia      Hypertension      NSVT (nonsustained ventricular tachycardia) (H)      PE (pulmonary embolism) 09/2015     PMR (polymyalgia rheumatica) (H)      Polymyalgia rheumatica (H)      STEMI (ST elevation myocardial infarction) (H) 10/2015    10/2015 Inferior STEMI - 100% RCA occlusion with MAL placed              Past Surgical History:     Past Surgical History:   Procedure Laterality Date     COLONOSCOPY N/A 10/17/2022    Procedure: COLONOSCOPY WITH POLYPECTOMY;  Surgeon: Pradeep Cintron MD;  Location: [facility] OR     HEART CATH STENT COR W/WO PTCA  10/2015    90% diagonal, 50-60% CFX, 100% prox RCA occlusion - MAL placed in RCA     ORTHOPEDIC SURGERY  08/2015    right carpal tunnel              Allergies:   Seasonal allergies and Simvastatin       Data:   All laboratory data reviewed:    Recent Labs   Lab Test 10/13/22  0855 09/29/22  1203 09/21/21  0919 08/09/21  0821 08/01/16  0000 12/24/15  1127   LDL 51 52  --  67   < >  --    HDL 45 41  --  42   < >  --    NHDL 63 68  --  86   < >  --    CHOL 108 109  --  128   < >  --    TRIG 59 80  --  97   < >  --    TSH  --   --  1.29  --   --   --    NTBNP  --   --   --   --   --  458*    < > = values in this interval not displayed.       Lab Results   Component Value Date    WBC 4.9 09/21/2021    WBC 4.7 09/17/2020    RBC 5.14 09/21/2021    RBC 5.53 09/17/2020    HGB 16.1 09/21/2021    HGB 16.9 09/17/2020    HCT 47.7 09/21/2021    HCT 49.8 09/17/2020    MCV 93 09/21/2021    MCV 90 09/17/2020    MCH 31.3 09/21/2021    MCH 30.6 09/17/2020    MCHC 33.8 09/21/2021    MCHC 33.9 09/17/2020    RDW 13.8 09/21/2021    RDW 14.2 09/17/2020     09/21/2021     09/17/2020       Lab Results   Component Value Date     09/29/2022     06/17/2021    POTASSIUM 4.0 09/29/2022    POTASSIUM 4.0 06/17/2021    CHLORIDE 105 09/29/2022    CHLORIDE 108 06/17/2021    CO2 26 09/29/2022    CO2 27 06/17/2021    ANIONGAP 6 09/29/2022    ANIONGAP 5  06/17/2021     (H) 09/29/2022    GLC 97 06/17/2021    BUN 26 09/29/2022    BUN 23 06/17/2021    CR 0.82 09/29/2022    CR 0.81 06/17/2021    GFRESTIMATED 89 09/29/2022    GFRESTIMATED 85 06/17/2021    GFRESTBLACK >90 06/17/2021    RONNY 9.7 09/29/2022    RONNY 9.4 06/17/2021      Lab Results   Component Value Date    AST 33 09/17/2020    ALT 34 10/13/2022    ALT 34 06/17/2021       Lab Results   Component Value Date    A1C 5.6 09/20/2019       Lab Results   Component Value Date    INR 1.18 (H) 10/17/2022    INR 3.3 (H) 10/05/2022    INR 2.1 09/21/2022    INR 2.1 09/21/2022    INR 2.7 07/14/2021    INR 2.1 (A) 06/30/2021

## 2023-04-24 NOTE — LETTER
4/24/2023    Brandan Clinton MD  [facility] [address]  [facility] [address]    RE: Yogesh Abreu       Dear Colleague,     I had the pleasure of seeing Yogesh Abreu in the [facility].  Cardiology Clinic Progress Note  Yogesh Abreu MRN# [id number]   YOB: 1940 Age: 82 year old     Primary cardiologist: Dr. Isaacs     Reason for visit: follow-up     History of presenting illness:    Yogesh Abreu is a pleasant 82 year old patient who follows closely with Dr. Isaacs.     He has a past medical history significant for antiphospholipid antibody syndrome on Coumadin with history of PE and DVT, coronary disease s/p PCI to RCA (2015), hyperlipidemia, and hypertension, who is here today for follow-up.     The patient ulloa in [place] and was seen by his cardiologist there in January of this year with complaints of shortness of breath.  He was experiencing some exertional shortness of breath, although he remained quite active.  Per report, EKG at that visit showed sinus rhythm with PACs.  Subsequent nuclear stress test showed inferior ischemia. He underwent coronary angiogram on 3/1/2023 in [place], [place] that demonstrated significant mid LAD disease that was successfully treated with a single drug-eluting stent.  Due to the ectopy noted on his EKG, a 24-hour Holter monitor was also recommended.    Today the patient reports doing well from a cardiovascular standpoint.  He has no cardiopulmonary complaints.  He specifically denies any chest discomfort, shortness of breath, syncope near syncope, or palpitations.  He remains active by walking and going to the gym almost daily.  He never started cardiac rehab since he was in [place].  He also was not placed on a 24-hour Holter monitor prior to returning to [place].  He brought in all his records from [place] including Cath Lab report, echocardiogram, nuclear stress test, and EKG, which I  reviewed. EKG today shows sinus rhythm.  His blood pressure is well controlled.    Other cardiac work-up that I reviewed, includes a 10-day Zio patch that was placed in October 2022 that demonstrated predominantly sinus rhythm.  There were 2 runs of ventricular tachycardia, the longest lasting 13 beats, 90 runs of SVT up to 17 seconds.  His most recent lipid panel from 10/13/2022 shows total cholesterol 108, HDL 45, LDL 51, triglycerides of 59.           Assessment and Plan:     ASSESSMENT:    CAD s/p PCI to RCA (2015) and mid LAD (3/1/2023).  Recovering well without any signs of ischemia.  He has no exertional symptoms.  He is currently on triple therapy with Coumadin, aspirin, and Brilinta.  Additionally, he is on beta-blocker, lisinopril, high intensity statin, and ezetimibe.  Antiphospholipid antibody syndrome with history of DVT and PE.  On chronic Coumadin.  Hyperlipidemia with goal LDL < 70.  At goal on atorvastatin 80 mg daily and ezetimibe 10 mg daily.  NSVT and SVT noted on Zio patch from October 2022.  EKG today shows sinus rhythm without ectopy.  He denies any palpitations.  On blocker therapy.  Hypertension.  Well-controlled on lisinopril 5 mg daily, Toprol-XL 12.5 mg daily.      PLAN:     Okay to stop aspirin 81 mg, continue Brilinta 90 mg twice daily in addition to Coumadin.  Outpatient cardiac rehab referral placed.  Given the patient is completely asymptomatic and EKG today shows normal sinus rhythm, I think it is reasonable to hold off on Holter monitor at this time.  I will have patient's [place] records uploaded to his chart.   Follow-up with Dr. Sharan Mauro with repeat lipid panel in 6 months, sooner if needed.         Denisa Carranza, STEVE, APRN, CNP  Page: [phone] (8a-5p M-F)    Orders this Visit:  Orders Placed This Encounter   Procedures    Lipid Profile    ALT    Follow-Up with Cardiology    Cardiac Rehab Referral    EKG 12-lead complete w/read - Clinics (performed today)     Orders  "Placed This Encounter   Medications    BRILINTA 90 MG tablet     Sig: Take 1 tablet by mouth 2 times daily    DISCONTD: aspirin 81 MG EC tablet     Sig: Take 81 mg by mouth daily     Medications Discontinued During This Encounter   Medication Reason    enoxaparin ANTICOAGULANT (LOVENOX) 100 MG/ML syringe Therapy completed (No AVS)    aspirin 81 MG EC tablet        Today's clinic visit entailed:  Review of the result(s) of each unique test - echo, labs, lexiscan, cath  Ordering of each unique test  Prescription drug management  35 minutes spent by me on the date of the encounter doing chart review, review of outside records, review of test results, patient visit and documentation   Provider  Link to [employer] Help Grid     The level of medical decision making during this visit was of moderate complexity.           Review of Systems:     Review of Systems:  Skin:  not assessed     Eyes:  not assessed    ENT:  not assessed    Respiratory:  Positive for dyspnea on exertion  Cardiovascular:  Negative    Gastroenterology: not assessed    Genitourinary:  not assessed    Musculoskeletal:  not assessed    Neurologic:  not assessed    Psychiatric:  not assessed    Heme/Lymph/Imm:  not assessed    Endocrine:  not assessed              Physical Exam:   Vitals: /72   Pulse 64   Ht 1.753 m (5' 9\")   Wt 88.1 kg (194 lb 4.8 oz)   SpO2 97%   BMI 28.69 kg/m    Constitutional:  cooperative        Skin:  warm and dry to the touch        Head:  normocephalic        Eyes:  pupils equal and round        ENT:           Neck:  JVP normal        Chest:  normal breath sounds, clear to auscultation, normal A-P diameter, normal symmetry, normal respiratory excursion, no use of accessory muscles        Cardiac: regular rhythm;normal S1 and S2;no murmurs, gallops or rubs detected                  Abdomen:  abdomen soft        Vascular: pulses full and equal                                      Extremities and Back:  no edema    "     Neurological:  no gross motor deficits;affect appropriate             Medications:     Current Outpatient Medications   Medication Sig Dispense Refill    albuterol (PROAIR HFA/PROVENTIL HFA/VENTOLIN HFA) 108 (90 Base) MCG/ACT inhaler Inhale 2 puffs into the lungs every 6 hours as needed for shortness of breath / dyspnea or wheezing 1 Inhaler 0    atorvastatin (LIPITOR) 80 MG tablet Take 1 tablet (80 mg) by mouth At Bedtime 90 tablet 3    azelastine (ASTELIN) 0.1 % nasal spray Spray 1 spray into both nostrils 2 times daily      BRILINTA 90 MG tablet Take 1 tablet by mouth 2 times daily      Calcium Citrate-Vitamin D (CALCIUM CITRATE + D PO) Take 600 mg by mouth 2 times daily      Cholecalciferol (VITAMIN D3 PO) Take 1,000 Units by mouth 2 times daily      ezetimibe (ZETIA) 10 MG tablet Take 1 tablet (10 mg) by mouth daily 90 tablet 3    fluticasone-salmeterol (ADVAIR-HFA) 230-21 MCG/ACT inhaler Inhale 2 puffs into the lungs 2 times daily 12 g 11    ibuprofen (ADVIL/MOTRIN) 200 MG capsule Take 600 mg by mouth daily as needed for fever      ipratropium (ATROVENT) 0.06 % nasal spray Spray 2 sprays into both nostrils 4 times daily as needed (nasal drainage) 15 mL 11    lisinopril (ZESTRIL) 5 MG tablet TAKE 1 TABLET(5 MG) BY MOUTH DAILY 90 tablet 3    metoprolol succinate ER (TOPROL XL) 25 MG 24 hr tablet Take 0.5 tablets (12.5 mg) by mouth daily Appt needed for refills; please call [phone] to schedule 45 tablet 3    nitroGLYcerin (NITROSTAT) 0.4 MG sublingual tablet Place 1 tablet (0.4 mg) under the tongue every 5 minutes as needed for chest pain 25 tablet 3    vardenafil (LEVITRA) 20 MG tablet Take 0.5-1 tablets (10-20 mg) by mouth daily as needed Never use with nitroglycerin, terazosin or doxazosin. 12 tablet 11    warfarin ANTICOAGULANT (COUMADIN) 5 MG tablet Take 1 tab on Thursdays and take 1 1/2 tabs on all other days or as directed per INR clinic 145 tablet 3       Family History   Problem Relation Age of  Onset    Hypertension Brother     Hypertension Brother        Social History     Socioeconomic History    Marital status:      Spouse name: Not on file    Number of children: Not on file    Years of education: Not on file    Highest education level: Not on file   Occupational History    Not on file   Tobacco Use    Smoking status: Former     Packs/day: 0.50     Years: 5.00     Pack years: 2.50     Types: Cigarettes     Start date:      Quit date:      Years since quittin.3    Smokeless tobacco: Never   Vaping Use    Vaping status: Not on file   Substance and Sexual Activity    Alcohol use: Yes     Comment: 1 drink per day    Drug use: No    Sexual activity: Yes     Partners: Female   Other Topics Concern     Service Not Asked    Blood Transfusions Not Asked    Caffeine Concern Yes     Comment: 1 cup coffee daily    Occupational Exposure Not Asked    Hobby Hazards Not Asked    Sleep Concern No    Stress Concern Yes     Comment: related to relationship    Weight Concern No    Special Diet Yes     Comment: mediterranian diet    Back Care Not Asked    Exercise Yes     Comment: walking, cardiac rehab starting    Bike Helmet Not Asked    Seat Belt Not Asked    Self-Exams Not Asked    Parent/sibling w/ CABG, MI or angioplasty before 65F 55M? Not Asked   Social History Narrative    Not on file     Social Determinants of Health     Financial Resource Strain: Not on file   Food Insecurity: Not on file   Transportation Needs: Not on file   Physical Activity: Not on file   Stress: Not on file   Social Connections: Not on file   Intimate Partner Violence: Not on file   Housing Stability: Not on file            Past Medical History:     Past Medical History:   Diagnosis Date    Antiphospholipid antibody syndrome (H) 2017    CAD (coronary artery disease), native coronary artery 10/2015    2015 Heart cath - 90% diagonal, 50-60% CFX, 100% prox RCA occlusion - MAL placed in RCA, 10/2015 EF 35-40%  by Echo    DVT (deep venous thrombosis) (H) 09/2015 9/2015 Occlusive DVT extending from left common femoral to mid left popliteal vein    Hypercholesteraemia     Hypertension     NSVT (nonsustained ventricular tachycardia) (H)     PE (pulmonary embolism) 09/2015    PMR (polymyalgia rheumatica) (H)     Polymyalgia rheumatica (H)     STEMI (ST elevation myocardial infarction) (H) 10/2015    10/2015 Inferior STEMI - 100% RCA occlusion with MAL placed              Past Surgical History:     Past Surgical History:   Procedure Laterality Date    COLONOSCOPY N/A 10/17/2022    Procedure: COLONOSCOPY WITH POLYPECTOMY;  Surgeon: Pradeep Cintron MD;  Location: [facility] Main OR    HEART CATH STENT COR W/WO PTCA  10/2015    90% diagonal, 50-60% CFX, 100% prox RCA occlusion - MAL placed in RCA    ORTHOPEDIC SURGERY  08/2015    right carpal tunnel              Allergies:   Seasonal allergies and Simvastatin       Data:   All laboratory data reviewed:    Recent Labs   Lab Test 10/13/22  0855 09/29/22  1203 09/21/21  0919 08/09/21  0821 08/01/16  0000 12/24/15  1127   LDL 51 52  --  67   < >  --    HDL 45 41  --  42   < >  --    NHDL 63 68  --  86   < >  --    CHOL 108 109  --  128   < >  --    TRIG 59 80  --  97   < >  --    TSH  --   --  1.29  --   --   --    NTBNP  --   --   --   --   --  458*    < > = values in this interval not displayed.       Lab Results   Component Value Date    WBC 4.9 09/21/2021    WBC 4.7 09/17/2020    RBC 5.14 09/21/2021    RBC 5.53 09/17/2020    HGB 16.1 09/21/2021    HGB 16.9 09/17/2020    HCT 47.7 09/21/2021    HCT 49.8 09/17/2020    MCV 93 09/21/2021    MCV 90 09/17/2020    MCH 31.3 09/21/2021    MCH 30.6 09/17/2020    MCHC 33.8 09/21/2021    MCHC 33.9 09/17/2020    RDW 13.8 09/21/2021    RDW 14.2 09/17/2020     09/21/2021     09/17/2020       Lab Results   Component Value Date     09/29/2022     06/17/2021    POTASSIUM 4.0 09/29/2022    POTASSIUM 4.0 06/17/2021     CHLORIDE 105 09/29/2022    CHLORIDE 108 06/17/2021    CO2 26 09/29/2022    CO2 27 06/17/2021    ANIONGAP 6 09/29/2022    ANIONGAP 5 06/17/2021     (H) 09/29/2022    GLC 97 06/17/2021    BUN 26 09/29/2022    BUN 23 06/17/2021    CR 0.82 09/29/2022    CR 0.81 06/17/2021    GFRESTIMATED 89 09/29/2022    GFRESTIMATED 85 06/17/2021    GFRESTBLACK >90 06/17/2021    RONNY 9.7 09/29/2022    RONNY 9.4 06/17/2021      Lab Results   Component Value Date    AST 33 09/17/2020    ALT 34 10/13/2022    ALT 34 06/17/2021       Lab Results   Component Value Date    A1C 5.6 09/20/2019       Lab Results   Component Value Date    INR 1.18 (H) 10/17/2022    INR 3.3 (H) 10/05/2022    INR 2.1 09/21/2022    INR 2.1 09/21/2022    INR 2.7 07/14/2021    INR 2.1 (A) 06/30/2021             Thank you for allowing me to participate in the care of your patient.      Sincerely,     Denisa Carranza, [facility]  cc:   No referring provider defined for this encounter.

## 2023-04-24 NOTE — PATIENT INSTRUCTIONS
Today's Plan:     1) Start outpatient cardiac rehab.     2) STOP your aspirin, continue coumadin and Brilinta.     3) Follow-up with Dr. Isaacs in 6 months, sooner if needed.     If you have questions or concerns please call my nurse team at [phone].       Scheduling phone number: [phone]    Reminder: Please bring in all current medications, over the counter supplements and vitamin bottles to your next appointment.-    It was a pleasure seeing you today!     Denisa Carranza, ALEX  4/24/2023    -

## 2023-04-26 ENCOUNTER — HOSPITAL ENCOUNTER (OUTPATIENT)
Dept: CARDIAC REHAB | Facility: CLINIC | Age: 83
Discharge: HOME | End: 2023-04-26
Attending: NURSE PRACTITIONER
Payer: COMMERCIAL

## 2023-04-26 DIAGNOSIS — Z95.5 S/P CORONARY ARTERY STENT PLACEMENT: ICD-10-CM

## 2023-04-26 DIAGNOSIS — I25.110 CORONARY ARTERY DISEASE INVOLVING NATIVE CORONARY ARTERY OF NATIVE HEART WITH UNSTABLE ANGINA PECTORIS (H): ICD-10-CM

## 2023-04-26 DIAGNOSIS — Z95.5 S/P CORONARY ARTERY STENT PLACEMENT: Primary | ICD-10-CM

## 2023-04-26 PROCEDURE — 93797 PHYS/QHP OP CAR RHAB WO ECG: CPT

## 2023-04-26 PROCEDURE — 93798 PHYS/QHP OP CAR RHAB W/ECG: CPT

## 2023-05-01 ENCOUNTER — HOSPITAL ENCOUNTER (OUTPATIENT)
Dept: CARDIAC REHAB | Facility: CLINIC | Age: 83
Discharge: HOME | End: 2023-05-01
Attending: INTERNAL MEDICINE
Payer: COMMERCIAL

## 2023-05-01 PROCEDURE — 93797 PHYS/QHP OP CAR RHAB WO ECG: CPT

## 2023-05-01 PROCEDURE — 93798 PHYS/QHP OP CAR RHAB W/ECG: CPT

## 2023-05-02 ENCOUNTER — HOSPITAL ENCOUNTER (OUTPATIENT)
Dept: CARDIAC REHAB | Facility: CLINIC | Age: 83
Discharge: HOME | End: 2023-05-02
Attending: INTERNAL MEDICINE
Payer: COMMERCIAL

## 2023-05-02 PROCEDURE — 93798 PHYS/QHP OP CAR RHAB W/ECG: CPT | Performed by: REHABILITATION PRACTITIONER

## 2023-05-08 ENCOUNTER — HOSPITAL ENCOUNTER (OUTPATIENT)
Dept: CARDIAC REHAB | Facility: CLINIC | Age: 83
Discharge: HOME | End: 2023-05-08
Attending: INTERNAL MEDICINE
Payer: COMMERCIAL

## 2023-05-08 PROCEDURE — 93798 PHYS/QHP OP CAR RHAB W/ECG: CPT

## 2023-05-09 ENCOUNTER — HOSPITAL ENCOUNTER (OUTPATIENT)
Dept: CARDIAC REHAB | Facility: CLINIC | Age: 83
Discharge: HOME | End: 2023-05-09
Attending: INTERNAL MEDICINE
Payer: COMMERCIAL

## 2023-05-15 ENCOUNTER — HOSPITAL ENCOUNTER (OUTPATIENT)
Dept: CARDIAC REHAB | Facility: CLINIC | Age: 83
Discharge: HOME | End: 2023-05-15
Attending: INTERNAL MEDICINE
Payer: COMMERCIAL

## 2023-05-15 PROCEDURE — 93798 PHYS/QHP OP CAR RHAB W/ECG: CPT

## 2023-05-16 ENCOUNTER — HOSPITAL ENCOUNTER (OUTPATIENT)
Dept: CARDIAC REHAB | Facility: CLINIC | Age: 83
Discharge: HOME | End: 2023-05-16
Attending: INTERNAL MEDICINE
Payer: COMMERCIAL

## 2023-05-16 PROCEDURE — 93798 PHYS/QHP OP CAR RHAB W/ECG: CPT

## 2023-05-22 ENCOUNTER — HOSPITAL ENCOUNTER (OUTPATIENT)
Dept: CARDIAC REHAB | Facility: CLINIC | Age: 83
Discharge: HOME | End: 2023-05-22
Attending: INTERNAL MEDICINE
Payer: COMMERCIAL

## 2023-05-22 PROCEDURE — 93798 PHYS/QHP OP CAR RHAB W/ECG: CPT | Performed by: OCCUPATIONAL THERAPIST

## 2023-05-23 ENCOUNTER — HOSPITAL ENCOUNTER (OUTPATIENT)
Dept: CARDIAC REHAB | Facility: CLINIC | Age: 83
Discharge: HOME | End: 2023-05-23
Attending: INTERNAL MEDICINE
Payer: COMMERCIAL

## 2023-05-23 PROCEDURE — 93798 PHYS/QHP OP CAR RHAB W/ECG: CPT

## 2023-05-30 ENCOUNTER — HOSPITAL ENCOUNTER (OUTPATIENT)
Dept: CARDIAC REHAB | Facility: CLINIC | Age: 83
Discharge: HOME | End: 2023-05-30
Attending: INTERNAL MEDICINE
Payer: COMMERCIAL

## 2023-05-30 PROCEDURE — 93798 PHYS/QHP OP CAR RHAB W/ECG: CPT | Performed by: OCCUPATIONAL THERAPIST

## 2023-06-05 ENCOUNTER — HOSPITAL ENCOUNTER (OUTPATIENT)
Dept: CARDIAC REHAB | Facility: CLINIC | Age: 83
Discharge: HOME | End: 2023-06-05
Attending: INTERNAL MEDICINE
Payer: COMMERCIAL

## 2023-06-05 PROCEDURE — 93798 PHYS/QHP OP CAR RHAB W/ECG: CPT | Performed by: OCCUPATIONAL THERAPIST

## 2023-06-05 PROCEDURE — 93797 PHYS/QHP OP CAR RHAB WO ECG: CPT

## 2023-06-06 ENCOUNTER — HOSPITAL ENCOUNTER (OUTPATIENT)
Dept: CARDIAC REHAB | Facility: CLINIC | Age: 83
Discharge: HOME | End: 2023-06-06
Attending: INTERNAL MEDICINE
Payer: COMMERCIAL

## 2023-06-06 ENCOUNTER — TELEPHONE (OUTPATIENT)
Dept: CARDIAC REHAB | Facility: CLINIC | Age: 83
End: 2023-06-06
Payer: COMMERCIAL

## 2023-06-06 DIAGNOSIS — I47.20 VENTRICULAR TACHYCARDIA (H): Primary | ICD-10-CM

## 2023-06-06 DIAGNOSIS — I25.10 CORONARY ARTERY DISEASE INVOLVING NATIVE CORONARY ARTERY OF NATIVE HEART WITHOUT ANGINA PECTORIS: ICD-10-CM

## 2023-06-06 DIAGNOSIS — R00.0 TACHYCARDIA: ICD-10-CM

## 2023-06-06 DIAGNOSIS — I10 ESSENTIAL HYPERTENSION: ICD-10-CM

## 2023-06-06 PROCEDURE — 93798 PHYS/QHP OP CAR RHAB W/ECG: CPT | Performed by: REHABILITATION PRACTITIONER

## 2023-06-06 RX ORDER — METOPROLOL SUCCINATE 25 MG/1
25 TABLET, EXTENDED RELEASE ORAL DAILY
Qty: 90 TABLET | Refills: 3 | Status: SHIPPED | OUTPATIENT
Start: 2023-06-06 | End: 2024-09-04

## 2023-06-06 NOTE — TELEPHONE ENCOUNTER
Received response from SOPHIA Crawley:     Denisa Carranza, Shelbi Mohan, RN  Caller: Unspecified (Today,  2:42 PM)  Recommend 48 hour holter monitor. Additionally, let's increase his Toprol XL to 25 mg daily. Please advise if he should get lightheaded/dizzy or feel like he's going to pass out, he should seek medical attention. Thanks!   -Denisa      Called pt, reviewed recommendations. Pt verbalized understanding and agreement with plan. Prescription sent to pharmacy for metoprolol succinate 25 mg daily. Order placed for Holter monitor and message sent to scheduling.

## 2023-06-06 NOTE — TELEPHONE ENCOUNTER
"PT seen for cardiac rehab exercise today and began in SB/SR with rare PAC. PT self increased exercise to 6.8 METS at peak on TM and began experiencing intermittent VT which progressed to constant with highest  BPM and had spontaneous return of SR/ST without return of ectopy. PT noted feeling only \"a little bit tired\" during the episode. See note for CV responses and EKG strips.  "

## 2023-06-12 ENCOUNTER — HOSPITAL ENCOUNTER (OUTPATIENT)
Dept: CARDIAC REHAB | Facility: CLINIC | Age: 83
Discharge: HOME | End: 2023-06-12
Attending: INTERNAL MEDICINE
Payer: COMMERCIAL

## 2023-06-12 PROCEDURE — 93798 PHYS/QHP OP CAR RHAB W/ECG: CPT

## 2023-06-13 ENCOUNTER — HOSPITAL ENCOUNTER (OUTPATIENT)
Dept: CARDIAC REHAB | Facility: CLINIC | Age: 83
Discharge: HOME | End: 2023-06-13
Attending: INTERNAL MEDICINE
Payer: COMMERCIAL

## 2023-06-13 PROCEDURE — 93798 PHYS/QHP OP CAR RHAB W/ECG: CPT | Performed by: REHABILITATION PRACTITIONER

## 2023-06-15 ENCOUNTER — HOSPITAL ENCOUNTER (OUTPATIENT)
Dept: CARDIOLOGY | Facility: CLINIC | Age: 83
Discharge: HOME | End: 2023-06-15
Attending: NURSE PRACTITIONER | Admitting: NURSE PRACTITIONER
Payer: COMMERCIAL

## 2023-06-15 DIAGNOSIS — I25.10 CORONARY ARTERY DISEASE INVOLVING NATIVE CORONARY ARTERY OF NATIVE HEART WITHOUT ANGINA PECTORIS: ICD-10-CM

## 2023-06-15 DIAGNOSIS — R00.0 TACHYCARDIA: ICD-10-CM

## 2023-06-15 DIAGNOSIS — I47.20 VENTRICULAR TACHYCARDIA (H): ICD-10-CM

## 2023-06-15 PROCEDURE — 93227 XTRNL ECG REC<48 HR R&I: CPT | Performed by: INTERNAL MEDICINE

## 2023-06-15 PROCEDURE — 93225 XTRNL ECG REC<48 HRS REC: CPT

## 2023-06-17 ENCOUNTER — MYC MEDICAL ADVICE (OUTPATIENT)
Dept: CARDIOLOGY | Facility: CLINIC | Age: 83
End: 2023-06-17
Payer: COMMERCIAL

## 2023-06-17 DIAGNOSIS — I25.10 CORONARY ARTERY DISEASE INVOLVING NATIVE CORONARY ARTERY OF NATIVE HEART WITHOUT ANGINA PECTORIS: Primary | ICD-10-CM

## 2023-06-19 ENCOUNTER — HOSPITAL ENCOUNTER (OUTPATIENT)
Dept: CARDIAC REHAB | Facility: CLINIC | Age: 83
Discharge: HOME | End: 2023-06-19
Attending: INTERNAL MEDICINE
Payer: COMMERCIAL

## 2023-06-19 PROCEDURE — 93798 PHYS/QHP OP CAR RHAB W/ECG: CPT | Performed by: OCCUPATIONAL THERAPIST

## 2023-06-19 RX ORDER — CLOPIDOGREL BISULFATE 75 MG/1
75 TABLET ORAL DAILY
Qty: 90 TABLET | Refills: 3 | Status: SHIPPED | OUTPATIENT
Start: 2023-06-19 | End: 2023-09-25

## 2023-06-19 RX ORDER — CLOPIDOGREL BISULFATE 75 MG/1
75 TABLET ORAL DAILY
Qty: 4 TABLET | Refills: 0 | Status: SHIPPED | OUTPATIENT
Start: 2023-06-19 | End: 2023-09-25

## 2023-06-19 NOTE — TELEPHONE ENCOUNTER
Denisa Carranza, Deanna Patel, RN  Phone Number: [phone]     Happy to switch him to plavix. He should take 300 mg of plavix 24 hours after his last dose of Brilinta, followed by 75 mg plavix daily thereafter. Continue Toprol XL 25 mg daily. Thanks!     -Denisa

## 2023-06-19 NOTE — TELEPHONE ENCOUNTER
Spoke with pt about switching to Plavix after finishing up the rest of his Brilinta which he should have until mid July.   Informed pt about the loading dose and have gave verbal understanding.   Will await holter monitor results.

## 2023-06-20 ENCOUNTER — HOSPITAL ENCOUNTER (OUTPATIENT)
Dept: CARDIAC REHAB | Facility: CLINIC | Age: 83
Discharge: HOME | End: 2023-06-20
Attending: INTERNAL MEDICINE
Payer: COMMERCIAL

## 2023-06-20 PROCEDURE — 93798 PHYS/QHP OP CAR RHAB W/ECG: CPT | Performed by: REHABILITATION PRACTITIONER

## 2023-06-26 ENCOUNTER — HOSPITAL ENCOUNTER (OUTPATIENT)
Dept: CARDIAC REHAB | Facility: CLINIC | Age: 83
Discharge: HOME | End: 2023-06-26
Attending: INTERNAL MEDICINE
Payer: COMMERCIAL

## 2023-06-26 PROCEDURE — 93798 PHYS/QHP OP CAR RHAB W/ECG: CPT | Performed by: CLINICAL EXERCISE PHYSIOLOGIST

## 2023-06-28 ENCOUNTER — HOSPITAL ENCOUNTER (OUTPATIENT)
Dept: CARDIAC REHAB | Facility: CLINIC | Age: 83
Discharge: HOME | End: 2023-06-28
Attending: INTERNAL MEDICINE
Payer: COMMERCIAL

## 2023-06-28 PROCEDURE — 93798 PHYS/QHP OP CAR RHAB W/ECG: CPT | Performed by: CLINICAL EXERCISE PHYSIOLOGIST

## 2023-06-28 PROCEDURE — 93797 PHYS/QHP OP CAR RHAB WO ECG: CPT | Mod: 59 | Performed by: CLINICAL EXERCISE PHYSIOLOGIST

## 2023-07-03 ENCOUNTER — HOSPITAL ENCOUNTER (OUTPATIENT)
Dept: CARDIAC REHAB | Facility: CLINIC | Age: 83
Discharge: HOME | End: 2023-07-03
Attending: INTERNAL MEDICINE
Payer: COMMERCIAL

## 2023-07-03 PROCEDURE — 93798 PHYS/QHP OP CAR RHAB W/ECG: CPT | Performed by: OCCUPATIONAL THERAPIST

## 2023-07-05 ENCOUNTER — HOSPITAL ENCOUNTER (OUTPATIENT)
Dept: CARDIAC REHAB | Facility: CLINIC | Age: 83
Discharge: HOME | End: 2023-07-05
Attending: INTERNAL MEDICINE
Payer: COMMERCIAL

## 2023-07-05 PROCEDURE — 93797 PHYS/QHP OP CAR RHAB WO ECG: CPT | Mod: 59 | Performed by: REHABILITATION PRACTITIONER

## 2023-07-05 PROCEDURE — 93798 PHYS/QHP OP CAR RHAB W/ECG: CPT | Performed by: REHABILITATION PRACTITIONER

## 2023-07-11 ENCOUNTER — HOSPITAL ENCOUNTER (OUTPATIENT)
Dept: CARDIAC REHAB | Facility: CLINIC | Age: 83
Discharge: HOME | End: 2023-07-11
Attending: INTERNAL MEDICINE
Payer: COMMERCIAL

## 2023-07-11 PROCEDURE — 93798 PHYS/QHP OP CAR RHAB W/ECG: CPT

## 2023-07-12 ENCOUNTER — HOSPITAL ENCOUNTER (OUTPATIENT)
Dept: CARDIAC REHAB | Facility: CLINIC | Age: 83
Discharge: HOME | End: 2023-07-12
Attending: INTERNAL MEDICINE
Payer: COMMERCIAL

## 2023-07-12 PROCEDURE — 93797 PHYS/QHP OP CAR RHAB WO ECG: CPT | Performed by: REHABILITATION PRACTITIONER

## 2023-07-12 PROCEDURE — 93798 PHYS/QHP OP CAR RHAB W/ECG: CPT

## 2023-07-17 ENCOUNTER — HOSPITAL ENCOUNTER (OUTPATIENT)
Dept: CARDIAC REHAB | Facility: CLINIC | Age: 83
Discharge: HOME | End: 2023-07-17
Attending: INTERNAL MEDICINE
Payer: COMMERCIAL

## 2023-07-17 PROCEDURE — 93798 PHYS/QHP OP CAR RHAB W/ECG: CPT | Performed by: OCCUPATIONAL THERAPIST

## 2023-07-19 ENCOUNTER — HOSPITAL ENCOUNTER (OUTPATIENT)
Dept: CARDIAC REHAB | Facility: CLINIC | Age: 83
Discharge: HOME | End: 2023-07-19
Attending: INTERNAL MEDICINE
Payer: COMMERCIAL

## 2023-07-19 PROCEDURE — 93798 PHYS/QHP OP CAR RHAB W/ECG: CPT | Performed by: REHABILITATION PRACTITIONER

## 2023-07-24 ENCOUNTER — HOSPITAL ENCOUNTER (OUTPATIENT)
Dept: CARDIAC REHAB | Facility: CLINIC | Age: 83
Discharge: HOME | End: 2023-07-24
Attending: INTERNAL MEDICINE
Payer: COMMERCIAL

## 2023-07-24 PROCEDURE — 93798 PHYS/QHP OP CAR RHAB W/ECG: CPT

## 2023-07-26 ENCOUNTER — HOSPITAL ENCOUNTER (OUTPATIENT)
Dept: CARDIAC REHAB | Facility: CLINIC | Age: 83
Discharge: HOME | End: 2023-07-26
Attending: INTERNAL MEDICINE
Payer: COMMERCIAL

## 2023-07-26 PROCEDURE — 93797 PHYS/QHP OP CAR RHAB WO ECG: CPT

## 2023-07-26 PROCEDURE — 93798 PHYS/QHP OP CAR RHAB W/ECG: CPT

## 2023-09-01 NOTE — PROGRESS NOTES
ANTICOAGULATION FOLLOW-UP CLINIC VISIT    Patient Name:  Yogesh Abreu  Date:  2021  Contact Type:  Telephone    SUBJECTIVE:         OBJECTIVE    Recent labs: (last 7 days)     21   INR 2.3*       ASSESSMENT / PLAN  INR assessment THER    Recheck INR In: 2 WEEKS    INR Location Home INR    Billed home INR No      Anticoagulation Summary  As of 2021    INR goal:  2.0-3.0   TTR:  60.3 % (1 y)   INR used for dosin.3 (2021)   Warfarin maintenance plan:  7.5 mg (5 mg x 1.5) every day   Full warfarin instructions:  7.5 mg every day   Weekly warfarin total:  52.5 mg   No change documented:  Tanja Wills RN   Plan last modified:  Tanja Wills RN (3/11/2021)   Next INR check:  2021   Target end date:  Indefinite    Indications    PE (pulmonary embolism) [I26.99]  Long term current use of anticoagulants with INR goal of 2.0-3.0 [Z79.01]             Anticoagulation Episode Summary     INR check location:      Preferred lab:      Send INR reminders to:  [facility] INR NURSE    Comments:        Anticoagulation Care Providers     Provider Role Specialty Phone number    RobertFrederic Mauro MD Referring Cardiovascular Disease [phone]            See the Encounter Report to view Anticoagulation Flowsheet and Dosing Calendar (Go to Encounters tab in chart review, and find the Anticoagulation Therapy Visit)    Home INR 2.3 Left message asking pt to call back if he has had any change in meds, diet or bleeding/bruising issues. Left detailed instructions to continue current dosing of 7.5 mg daily and recheck INR in 2 weeks.  Tanja Wills RN                 
no

## 2023-09-05 ENCOUNTER — LAB (OUTPATIENT)
Dept: LAB | Facility: CLINIC | Age: 83
End: 2023-09-05
Payer: COMMERCIAL

## 2023-09-05 ENCOUNTER — HOSPITAL ENCOUNTER (OUTPATIENT)
Dept: CARDIOLOGY | Facility: CLINIC | Age: 83
Discharge: HOME | End: 2023-09-05
Attending: INTERNAL MEDICINE | Admitting: INTERNAL MEDICINE
Payer: COMMERCIAL

## 2023-09-05 DIAGNOSIS — I25.110 CORONARY ARTERY DISEASE INVOLVING NATIVE CORONARY ARTERY OF NATIVE HEART WITH UNSTABLE ANGINA PECTORIS (H): ICD-10-CM

## 2023-09-05 DIAGNOSIS — I10 ESSENTIAL HYPERTENSION: ICD-10-CM

## 2023-09-05 DIAGNOSIS — I25.2 HISTORY OF ACUTE INFERIOR WALL MYOCARDIAL INFARCTION: ICD-10-CM

## 2023-09-05 LAB
ALT SERPL W P-5'-P-CCNC: 23 U/L (ref 0–70)
ANION GAP SERPL CALCULATED.3IONS-SCNC: 11 MMOL/L (ref 7–15)
BUN SERPL-MCNC: 23.5 MG/DL (ref 8–23)
CALCIUM SERPL-MCNC: 9.8 MG/DL (ref 8.8–10.2)
CHLORIDE SERPL-SCNC: 104 MMOL/L (ref 98–107)
CHOLEST SERPL-MCNC: 115 MG/DL
CREAT SERPL-MCNC: 0.82 MG/DL (ref 0.67–1.17)
DEPRECATED HCO3 PLAS-SCNC: 26 MMOL/L (ref 22–29)
GFR SERPL CREATININE-BSD FRML MDRD: 88 ML/MIN/1.73M2
GLUCOSE SERPL-MCNC: 106 MG/DL (ref 70–99)
HDLC SERPL-MCNC: 36 MG/DL
LDLC SERPL CALC-MCNC: 67 MG/DL
LVEF ECHO: NORMAL
NONHDLC SERPL-MCNC: 79 MG/DL
POTASSIUM SERPL-SCNC: 4.5 MMOL/L (ref 3.4–5.3)
SODIUM SERPL-SCNC: 141 MMOL/L (ref 136–145)
TRIGL SERPL-MCNC: 62 MG/DL

## 2023-09-05 PROCEDURE — 80048 BASIC METABOLIC PNL TOTAL CA: CPT | Performed by: INTERNAL MEDICINE

## 2023-09-05 PROCEDURE — 93306 TTE W/DOPPLER COMPLETE: CPT | Mod: 26 | Performed by: INTERNAL MEDICINE

## 2023-09-05 PROCEDURE — 84460 ALANINE AMINO (ALT) (SGPT): CPT | Performed by: INTERNAL MEDICINE

## 2023-09-05 PROCEDURE — 80061 LIPID PANEL: CPT | Performed by: INTERNAL MEDICINE

## 2023-09-05 PROCEDURE — 93306 TTE W/DOPPLER COMPLETE: CPT

## 2023-09-05 PROCEDURE — 36415 COLL VENOUS BLD VENIPUNCTURE: CPT | Performed by: INTERNAL MEDICINE

## 2023-09-25 ENCOUNTER — OFFICE VISIT (OUTPATIENT)
Dept: CARDIOLOGY | Facility: CLINIC | Age: 83
End: 2023-09-25
Attending: INTERNAL MEDICINE
Payer: COMMERCIAL

## 2023-09-25 VITALS
SYSTOLIC BLOOD PRESSURE: 128 MMHG | OXYGEN SATURATION: 97 % | HEART RATE: 61 BPM | DIASTOLIC BLOOD PRESSURE: 72 MMHG | WEIGHT: 191.9 LBS | BODY MASS INDEX: 28.42 KG/M2 | HEIGHT: 69 IN

## 2023-09-25 DIAGNOSIS — Z95.5 HISTORY OF PLACEMENT OF STENT IN LAD CORONARY ARTERY: Primary | ICD-10-CM

## 2023-09-25 DIAGNOSIS — I25.110 CORONARY ARTERY DISEASE INVOLVING NATIVE CORONARY ARTERY OF NATIVE HEART WITH UNSTABLE ANGINA PECTORIS (H): ICD-10-CM

## 2023-09-25 DIAGNOSIS — Z86.711 HISTORY OF PULMONARY EMBOLISM: ICD-10-CM

## 2023-09-25 DIAGNOSIS — R00.1 BRADYCARDIA: ICD-10-CM

## 2023-09-25 DIAGNOSIS — I25.10 CORONARY ARTERY DISEASE INVOLVING NATIVE CORONARY ARTERY OF NATIVE HEART WITHOUT ANGINA PECTORIS: ICD-10-CM

## 2023-09-25 DIAGNOSIS — I10 ESSENTIAL HYPERTENSION: ICD-10-CM

## 2023-09-25 DIAGNOSIS — I25.2 HISTORY OF ACUTE INFERIOR WALL MYOCARDIAL INFARCTION: ICD-10-CM

## 2023-09-25 DIAGNOSIS — Z79.01 LONG TERM CURRENT USE OF ANTICOAGULANT THERAPY: ICD-10-CM

## 2023-09-25 DIAGNOSIS — R00.0 TACHYCARDIA: ICD-10-CM

## 2023-09-25 DIAGNOSIS — E78.2 MIXED HYPERLIPIDEMIA: ICD-10-CM

## 2023-09-25 PROCEDURE — 99214 OFFICE O/P EST MOD 30 MIN: CPT | Performed by: INTERNAL MEDICINE

## 2023-09-25 RX ORDER — ASPIRIN 81 MG/1
81 TABLET ORAL DAILY
COMMUNITY
Start: 2023-09-25

## 2023-09-25 NOTE — PROGRESS NOTES
HPI and Plan:     It is my pleasure to see this pleasant 83-year-old patient who in the past suffered an acute inferior myocardial infarct with stenting of the right coronary artery.  In [place] on March 1 he had stenting of the left anterior descending artery.  He is now 6 months out from that stenting procedure.  He has no chest pains chest pressure or unusual shortness of breath.  This patient has other noncardiac issues and that he has antiphospholipid antibody and he has had DVT and pulmonary embolism because of that and is on chronic Coumadin therapy.  He is lipid profile was reasonably good.  The LDL is 67 the HDL is mildly reduced at 36 and his triglycerides are normal at 62 with a total cholesterol of 115.  His blood pressure is normal at 128/72.    Impression:  1.  Status post stenting of the left anterior descending artery in [place].  No symptoms of angina pectoris since that time.  Shortness of breath was the predominant symptom which has disappeared.  2.  Status post prior inferior myocardial infarction 2015 with stenting of the right coronary artery.  Echocardiogram shows mild basal inferior hypokinesis.  I did review that echocardiogram today.  He was somewhat tightly difficult and difficult to see the basal inferior wall.  3.  Apart from mild HDL deficiency lipids are within secondary prevention guidelines and he is on high intensity statin therapy and Zetia as per ACC guidelines.  No evidence of hepatic toxicity on statin therapy.  4.  Normotensive.    Plan:  1.  We will stop the Plavix medication as he is 6 months out from stenting of the left anterior descending artery.  He did not have an acute coronary syndrome per se so 6 months to be sufficient especially with the newer generation stents.  We will restart a baby aspirin as he has coronary artery disease.  He will continue on warfarin for noncardiac reasons which is antiphospholipid antibody with DVT and PE.  There is a slightly increased risk  of bleeding being on both the baby aspirin and warfarin but both are indicated.  2.  I will see the patient back again in 1 years time we will repeat his basic metabolic profile and lipid profile at that time.    It has been my pleasure to be involved in care of this very nice patient.  As always has been told to contact me if you have any questions or any concerns.    Frederic Mauro MD, FACC, FRCPI.      Orders Placed This Encounter   Procedures    Basic metabolic panel    Lipid Profile    ALT    Follow-Up with Cardiology       Orders Placed This Encounter   Medications    aspirin 81 MG EC tablet     Sig: Take 1 tablet (81 mg) by mouth daily       Medications Discontinued During This Encounter   Medication Reason    ipratropium (ATROVENT) 0.06 % nasal spray Therapy completed (No AVS)    clopidogrel (PLAVIX) 75 MG tablet Therapy completed (No AVS)    clopidogrel (PLAVIX) 75 MG tablet          Encounter Diagnoses   Name Primary?    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)     Essential hypertension     Mixed hyperlipidemia     Tachycardia     Coronary artery disease involving native coronary artery of native heart without angina pectoris     Bradycardia     Long term current use of anticoagulant therapy     History of pulmonary embolism     History of acute inferior wall myocardial infarction     History of placement of stent in LAD coronary artery Yes       CURRENT MEDICATIONS:  Current Outpatient Medications   Medication Sig Dispense Refill    albuterol (PROAIR HFA/PROVENTIL HFA/VENTOLIN HFA) 108 (90 Base) MCG/ACT inhaler Inhale 2 puffs into the lungs every 6 hours as needed for shortness of breath / dyspnea or wheezing 1 Inhaler 0    aspirin 81 MG EC tablet Take 1 tablet (81 mg) by mouth daily      atorvastatin (LIPITOR) 80 MG tablet Take 1 tablet (80 mg) by mouth At Bedtime 90 tablet 3    azelastine (ASTELIN) 0.1 % nasal spray Spray 1 spray  into both nostrils 2 times daily      Calcium Citrate-Vitamin D (CALCIUM CITRATE + D PO) Take 600 mg by mouth 2 times daily      Cholecalciferol (VITAMIN D3 PO) Take 1,000 Units by mouth 2 times daily      ezetimibe (ZETIA) 10 MG tablet Take 1 tablet (10 mg) by mouth daily 90 tablet 3    fluticasone-salmeterol (ADVAIR-HFA) 230-21 MCG/ACT inhaler Inhale 2 puffs into the lungs 2 times daily 12 g 11    ibuprofen (ADVIL/MOTRIN) 200 MG capsule Take 600 mg by mouth daily as needed for fever      lisinopril (ZESTRIL) 5 MG tablet TAKE 1 TABLET(5 MG) BY MOUTH DAILY 90 tablet 3    metoprolol succinate ER (TOPROL XL) 25 MG 24 hr tablet Take 1 tablet (25 mg) by mouth daily 90 tablet 3    nitroGLYcerin (NITROSTAT) 0.4 MG sublingual tablet Place 1 tablet (0.4 mg) under the tongue every 5 minutes as needed for chest pain 25 tablet 3    vardenafil (LEVITRA) 20 MG tablet Take 0.5-1 tablets (10-20 mg) by mouth daily as needed Never use with nitroglycerin, terazosin or doxazosin. 12 tablet 11    warfarin ANTICOAGULANT (COUMADIN) 5 MG tablet Take 1 tab on Thursdays and take 1 1/2 tabs on all other days or as directed per INR clinic 145 tablet 3       ALLERGIES     Allergies   Allergen Reactions    Seasonal Allergies     Simvastatin Muscle Pain (Myalgia)      at 20 mg daily.       PAST MEDICAL HISTORY:  Past Medical History:   Diagnosis Date    Antiphospholipid antibody syndrome (H) 05/25/2017    CAD (coronary artery disease), native coronary artery 10/2015    9/2015 Heart cath - 90% diagonal, 50-60% CFX, 100% prox RCA occlusion - MAL placed in RCA, 10/2015 EF 35-40% by Echo    DVT (deep venous thrombosis) (H) 09/2015 9/2015 Occlusive DVT extending from left common femoral to mid left popliteal vein    Hypercholesteraemia     Hypertension     NSVT (nonsustained ventricular tachycardia) (H)     PE (pulmonary embolism) 09/2015    PMR (polymyalgia rheumatica) (H)     Polymyalgia rheumatica (H)     STEMI (ST elevation myocardial  "infarction) (H) 10/2015    10/2015 Inferior STEMI - 100% RCA occlusion with MAL placed       PAST SURGICAL HISTORY:  Past Surgical History:   Procedure Laterality Date    COLONOSCOPY N/A 10/17/2022    Procedure: COLONOSCOPY WITH POLYPECTOMY;  Surgeon: Pradeep Cintron MD;  Location: [facility] Main OR    HEART CATH STENT COR W/WO PTCA  10/2015    90% diagonal, 50-60% CFX, 100% prox RCA occlusion - MAL placed in RCA    ORTHOPEDIC SURGERY  2015    right carpal tunnel       FAMILY HISTORY:  Family History   Problem Relation Age of Onset    Hypertension Brother     Hypertension Brother        SOCIAL HISTORY:  Social History     Socioeconomic History    Marital status:      Spouse name: None    Number of children: None    Years of education: None    Highest education level: None   Tobacco Use    Smoking status: Former     Packs/day: 0.50     Years: 5.00     Pack years: 2.50     Types: Cigarettes     Start date:      Quit date:      Years since quittin.7    Smokeless tobacco: Never   Substance and Sexual Activity    Alcohol use: Yes     Comment: 1 drink per day    Drug use: No    Sexual activity: Yes     Partners: Female   Other Topics Concern    Caffeine Concern Yes     Comment: 1 cup coffee daily    Sleep Concern No    Stress Concern Yes     Comment: related to relationship    Weight Concern No    Special Diet Yes     Comment: mediterranian diet    Exercise Yes     Comment: walking, cardiac rehab starting       Review of Systems:  Skin:          Eyes:         ENT:         Respiratory:          Cardiovascular:         Gastroenterology:        Genitourinary:         Musculoskeletal:         Neurologic:         Psychiatric:         Heme/Lymph/Imm:         Endocrine:           Physical Exam:  Vitals: /72   Pulse 61   Ht 1.753 m (5' 9\")   Wt 87 kg (191 lb 14.4 oz)   SpO2 97%   BMI 28.34 kg/m      Constitutional:  cooperative;cooperative, alert and oriented, well developed, well nourished, " in no acute distress        Skin:  warm and dry to the touch          Head:  normocephalic        Eyes:  pupils equal and round        Lymph:No Cervical lymphadenopathy present     ENT:  no pallor or cyanosis        Neck:  JVP normal;carotid pulses are full and equal bilaterally;no carotid bruit        Respiratory:  normal breath sounds, clear to auscultation, normal A-P diameter, normal symmetry, normal respiratory excursion, no use of accessory muscles         Cardiac: regular rhythm;normal S1 and S2;no murmurs, gallops or rubs detected                pulses full and equal                                        GI:  not assessed this visit        Extremities and Muscular Skeletal:  no edema;no deformities, clubbing, cyanosis, erythema observed              Neurological:  no gross motor deficits;affect appropriate        Psych:  Alert and Oriented x 3        CC  Frederic Isaacs MD  [id number]  [place] [id number]

## 2023-09-25 NOTE — LETTER
9/25/2023    Brandan Clinton MD  [address]  [address]  [facility] [address]    RE: Yogesh DARINEL Abreu       Dear Colleague,     I had the pleasure of seeing Yogesh Abreu in the [facility].  HPI and Plan:     It is my pleasure to see this pleasant 83-year-old patient who in the past suffered an acute inferior myocardial infarct with stenting of the right coronary artery.  In [place] on March 1 he had stenting of the left anterior descending artery.  He is now 6 months out from that stenting procedure.  He has no chest pains chest pressure or unusual shortness of breath.  This patient has other noncardiac issues and that he has antiphospholipid antibody and he has had DVT and pulmonary embolism because of that and is on chronic Coumadin therapy.  He is lipid profile was reasonably good.  The LDL is 67 the HDL is mildly reduced at 36 and his triglycerides are normal at 62 with a total cholesterol of 115.  His blood pressure is normal at 128/72.    Impression:  1.  Status post stenting of the left anterior descending artery in [place].  No symptoms of angina pectoris since that time.  Shortness of breath was the predominant symptom which has disappeared.  2.  Status post prior inferior myocardial infarction 2015 with stenting of the right coronary artery.  Echocardiogram shows mild basal inferior hypokinesis.  I did review that echocardiogram today.  He was somewhat tightly difficult and difficult to see the basal inferior wall.  3.  Apart from mild HDL deficiency lipids are within secondary prevention guidelines and he is on high intensity statin therapy and Zetia as per ACC guidelines.  No evidence of hepatic toxicity on statin therapy.  4.  Normotensive.    Plan:  1.  We will stop the Plavix medication as he is 6 months out from stenting of the left anterior descending artery.  He did not have an acute coronary syndrome per se so 6 months to be sufficient especially with the newer generation  stents.  We will restart a baby aspirin as he has coronary artery disease.  He will continue on warfarin for noncardiac reasons which is antiphospholipid antibody with DVT and PE.  There is a slightly increased risk of bleeding being on both the baby aspirin and warfarin but both are indicated.  2.  I will see the patient back again in 1 years time we will repeat his basic metabolic profile and lipid profile at that time.    It has been my pleasure to be involved in care of this very nice patient.  As always has been told to contact me if you have any questions or any concerns.    Frederic Mauro MD, FACC, FRCPI.      Orders Placed This Encounter   Procedures    Basic metabolic panel    Lipid Profile    ALT    Follow-Up with Cardiology       Orders Placed This Encounter   Medications    aspirin 81 MG EC tablet     Sig: Take 1 tablet (81 mg) by mouth daily       Medications Discontinued During This Encounter   Medication Reason    ipratropium (ATROVENT) 0.06 % nasal spray Therapy completed (No AVS)    clopidogrel (PLAVIX) 75 MG tablet Therapy completed (No AVS)    clopidogrel (PLAVIX) 75 MG tablet          Encounter Diagnoses   Name Primary?    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)     Essential hypertension     Mixed hyperlipidemia     Tachycardia     Coronary artery disease involving native coronary artery of native heart without angina pectoris     Bradycardia     Long term current use of anticoagulant therapy     History of pulmonary embolism     History of acute inferior wall myocardial infarction     History of placement of stent in LAD coronary artery Yes       CURRENT MEDICATIONS:  Current Outpatient Medications   Medication Sig Dispense Refill    albuterol (PROAIR HFA/PROVENTIL HFA/VENTOLIN HFA) 108 (90 Base) MCG/ACT inhaler Inhale 2 puffs into the lungs every 6 hours as needed for shortness of breath / dyspnea or wheezing 1 Inhaler 0     aspirin 81 MG EC tablet Take 1 tablet (81 mg) by mouth daily      atorvastatin (LIPITOR) 80 MG tablet Take 1 tablet (80 mg) by mouth At Bedtime 90 tablet 3    azelastine (ASTELIN) 0.1 % nasal spray Spray 1 spray into both nostrils 2 times daily      Calcium Citrate-Vitamin D (CALCIUM CITRATE + D PO) Take 600 mg by mouth 2 times daily      Cholecalciferol (VITAMIN D3 PO) Take 1,000 Units by mouth 2 times daily      ezetimibe (ZETIA) 10 MG tablet Take 1 tablet (10 mg) by mouth daily 90 tablet 3    fluticasone-salmeterol (ADVAIR-HFA) 230-21 MCG/ACT inhaler Inhale 2 puffs into the lungs 2 times daily 12 g 11    ibuprofen (ADVIL/MOTRIN) 200 MG capsule Take 600 mg by mouth daily as needed for fever      lisinopril (ZESTRIL) 5 MG tablet TAKE 1 TABLET(5 MG) BY MOUTH DAILY 90 tablet 3    metoprolol succinate ER (TOPROL XL) 25 MG 24 hr tablet Take 1 tablet (25 mg) by mouth daily 90 tablet 3    nitroGLYcerin (NITROSTAT) 0.4 MG sublingual tablet Place 1 tablet (0.4 mg) under the tongue every 5 minutes as needed for chest pain 25 tablet 3    vardenafil (LEVITRA) 20 MG tablet Take 0.5-1 tablets (10-20 mg) by mouth daily as needed Never use with nitroglycerin, terazosin or doxazosin. 12 tablet 11    warfarin ANTICOAGULANT (COUMADIN) 5 MG tablet Take 1 tab on Thursdays and take 1 1/2 tabs on all other days or as directed per INR clinic 145 tablet 3       ALLERGIES     Allergies   Allergen Reactions    Seasonal Allergies     Simvastatin Muscle Pain (Myalgia)      at 20 mg daily.       PAST MEDICAL HISTORY:  Past Medical History:   Diagnosis Date    Antiphospholipid antibody syndrome (H) 05/25/2017    CAD (coronary artery disease), native coronary artery 10/2015    9/2015 Heart cath - 90% diagonal, 50-60% CFX, 100% prox RCA occlusion - MAL placed in RCA, 10/2015 EF 35-40% by Echo    DVT (deep venous thrombosis) (H) 09/2015 9/2015 Occlusive DVT extending from left common femoral to mid left popliteal vein    Hypercholesteraemia      Hypertension     NSVT (nonsustained ventricular tachycardia) (H)     PE (pulmonary embolism) 2015    PMR (polymyalgia rheumatica) (H)     Polymyalgia rheumatica (H)     STEMI (ST elevation myocardial infarction) (H) 10/2015    10/2015 Inferior STEMI - 100% RCA occlusion with MAL placed       PAST SURGICAL HISTORY:  Past Surgical History:   Procedure Laterality Date    COLONOSCOPY N/A 10/17/2022    Procedure: COLONOSCOPY WITH POLYPECTOMY;  Surgeon: Pradeep Cintron MD;  Location: [facility] Main OR    HEART CATH STENT COR W/WO PTCA  10/2015    90% diagonal, 50-60% CFX, 100% prox RCA occlusion - MAL placed in RCA    ORTHOPEDIC SURGERY  2015    right carpal tunnel       FAMILY HISTORY:  Family History   Problem Relation Age of Onset    Hypertension Brother     Hypertension Brother        SOCIAL HISTORY:  Social History     Socioeconomic History    Marital status:      Spouse name: None    Number of children: None    Years of education: None    Highest education level: None   Tobacco Use    Smoking status: Former     Packs/day: 0.50     Years: 5.00     Pack years: 2.50     Types: Cigarettes     Start date:      Quit date:      Years since quittin.7    Smokeless tobacco: Never   Substance and Sexual Activity    Alcohol use: Yes     Comment: 1 drink per day    Drug use: No    Sexual activity: Yes     Partners: Female   Other Topics Concern    Caffeine Concern Yes     Comment: 1 cup coffee daily    Sleep Concern No    Stress Concern Yes     Comment: related to relationship    Weight Concern No    Special Diet Yes     Comment: mediterranian diet    Exercise Yes     Comment: walking, cardiac rehab starting       Review of Systems:  Skin:          Eyes:         ENT:         Respiratory:          Cardiovascular:         Gastroenterology:        Genitourinary:         Musculoskeletal:         Neurologic:         Psychiatric:         Heme/Lymph/Imm:         Endocrine:           Physical Exam:  Vitals:  "/72   Pulse 61   Ht 1.753 m (5' 9\")   Wt 87 kg (191 lb 14.4 oz)   SpO2 97%   BMI 28.34 kg/m      Constitutional:  cooperative;cooperative, alert and oriented, well developed, well nourished, in no acute distress        Skin:  warm and dry to the touch          Head:  normocephalic        Eyes:  pupils equal and round        Lymph:No Cervical lymphadenopathy present     ENT:  no pallor or cyanosis        Neck:  JVP normal;carotid pulses are full and equal bilaterally;no carotid bruit        Respiratory:  normal breath sounds, clear to auscultation, normal A-P diameter, normal symmetry, normal respiratory excursion, no use of accessory muscles         Cardiac: regular rhythm;normal S1 and S2;no murmurs, gallops or rubs detected                pulses full and equal                                        GI:  not assessed this visit        Extremities and Muscular Skeletal:  no edema;no deformities, clubbing, cyanosis, erythema observed              Neurological:  no gross motor deficits;affect appropriate        Psych:  Alert and Oriented x 3        CC  Frederic Isaacs MD  [address] [address]          Thank you for allowing me to participate in the care of your patient.      Sincerely,     Frederic Isaacs MD, MD     [facility]  "

## 2024-07-07 ENCOUNTER — HEALTH MAINTENANCE LETTER (OUTPATIENT)
Age: 84
End: 2024-07-07

## 2024-08-14 ENCOUNTER — THERAPY VISIT (OUTPATIENT)
Dept: SPEECH THERAPY | Facility: CLINIC | Age: 84
End: 2024-08-14
Payer: COMMERCIAL

## 2024-08-14 DIAGNOSIS — G25.0 ESSENTIAL TREMOR: Primary | ICD-10-CM

## 2024-08-14 DIAGNOSIS — R49.0 DYSPHONIA: ICD-10-CM

## 2024-08-14 DIAGNOSIS — R47.1 DYSARTHRIA: ICD-10-CM

## 2024-08-14 PROCEDURE — 92522 EVALUATE SPEECH PRODUCTION: CPT | Mod: GN

## 2024-08-14 NOTE — PROGRESS NOTES
"SPEECH LANGUAGE PATHOLOGY EVALUATION  [facility]                                                                                   OUTPATIENT SPEECH LANGUAGE PATHOLOGY      PLAN OF TREATMENT FOR OUTPATIENT REHABILITATION   Patient's Last Name, First Name, Yogesh Carlson YOB: 1940   Provider's Name   [facility]   Medical Record No.  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 Yvonne Prather MD. Per neurologist note on 7/17/2024, '  Presentation of progressive tremor and ataxia since previous evaluation in 2018.  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  Strong hereditary/family component. '.     He is already on low-dose of metoprolol which apparently has not been tolerated at a higher dosage.     Reports that DBS  may be an option in the future that he is considering.     Declines dysarthria. However does report decrease speech intelligibility. States that he often does not want to talk as it's 'hard to get going' when speaking. States his voice is raspy/strained and has feeling of phlegm in throat. Notes need for increase in throat clearing to reduce phlegm.     Date of " onset: 07/17/24 (last date seen by neurologist)    Relevant medical history: DVT (blood clot); Hearing problems; High blood pressure; Overweight; Progressive neurological deficits; Vision problems   Active Ambulatory Problems     Diagnosis Date Noted    PE (pulmonary embolism) 09/08/2015    CAD (coronary artery disease), IMI -  => rescue stent to  RCA 10/08/2015    Hypertension     Mixed hyperlipidemia     Polymyalgia rheumatica (H24)     ACS (acute coronary syndrome) (H) 12/24/2015    Long-term (current) use of anticoagulants [Z79.01] 05/20/2016    Antiphospholipid antibody syndrome (H24) 05/25/2017    Moderate persistent asthma without complication 05/25/2017    HL (hearing loss), bilateral 05/25/2017    Chronic bilateral low back pain without sciatica 08/28/2018    Long term current use of anticoagulants with INR goal of 2.0-3.0 06/26/2019    Other acute pulmonary embolism, unspecified whether acute cor pulmonale present (H) 08/01/2022    NSVT (nonsustained ventricular tachycardia) (H) 11/22/2022     Resolved Ambulatory Problems     Diagnosis Date Noted    Chronic left-sided low back pain with left-sided sciatica 06/19/2017    Right shoulder pain 07/11/2017    Left ventricular failure (H) 08/20/2018    Acute pain of left knee 10/04/2021     Past Medical History:   Diagnosis Date    CAD (coronary artery disease), native coronary artery 10/2015    DVT (deep venous thrombosis) (H) 09/2015    Hypercholesteraemia     PE (pulmonary embolism) 09/2015    PMR (polymyalgia rheumatica) (H)     STEMI (ST elevation myocardial infarction) (H) 10/2015      Dates & types of surgery: hip [id number]  Past Surgical History:   Procedure Laterality Date    COLONOSCOPY N/A 10/17/2022    Procedure: COLONOSCOPY WITH POLYPECTOMY;  Surgeon: Pradeep Cintron MD;  Location: [facility] Main OR    HEART CATH STENT COR W/WO PTCA  10/2015    90% diagonal, 50-60% CFX, 100% prox RCA occlusion - MAL placed in RCA    ORTHOPEDIC SURGERY   08/2015    right carpal tunnel        Prior diagnostic imaging/testing results:       Prior therapy history for the same diagnosis, illness or injury: No      Living Environment  Social support: With a significant other or spouse   Help at home: None  Equipment owned:       Employment: No    Hobbies/Interests: [employer], [employer]    Patient goals for therapy: yes      Objective     ORAL MOTOR FUNCTION:  Dentition: adequate dentition  Secretion management: WFL  Mucosal quality: adequate  Mandibular function: intact  Oral labial function: WFL  Lingual function: WFL  Laryngeal function: cough, volitional swallow, impaired vocal quality: dysphonia       MOTOR SPEECH:  Speech Intelligibility:     Word level speech intelligibility: intact      Phrase/sentence level speech intelligibility: Test 7 Assessment of Intelligibility of Dysarthria Speech  - 94% intelligible      Conversation level speech intelligibility:  at least 90% intelligible   Respiration Observations:  variations of excessive loudness    Phonation: hoarse quality, strained-strangled quality   Maximum sustained phonation:     Prolonged 'ah' at mid pitch (sec): 72 dB SPL for an average of 6.2 seconds with consistent roughness and some pitch instability     Vibratory Function of Vocal Folds Scale Norms: males 20 - 80 yrs: 15 - 25 secs  S/Z ratio (13.0 sec/ 7.9 sec): 1.65  (>1.4 is suggestive of VF pathology)   Pitch glide: phonation breaks   Resonance: hypernasal, hyponasal  Rate/prosody:  short phrases, monopitch, monoloudness,     Articulation:  slow articulation breakdowns, telescoping    Articulatory rate of movement: speech production: Diadochokinetic rates (30-55 in 10 secs =WF): /pa/=40 /ta/=44, /ka/=9.   Sequential motion rates Diadochokinetic rates (8 in 10 seconds=WFL) /pataka/=5  Speech Fluency: WNL   Dysarthria differential diagnosis: Spastic/UMN, Ataxic/cerebellar, mixed spastic and ataxic     Motor speech level of impairment: mild to moderate  impairment     Assessment & Plan   CLINICAL IMPRESSIONS   Medical Diagnosis: voice tremor    Treatment Diagnosis: mixed spastic and ataxic dysarthria   Impression/Assessment: Pt is a 84 year old male with speech and voice complaints. The following significant findings have been identified: impaired speech intelligibility and impaired voicing, characterized by impaired respiratory/phonatory coordination for phonation, vocal tremors, reduced speech intelligibility, dysphonia. Identified deficits interfere with their ability to communicate wants and needs and communicate within the home or community as compared to previous level of function.    PLAN OF CARE  Treatment Interventions: Speech, Voice    Prognosis to achieve stated therapy goals is good, fair   Rehab potential is impacted by: comorbidities    Long Term Goals:   SLP Goal 1  Goal Identifier: Strategies  Goal Description: Patient will verbalize and utilize techniques to promote improved respiratory/phonatory coordination for phonation for speech promote functional communication with novel and familiar communication partners with 80-90% accuracy given no-min cues, across at least 3 consecutive sessions  SLP Goal 2  Goal Identifier: Objective Assessment  Goal Description: Pt will complete formal voice evaluation as determined by treating SLP for outcomes in order to generate/modify goals as needed by the end of POC      Frequency of Treatment: x1/wk, tapering off  Duration of Treatment: 60-90 days     Recommended Referrals to Other Professionals: Speech Language Pathology, ? voice  Education Assessment:   Learner/Method: Patient;Listening  Education Comments: created cohesive and individualized treatment/POC w/ pt and interdisplinary team. Pt stated comprehension of evaluation results, recommendations, and POC.    Risks and benefits of evaluation/treatment have been explained.   Patient/Family/caregiver agrees with Plan of Care.     Evaluation Time:    Sound  production (artic, phonology, apraxia, dysarthria) Minutes (37574): 38     Present: Not applicable     Signing Clinician: Josselyn Iqbal MS, CCC-SLP

## 2024-09-03 ENCOUNTER — THERAPY VISIT (OUTPATIENT)
Dept: SPEECH THERAPY | Facility: CLINIC | Age: 84
End: 2024-09-03
Payer: COMMERCIAL

## 2024-09-03 DIAGNOSIS — R47.1 DYSARTHRIA: ICD-10-CM

## 2024-09-03 DIAGNOSIS — R49.0 DYSPHONIA: Primary | ICD-10-CM

## 2024-09-03 DIAGNOSIS — G25.0 ESSENTIAL TREMOR: ICD-10-CM

## 2024-09-03 PROCEDURE — 92507 TX SP LANG VOICE COMM INDIV: CPT | Mod: GN | Performed by: STUDENT IN AN ORGANIZED HEALTH CARE EDUCATION/TRAINING PROGRAM

## 2024-09-04 DIAGNOSIS — I25.10 CORONARY ARTERY DISEASE INVOLVING NATIVE CORONARY ARTERY OF NATIVE HEART WITHOUT ANGINA PECTORIS: ICD-10-CM

## 2024-09-04 DIAGNOSIS — R00.0 TACHYCARDIA: ICD-10-CM

## 2024-09-04 DIAGNOSIS — I10 ESSENTIAL HYPERTENSION: ICD-10-CM

## 2024-09-04 RX ORDER — METOPROLOL SUCCINATE 25 MG/1
25 TABLET, EXTENDED RELEASE ORAL DAILY
Qty: 90 TABLET | Refills: 0 | Status: SHIPPED | OUTPATIENT
Start: 2024-09-04

## 2024-09-04 NOTE — LETTER
September 4, 2024       TO: Yogesh Abreu  [address]  [facility] [address]       Dear Yogesh Abreu,    We recently received a call from your pharmacy requesting a refill of your medication(s).    Our records indicate that you are due for follow-up with your Heart Care Provider. We will refill your medications for 3 months which will allow you enough time to be seen.    Please call [phone] to schedule your appointment.    Thank you for allowing [facility] to be a part of your health care team and we look forward to seeing you soon.    Thank you,    [facility]

## 2024-09-09 ENCOUNTER — THERAPY VISIT (OUTPATIENT)
Dept: SPEECH THERAPY | Facility: CLINIC | Age: 84
End: 2024-09-09
Payer: COMMERCIAL

## 2024-09-09 DIAGNOSIS — R49.0 DYSPHONIA: Primary | ICD-10-CM

## 2024-09-09 DIAGNOSIS — R47.1 DYSARTHRIA: ICD-10-CM

## 2024-09-09 DIAGNOSIS — G25.0 ESSENTIAL TREMOR: ICD-10-CM

## 2024-09-09 PROCEDURE — 92507 TX SP LANG VOICE COMM INDIV: CPT | Mod: GN | Performed by: STUDENT IN AN ORGANIZED HEALTH CARE EDUCATION/TRAINING PROGRAM

## 2024-09-30 ENCOUNTER — THERAPY VISIT (OUTPATIENT)
Dept: SPEECH THERAPY | Facility: CLINIC | Age: 84
End: 2024-09-30
Payer: COMMERCIAL

## 2024-09-30 DIAGNOSIS — G25.0 ESSENTIAL TREMOR: ICD-10-CM

## 2024-09-30 DIAGNOSIS — R47.1 DYSARTHRIA: ICD-10-CM

## 2024-09-30 DIAGNOSIS — R49.0 DYSPHONIA: Primary | ICD-10-CM

## 2024-09-30 PROCEDURE — 92507 TX SP LANG VOICE COMM INDIV: CPT | Mod: GN | Performed by: STUDENT IN AN ORGANIZED HEALTH CARE EDUCATION/TRAINING PROGRAM

## 2024-10-02 ENCOUNTER — TELEPHONE (OUTPATIENT)
Dept: CARDIOLOGY | Facility: CLINIC | Age: 84
End: 2024-10-02
Payer: COMMERCIAL

## 2024-10-02 NOTE — TELEPHONE ENCOUNTER
Health Call Center    Phone Message    May a detailed message be left on voicemail: yes     Reason for Call: Other: Vincenzo called requesting to speak with his care team about some concerns about the accuracy of monitoring his heart on his own. Please reach out to Vincenzo to discuss. Thank you!     Action Taken: Other: Cardiology    Travel Screening: Not Applicable    Thank you!  Specialty Access Center       Date of Service:                                                                      
Called pt, states when he is working out, he uses [employer] & his Apple Watch to monitor his heart rate. He states he has noticed his heart beat is skipping beats at time. Pt asking if he might be in afib. He states by the Apple Watch, his heart rate runs 120-150 bpm while working out, 62-64 bpm at rest & about 40 bpm while sleeping. He denies any symptoms such as lightheadedness, dizziness or palpitations. Pt advised to try to get an EKG reading using his Apple Watch if he can & send reading through My Chart. Pt verbalized understanding. Shabbir GIRALDO   
n/a

## 2024-10-07 ENCOUNTER — THERAPY VISIT (OUTPATIENT)
Dept: SPEECH THERAPY | Facility: CLINIC | Age: 84
End: 2024-10-07
Payer: COMMERCIAL

## 2024-10-07 DIAGNOSIS — R49.0 DYSPHONIA: Primary | ICD-10-CM

## 2024-10-07 DIAGNOSIS — R47.1 DYSARTHRIA: ICD-10-CM

## 2024-10-07 DIAGNOSIS — G25.0 ESSENTIAL TREMOR: ICD-10-CM

## 2024-10-07 PROCEDURE — 92507 TX SP LANG VOICE COMM INDIV: CPT | Mod: GN | Performed by: STUDENT IN AN ORGANIZED HEALTH CARE EDUCATION/TRAINING PROGRAM

## 2024-10-14 ENCOUNTER — THERAPY VISIT (OUTPATIENT)
Dept: SPEECH THERAPY | Facility: CLINIC | Age: 84
End: 2024-10-14
Payer: COMMERCIAL

## 2024-10-14 DIAGNOSIS — R49.0 DYSPHONIA: Primary | ICD-10-CM

## 2024-10-14 DIAGNOSIS — G25.0 ESSENTIAL TREMOR: ICD-10-CM

## 2024-10-14 DIAGNOSIS — R47.1 DYSARTHRIA: ICD-10-CM

## 2024-10-14 PROCEDURE — 92507 TX SP LANG VOICE COMM INDIV: CPT | Mod: GN | Performed by: STUDENT IN AN ORGANIZED HEALTH CARE EDUCATION/TRAINING PROGRAM

## 2024-10-14 NOTE — PROGRESS NOTES
Speech-Language Pathology Department   DISCHARGE NOTE  [facility]    10/14/24 0500   Appointment Info   Treating Provider Aye Delcid MA, CCC-SLP   Visits Used 6   Medical Diagnosis voice tremor   SLP Tx Diagnosis mixed spastic and ataxic dysarthria   Progress Note/Certification   Start Of Care Date 08/14/24   Onset Of Illness/injury Or Date Of Surgery 07/17/24  (last date seen by neurologist)   Therapy Frequency x1/wk, tapering off   Predicted Duration 60-90 days   Certification date from 08/14/24   Certification date to 11/12/24   Progress Note Due Date 11/12/24   Subjective Report   Subjective Report Pt reports that his voice has been good since the last session, noting that he has been paying attention to the impact his nervous system has on his symptoms.   SLP Goals   SLP Goals 1;2;3   SLP Goal 1   Goal Identifier Strategies   Goal Description Patient will verbalize and utilize techniques to promote improved respiratory/phonatory coordination for phonation for speech promote functional communication with novel and familiar communication partners with 80-90% accuracy given no-min cues, across at least 3 consecutive sessions   Goal Progress Progressing, not met.  Pt benefits from at least min-mod cues to achieve 80-90% accy with voice and speech strategies in extended narrative reading and conversation tasks in session.   Target Date 11/12/24   SLP Goal 2   Goal Identifier Objective Assessment   Goal Description Pt will complete formal voice evaluation as determined by treating SLP for outcomes in order to generate/modify goals as needed by the end of POC   Goal Progress Goal met.  Pt completed informal voice assessment on 9/3/24.  Formal assessment was not deemed necessary after completion of informal objective measures.   Target Date 11/12/24   Date Met 09/03/24   Treatment Interventions (SLP)   Treatment Interventions Treatment Speech/Lang/Voice   Treatment  Speech/Lang/Voice   Treatment of Speech, Language, Voice Communication&/or Auditory Processing (01198) 35 Minutes   Speech/Lang/Voice Speech/Lang/Voice 2   Speech/Lang/Voice 1 Strategies   Speech/Lang/Voice 1 - Details Skilled intervention focused on training strategies to improve voice quality and minimize the vocal tremor: respiratory/phonatory coordination and shortening voiced segments (staccato technique).  Pt demonstrated these techniques when reading aloud short articles from [employer] magazine and in semi-structured conversation with SLP given min-mod cues/models.  Cues to maintain adequate breath flow continued to be facilitating for improved performance.  SLP provided education regarding an optimal home regimen of practice of therapy exercises for continued improvement and generalization, and pt verbalized understanding.   Skilled Intervention Demonstrated voice exercises;Modeled compensatory strategies;Provided feedback on performance of tasks;Measured key voice production parameters;Facilitated respiratory, laryngeal, oral integration   Patient Response/Progress Good progress toward Strategies goal, Objective Assessment goal met.  Discharge today as pt will be leaving to spend the winter in [place].  Pt is motivated to maintain a regular home regimen of practice of therapy exercises and strategies to continue progress.   Education   Learner/Method Patient;Listening;Reading;Demonstration   Education Comments Session targets, performance, rationale behind therapy exercises, answered questions about sympathetic nervous system activation as a trigger for worsening symptoms and potential management strategies, principles of motor learning, HEP   Plan   Home program Daily practice of voice exercises, focus on paragraph reading and daily conversations   Updates to plan of care Discharge, pt will be leaving for [place] and unable to attend future sessions at this clinic.   Total Session Time   Total Treatment  Time (sum of timed and untimed services) 35       DISCHARGE  Reason for Discharge: Patient will be leaving for [place] and is unable to schedule further appointments at this clinic.    Discharge Plan: Patient to continue home program.    Referring Provider:  Referred Self      BRANDAN Negro M.A. (music), CCC-SLP  Speech-Language Pathologist  [facility] Certificate of Vocology  [facility]  [phone]

## 2024-11-15 ENCOUNTER — TELEPHONE (OUTPATIENT)
Dept: CARDIOLOGY | Facility: CLINIC | Age: 84
End: 2024-11-15
Payer: COMMERCIAL

## 2024-11-15 NOTE — TELEPHONE ENCOUNTER
2nd attempt- Left voicemail for the patient to call back and schedule the following:    Appointment type:  Return Cardiology  Provider:  Sharan Mauro  Return date:  Clinic opened on 11/22 and 11/25  Additional appointment(s) needed:  Labs prior  Additonal Notes: Clinics on 11/22 and 11/25 have been placed on hold, will need to manually schedule  Specialty phone number: [phone]    Sri Maldonado

## 2024-12-16 DIAGNOSIS — I25.10 CORONARY ARTERY DISEASE INVOLVING NATIVE CORONARY ARTERY OF NATIVE HEART WITHOUT ANGINA PECTORIS: ICD-10-CM

## 2024-12-16 DIAGNOSIS — I10 ESSENTIAL HYPERTENSION: ICD-10-CM

## 2024-12-16 DIAGNOSIS — R00.0 TACHYCARDIA: ICD-10-CM

## 2024-12-16 RX ORDER — METOPROLOL SUCCINATE 25 MG/1
25 TABLET, EXTENDED RELEASE ORAL DAILY
Qty: 90 TABLET | Refills: 0 | Status: SHIPPED | OUTPATIENT
Start: 2024-12-16

## 2025-01-07 ENCOUNTER — TELEPHONE (OUTPATIENT)
Dept: CARDIOLOGY | Facility: CLINIC | Age: 85
End: 2025-01-07
Payer: COMMERCIAL

## 2025-01-07 NOTE — TELEPHONE ENCOUNTER
1/7/25 lm/ for pt to c/b to schedule annual followup - pt on wait list, if openings still available when pt calls back schedule, if not keep on wait list, or see if pt wants to see an KIM, amh

## 2025-04-24 ENCOUNTER — LAB (OUTPATIENT)
Dept: LAB | Facility: CLINIC | Age: 85
End: 2025-04-24
Payer: COMMERCIAL

## 2025-04-24 DIAGNOSIS — E78.5 HYPERLIPIDEMIA WITH TARGET LDL LESS THAN 70: ICD-10-CM

## 2025-04-24 DIAGNOSIS — I25.10 CORONARY ARTERY DISEASE INVOLVING NATIVE CORONARY ARTERY OF NATIVE HEART WITHOUT ANGINA PECTORIS: ICD-10-CM

## 2025-04-24 DIAGNOSIS — R00.0 TACHYCARDIA: ICD-10-CM

## 2025-04-24 DIAGNOSIS — I10 ESSENTIAL HYPERTENSION: ICD-10-CM

## 2025-04-24 LAB
ALT SERPL W P-5'-P-CCNC: 27 U/L (ref 0–70)
ANION GAP SERPL CALCULATED.3IONS-SCNC: 13 MMOL/L (ref 7–15)
BUN SERPL-MCNC: 20.5 MG/DL (ref 8–23)
CALCIUM SERPL-MCNC: 10 MG/DL (ref 8.8–10.4)
CHLORIDE SERPL-SCNC: 99 MMOL/L (ref 98–107)
CHOLEST SERPL-MCNC: 112 MG/DL
CREAT SERPL-MCNC: 0.9 MG/DL (ref 0.67–1.17)
EGFRCR SERPLBLD CKD-EPI 2021: 85 ML/MIN/1.73M2
FASTING STATUS PATIENT QL REPORTED: YES
GLUCOSE SERPL-MCNC: 104 MG/DL (ref 70–99)
HCO3 SERPL-SCNC: 26 MMOL/L (ref 22–29)
HDLC SERPL-MCNC: 36 MG/DL
LDLC SERPL CALC-MCNC: 53 MG/DL
NONHDLC SERPL-MCNC: 76 MG/DL
POTASSIUM SERPL-SCNC: 4.2 MMOL/L (ref 3.4–5.3)
SODIUM SERPL-SCNC: 138 MMOL/L (ref 135–145)
TRIGL SERPL-MCNC: 117 MG/DL

## 2025-04-28 ENCOUNTER — OFFICE VISIT (OUTPATIENT)
Dept: CARDIOLOGY | Facility: CLINIC | Age: 85
End: 2025-04-28
Attending: INTERNAL MEDICINE
Payer: COMMERCIAL

## 2025-04-28 VITALS
BODY MASS INDEX: 29.27 KG/M2 | HEART RATE: 65 BPM | DIASTOLIC BLOOD PRESSURE: 81 MMHG | WEIGHT: 197.6 LBS | HEIGHT: 69 IN | SYSTOLIC BLOOD PRESSURE: 134 MMHG | OXYGEN SATURATION: 96 %

## 2025-04-28 DIAGNOSIS — Z79.01 LONG TERM CURRENT USE OF ANTICOAGULANT THERAPY: ICD-10-CM

## 2025-04-28 DIAGNOSIS — Z86.711 HISTORY OF PULMONARY EMBOLISM: ICD-10-CM

## 2025-04-28 DIAGNOSIS — E78.5 HYPERLIPIDEMIA WITH TARGET LDL LESS THAN 70: ICD-10-CM

## 2025-04-28 DIAGNOSIS — I25.10 CORONARY ARTERY DISEASE INVOLVING NATIVE CORONARY ARTERY OF NATIVE HEART WITHOUT ANGINA PECTORIS: ICD-10-CM

## 2025-04-28 DIAGNOSIS — I10 ESSENTIAL HYPERTENSION: ICD-10-CM

## 2025-04-28 RX ORDER — EZETIMIBE 10 MG/1
10 TABLET ORAL DAILY
Qty: 90 TABLET | Refills: 4 | Status: CANCELLED | OUTPATIENT
Start: 2025-04-28

## 2025-04-28 RX ORDER — ATORVASTATIN CALCIUM 80 MG/1
80 TABLET, FILM COATED ORAL AT BEDTIME
Qty: 90 TABLET | Refills: 4 | Status: CANCELLED | OUTPATIENT
Start: 2025-04-28

## 2025-04-28 RX ORDER — NITROGLYCERIN 0.4 MG/1
0.4 TABLET SUBLINGUAL EVERY 5 MIN PRN
Qty: 25 TABLET | Refills: 3 | Status: SHIPPED | OUTPATIENT
Start: 2025-04-28

## 2025-04-28 RX ORDER — METOPROLOL SUCCINATE 25 MG/1
12.5 TABLET, EXTENDED RELEASE ORAL DAILY
COMMUNITY
Start: 2025-04-28

## 2025-04-28 RX ORDER — LISINOPRIL 5 MG/1
TABLET ORAL
Qty: 90 TABLET | Refills: 4 | Status: CANCELLED | OUTPATIENT
Start: 2025-04-28

## 2025-04-28 RX ORDER — WARFARIN SODIUM 5 MG/1
TABLET ORAL
Qty: 145 TABLET | Refills: 4 | Status: CANCELLED | OUTPATIENT
Start: 2025-04-28

## 2025-04-28 RX ORDER — METOPROLOL SUCCINATE 25 MG/1
25 TABLET, EXTENDED RELEASE ORAL DAILY
Qty: 90 TABLET | Refills: 4 | Status: CANCELLED | OUTPATIENT
Start: 2025-04-28

## 2025-04-28 NOTE — PROGRESS NOTES
Cardiology Clinic Progress Note  Yogesh Abreu MRN# [id number]   YOB: 1940 Age: 84 year old     Primary cardiologist: Dr. Isaacs     Reason for visit: follow-up     History of presenting illness:    Yogesh Abreu is a pleasant 84 year old patient who follows closely with Dr. Isaacs.     He has a past medical history significant for antiphospholipid antibody syndrome on Coumadin with history of PE and DVT, coronary disease s/p PCI to RCA (2015), hyperlipidemia, and hypertension, who is here today for follow-up.     In January 2023, he was seen by his cardiologist in [place] with complaints of shortness of breath. Nuclear stress test showed inferior ischemia. He underwent coronary angiogram on 3/1/2023 in [place], [place] demonstrating significant mid LAD disease s/p single drug-eluting stent.     The patient was last seen by Dr. Isaacs September 2023 at which point he was doing well without any recurrent exertional symptoms.  His Plavix was stopped and he was started on aspirin 81 mg daily.    Since that time he has done well.  He is walking on the treadmill 3 to 4 days a week.  He recently returned from [place] where he was golfing on a weekly basis.  He has no exertional symptoms.  He is currently participating in physical therapy for some gait and balance and occasional positional dizziness.  He otherwise has no chest pain, shortness of breath, syncope or near syncope, or palpitations.  No PND or orthopnea.      Recent labs from 4/24/2025 show LDL of 53, creatinine of 0.90, GFR 85, normal electrolytes.  ALT of 27.           Assessment and Plan:     ASSESSMENT:    CAD s/p PCI to RCA (2015) and mid LAD (3/1/2023).  Doing well without any exertional symptoms or angina.  Risk factors are well-controlled.  Managed on aspirin and statin therapy.  Antiphospholipid antibody syndrome with history of DVT and PE.  On chronic Coumadin.  Hyperlipidemia with goal LDL < 70.  At goal on  atorvastatin 80 mg daily and ezetimibe 10 mg daily.  NSVT and SVT noted on Zio patch from October 2022.  Asymptomatic, on low-dose Toprol-XL.  Hypertension.  Well-controlled on lisinopril 5 mg daily, Toprol-XL 12.5 mg daily.      PLAN:     Geovany is doing excellent from a cardiac standpoint, he has no exertional symptoms.  He will continue his current medication regimen, I made no changes today.  In regards to his occasional positional dizziness, I have encouraged him to stay well-hydrated and move positions slowly.  Assuming he remains stable, follow-up with Dr. Sharan Mauro in 6 months as planned with repeat labs.       Denisa Carranza, STEVE, APRN, CNP  Page: [phone] (8a-5p M-F)    Orders this Visit:  Orders Placed This Encounter   Procedures    Lipid Profile    ALT    Basic metabolic panel    Follow-Up with Cardiology     Orders Placed This Encounter   Medications    nitroGLYcerin (NITROSTAT) 0.4 MG sublingual tablet     Sig: Place 1 tablet (0.4 mg) under the tongue every 5 minutes as needed for chest pain.     Dispense:  25 tablet     Refill:  3    metoprolol succinate ER (TOPROL XL) 25 MG 24 hr tablet     Sig: Take 0.5 tablets (12.5 mg) by mouth daily.     Medications Discontinued During This Encounter   Medication Reason    nitroGLYcerin (NITROSTAT) 0.4 MG sublingual tablet Reorder (No AVS)    metoprolol succinate ER (TOPROL XL) 25 MG 24 hr tablet          Today's clinic visit entailed:  Review of the result(s) of each unique test - echo, labs, EKG  Ordering of each unique test  Prescription drug management  35 minutes spent by me on the date of the encounter doing chart review, review of outside records, review of test results, patient visit and documentation   Provider  Link to [facility] Help Grid     The level of medical decision making during this visit was of moderate complexity.           Review of Systems:     Review of Systems:  Skin:        Eyes:       ENT:       Respiratory:  Positive for dyspnea on  "exertion  Cardiovascular:    Positive for, fatigue, dizziness  Gastroenterology:      Genitourinary:       Musculoskeletal:       Neurologic:       Psychiatric:       Heme/Lymph/Imm:       Endocrine:                 Physical Exam:   Vitals: /81   Pulse 65   Ht 1.753 m (5' 9\")   Wt 89.6 kg (197 lb 9.6 oz)   SpO2 96%   BMI 29.18 kg/m    Constitutional:  cooperative        Skin:  warm and dry to the touch        Head:  normocephalic        Eyes:  pupils equal and round        ENT:  not assessed this visit        Neck:  JVP normal        Chest:  normal breath sounds, clear to auscultation, normal A-P diameter, normal symmetry, normal respiratory excursion, no use of accessory muscles        Cardiac: regular rhythm, normal S1 and S2, no murmurs, gallops or rubs detected                  Abdomen:  abdomen soft        Vascular: pulses full and equal                                      Extremities and Back:  no edema        Neurological:  no gross motor deficits, affect appropriate             Medications:     Current Outpatient Medications   Medication Sig Dispense Refill    albuterol (PROAIR HFA/PROVENTIL HFA/VENTOLIN HFA) 108 (90 Base) MCG/ACT inhaler Inhale 2 puffs into the lungs every 6 hours as needed for shortness of breath / dyspnea or wheezing 1 Inhaler 0    aspirin 81 MG EC tablet Take 1 tablet (81 mg) by mouth daily      atorvastatin (LIPITOR) 80 MG tablet Take 1 tablet (80 mg) by mouth At Bedtime 90 tablet 3    azelastine (ASTELIN) 0.1 % nasal spray Spray 1 spray into both nostrils 2 times daily      Calcium Citrate-Vitamin D (CALCIUM CITRATE + D PO) Take 600 mg by mouth 2 times daily      Cholecalciferol (VITAMIN D3 PO) Take 1,000 Units by mouth 2 times daily      ezetimibe (ZETIA) 10 MG tablet Take 1 tablet (10 mg) by mouth daily 90 tablet 3    fluticasone-salmeterol (ADVAIR-HFA) 230-21 MCG/ACT inhaler Inhale 2 puffs into the lungs 2 times daily 12 g 11    ibuprofen (ADVIL/MOTRIN) 200 MG capsule " Take 600 mg by mouth daily as needed for fever      lisinopril (ZESTRIL) 5 MG tablet TAKE 1 TABLET(5 MG) BY MOUTH DAILY 90 tablet 3    metoprolol succinate ER (TOPROL XL) 25 MG 24 hr tablet Take 0.5 tablets (12.5 mg) by mouth daily.      nitroGLYcerin (NITROSTAT) 0.4 MG sublingual tablet Place 1 tablet (0.4 mg) under the tongue every 5 minutes as needed for chest pain. 25 tablet 3    vardenafil (LEVITRA) 20 MG tablet Take 0.5-1 tablets (10-20 mg) by mouth daily as needed Never use with nitroglycerin, terazosin or doxazosin. 12 tablet 11    warfarin ANTICOAGULANT (COUMADIN) 5 MG tablet Take 1 tab on  and take 1 1/2 tabs on all other days or as directed per INR clinic 145 tablet 3       Family History   Problem Relation Age of Onset    Hypertension Brother     Hypertension Brother        Social History     Socioeconomic History    Marital status:      Spouse name: Not on file    Number of children: Not on file    Years of education: Not on file    Highest education level: Not on file   Occupational History    Not on file   Tobacco Use    Smoking status: Former     Current packs/day: 0.00     Average packs/day: 0.5 packs/day for 10.0 years (5.0 ttl pk-yrs)     Types: Cigarettes     Start date:      Quit date:      Years since quittin.3    Smokeless tobacco: Never   Substance and Sexual Activity    Alcohol use: Yes     Comment: 1 drink per day    Drug use: No    Sexual activity: Yes     Partners: Female   Other Topics Concern     Service Not Asked    Blood Transfusions Not Asked    Caffeine Concern Yes     Comment: 1 cup coffee daily    Occupational Exposure Not Asked    Hobby Hazards Not Asked    Sleep Concern No    Stress Concern Yes     Comment: related to relationship    Weight Concern No    Special Diet Yes     Comment: mediterranian diet    Back Care Not Asked    Exercise Yes     Comment: walking, cardiac rehab starting    Bike Helmet Not Asked    Seat Belt Not Asked     Self-Exams Not Asked    Parent/sibling w/ CABG, MI or angioplasty before 65F 55M? Not Asked   Social History Narrative    Not on file     Social Drivers of Health     Financial Resource Strain: Low Risk  (5/28/2024)    [facility]    Financial Resource Strain     Difficulty of Paying Living Expenses: 3     Difficulty of Paying Living Expenses: Not on file   Food Insecurity: No Food Insecurity (5/28/2024)    Received from [facility]    Food Insecurity     Do you worry your food will run out before you are able to buy more?: 1   Transportation Needs: No Transportation Needs (5/28/2024)    Received from [facility]    Transportation Needs     Does lack of transportation keep you from medical appointments?: 1     Does lack of transportation keep you from work, meetings or getting things that you need?: 1   Physical Activity: Not on file   Stress: Not on file   Social Connections: Socially Integrated (5/28/2024)    [facility]    Social Connections     Do you often feel lonely or isolated from those around you?: 0   Interpersonal Safety: Not At Risk (8/17/2023)    Received from [facility]     Threatened: Not on file     Insulted: Not on file     Physically Hurt : Not on file     Scream: Not on file   Housing Stability: Low Risk  (5/28/2024)    Received from [facility]    Housing Stability     What is your housing situation today?: 1            Past Medical History:     Past Medical History:   Diagnosis Date    Antiphospholipid antibody syndrome 05/25/2017    CAD (coronary artery disease), native coronary artery 10/2015    9/2015 Heart cath - 90% diagonal, 50-60% CFX, 100% prox RCA occlusion - MAL placed in RCA, 10/2015 EF 35-40% by Echo    DVT (deep venous thrombosis) (H) 09/2015 9/2015 Occlusive DVT extending  from left common femoral to mid left popliteal vein    Hypercholesteraemia     Hypertension     NSVT (nonsustained ventricular tachycardia) (H)     PE (pulmonary embolism) 09/2015    PMR (polymyalgia rheumatica)     Polymyalgia rheumatica     STEMI (ST elevation myocardial infarction) (H) 10/2015    10/2015 Inferior STEMI - 100% RCA occlusion with MAL placed              Past Surgical History:     Past Surgical History:   Procedure Laterality Date    COLONOSCOPY N/A 10/17/2022    Procedure: COLONOSCOPY WITH POLYPECTOMY;  Surgeon: Pradeep Cintron MD;  Location: [facility] Main OR    HEART CATH STENT COR W/WO PTCA  10/2015    90% diagonal, 50-60% CFX, 100% prox RCA occlusion - MAL placed in RCA    ORTHOPEDIC SURGERY  08/2015    right carpal tunnel              Allergies:   Seasonal allergies and Simvastatin       Data:   All laboratory data reviewed:    Recent Labs   Lab Test 04/24/25  0810 09/05/23  0804 10/13/22  0855 09/29/22  1203 09/21/21  0919   LDL 53 67 51   < >  --    HDL 36* 36* 45   < >  --    NHDL 76 79 63   < >  --    CHOL 112 115 108   < >  --    TRIG 117 62 59   < >  --    TSH  --   --   --   --  1.29    < > = values in this interval not displayed.       Lab Results   Component Value Date    WBC 4.9 09/21/2021    WBC 4.7 09/17/2020    RBC 5.14 09/21/2021    RBC 5.53 09/17/2020    HGB 16.1 09/21/2021    HGB 16.9 09/17/2020    HCT 47.7 09/21/2021    HCT 49.8 09/17/2020    MCV 93 09/21/2021    MCV 90 09/17/2020    MCH 31.3 09/21/2021    MCH 30.6 09/17/2020    MCHC 33.8 09/21/2021    MCHC 33.9 09/17/2020    RDW 13.8 09/21/2021    RDW 14.2 09/17/2020     09/21/2021     09/17/2020       Lab Results   Component Value Date     04/24/2025     06/17/2021    POTASSIUM 4.2 04/24/2025    POTASSIUM 4.0 09/29/2022    POTASSIUM 4.0 06/17/2021    CHLORIDE 99 04/24/2025    CHLORIDE 105 09/29/2022    CHLORIDE 108 06/17/2021    CO2 26 04/24/2025    CO2 26 09/29/2022    CO2 27 06/17/2021     ANIONGAP 13 04/24/2025    ANIONGAP 6 09/29/2022    ANIONGAP 5 06/17/2021     (H) 04/24/2025     (H) 09/29/2022    GLC 97 06/17/2021    BUN 20.5 04/24/2025    BUN 26 09/29/2022    BUN 23 06/17/2021    CR 0.90 04/24/2025    CR 0.81 06/17/2021    GFRESTIMATED 85 04/24/2025    GFRESTIMATED 85 06/17/2021    GFRESTBLACK >90 06/17/2021    RONNY 10.0 04/24/2025    RONNY 9.4 06/17/2021      Lab Results   Component Value Date    AST 33 09/17/2020    ALT 27 04/24/2025    ALT 34 06/17/2021       Lab Results   Component Value Date    A1C 5.6 09/20/2019       Lab Results   Component Value Date    INR 1.18 (H) 10/17/2022    INR 3.3 (H) 10/05/2022    INR 2.1 09/21/2022    INR 2.1 09/21/2022    INR 2.7 07/14/2021    INR 2.1 (A) 06/30/2021

## 2025-04-28 NOTE — LETTER
4/28/2025    Brandan Clinton MD  [address]  [facility] [address]    RE: Yogesh Abreu       Dear Colleague,     I had the pleasure of seeing Yogesh Abreu in the [facility].  Cardiology Clinic Progress Note  Yogesh Abreu MRN# [id number]   YOB: 1940 Age: 84 year old     Primary cardiologist: Dr. Isaacs     Reason for visit: follow-up     History of presenting illness:    Yogesh Abreu is a pleasant 84 year old patient who follows closely with Dr. Isaacs.     He has a past medical history significant for antiphospholipid antibody syndrome on Coumadin with history of PE and DVT, coronary disease s/p PCI to RCA (2015), hyperlipidemia, and hypertension, who is here today for follow-up.     In January 2023, he was seen by his cardiologist in [place] with complaints of shortness of breath. Nuclear stress test showed inferior ischemia. He underwent coronary angiogram on 3/1/2023 in [place], [place] demonstrating significant mid LAD disease s/p single drug-eluting stent.     The patient was last seen by Dr. Isaacs September 2023 at which point he was doing well without any recurrent exertional symptoms.  His Plavix was stopped and he was started on aspirin 81 mg daily.    Since that time he has done well.  He is walking on the treadmill 3 to 4 days a week.  He recently returned from [place] where he was golfing on a weekly basis.  He has no exertional symptoms.  He is currently participating in physical therapy for some gait and balance and occasional positional dizziness.  He otherwise has no chest pain, shortness of breath, syncope or near syncope, or palpitations.  No PND or orthopnea.      Recent labs from 4/24/2025 show LDL of 53, creatinine of 0.90, GFR 85, normal electrolytes.  ALT of 27.           Assessment and Plan:     ASSESSMENT:    CAD s/p PCI to RCA (2015) and mid LAD (3/1/2023).  Doing well without any exertional symptoms or  angina.  Risk factors are well-controlled.  Managed on aspirin and statin therapy.  Antiphospholipid antibody syndrome with history of DVT and PE.  On chronic Coumadin.  Hyperlipidemia with goal LDL < 70.  At goal on atorvastatin 80 mg daily and ezetimibe 10 mg daily.  NSVT and SVT noted on Zio patch from October 2022.  Asymptomatic, on low-dose Toprol-XL.  Hypertension.  Well-controlled on lisinopril 5 mg daily, Toprol-XL 12.5 mg daily.      PLAN:     Geovany is doing excellent from a cardiac standpoint, he has no exertional symptoms.  He will continue his current medication regimen, I made no changes today.  In regards to his occasional positional dizziness, I have encouraged him to stay well-hydrated and move positions slowly.  Assuming he remains stable, follow-up with Dr. Sharan Mauro in 6 months as planned with repeat labs.       Denisa Carranza, STEVE, APRN, CNP  Page: [phone] (8a-5p M-F)    Orders this Visit:  Orders Placed This Encounter   Procedures     Lipid Profile     ALT     Basic metabolic panel     Follow-Up with Cardiology     Orders Placed This Encounter   Medications     nitroGLYcerin (NITROSTAT) 0.4 MG sublingual tablet     Sig: Place 1 tablet (0.4 mg) under the tongue every 5 minutes as needed for chest pain.     Dispense:  25 tablet     Refill:  3     metoprolol succinate ER (TOPROL XL) 25 MG 24 hr tablet     Sig: Take 0.5 tablets (12.5 mg) by mouth daily.     Medications Discontinued During This Encounter   Medication Reason     nitroGLYcerin (NITROSTAT) 0.4 MG sublingual tablet Reorder (No AVS)     metoprolol succinate ER (TOPROL XL) 25 MG 24 hr tablet          Today's clinic visit entailed:  Review of the result(s) of each unique test - echo, labs, EKG  Ordering of each unique test  Prescription drug management  35 minutes spent by me on the date of the encounter doing chart review, review of outside records, review of test results, patient visit and documentation   Provider  Link to [facility]  "Help Grid     The level of medical decision making during this visit was of moderate complexity.           Review of Systems:     Review of Systems:  Skin:        Eyes:       ENT:       Respiratory:  Positive for dyspnea on exertion  Cardiovascular:    Positive for, fatigue, dizziness  Gastroenterology:      Genitourinary:       Musculoskeletal:       Neurologic:       Psychiatric:       Heme/Lymph/Imm:       Endocrine:                 Physical Exam:   Vitals: /81   Pulse 65   Ht 1.753 m (5' 9\")   Wt 89.6 kg (197 lb 9.6 oz)   SpO2 96%   BMI 29.18 kg/m    Constitutional:  cooperative        Skin:  warm and dry to the touch        Head:  normocephalic        Eyes:  pupils equal and round        ENT:  not assessed this visit        Neck:  JVP normal        Chest:  normal breath sounds, clear to auscultation, normal A-P diameter, normal symmetry, normal respiratory excursion, no use of accessory muscles        Cardiac: regular rhythm, normal S1 and S2, no murmurs, gallops or rubs detected                  Abdomen:  abdomen soft        Vascular: pulses full and equal                                      Extremities and Back:  no edema        Neurological:  no gross motor deficits, affect appropriate             Medications:     Current Outpatient Medications   Medication Sig Dispense Refill     albuterol (PROAIR HFA/PROVENTIL HFA/VENTOLIN HFA) 108 (90 Base) MCG/ACT inhaler Inhale 2 puffs into the lungs every 6 hours as needed for shortness of breath / dyspnea or wheezing 1 Inhaler 0     aspirin 81 MG EC tablet Take 1 tablet (81 mg) by mouth daily       atorvastatin (LIPITOR) 80 MG tablet Take 1 tablet (80 mg) by mouth At Bedtime 90 tablet 3     azelastine (ASTELIN) 0.1 % nasal spray Spray 1 spray into both nostrils 2 times daily       Calcium Citrate-Vitamin D (CALCIUM CITRATE + D PO) Take 600 mg by mouth 2 times daily       Cholecalciferol (VITAMIN D3 PO) Take 1,000 Units by mouth 2 times daily       " ezetimibe (ZETIA) 10 MG tablet Take 1 tablet (10 mg) by mouth daily 90 tablet 3     fluticasone-salmeterol (ADVAIR-HFA) 230-21 MCG/ACT inhaler Inhale 2 puffs into the lungs 2 times daily 12 g 11     ibuprofen (ADVIL/MOTRIN) 200 MG capsule Take 600 mg by mouth daily as needed for fever       lisinopril (ZESTRIL) 5 MG tablet TAKE 1 TABLET(5 MG) BY MOUTH DAILY 90 tablet 3     metoprolol succinate ER (TOPROL XL) 25 MG 24 hr tablet Take 0.5 tablets (12.5 mg) by mouth daily.       nitroGLYcerin (NITROSTAT) 0.4 MG sublingual tablet Place 1 tablet (0.4 mg) under the tongue every 5 minutes as needed for chest pain. 25 tablet 3     vardenafil (LEVITRA) 20 MG tablet Take 0.5-1 tablets (10-20 mg) by mouth daily as needed Never use with nitroglycerin, terazosin or doxazosin. 12 tablet 11     warfarin ANTICOAGULANT (COUMADIN) 5 MG tablet Take 1 tab on  and take 1 1/2 tabs on all other days or as directed per INR clinic 145 tablet 3       Family History   Problem Relation Age of Onset     Hypertension Brother      Hypertension Brother        Social History     Socioeconomic History     Marital status:      Spouse name: Not on file     Number of children: Not on file     Years of education: Not on file     Highest education level: Not on file   Occupational History     Not on file   Tobacco Use     Smoking status: Former     Current packs/day: 0.00     Average packs/day: 0.5 packs/day for 10.0 years (5.0 ttl pk-yrs)     Types: Cigarettes     Start date:      Quit date:      Years since quittin.3     Smokeless tobacco: Never   Substance and Sexual Activity     Alcohol use: Yes     Comment: 1 drink per day     Drug use: No     Sexual activity: Yes     Partners: Female   Other Topics Concern      Service Not Asked     Blood Transfusions Not Asked     Caffeine Concern Yes     Comment: 1 cup coffee daily     Occupational Exposure Not Asked     Hobby Hazards Not Asked     Sleep Concern No     Stress  Concern Yes     Comment: related to relationship     Weight Concern No     Special Diet Yes     Comment: mediterranian diet     Back Care Not Asked     Exercise Yes     Comment: walking, cardiac rehab starting     Bike Helmet Not Asked     Seat Belt Not Asked     Self-Exams Not Asked     Parent/sibling w/ CABG, MI or angioplasty before 65F 55M? Not Asked   Social History Narrative     Not on file     Social Drivers of Health     Financial Resource Strain: Low Risk  (5/28/2024)    [facility]    Financial Resource Strain      Difficulty of Paying Living Expenses: 3      Difficulty of Paying Living Expenses: Not on file   Food Insecurity: No Food Insecurity (5/28/2024)    [facility]    Food Insecurity      Do you worry your food will run out before you are able to buy more?: 1   Transportation Needs: No Transportation Needs (5/28/2024)    [facility] from [facility]    Transportation Needs      Does lack of transportation keep you from medical appointments?: 1      Does lack of transportation keep you from work, meetings or getting things that you need?: 1   Physical Activity: Not on file   Stress: Not on file   Social Connections: Socially Integrated (5/28/2024)    Received from [facility]    Social Connections      Do you often feel lonely or isolated from those around you?: 0   Interpersonal Safety: Not At Risk (8/17/2023)    Received from [facility]      Threatened: Not on file      Insulted: Not on file      Physically Hurt : Not on file      Scream: Not on file   Housing Stability: Low Risk  (5/28/2024)    [facility] from [facility]    Housing Stability      What is your housing situation today?: 1            Past Medical History:     Past Medical History:   Diagnosis Date     Antiphospholipid antibody  syndrome 05/25/2017     CAD (coronary artery disease), native coronary artery 10/2015    9/2015 Heart cath - 90% diagonal, 50-60% CFX, 100% prox RCA occlusion - MAL placed in RCA, 10/2015 EF 35-40% by Echo     DVT (deep venous thrombosis) (H) 09/2015 9/2015 Occlusive DVT extending from left common femoral to mid left popliteal vein     Hypercholesteraemia      Hypertension      NSVT (nonsustained ventricular tachycardia) (H)      PE (pulmonary embolism) 09/2015     PMR (polymyalgia rheumatica)      Polymyalgia rheumatica      STEMI (ST elevation myocardial infarction) (H) 10/2015    10/2015 Inferior STEMI - 100% RCA occlusion with MAL placed              Past Surgical History:     Past Surgical History:   Procedure Laterality Date     COLONOSCOPY N/A 10/17/2022    Procedure: COLONOSCOPY WITH POLYPECTOMY;  Surgeon: Pradeep Cintron MD;  [facility]     HEART CATH STENT COR W/WO PTCA  10/2015    90% diagonal, 50-60% CFX, 100% prox RCA occlusion - MAL placed in RCA     ORTHOPEDIC SURGERY  08/2015    right carpal tunnel              Allergies:   Seasonal allergies and Simvastatin       Data:   All laboratory data reviewed:    Recent Labs   Lab Test 04/24/25  0810 09/05/23  0804 10/13/22  0855 09/29/22  1203 09/21/21  0919   LDL 53 67 51   < >  --    HDL 36* 36* 45   < >  --    NHDL 76 79 63   < >  --    CHOL 112 115 108   < >  --    TRIG 117 62 59   < >  --    TSH  --   --   --   --  1.29    < > = values in this interval not displayed.       Lab Results   Component Value Date    WBC 4.9 09/21/2021    WBC 4.7 09/17/2020    RBC 5.14 09/21/2021    RBC 5.53 09/17/2020    HGB 16.1 09/21/2021    HGB 16.9 09/17/2020    HCT 47.7 09/21/2021    HCT 49.8 09/17/2020    MCV 93 09/21/2021    MCV 90 09/17/2020    MCH 31.3 09/21/2021    MCH 30.6 09/17/2020    MCHC 33.8 09/21/2021    MCHC 33.9 09/17/2020    RDW 13.8 09/21/2021    RDW 14.2 09/17/2020     09/21/2021     09/17/2020       Lab Results    Component Value Date     04/24/2025     06/17/2021    POTASSIUM 4.2 04/24/2025    POTASSIUM 4.0 09/29/2022    POTASSIUM 4.0 06/17/2021    CHLORIDE 99 04/24/2025    CHLORIDE 105 09/29/2022    CHLORIDE 108 06/17/2021    CO2 26 04/24/2025    CO2 26 09/29/2022    CO2 27 06/17/2021    ANIONGAP 13 04/24/2025    ANIONGAP 6 09/29/2022    ANIONGAP 5 06/17/2021     (H) 04/24/2025     (H) 09/29/2022    GLC 97 06/17/2021    BUN 20.5 04/24/2025    BUN 26 09/29/2022    BUN 23 06/17/2021    CR 0.90 04/24/2025    CR 0.81 06/17/2021    GFRESTIMATED 85 04/24/2025    GFRESTIMATED 85 06/17/2021    GFRESTBLACK >90 06/17/2021    RONNY 10.0 04/24/2025    RONNY 9.4 06/17/2021      Lab Results   Component Value Date    AST 33 09/17/2020    ALT 27 04/24/2025    ALT 34 06/17/2021       Lab Results   Component Value Date    A1C 5.6 09/20/2019       Lab Results   Component Value Date    INR 1.18 (H) 10/17/2022    INR 3.3 (H) 10/05/2022    INR 2.1 09/21/2022    INR 2.1 09/21/2022    INR 2.7 07/14/2021    INR 2.1 (A) 06/30/2021         Thank you for allowing me to participate in the care of your patient.      Sincerely,     Denisa Carranza, CNP     [facility]  cc:   Frederic Isaacs MD  [address]  [place] [id number]

## 2025-06-28 ENCOUNTER — HEALTH MAINTENANCE LETTER (OUTPATIENT)
Age: 85
End: 2025-06-28

## (undated) DEVICE — TUBING SUCTION MEDI-VAC 1/4"X20' N620A - HE

## (undated) DEVICE — SUCTION MANIFOLD NEPTUNE 2 SYS 1 PORT 702-025-000

## (undated) DEVICE — FORCEP BIOPSY DISP 000386

## (undated) DEVICE — SOL WATER IRRIG 1000ML BOTTLE 2F7114

## (undated) RX ORDER — FENTANYL CITRATE-0.9 % NACL/PF 10 MCG/ML
PLASTIC BAG, INJECTION (ML) INTRAVENOUS
Status: DISPENSED
Start: 2022-10-17

## (undated) RX ORDER — REGADENOSON 0.08 MG/ML
INJECTION, SOLUTION INTRAVENOUS
Status: DISPENSED
Start: 2019-09-17

## (undated) RX ORDER — LIDOCAINE HYDROCHLORIDE 10 MG/ML
INJECTION, SOLUTION EPIDURAL; INFILTRATION; INTRACAUDAL; PERINEURAL
Status: DISPENSED
Start: 2022-10-17

## (undated) RX ORDER — PROPOFOL 10 MG/ML
INJECTION, EMULSION INTRAVENOUS
Status: DISPENSED
Start: 2022-10-17